# Patient Record
Sex: FEMALE | Race: OTHER | NOT HISPANIC OR LATINO | URBAN - METROPOLITAN AREA
[De-identification: names, ages, dates, MRNs, and addresses within clinical notes are randomized per-mention and may not be internally consistent; named-entity substitution may affect disease eponyms.]

---

## 2020-01-01 ENCOUNTER — INPATIENT (INPATIENT)
Facility: HOSPITAL | Age: 55
LOS: 31 days | DRG: 207 | End: 2020-05-09
Attending: INTERNAL MEDICINE | Admitting: INTERNAL MEDICINE
Payer: COMMERCIAL

## 2020-01-01 VITALS — HEART RATE: 143 BPM | OXYGEN SATURATION: 77 %

## 2020-01-01 VITALS
WEIGHT: 190.04 LBS | HEART RATE: 114 BPM | HEIGHT: 55 IN | SYSTOLIC BLOOD PRESSURE: 104 MMHG | OXYGEN SATURATION: 93 % | TEMPERATURE: 100 F | DIASTOLIC BLOOD PRESSURE: 76 MMHG | RESPIRATION RATE: 16 BRPM

## 2020-01-01 DIAGNOSIS — K21.9 GASTRO-ESOPHAGEAL REFLUX DISEASE WITHOUT ESOPHAGITIS: ICD-10-CM

## 2020-01-01 DIAGNOSIS — J80 ACUTE RESPIRATORY DISTRESS SYNDROME: ICD-10-CM

## 2020-01-01 DIAGNOSIS — I10 ESSENTIAL (PRIMARY) HYPERTENSION: ICD-10-CM

## 2020-01-01 DIAGNOSIS — N17.9 ACUTE KIDNEY FAILURE, UNSPECIFIED: ICD-10-CM

## 2020-01-01 DIAGNOSIS — U07.1 COVID-19: ICD-10-CM

## 2020-01-01 DIAGNOSIS — B34.9 VIRAL INFECTION, UNSPECIFIED: ICD-10-CM

## 2020-01-01 DIAGNOSIS — J18.9 PNEUMONIA, UNSPECIFIED ORGANISM: ICD-10-CM

## 2020-01-01 DIAGNOSIS — I50.9 HEART FAILURE, UNSPECIFIED: ICD-10-CM

## 2020-01-01 DIAGNOSIS — E43 UNSPECIFIED SEVERE PROTEIN-CALORIE MALNUTRITION: ICD-10-CM

## 2020-01-01 DIAGNOSIS — Z29.9 ENCOUNTER FOR PROPHYLACTIC MEASURES, UNSPECIFIED: ICD-10-CM

## 2020-01-01 DIAGNOSIS — Z51.5 ENCOUNTER FOR PALLIATIVE CARE: ICD-10-CM

## 2020-01-01 DIAGNOSIS — A41.9 SEPSIS, UNSPECIFIED ORGANISM: ICD-10-CM

## 2020-01-01 LAB
ALBUMIN SERPL ELPH-MCNC: 1.2 G/DL — LOW (ref 3.5–5)
ALBUMIN SERPL ELPH-MCNC: 1.2 G/DL — LOW (ref 3.5–5)
ALBUMIN SERPL ELPH-MCNC: 1.3 G/DL — LOW (ref 3.5–5)
ALBUMIN SERPL ELPH-MCNC: 1.5 G/DL — LOW (ref 3.5–5)
ALBUMIN SERPL ELPH-MCNC: 1.6 G/DL — LOW (ref 3.5–5)
ALBUMIN SERPL ELPH-MCNC: 1.7 G/DL — LOW (ref 3.5–5)
ALBUMIN SERPL ELPH-MCNC: 1.8 G/DL — LOW (ref 3.5–5)
ALBUMIN SERPL ELPH-MCNC: 1.9 G/DL — LOW (ref 3.5–5)
ALBUMIN SERPL ELPH-MCNC: 2 G/DL — LOW (ref 3.5–5)
ALBUMIN SERPL ELPH-MCNC: 2.1 G/DL — LOW (ref 3.5–5)
ALBUMIN SERPL ELPH-MCNC: 2.4 G/DL — LOW (ref 3.5–5)
ALBUMIN SERPL ELPH-MCNC: 2.4 G/DL — LOW (ref 3.5–5)
ALBUMIN SERPL ELPH-MCNC: 2.5 G/DL — LOW (ref 3.5–5)
ALBUMIN SERPL ELPH-MCNC: 2.6 G/DL — LOW (ref 3.5–5)
ALBUMIN SERPL ELPH-MCNC: 3.1 G/DL — LOW (ref 3.5–5)
ALP SERPL-CCNC: 108 U/L — SIGNIFICANT CHANGE UP (ref 40–120)
ALP SERPL-CCNC: 125 U/L — HIGH (ref 40–120)
ALP SERPL-CCNC: 129 U/L — HIGH (ref 40–120)
ALP SERPL-CCNC: 153 U/L — HIGH (ref 40–120)
ALP SERPL-CCNC: 166 U/L — HIGH (ref 40–120)
ALP SERPL-CCNC: 169 U/L — HIGH (ref 40–120)
ALP SERPL-CCNC: 184 U/L — HIGH (ref 40–120)
ALP SERPL-CCNC: 185 U/L — HIGH (ref 40–120)
ALP SERPL-CCNC: 188 U/L — HIGH (ref 40–120)
ALP SERPL-CCNC: 190 U/L — HIGH (ref 40–120)
ALP SERPL-CCNC: 202 U/L — HIGH (ref 40–120)
ALP SERPL-CCNC: 209 U/L — HIGH (ref 40–120)
ALP SERPL-CCNC: 236 U/L — HIGH (ref 40–120)
ALP SERPL-CCNC: 239 U/L — HIGH (ref 40–120)
ALP SERPL-CCNC: 253 U/L — HIGH (ref 40–120)
ALP SERPL-CCNC: 259 U/L — HIGH (ref 40–120)
ALP SERPL-CCNC: 272 U/L — HIGH (ref 40–120)
ALP SERPL-CCNC: 278 U/L — HIGH (ref 40–120)
ALP SERPL-CCNC: 291 U/L — HIGH (ref 40–120)
ALP SERPL-CCNC: 293 U/L — HIGH (ref 40–120)
ALP SERPL-CCNC: 74 U/L — SIGNIFICANT CHANGE UP (ref 40–120)
ALP SERPL-CCNC: 77 U/L — SIGNIFICANT CHANGE UP (ref 40–120)
ALP SERPL-CCNC: 80 U/L — SIGNIFICANT CHANGE UP (ref 40–120)
ALP SERPL-CCNC: 85 U/L — SIGNIFICANT CHANGE UP (ref 40–120)
ALP SERPL-CCNC: 86 U/L — SIGNIFICANT CHANGE UP (ref 40–120)
ALP SERPL-CCNC: 86 U/L — SIGNIFICANT CHANGE UP (ref 40–120)
ALP SERPL-CCNC: 89 U/L — SIGNIFICANT CHANGE UP (ref 40–120)
ALP SERPL-CCNC: 91 U/L — SIGNIFICANT CHANGE UP (ref 40–120)
ALP SERPL-CCNC: 95 U/L — SIGNIFICANT CHANGE UP (ref 40–120)
ALP SERPL-CCNC: 96 U/L — SIGNIFICANT CHANGE UP (ref 40–120)
ALP SERPL-CCNC: 97 U/L — SIGNIFICANT CHANGE UP (ref 40–120)
ALT FLD-CCNC: 115 U/L DA — HIGH (ref 10–60)
ALT FLD-CCNC: 129 U/L DA — HIGH (ref 10–60)
ALT FLD-CCNC: 29 U/L DA — SIGNIFICANT CHANGE UP (ref 10–60)
ALT FLD-CCNC: 31 U/L DA — SIGNIFICANT CHANGE UP (ref 10–60)
ALT FLD-CCNC: 31 U/L DA — SIGNIFICANT CHANGE UP (ref 10–60)
ALT FLD-CCNC: 33 U/L DA — SIGNIFICANT CHANGE UP (ref 10–60)
ALT FLD-CCNC: 33 U/L DA — SIGNIFICANT CHANGE UP (ref 10–60)
ALT FLD-CCNC: 38 U/L DA — SIGNIFICANT CHANGE UP (ref 10–60)
ALT FLD-CCNC: 38 U/L DA — SIGNIFICANT CHANGE UP (ref 10–60)
ALT FLD-CCNC: 39 U/L DA — SIGNIFICANT CHANGE UP (ref 10–60)
ALT FLD-CCNC: 44 U/L DA — SIGNIFICANT CHANGE UP (ref 10–60)
ALT FLD-CCNC: 46 U/L DA — SIGNIFICANT CHANGE UP (ref 10–60)
ALT FLD-CCNC: 47 U/L DA — SIGNIFICANT CHANGE UP (ref 10–60)
ALT FLD-CCNC: 47 U/L DA — SIGNIFICANT CHANGE UP (ref 10–60)
ALT FLD-CCNC: 49 U/L DA — SIGNIFICANT CHANGE UP (ref 10–60)
ALT FLD-CCNC: 49 U/L DA — SIGNIFICANT CHANGE UP (ref 10–60)
ALT FLD-CCNC: 51 U/L DA — SIGNIFICANT CHANGE UP (ref 10–60)
ALT FLD-CCNC: 51 U/L DA — SIGNIFICANT CHANGE UP (ref 10–60)
ALT FLD-CCNC: 52 U/L DA — SIGNIFICANT CHANGE UP (ref 10–60)
ALT FLD-CCNC: 54 U/L DA — SIGNIFICANT CHANGE UP (ref 10–60)
ALT FLD-CCNC: 55 U/L DA — SIGNIFICANT CHANGE UP (ref 10–60)
ALT FLD-CCNC: 55 U/L DA — SIGNIFICANT CHANGE UP (ref 10–60)
ALT FLD-CCNC: 57 U/L DA — SIGNIFICANT CHANGE UP (ref 10–60)
ALT FLD-CCNC: 63 U/L DA — HIGH (ref 10–60)
ALT FLD-CCNC: 65 U/L DA — HIGH (ref 10–60)
ALT FLD-CCNC: 66 U/L DA — HIGH (ref 10–60)
ALT FLD-CCNC: 68 U/L DA — HIGH (ref 10–60)
ALT FLD-CCNC: 71 U/L DA — HIGH (ref 10–60)
ALT FLD-CCNC: 75 U/L DA — HIGH (ref 10–60)
ALT FLD-CCNC: 87 U/L DA — HIGH (ref 10–60)
ALT FLD-CCNC: 93 U/L DA — HIGH (ref 10–60)
ANION GAP SERPL CALC-SCNC: 10 MMOL/L — SIGNIFICANT CHANGE UP (ref 5–17)
ANION GAP SERPL CALC-SCNC: 11 MMOL/L — SIGNIFICANT CHANGE UP (ref 5–17)
ANION GAP SERPL CALC-SCNC: 12 MMOL/L — SIGNIFICANT CHANGE UP (ref 5–17)
ANION GAP SERPL CALC-SCNC: 12 MMOL/L — SIGNIFICANT CHANGE UP (ref 5–17)
ANION GAP SERPL CALC-SCNC: 13 MMOL/L — SIGNIFICANT CHANGE UP (ref 5–17)
ANION GAP SERPL CALC-SCNC: 13 MMOL/L — SIGNIFICANT CHANGE UP (ref 5–17)
ANION GAP SERPL CALC-SCNC: 14 MMOL/L — SIGNIFICANT CHANGE UP (ref 5–17)
ANION GAP SERPL CALC-SCNC: 15 MMOL/L — SIGNIFICANT CHANGE UP (ref 5–17)
ANION GAP SERPL CALC-SCNC: 16 MMOL/L — SIGNIFICANT CHANGE UP (ref 5–17)
ANION GAP SERPL CALC-SCNC: 17 MMOL/L — SIGNIFICANT CHANGE UP (ref 5–17)
ANION GAP SERPL CALC-SCNC: 5 MMOL/L — SIGNIFICANT CHANGE UP (ref 5–17)
ANION GAP SERPL CALC-SCNC: 6 MMOL/L — SIGNIFICANT CHANGE UP (ref 5–17)
ANION GAP SERPL CALC-SCNC: 7 MMOL/L — SIGNIFICANT CHANGE UP (ref 5–17)
ANION GAP SERPL CALC-SCNC: 7 MMOL/L — SIGNIFICANT CHANGE UP (ref 5–17)
ANION GAP SERPL CALC-SCNC: 8 MMOL/L — SIGNIFICANT CHANGE UP (ref 5–17)
ANION GAP SERPL CALC-SCNC: 9 MMOL/L — SIGNIFICANT CHANGE UP (ref 5–17)
ANISOCYTOSIS BLD QL: SLIGHT — SIGNIFICANT CHANGE UP
APPEARANCE UR: CLEAR — SIGNIFICANT CHANGE UP
APTT BLD: 25.9 SEC — LOW (ref 27.5–36.3)
APTT BLD: 29.4 SEC — SIGNIFICANT CHANGE UP (ref 27.5–36.3)
APTT BLD: 30.7 SEC — SIGNIFICANT CHANGE UP (ref 27.5–36.3)
APTT BLD: 31.8 SEC — SIGNIFICANT CHANGE UP (ref 27.5–36.3)
APTT BLD: 32 SEC — SIGNIFICANT CHANGE UP (ref 27.5–36.3)
APTT BLD: 32.4 SEC — SIGNIFICANT CHANGE UP (ref 27.5–36.3)
APTT BLD: 34.7 SEC — SIGNIFICANT CHANGE UP (ref 27.5–36.3)
APTT BLD: 35.3 SEC — SIGNIFICANT CHANGE UP (ref 27.5–36.3)
AST SERPL-CCNC: 100 U/L — HIGH (ref 10–40)
AST SERPL-CCNC: 105 U/L — HIGH (ref 10–40)
AST SERPL-CCNC: 30 U/L — SIGNIFICANT CHANGE UP (ref 10–40)
AST SERPL-CCNC: 37 U/L — SIGNIFICANT CHANGE UP (ref 10–40)
AST SERPL-CCNC: 40 U/L — SIGNIFICANT CHANGE UP (ref 10–40)
AST SERPL-CCNC: 44 U/L — HIGH (ref 10–40)
AST SERPL-CCNC: 45 U/L — HIGH (ref 10–40)
AST SERPL-CCNC: 45 U/L — HIGH (ref 10–40)
AST SERPL-CCNC: 47 U/L — HIGH (ref 10–40)
AST SERPL-CCNC: 48 U/L — HIGH (ref 10–40)
AST SERPL-CCNC: 51 U/L — HIGH (ref 10–40)
AST SERPL-CCNC: 52 U/L — HIGH (ref 10–40)
AST SERPL-CCNC: 54 U/L — HIGH (ref 10–40)
AST SERPL-CCNC: 55 U/L — HIGH (ref 10–40)
AST SERPL-CCNC: 55 U/L — HIGH (ref 10–40)
AST SERPL-CCNC: 56 U/L — HIGH (ref 10–40)
AST SERPL-CCNC: 57 U/L — HIGH (ref 10–40)
AST SERPL-CCNC: 57 U/L — HIGH (ref 10–40)
AST SERPL-CCNC: 63 U/L — HIGH (ref 10–40)
AST SERPL-CCNC: 64 U/L — HIGH (ref 10–40)
AST SERPL-CCNC: 65 U/L — HIGH (ref 10–40)
AST SERPL-CCNC: 66 U/L — HIGH (ref 10–40)
AST SERPL-CCNC: 68 U/L — HIGH (ref 10–40)
AST SERPL-CCNC: 73 U/L — HIGH (ref 10–40)
AST SERPL-CCNC: 74 U/L — HIGH (ref 10–40)
AST SERPL-CCNC: 75 U/L — HIGH (ref 10–40)
AST SERPL-CCNC: 82 U/L — HIGH (ref 10–40)
AST SERPL-CCNC: 83 U/L — HIGH (ref 10–40)
AST SERPL-CCNC: 84 U/L — HIGH (ref 10–40)
BACTERIA # UR AUTO: ABNORMAL /HPF
BASE EXCESS BLDA CALC-SCNC: -0.7 MMOL/L — SIGNIFICANT CHANGE UP (ref -2–2)
BASE EXCESS BLDA CALC-SCNC: -1.9 MMOL/L — SIGNIFICANT CHANGE UP (ref -2–2)
BASE EXCESS BLDA CALC-SCNC: -2.2 MMOL/L — LOW (ref -2–2)
BASE EXCESS BLDA CALC-SCNC: -2.5 MMOL/L — LOW (ref -2–2)
BASE EXCESS BLDA CALC-SCNC: -2.9 MMOL/L — LOW (ref -2–2)
BASE EXCESS BLDA CALC-SCNC: -3.2 MMOL/L — LOW (ref -2–2)
BASE EXCESS BLDA CALC-SCNC: -3.2 MMOL/L — LOW (ref -2–2)
BASE EXCESS BLDA CALC-SCNC: -3.3 MMOL/L — LOW (ref -2–2)
BASE EXCESS BLDA CALC-SCNC: -3.7 MMOL/L — LOW (ref -2–2)
BASE EXCESS BLDA CALC-SCNC: -4.4 MMOL/L — LOW (ref -2–2)
BASE EXCESS BLDA CALC-SCNC: -4.4 MMOL/L — LOW (ref -2–2)
BASE EXCESS BLDA CALC-SCNC: -4.7 MMOL/L — SIGNIFICANT CHANGE UP (ref -2–2)
BASE EXCESS BLDA CALC-SCNC: -5.1 MMOL/L — LOW (ref -2–2)
BASE EXCESS BLDA CALC-SCNC: -5.2 MMOL/L — LOW (ref -2–2)
BASE EXCESS BLDA CALC-SCNC: -5.3 MMOL/L — LOW (ref -2–2)
BASE EXCESS BLDA CALC-SCNC: -5.3 MMOL/L — LOW (ref -2–2)
BASE EXCESS BLDA CALC-SCNC: -5.4 MMOL/L — LOW (ref -2–2)
BASE EXCESS BLDA CALC-SCNC: -5.9 MMOL/L — LOW (ref -2–2)
BASE EXCESS BLDA CALC-SCNC: -6 MMOL/L — SIGNIFICANT CHANGE UP (ref -2–2)
BASE EXCESS BLDA CALC-SCNC: -6.1 MMOL/L — LOW (ref -2–2)
BASE EXCESS BLDA CALC-SCNC: -7.3 MMOL/L — LOW (ref -2–2)
BASE EXCESS BLDA CALC-SCNC: -7.8 MMOL/L — LOW (ref -2–2)
BASE EXCESS BLDA CALC-SCNC: -8.1 MMOL/L — LOW (ref -2–2)
BASE EXCESS BLDA CALC-SCNC: -9.8 MMOL/L — LOW (ref -2–2)
BASE EXCESS BLDA CALC-SCNC: 0.5 MMOL/L — SIGNIFICANT CHANGE UP (ref -2–2)
BASE EXCESS BLDA CALC-SCNC: 0.6 MMOL/L — SIGNIFICANT CHANGE UP (ref -2–2)
BASE EXCESS BLDA CALC-SCNC: 1.2 MMOL/L — SIGNIFICANT CHANGE UP (ref -2–2)
BASE EXCESS BLDA CALC-SCNC: 1.3 MMOL/L — SIGNIFICANT CHANGE UP (ref -2–2)
BASE EXCESS BLDA CALC-SCNC: 1.3 MMOL/L — SIGNIFICANT CHANGE UP (ref -2–2)
BASE EXCESS BLDA CALC-SCNC: 1.8 MMOL/L — SIGNIFICANT CHANGE UP (ref -2–2)
BASE EXCESS BLDA CALC-SCNC: 1.9 MMOL/L — SIGNIFICANT CHANGE UP (ref -2–2)
BASE EXCESS BLDA CALC-SCNC: 2.2 MMOL/L — HIGH (ref -2–2)
BASE EXCESS BLDA CALC-SCNC: 2.4 MMOL/L — HIGH (ref -2–2)
BASE EXCESS BLDA CALC-SCNC: 2.5 MMOL/L — HIGH (ref -2–2)
BASE EXCESS BLDA CALC-SCNC: 2.6 MMOL/L — HIGH (ref -2–2)
BASE EXCESS BLDA CALC-SCNC: 3.2 MMOL/L — SIGNIFICANT CHANGE UP (ref -2–2)
BASE EXCESS BLDA CALC-SCNC: 3.5 MMOL/L — HIGH (ref -2–2)
BASE EXCESS BLDA CALC-SCNC: 4.3 MMOL/L — HIGH (ref -2–2)
BASE EXCESS BLDA CALC-SCNC: 6.7 MMOL/L — HIGH (ref -2–2)
BASE EXCESS BLDA CALC-SCNC: SIGNIFICANT CHANGE UP MMOL/L (ref -2–2)
BASE EXCESS BLDV CALC-SCNC: -0.6 MMOL/L — SIGNIFICANT CHANGE UP (ref -2–2)
BASE EXCESS BLDV CALC-SCNC: 1.5 MMOL/L — SIGNIFICANT CHANGE UP (ref -2–2)
BASE EXCESS BLDV CALC-SCNC: 4.4 MMOL/L — SIGNIFICANT CHANGE UP (ref -2–2)
BASO STIPL BLD QL SMEAR: PRESENT — SIGNIFICANT CHANGE UP
BASO STIPL BLD QL SMEAR: PRESENT — SIGNIFICANT CHANGE UP
BASOPHILS # BLD AUTO: 0 K/UL — SIGNIFICANT CHANGE UP (ref 0–0.2)
BASOPHILS # BLD AUTO: 0 K/UL — SIGNIFICANT CHANGE UP (ref 0–0.2)
BASOPHILS # BLD AUTO: 0.01 K/UL — SIGNIFICANT CHANGE UP (ref 0–0.2)
BASOPHILS # BLD AUTO: 0.02 K/UL — SIGNIFICANT CHANGE UP (ref 0–0.2)
BASOPHILS # BLD AUTO: 0.04 K/UL — SIGNIFICANT CHANGE UP (ref 0–0.2)
BASOPHILS # BLD AUTO: 0.1 K/UL — SIGNIFICANT CHANGE UP (ref 0–0.2)
BASOPHILS # BLD AUTO: 0.11 K/UL — SIGNIFICANT CHANGE UP (ref 0–0.2)
BASOPHILS # BLD AUTO: 0.19 K/UL — SIGNIFICANT CHANGE UP (ref 0–0.2)
BASOPHILS NFR BLD AUTO: 0 % — SIGNIFICANT CHANGE UP (ref 0–2)
BASOPHILS NFR BLD AUTO: 0 % — SIGNIFICANT CHANGE UP (ref 0–2)
BASOPHILS NFR BLD AUTO: 0.2 % — SIGNIFICANT CHANGE UP (ref 0–2)
BASOPHILS NFR BLD AUTO: 0.2 % — SIGNIFICANT CHANGE UP (ref 0–2)
BASOPHILS NFR BLD AUTO: 0.3 % — SIGNIFICANT CHANGE UP (ref 0–2)
BASOPHILS NFR BLD AUTO: 0.4 % — SIGNIFICANT CHANGE UP (ref 0–2)
BASOPHILS NFR BLD AUTO: 0.8 % — SIGNIFICANT CHANGE UP (ref 0–2)
BASOPHILS NFR BLD AUTO: 1 % — SIGNIFICANT CHANGE UP (ref 0–2)
BILIRUB SERPL-MCNC: 0.4 MG/DL — SIGNIFICANT CHANGE UP (ref 0.2–1.2)
BILIRUB SERPL-MCNC: 0.4 MG/DL — SIGNIFICANT CHANGE UP (ref 0.2–1.2)
BILIRUB SERPL-MCNC: 0.5 MG/DL — SIGNIFICANT CHANGE UP (ref 0.2–1.2)
BILIRUB SERPL-MCNC: 0.6 MG/DL — SIGNIFICANT CHANGE UP (ref 0.2–1.2)
BILIRUB SERPL-MCNC: 0.9 MG/DL — SIGNIFICANT CHANGE UP (ref 0.2–1.2)
BILIRUB SERPL-MCNC: 1 MG/DL — SIGNIFICANT CHANGE UP (ref 0.2–1.2)
BILIRUB SERPL-MCNC: 1.1 MG/DL — SIGNIFICANT CHANGE UP (ref 0.2–1.2)
BILIRUB SERPL-MCNC: 1.1 MG/DL — SIGNIFICANT CHANGE UP (ref 0.2–1.2)
BILIRUB SERPL-MCNC: 1.7 MG/DL — HIGH (ref 0.2–1.2)
BILIRUB SERPL-MCNC: 1.8 MG/DL — HIGH (ref 0.2–1.2)
BILIRUB SERPL-MCNC: 1.8 MG/DL — HIGH (ref 0.2–1.2)
BILIRUB SERPL-MCNC: 1.9 MG/DL — HIGH (ref 0.2–1.2)
BILIRUB SERPL-MCNC: 3 MG/DL — HIGH (ref 0.2–1.2)
BILIRUB SERPL-MCNC: 3.2 MG/DL — HIGH (ref 0.2–1.2)
BILIRUB SERPL-MCNC: 3.3 MG/DL — HIGH (ref 0.2–1.2)
BILIRUB SERPL-MCNC: 3.5 MG/DL — HIGH (ref 0.2–1.2)
BILIRUB SERPL-MCNC: 3.6 MG/DL — HIGH (ref 0.2–1.2)
BILIRUB SERPL-MCNC: 3.7 MG/DL — HIGH (ref 0.2–1.2)
BILIRUB SERPL-MCNC: 3.8 MG/DL — HIGH (ref 0.2–1.2)
BILIRUB SERPL-MCNC: 3.9 MG/DL — HIGH (ref 0.2–1.2)
BILIRUB SERPL-MCNC: 4 MG/DL — HIGH (ref 0.2–1.2)
BILIRUB SERPL-MCNC: 4.2 MG/DL — HIGH (ref 0.2–1.2)
BILIRUB SERPL-MCNC: 4.3 MG/DL — HIGH (ref 0.2–1.2)
BILIRUB UR-MCNC: ABNORMAL
BLOOD GAS COMMENTS ARTERIAL: SIGNIFICANT CHANGE UP
BLOOD GAS COMMENTS, VENOUS: SIGNIFICANT CHANGE UP
BLOOD GAS COMMENTS, VENOUS: SIGNIFICANT CHANGE UP
BUN SERPL-MCNC: 19 MG/DL — HIGH (ref 7–18)
BUN SERPL-MCNC: 20 MG/DL — HIGH (ref 7–18)
BUN SERPL-MCNC: 20 MG/DL — HIGH (ref 7–18)
BUN SERPL-MCNC: 21 MG/DL — HIGH (ref 7–18)
BUN SERPL-MCNC: 24 MG/DL — HIGH (ref 7–18)
BUN SERPL-MCNC: 25 MG/DL — HIGH (ref 7–18)
BUN SERPL-MCNC: 25 MG/DL — HIGH (ref 7–18)
BUN SERPL-MCNC: 26 MG/DL — HIGH (ref 7–18)
BUN SERPL-MCNC: 27 MG/DL — HIGH (ref 7–18)
BUN SERPL-MCNC: 28 MG/DL — HIGH (ref 7–18)
BUN SERPL-MCNC: 29 MG/DL — HIGH (ref 7–18)
BUN SERPL-MCNC: 32 MG/DL — HIGH (ref 7–18)
BUN SERPL-MCNC: 32 MG/DL — HIGH (ref 7–18)
BUN SERPL-MCNC: 33 MG/DL — HIGH (ref 7–18)
BUN SERPL-MCNC: 36 MG/DL — HIGH (ref 7–18)
BUN SERPL-MCNC: 37 MG/DL — HIGH (ref 7–18)
BUN SERPL-MCNC: 39 MG/DL — HIGH (ref 7–18)
BUN SERPL-MCNC: 41 MG/DL — HIGH (ref 7–18)
BUN SERPL-MCNC: 41 MG/DL — HIGH (ref 7–18)
BUN SERPL-MCNC: 42 MG/DL — HIGH (ref 7–18)
BUN SERPL-MCNC: 45 MG/DL — HIGH (ref 7–18)
BUN SERPL-MCNC: 46 MG/DL — HIGH (ref 7–18)
BUN SERPL-MCNC: 46 MG/DL — HIGH (ref 7–18)
BUN SERPL-MCNC: 48 MG/DL — HIGH (ref 7–18)
BUN SERPL-MCNC: 48 MG/DL — HIGH (ref 7–18)
BUN SERPL-MCNC: 54 MG/DL — HIGH (ref 7–18)
BUN SERPL-MCNC: 58 MG/DL — HIGH (ref 7–18)
BUN SERPL-MCNC: 58 MG/DL — HIGH (ref 7–18)
BUN SERPL-MCNC: 59 MG/DL — HIGH (ref 7–18)
BUN SERPL-MCNC: 69 MG/DL — HIGH (ref 7–18)
BUN SERPL-MCNC: 82 MG/DL — HIGH (ref 7–18)
CALCIUM SERPL-MCNC: 7.8 MG/DL — LOW (ref 8.4–10.5)
CALCIUM SERPL-MCNC: 8.3 MG/DL — LOW (ref 8.4–10.5)
CALCIUM SERPL-MCNC: 8.4 MG/DL — SIGNIFICANT CHANGE UP (ref 8.4–10.5)
CALCIUM SERPL-MCNC: 8.5 MG/DL — SIGNIFICANT CHANGE UP (ref 8.4–10.5)
CALCIUM SERPL-MCNC: 8.6 MG/DL — SIGNIFICANT CHANGE UP (ref 8.4–10.5)
CALCIUM SERPL-MCNC: 8.7 MG/DL — SIGNIFICANT CHANGE UP (ref 8.4–10.5)
CALCIUM SERPL-MCNC: 8.8 MG/DL — SIGNIFICANT CHANGE UP (ref 8.4–10.5)
CALCIUM SERPL-MCNC: 8.9 MG/DL — SIGNIFICANT CHANGE UP (ref 8.4–10.5)
CALCIUM SERPL-MCNC: 9 MG/DL — SIGNIFICANT CHANGE UP (ref 8.4–10.5)
CALCIUM SERPL-MCNC: 9.1 MG/DL — SIGNIFICANT CHANGE UP (ref 8.4–10.5)
CALCIUM SERPL-MCNC: 9.2 MG/DL — SIGNIFICANT CHANGE UP (ref 8.4–10.5)
CALCIUM SERPL-MCNC: 9.4 MG/DL — SIGNIFICANT CHANGE UP (ref 8.4–10.5)
CALCIUM SERPL-MCNC: 9.5 MG/DL — SIGNIFICANT CHANGE UP (ref 8.4–10.5)
CALCIUM SERPL-MCNC: 9.6 MG/DL — SIGNIFICANT CHANGE UP (ref 8.4–10.5)
CHLORIDE SERPL-SCNC: 100 MMOL/L — SIGNIFICANT CHANGE UP (ref 96–108)
CHLORIDE SERPL-SCNC: 104 MMOL/L — SIGNIFICANT CHANGE UP (ref 96–108)
CHLORIDE SERPL-SCNC: 104 MMOL/L — SIGNIFICANT CHANGE UP (ref 96–108)
CHLORIDE SERPL-SCNC: 105 MMOL/L — SIGNIFICANT CHANGE UP (ref 96–108)
CHLORIDE SERPL-SCNC: 105 MMOL/L — SIGNIFICANT CHANGE UP (ref 96–108)
CHLORIDE SERPL-SCNC: 107 MMOL/L — SIGNIFICANT CHANGE UP (ref 96–108)
CHLORIDE SERPL-SCNC: 107 MMOL/L — SIGNIFICANT CHANGE UP (ref 96–108)
CHLORIDE SERPL-SCNC: 108 MMOL/L — SIGNIFICANT CHANGE UP (ref 96–108)
CHLORIDE SERPL-SCNC: 109 MMOL/L — HIGH (ref 96–108)
CHLORIDE SERPL-SCNC: 109 MMOL/L — HIGH (ref 96–108)
CHLORIDE SERPL-SCNC: 110 MMOL/L — HIGH (ref 96–108)
CHLORIDE SERPL-SCNC: 111 MMOL/L — HIGH (ref 96–108)
CHLORIDE SERPL-SCNC: 113 MMOL/L — HIGH (ref 96–108)
CHLORIDE SERPL-SCNC: 94 MMOL/L — LOW (ref 96–108)
CHLORIDE SERPL-SCNC: 95 MMOL/L — LOW (ref 96–108)
CHLORIDE SERPL-SCNC: 95 MMOL/L — LOW (ref 96–108)
CHLORIDE SERPL-SCNC: 96 MMOL/L — SIGNIFICANT CHANGE UP (ref 96–108)
CHLORIDE SERPL-SCNC: 97 MMOL/L — SIGNIFICANT CHANGE UP (ref 96–108)
CHLORIDE SERPL-SCNC: 98 MMOL/L — SIGNIFICANT CHANGE UP (ref 96–108)
CHLORIDE SERPL-SCNC: 99 MMOL/L — SIGNIFICANT CHANGE UP (ref 96–108)
CHLORIDE SERPL-SCNC: 99 MMOL/L — SIGNIFICANT CHANGE UP (ref 96–108)
CK SERPL-CCNC: 181 U/L — SIGNIFICANT CHANGE UP (ref 21–215)
CK SERPL-CCNC: 193 U/L — SIGNIFICANT CHANGE UP (ref 21–215)
CK SERPL-CCNC: 204 U/L — SIGNIFICANT CHANGE UP (ref 21–215)
CK SERPL-CCNC: 29 U/L — SIGNIFICANT CHANGE UP (ref 21–215)
CO2 SERPL-SCNC: 19 MMOL/L — LOW (ref 22–31)
CO2 SERPL-SCNC: 19 MMOL/L — LOW (ref 22–31)
CO2 SERPL-SCNC: 20 MMOL/L — LOW (ref 22–31)
CO2 SERPL-SCNC: 22 MMOL/L — SIGNIFICANT CHANGE UP (ref 22–31)
CO2 SERPL-SCNC: 23 MMOL/L — SIGNIFICANT CHANGE UP (ref 22–31)
CO2 SERPL-SCNC: 24 MMOL/L — SIGNIFICANT CHANGE UP (ref 22–31)
CO2 SERPL-SCNC: 25 MMOL/L — SIGNIFICANT CHANGE UP (ref 22–31)
CO2 SERPL-SCNC: 26 MMOL/L — SIGNIFICANT CHANGE UP (ref 22–31)
CO2 SERPL-SCNC: 26 MMOL/L — SIGNIFICANT CHANGE UP (ref 22–31)
CO2 SERPL-SCNC: 27 MMOL/L — SIGNIFICANT CHANGE UP (ref 22–31)
CO2 SERPL-SCNC: 28 MMOL/L — SIGNIFICANT CHANGE UP (ref 22–31)
CO2 SERPL-SCNC: 29 MMOL/L — SIGNIFICANT CHANGE UP (ref 22–31)
CO2 SERPL-SCNC: 30 MMOL/L — SIGNIFICANT CHANGE UP (ref 22–31)
COLOR SPEC: ABNORMAL
CORTIS AM PEAK SERPL-MCNC: 35.4 UG/DL — HIGH (ref 6–18.4)
CREAT SERPL-MCNC: 0.63 MG/DL — SIGNIFICANT CHANGE UP (ref 0.5–1.3)
CREAT SERPL-MCNC: 0.65 MG/DL — SIGNIFICANT CHANGE UP (ref 0.5–1.3)
CREAT SERPL-MCNC: 0.66 MG/DL — SIGNIFICANT CHANGE UP (ref 0.5–1.3)
CREAT SERPL-MCNC: 0.66 MG/DL — SIGNIFICANT CHANGE UP (ref 0.5–1.3)
CREAT SERPL-MCNC: 0.72 MG/DL — SIGNIFICANT CHANGE UP (ref 0.5–1.3)
CREAT SERPL-MCNC: 0.76 MG/DL — SIGNIFICANT CHANGE UP (ref 0.5–1.3)
CREAT SERPL-MCNC: 0.78 MG/DL — SIGNIFICANT CHANGE UP (ref 0.5–1.3)
CREAT SERPL-MCNC: 0.78 MG/DL — SIGNIFICANT CHANGE UP (ref 0.5–1.3)
CREAT SERPL-MCNC: 0.79 MG/DL — SIGNIFICANT CHANGE UP (ref 0.5–1.3)
CREAT SERPL-MCNC: 0.84 MG/DL — SIGNIFICANT CHANGE UP (ref 0.5–1.3)
CREAT SERPL-MCNC: 0.94 MG/DL — SIGNIFICANT CHANGE UP (ref 0.5–1.3)
CREAT SERPL-MCNC: 0.96 MG/DL — SIGNIFICANT CHANGE UP (ref 0.5–1.3)
CREAT SERPL-MCNC: 1.53 MG/DL — HIGH (ref 0.5–1.3)
CREAT SERPL-MCNC: 2.32 MG/DL — HIGH (ref 0.5–1.3)
CREAT SERPL-MCNC: 2.95 MG/DL — HIGH (ref 0.5–1.3)
CREAT SERPL-MCNC: 3.27 MG/DL — HIGH (ref 0.5–1.3)
CREAT SERPL-MCNC: 3.44 MG/DL — HIGH (ref 0.5–1.3)
CREAT SERPL-MCNC: 3.46 MG/DL — HIGH (ref 0.5–1.3)
CREAT SERPL-MCNC: 3.84 MG/DL — HIGH (ref 0.5–1.3)
CREAT SERPL-MCNC: 3.91 MG/DL — HIGH (ref 0.5–1.3)
CREAT SERPL-MCNC: 3.95 MG/DL — HIGH (ref 0.5–1.3)
CREAT SERPL-MCNC: 3.98 MG/DL — HIGH (ref 0.5–1.3)
CREAT SERPL-MCNC: 4.02 MG/DL — HIGH (ref 0.5–1.3)
CREAT SERPL-MCNC: 4.17 MG/DL — HIGH (ref 0.5–1.3)
CREAT SERPL-MCNC: 4.37 MG/DL — HIGH (ref 0.5–1.3)
CREAT SERPL-MCNC: 4.48 MG/DL — HIGH (ref 0.5–1.3)
CREAT SERPL-MCNC: 4.52 MG/DL — HIGH (ref 0.5–1.3)
CREAT SERPL-MCNC: 4.59 MG/DL — HIGH (ref 0.5–1.3)
CREAT SERPL-MCNC: 4.73 MG/DL — HIGH (ref 0.5–1.3)
CREAT SERPL-MCNC: 4.92 MG/DL — HIGH (ref 0.5–1.3)
CREAT SERPL-MCNC: 5.03 MG/DL — HIGH (ref 0.5–1.3)
CREAT SERPL-MCNC: 5.28 MG/DL — HIGH (ref 0.5–1.3)
CREAT SERPL-MCNC: 5.44 MG/DL — HIGH (ref 0.5–1.3)
CREAT SERPL-MCNC: 5.92 MG/DL — HIGH (ref 0.5–1.3)
CRP SERPL-MCNC: 10.61 MG/DL — HIGH (ref 0–0.4)
CRP SERPL-MCNC: 12.16 MG/DL — HIGH (ref 0–0.4)
CRP SERPL-MCNC: 12.2 MG/DL — HIGH (ref 0–0.4)
CRP SERPL-MCNC: 12.67 MG/DL — HIGH (ref 0–0.4)
CRP SERPL-MCNC: 13.36 MG/DL — HIGH (ref 0–0.4)
CRP SERPL-MCNC: 15.07 MG/DL — HIGH (ref 0–0.4)
CRP SERPL-MCNC: 15.69 MG/DL — HIGH (ref 0–0.4)
CRP SERPL-MCNC: 2.76 MG/DL — HIGH (ref 0–0.4)
CRP SERPL-MCNC: 20.66 MG/DL — HIGH (ref 0–0.4)
CRP SERPL-MCNC: 21.77 MG/DL — HIGH (ref 0–0.4)
CRP SERPL-MCNC: 22.01 MG/DL — HIGH (ref 0–0.4)
CRP SERPL-MCNC: 29.76 MG/DL — HIGH (ref 0–0.4)
CRP SERPL-MCNC: 5.96 MG/DL — HIGH (ref 0–0.4)
CRP SERPL-MCNC: 6.3 MG/DL — HIGH (ref 0–0.4)
CRP SERPL-MCNC: 6.61 MG/DL — HIGH (ref 0–0.4)
CRP SERPL-MCNC: 7.69 MG/DL — HIGH (ref 0–0.4)
CRP SERPL-MCNC: 8.14 MG/DL — HIGH (ref 0–0.4)
CRP SERPL-MCNC: 9.46 MG/DL — HIGH (ref 0–0.4)
CULTURE RESULTS: SIGNIFICANT CHANGE UP
CULTURE RESULTS: SIGNIFICANT CHANGE UP
D DIMER BLD IA.RAPID-MCNC: 1142 NG/ML DDU — HIGH
D DIMER BLD IA.RAPID-MCNC: 1166 NG/ML DDU — HIGH
D DIMER BLD IA.RAPID-MCNC: 1351 NG/ML DDU — HIGH
D DIMER BLD IA.RAPID-MCNC: 1401 NG/ML DDU — HIGH
D DIMER BLD IA.RAPID-MCNC: 1901 NG/ML DDU — HIGH
D DIMER BLD IA.RAPID-MCNC: 1936 NG/ML DDU — HIGH
D DIMER BLD IA.RAPID-MCNC: 2194 NG/ML DDU — HIGH
D DIMER BLD IA.RAPID-MCNC: 2219 NG/ML DDU — HIGH
D DIMER BLD IA.RAPID-MCNC: 369 NG/ML DDU — HIGH
D DIMER BLD IA.RAPID-MCNC: 675 NG/ML DDU — HIGH
D DIMER BLD IA.RAPID-MCNC: 692 NG/ML DDU — HIGH
D DIMER BLD IA.RAPID-MCNC: 820 NG/ML DDU — HIGH
DACRYOCYTES BLD QL SMEAR: SLIGHT — SIGNIFICANT CHANGE UP
DIFF PNL FLD: ABNORMAL
EOSINOPHIL # BLD AUTO: 0 K/UL — SIGNIFICANT CHANGE UP (ref 0–0.5)
EOSINOPHIL # BLD AUTO: 0 K/UL — SIGNIFICANT CHANGE UP (ref 0–0.5)
EOSINOPHIL # BLD AUTO: 0.01 K/UL — SIGNIFICANT CHANGE UP (ref 0–0.5)
EOSINOPHIL # BLD AUTO: 0.19 K/UL — SIGNIFICANT CHANGE UP (ref 0–0.5)
EOSINOPHIL # BLD AUTO: 0.2 K/UL — SIGNIFICANT CHANGE UP (ref 0–0.5)
EOSINOPHIL # BLD AUTO: 0.34 K/UL — SIGNIFICANT CHANGE UP (ref 0–0.5)
EOSINOPHIL # BLD AUTO: 0.45 K/UL — SIGNIFICANT CHANGE UP (ref 0–0.5)
EOSINOPHIL # BLD AUTO: 0.7 K/UL — HIGH (ref 0–0.5)
EOSINOPHIL NFR BLD AUTO: 0 % — SIGNIFICANT CHANGE UP (ref 0–6)
EOSINOPHIL NFR BLD AUTO: 0 % — SIGNIFICANT CHANGE UP (ref 0–6)
EOSINOPHIL NFR BLD AUTO: 0.1 % — SIGNIFICANT CHANGE UP (ref 0–6)
EOSINOPHIL NFR BLD AUTO: 1 % — SIGNIFICANT CHANGE UP (ref 0–6)
EOSINOPHIL NFR BLD AUTO: 1.4 % — SIGNIFICANT CHANGE UP (ref 0–6)
EOSINOPHIL NFR BLD AUTO: 1.6 % — SIGNIFICANT CHANGE UP (ref 0–6)
EOSINOPHIL NFR BLD AUTO: 3 % — SIGNIFICANT CHANGE UP (ref 0–6)
EOSINOPHIL NFR BLD AUTO: 5.2 % — SIGNIFICANT CHANGE UP (ref 0–6)
EPI CELLS # UR: SIGNIFICANT CHANGE UP /HPF
ERYTHROCYTE [SEDIMENTATION RATE] IN BLOOD: 108 MM/HR — HIGH (ref 0–20)
ERYTHROCYTE [SEDIMENTATION RATE] IN BLOOD: 115 MM/HR — HIGH (ref 0–20)
ERYTHROCYTE [SEDIMENTATION RATE] IN BLOOD: 50 MM/HR — HIGH (ref 0–20)
ERYTHROCYTE [SEDIMENTATION RATE] IN BLOOD: 57 MM/HR — HIGH (ref 0–20)
ERYTHROCYTE [SEDIMENTATION RATE] IN BLOOD: 80 MM/HR — HIGH (ref 0–20)
ERYTHROCYTE [SEDIMENTATION RATE] IN BLOOD: 90 MM/HR — HIGH (ref 0–20)
ERYTHROCYTE [SEDIMENTATION RATE] IN BLOOD: 92 MM/HR — HIGH (ref 0–20)
ERYTHROCYTE [SEDIMENTATION RATE] IN BLOOD: 96 MM/HR — HIGH (ref 0–20)
ERYTHROCYTE [SEDIMENTATION RATE] IN BLOOD: 96 MM/HR — HIGH (ref 0–20)
FERRITIN SERPL-MCNC: 1055 NG/ML — HIGH (ref 15–150)
FERRITIN SERPL-MCNC: 1091 NG/ML — HIGH (ref 15–150)
FERRITIN SERPL-MCNC: 1116 NG/ML — HIGH (ref 15–150)
FERRITIN SERPL-MCNC: 1181 NG/ML — HIGH (ref 15–150)
FERRITIN SERPL-MCNC: 1207 NG/ML — HIGH (ref 15–150)
FERRITIN SERPL-MCNC: 1470 NG/ML — HIGH (ref 15–150)
FERRITIN SERPL-MCNC: 776 NG/ML — HIGH (ref 15–150)
FERRITIN SERPL-MCNC: 829 NG/ML — HIGH (ref 15–150)
FERRITIN SERPL-MCNC: 935 NG/ML — HIGH (ref 15–150)
FERRITIN SERPL-MCNC: 944 NG/ML — HIGH (ref 15–150)
FERRITIN SERPL-MCNC: 951 NG/ML — HIGH (ref 15–150)
FERRITIN SERPL-MCNC: 989 NG/ML — HIGH (ref 15–150)
FERRITIN SERPL-MCNC: 991 NG/ML — HIGH (ref 15–150)
G6PD RBC-CCNC: 12.2 U/G HGB — SIGNIFICANT CHANGE UP (ref 7–20.5)
GIANT PLATELETS BLD QL SMEAR: PRESENT — SIGNIFICANT CHANGE UP
GIANT PLATELETS BLD QL SMEAR: PRESENT — SIGNIFICANT CHANGE UP
GLUCOSE BLDC GLUCOMTR-MCNC: 101 MG/DL — HIGH (ref 70–99)
GLUCOSE BLDC GLUCOMTR-MCNC: 102 MG/DL — HIGH (ref 70–99)
GLUCOSE BLDC GLUCOMTR-MCNC: 103 MG/DL — HIGH (ref 70–99)
GLUCOSE BLDC GLUCOMTR-MCNC: 104 MG/DL — HIGH (ref 70–99)
GLUCOSE BLDC GLUCOMTR-MCNC: 105 MG/DL — HIGH (ref 70–99)
GLUCOSE BLDC GLUCOMTR-MCNC: 106 MG/DL — HIGH (ref 70–99)
GLUCOSE BLDC GLUCOMTR-MCNC: 107 MG/DL — HIGH (ref 70–99)
GLUCOSE BLDC GLUCOMTR-MCNC: 108 MG/DL — HIGH (ref 70–99)
GLUCOSE BLDC GLUCOMTR-MCNC: 109 MG/DL — HIGH (ref 70–99)
GLUCOSE BLDC GLUCOMTR-MCNC: 110 MG/DL — HIGH (ref 70–99)
GLUCOSE BLDC GLUCOMTR-MCNC: 111 MG/DL — HIGH (ref 70–99)
GLUCOSE BLDC GLUCOMTR-MCNC: 111 MG/DL — HIGH (ref 70–99)
GLUCOSE BLDC GLUCOMTR-MCNC: 113 MG/DL — HIGH (ref 70–99)
GLUCOSE BLDC GLUCOMTR-MCNC: 115 MG/DL — HIGH (ref 70–99)
GLUCOSE BLDC GLUCOMTR-MCNC: 115 MG/DL — HIGH (ref 70–99)
GLUCOSE BLDC GLUCOMTR-MCNC: 117 MG/DL — HIGH (ref 70–99)
GLUCOSE BLDC GLUCOMTR-MCNC: 120 MG/DL — HIGH (ref 70–99)
GLUCOSE BLDC GLUCOMTR-MCNC: 121 MG/DL — HIGH (ref 70–99)
GLUCOSE BLDC GLUCOMTR-MCNC: 122 MG/DL — HIGH (ref 70–99)
GLUCOSE BLDC GLUCOMTR-MCNC: 122 MG/DL — HIGH (ref 70–99)
GLUCOSE BLDC GLUCOMTR-MCNC: 123 MG/DL — HIGH (ref 70–99)
GLUCOSE BLDC GLUCOMTR-MCNC: 125 MG/DL — HIGH (ref 70–99)
GLUCOSE BLDC GLUCOMTR-MCNC: 126 MG/DL — HIGH (ref 70–99)
GLUCOSE BLDC GLUCOMTR-MCNC: 128 MG/DL — HIGH (ref 70–99)
GLUCOSE BLDC GLUCOMTR-MCNC: 129 MG/DL — HIGH (ref 70–99)
GLUCOSE BLDC GLUCOMTR-MCNC: 129 MG/DL — HIGH (ref 70–99)
GLUCOSE BLDC GLUCOMTR-MCNC: 133 MG/DL — HIGH (ref 70–99)
GLUCOSE BLDC GLUCOMTR-MCNC: 134 MG/DL — HIGH (ref 70–99)
GLUCOSE BLDC GLUCOMTR-MCNC: 138 MG/DL — HIGH (ref 70–99)
GLUCOSE BLDC GLUCOMTR-MCNC: 148 MG/DL — HIGH (ref 70–99)
GLUCOSE BLDC GLUCOMTR-MCNC: 167 MG/DL — HIGH (ref 70–99)
GLUCOSE BLDC GLUCOMTR-MCNC: 198 MG/DL — HIGH (ref 70–99)
GLUCOSE BLDC GLUCOMTR-MCNC: 220 MG/DL — HIGH (ref 70–99)
GLUCOSE BLDC GLUCOMTR-MCNC: 23 MG/DL — CRITICAL LOW (ref 70–99)
GLUCOSE BLDC GLUCOMTR-MCNC: 23 MG/DL — CRITICAL LOW (ref 70–99)
GLUCOSE BLDC GLUCOMTR-MCNC: 37 MG/DL — CRITICAL LOW (ref 70–99)
GLUCOSE BLDC GLUCOMTR-MCNC: 51 MG/DL — LOW (ref 70–99)
GLUCOSE BLDC GLUCOMTR-MCNC: 57 MG/DL — LOW (ref 70–99)
GLUCOSE BLDC GLUCOMTR-MCNC: 66 MG/DL — LOW (ref 70–99)
GLUCOSE BLDC GLUCOMTR-MCNC: 70 MG/DL — SIGNIFICANT CHANGE UP (ref 70–99)
GLUCOSE BLDC GLUCOMTR-MCNC: 70 MG/DL — SIGNIFICANT CHANGE UP (ref 70–99)
GLUCOSE BLDC GLUCOMTR-MCNC: 71 MG/DL — SIGNIFICANT CHANGE UP (ref 70–99)
GLUCOSE BLDC GLUCOMTR-MCNC: 79 MG/DL — SIGNIFICANT CHANGE UP (ref 70–99)
GLUCOSE BLDC GLUCOMTR-MCNC: 81 MG/DL — SIGNIFICANT CHANGE UP (ref 70–99)
GLUCOSE BLDC GLUCOMTR-MCNC: 82 MG/DL — SIGNIFICANT CHANGE UP (ref 70–99)
GLUCOSE BLDC GLUCOMTR-MCNC: 82 MG/DL — SIGNIFICANT CHANGE UP (ref 70–99)
GLUCOSE BLDC GLUCOMTR-MCNC: 83 MG/DL — SIGNIFICANT CHANGE UP (ref 70–99)
GLUCOSE BLDC GLUCOMTR-MCNC: 84 MG/DL — SIGNIFICANT CHANGE UP (ref 70–99)
GLUCOSE BLDC GLUCOMTR-MCNC: 84 MG/DL — SIGNIFICANT CHANGE UP (ref 70–99)
GLUCOSE BLDC GLUCOMTR-MCNC: 85 MG/DL — SIGNIFICANT CHANGE UP (ref 70–99)
GLUCOSE BLDC GLUCOMTR-MCNC: 87 MG/DL — SIGNIFICANT CHANGE UP (ref 70–99)
GLUCOSE BLDC GLUCOMTR-MCNC: 88 MG/DL — SIGNIFICANT CHANGE UP (ref 70–99)
GLUCOSE BLDC GLUCOMTR-MCNC: 90 MG/DL — SIGNIFICANT CHANGE UP (ref 70–99)
GLUCOSE BLDC GLUCOMTR-MCNC: 93 MG/DL — SIGNIFICANT CHANGE UP (ref 70–99)
GLUCOSE BLDC GLUCOMTR-MCNC: 94 MG/DL — SIGNIFICANT CHANGE UP (ref 70–99)
GLUCOSE BLDC GLUCOMTR-MCNC: 94 MG/DL — SIGNIFICANT CHANGE UP (ref 70–99)
GLUCOSE BLDC GLUCOMTR-MCNC: 95 MG/DL — SIGNIFICANT CHANGE UP (ref 70–99)
GLUCOSE BLDC GLUCOMTR-MCNC: 96 MG/DL — SIGNIFICANT CHANGE UP (ref 70–99)
GLUCOSE BLDC GLUCOMTR-MCNC: 96 MG/DL — SIGNIFICANT CHANGE UP (ref 70–99)
GLUCOSE BLDC GLUCOMTR-MCNC: 97 MG/DL — SIGNIFICANT CHANGE UP (ref 70–99)
GLUCOSE BLDC GLUCOMTR-MCNC: 97 MG/DL — SIGNIFICANT CHANGE UP (ref 70–99)
GLUCOSE BLDC GLUCOMTR-MCNC: 98 MG/DL — SIGNIFICANT CHANGE UP (ref 70–99)
GLUCOSE BLDC GLUCOMTR-MCNC: 98 MG/DL — SIGNIFICANT CHANGE UP (ref 70–99)
GLUCOSE SERPL-MCNC: 100 MG/DL — HIGH (ref 70–99)
GLUCOSE SERPL-MCNC: 103 MG/DL — HIGH (ref 70–99)
GLUCOSE SERPL-MCNC: 103 MG/DL — HIGH (ref 70–99)
GLUCOSE SERPL-MCNC: 110 MG/DL — HIGH (ref 70–99)
GLUCOSE SERPL-MCNC: 111 MG/DL — HIGH (ref 70–99)
GLUCOSE SERPL-MCNC: 114 MG/DL — HIGH (ref 70–99)
GLUCOSE SERPL-MCNC: 117 MG/DL — HIGH (ref 70–99)
GLUCOSE SERPL-MCNC: 118 MG/DL — HIGH (ref 70–99)
GLUCOSE SERPL-MCNC: 120 MG/DL — HIGH (ref 70–99)
GLUCOSE SERPL-MCNC: 124 MG/DL — HIGH (ref 70–99)
GLUCOSE SERPL-MCNC: 132 MG/DL — HIGH (ref 70–99)
GLUCOSE SERPL-MCNC: 133 MG/DL — HIGH (ref 70–99)
GLUCOSE SERPL-MCNC: 136 MG/DL — HIGH (ref 70–99)
GLUCOSE SERPL-MCNC: 68 MG/DL — LOW (ref 70–99)
GLUCOSE SERPL-MCNC: 69 MG/DL — LOW (ref 70–99)
GLUCOSE SERPL-MCNC: 73 MG/DL — SIGNIFICANT CHANGE UP (ref 70–99)
GLUCOSE SERPL-MCNC: 76 MG/DL — SIGNIFICANT CHANGE UP (ref 70–99)
GLUCOSE SERPL-MCNC: 77 MG/DL — SIGNIFICANT CHANGE UP (ref 70–99)
GLUCOSE SERPL-MCNC: 81 MG/DL — SIGNIFICANT CHANGE UP (ref 70–99)
GLUCOSE SERPL-MCNC: 82 MG/DL — SIGNIFICANT CHANGE UP (ref 70–99)
GLUCOSE SERPL-MCNC: 82 MG/DL — SIGNIFICANT CHANGE UP (ref 70–99)
GLUCOSE SERPL-MCNC: 85 MG/DL — SIGNIFICANT CHANGE UP (ref 70–99)
GLUCOSE SERPL-MCNC: 86 MG/DL — SIGNIFICANT CHANGE UP (ref 70–99)
GLUCOSE SERPL-MCNC: 86 MG/DL — SIGNIFICANT CHANGE UP (ref 70–99)
GLUCOSE SERPL-MCNC: 87 MG/DL — SIGNIFICANT CHANGE UP (ref 70–99)
GLUCOSE SERPL-MCNC: 87 MG/DL — SIGNIFICANT CHANGE UP (ref 70–99)
GLUCOSE SERPL-MCNC: 94 MG/DL — SIGNIFICANT CHANGE UP (ref 70–99)
GLUCOSE SERPL-MCNC: 95 MG/DL — SIGNIFICANT CHANGE UP (ref 70–99)
GLUCOSE SERPL-MCNC: 97 MG/DL — SIGNIFICANT CHANGE UP (ref 70–99)
GLUCOSE SERPL-MCNC: 98 MG/DL — SIGNIFICANT CHANGE UP (ref 70–99)
GLUCOSE UR QL: NEGATIVE — SIGNIFICANT CHANGE UP
HAV IGM SER-ACNC: SIGNIFICANT CHANGE UP
HBA1C BLD-MCNC: 6.4 % — HIGH (ref 4–5.6)
HBV CORE IGM SER-ACNC: SIGNIFICANT CHANGE UP
HBV SURFACE AG SER-ACNC: SIGNIFICANT CHANGE UP
HCO3 BLDA-SCNC: 18 MMOL/L — LOW (ref 23–27)
HCO3 BLDA-SCNC: 18 MMOL/L — LOW (ref 23–27)
HCO3 BLDA-SCNC: 21 MMOL/L — LOW (ref 23–27)
HCO3 BLDA-SCNC: 21 MMOL/L — LOW (ref 23–27)
HCO3 BLDA-SCNC: 22 MMOL/L — LOW (ref 23–27)
HCO3 BLDA-SCNC: 23 MMOL/L — SIGNIFICANT CHANGE UP (ref 23–27)
HCO3 BLDA-SCNC: 24 MMOL/L — SIGNIFICANT CHANGE UP (ref 23–27)
HCO3 BLDA-SCNC: 24 MMOL/L — SIGNIFICANT CHANGE UP (ref 23–27)
HCO3 BLDA-SCNC: 25 MMOL/L — SIGNIFICANT CHANGE UP (ref 23–27)
HCO3 BLDA-SCNC: 26 MMOL/L — SIGNIFICANT CHANGE UP (ref 23–27)
HCO3 BLDA-SCNC: 27 MMOL/L — SIGNIFICANT CHANGE UP (ref 23–27)
HCO3 BLDA-SCNC: 28 MMOL/L — HIGH (ref 23–27)
HCO3 BLDA-SCNC: 29 MMOL/L — HIGH (ref 23–27)
HCO3 BLDA-SCNC: 29 MMOL/L — HIGH (ref 23–27)
HCO3 BLDA-SCNC: 30 MMOL/L — HIGH (ref 23–27)
HCO3 BLDA-SCNC: 30 MMOL/L — HIGH (ref 23–27)
HCO3 BLDV-SCNC: 24 MMOL/L — SIGNIFICANT CHANGE UP (ref 21–29)
HCO3 BLDV-SCNC: 28 MMOL/L — SIGNIFICANT CHANGE UP (ref 21–29)
HCO3 BLDV-SCNC: 31 MMOL/L — HIGH (ref 21–29)
HCT VFR BLD CALC: 27.1 % — LOW (ref 34.5–45)
HCT VFR BLD CALC: 27.7 % — LOW (ref 34.5–45)
HCT VFR BLD CALC: 27.9 % — LOW (ref 34.5–45)
HCT VFR BLD CALC: 28.2 % — LOW (ref 34.5–45)
HCT VFR BLD CALC: 28.9 % — LOW (ref 34.5–45)
HCT VFR BLD CALC: 29.2 % — LOW (ref 34.5–45)
HCT VFR BLD CALC: 29.4 % — LOW (ref 34.5–45)
HCT VFR BLD CALC: 29.6 % — LOW (ref 34.5–45)
HCT VFR BLD CALC: 29.6 % — LOW (ref 34.5–45)
HCT VFR BLD CALC: 29.8 % — LOW (ref 34.5–45)
HCT VFR BLD CALC: 29.9 % — LOW (ref 34.5–45)
HCT VFR BLD CALC: 30.2 % — LOW (ref 34.5–45)
HCT VFR BLD CALC: 30.4 % — LOW (ref 34.5–45)
HCT VFR BLD CALC: 30.7 % — LOW (ref 34.5–45)
HCT VFR BLD CALC: 32.5 % — LOW (ref 34.5–45)
HCT VFR BLD CALC: 33.5 % — LOW (ref 34.5–45)
HCT VFR BLD CALC: 34.1 % — LOW (ref 34.5–45)
HCT VFR BLD CALC: 34.4 % — LOW (ref 34.5–45)
HCT VFR BLD CALC: 36.9 % — SIGNIFICANT CHANGE UP (ref 34.5–45)
HCT VFR BLD CALC: 37.5 % — SIGNIFICANT CHANGE UP (ref 34.5–45)
HCT VFR BLD CALC: 37.9 % — SIGNIFICANT CHANGE UP (ref 34.5–45)
HCT VFR BLD CALC: 39.7 % — SIGNIFICANT CHANGE UP (ref 34.5–45)
HCT VFR BLD CALC: 40.9 % — SIGNIFICANT CHANGE UP (ref 34.5–45)
HCT VFR BLD CALC: 42.6 % — SIGNIFICANT CHANGE UP (ref 34.5–45)
HCT VFR BLD CALC: 47.5 % — HIGH (ref 34.5–45)
HCT VFR BLD CALC: 47.5 % — HIGH (ref 34.5–45)
HCT VFR BLD CALC: 47.6 % — HIGH (ref 34.5–45)
HCT VFR BLD CALC: 50.8 % — HIGH (ref 34.5–45)
HCT VFR BLD CALC: 52.8 % — HIGH (ref 34.5–45)
HCT VFR BLD CALC: 52.8 % — HIGH (ref 34.5–45)
HCT VFR BLD CALC: 53.5 % — HIGH (ref 34.5–45)
HCT VFR BLD CALC: 54.1 % — HIGH (ref 34.5–45)
HCT VFR BLD CALC: 56.1 % — HIGH (ref 34.5–45)
HCV AB S/CO SERPL IA: 0.18 S/CO — SIGNIFICANT CHANGE UP (ref 0–0.99)
HCV AB SERPL-IMP: SIGNIFICANT CHANGE UP
HGB BLD-MCNC: 10.1 G/DL — LOW (ref 11.5–15.5)
HGB BLD-MCNC: 10.2 G/DL — LOW (ref 11.5–15.5)
HGB BLD-MCNC: 10.4 G/DL — LOW (ref 11.5–15.5)
HGB BLD-MCNC: 10.9 G/DL — LOW (ref 11.5–15.5)
HGB BLD-MCNC: 11.2 G/DL — LOW (ref 11.5–15.5)
HGB BLD-MCNC: 11.4 G/DL — LOW (ref 11.5–15.5)
HGB BLD-MCNC: 11.5 G/DL — SIGNIFICANT CHANGE UP (ref 11.5–15.5)
HGB BLD-MCNC: 12.3 G/DL — SIGNIFICANT CHANGE UP (ref 11.5–15.5)
HGB BLD-MCNC: 12.7 G/DL — SIGNIFICANT CHANGE UP (ref 11.5–15.5)
HGB BLD-MCNC: 14.4 G/DL — SIGNIFICANT CHANGE UP (ref 11.5–15.5)
HGB BLD-MCNC: 15.2 G/DL — SIGNIFICANT CHANGE UP (ref 11.5–15.5)
HGB BLD-MCNC: 15.6 G/DL — HIGH (ref 11.5–15.5)
HGB BLD-MCNC: 16.6 G/DL — HIGH (ref 11.5–15.5)
HGB BLD-MCNC: 16.7 G/DL — HIGH (ref 11.5–15.5)
HGB BLD-MCNC: 16.9 G/DL — HIGH (ref 11.5–15.5)
HGB BLD-MCNC: 16.9 G/DL — HIGH (ref 11.5–15.5)
HGB BLD-MCNC: 17.3 G/DL — HIGH (ref 11.5–15.5)
HGB BLD-MCNC: 17.5 G/DL — HIGH (ref 11.5–15.5)
HGB BLD-MCNC: 7.9 G/DL — LOW (ref 11.5–15.5)
HGB BLD-MCNC: 8.3 G/DL — LOW (ref 11.5–15.5)
HGB BLD-MCNC: 8.7 G/DL — LOW (ref 11.5–15.5)
HGB BLD-MCNC: 8.7 G/DL — LOW (ref 11.5–15.5)
HGB BLD-MCNC: 8.8 G/DL — LOW (ref 11.5–15.5)
HGB BLD-MCNC: 8.9 G/DL — LOW (ref 11.5–15.5)
HGB BLD-MCNC: 9.1 G/DL — LOW (ref 11.5–15.5)
HGB BLD-MCNC: 9.1 G/DL — LOW (ref 11.5–15.5)
HGB BLD-MCNC: 9.2 G/DL — LOW (ref 11.5–15.5)
HGB BLD-MCNC: 9.2 G/DL — LOW (ref 11.5–15.5)
HGB BLD-MCNC: 9.6 G/DL — LOW (ref 11.5–15.5)
HGB BLD-MCNC: 9.7 G/DL — LOW (ref 11.5–15.5)
HGB BLD-MCNC: 9.7 G/DL — LOW (ref 11.5–15.5)
HIV 1+2 AB+HIV1 P24 AG SERPL QL IA: SIGNIFICANT CHANGE UP
HOROWITZ INDEX BLDA+IHG-RTO: 100 — SIGNIFICANT CHANGE UP
HOROWITZ INDEX BLDA+IHG-RTO: 21 — SIGNIFICANT CHANGE UP
HOROWITZ INDEX BLDA+IHG-RTO: 21 — SIGNIFICANT CHANGE UP
HOROWITZ INDEX BLDA+IHG-RTO: 50 — SIGNIFICANT CHANGE UP
HOROWITZ INDEX BLDA+IHG-RTO: 60 — SIGNIFICANT CHANGE UP
HOROWITZ INDEX BLDA+IHG-RTO: 70 — SIGNIFICANT CHANGE UP
HOROWITZ INDEX BLDA+IHG-RTO: 80 — SIGNIFICANT CHANGE UP
HOROWITZ INDEX BLDA+IHG-RTO: 85 — SIGNIFICANT CHANGE UP
HOROWITZ INDEX BLDV+IHG-RTO: 100 — SIGNIFICANT CHANGE UP
HOROWITZ INDEX BLDV+IHG-RTO: 21 — SIGNIFICANT CHANGE UP
HOROWITZ INDEX BLDV+IHG-RTO: 60 — SIGNIFICANT CHANGE UP
HYPOCHROMIA BLD QL: SIGNIFICANT CHANGE UP
IMM GRANULOCYTES NFR BLD AUTO: 0.2 % — SIGNIFICANT CHANGE UP (ref 0–1.5)
IMM GRANULOCYTES NFR BLD AUTO: 1.3 % — SIGNIFICANT CHANGE UP (ref 0–1.5)
IMM GRANULOCYTES NFR BLD AUTO: 1.4 % — SIGNIFICANT CHANGE UP (ref 0–1.5)
IMM GRANULOCYTES NFR BLD AUTO: 2.4 % — HIGH (ref 0–1.5)
IMM GRANULOCYTES NFR BLD AUTO: 3.9 % — HIGH (ref 0–1.5)
INR BLD: 0.97 RATIO — SIGNIFICANT CHANGE UP (ref 0.88–1.16)
INR BLD: 1.11 RATIO — SIGNIFICANT CHANGE UP (ref 0.88–1.16)
INR BLD: 1.11 RATIO — SIGNIFICANT CHANGE UP (ref 0.88–1.16)
INR BLD: 1.13 RATIO — SIGNIFICANT CHANGE UP (ref 0.88–1.16)
INR BLD: 1.22 RATIO — HIGH (ref 0.88–1.16)
INR BLD: 1.22 RATIO — HIGH (ref 0.88–1.16)
INR BLD: 1.23 RATIO — HIGH (ref 0.88–1.16)
INR BLD: 1.3 RATIO — HIGH (ref 0.88–1.16)
INR BLD: 1.32 RATIO — HIGH (ref 0.88–1.16)
INR BLD: 1.33 RATIO — HIGH (ref 0.88–1.16)
INR BLD: 1.37 RATIO — HIGH (ref 0.88–1.16)
INR BLD: 1.38 RATIO — HIGH (ref 0.88–1.16)
INR BLD: 1.43 RATIO — HIGH (ref 0.88–1.16)
INR BLD: 1.46 RATIO — HIGH (ref 0.88–1.16)
INR BLD: 1.47 RATIO — HIGH (ref 0.88–1.16)
KETONES UR-MCNC: ABNORMAL
LACTATE SERPL-SCNC: 2.3 MMOL/L — HIGH (ref 0.7–2)
LDH SERPL L TO P-CCNC: 1380 U/L — HIGH (ref 120–225)
LDH SERPL L TO P-CCNC: 301 U/L — HIGH (ref 120–225)
LDH SERPL L TO P-CCNC: 310 U/L — HIGH (ref 120–225)
LDH SERPL L TO P-CCNC: 362 U/L — HIGH (ref 120–225)
LDH SERPL L TO P-CCNC: 362 U/L — HIGH (ref 120–225)
LDH SERPL L TO P-CCNC: 400 U/L — HIGH (ref 120–225)
LDH SERPL L TO P-CCNC: 409 U/L — HIGH (ref 120–225)
LDH SERPL L TO P-CCNC: 443 U/L — HIGH (ref 120–225)
LDH SERPL L TO P-CCNC: 447 U/L — HIGH (ref 120–225)
LDH SERPL L TO P-CCNC: 519 U/L — HIGH (ref 120–225)
LEUKOCYTE ESTERASE UR-ACNC: ABNORMAL
LG PLATELETS BLD QL AUTO: SLIGHT — SIGNIFICANT CHANGE UP
LG PLATELETS BLD QL AUTO: SLIGHT — SIGNIFICANT CHANGE UP
LYMPHOCYTES # BLD AUTO: 0.7 K/UL — LOW (ref 1–3.3)
LYMPHOCYTES # BLD AUTO: 0.75 K/UL — LOW (ref 1–3.3)
LYMPHOCYTES # BLD AUTO: 0.97 K/UL — LOW (ref 1–3.3)
LYMPHOCYTES # BLD AUTO: 0.99 K/UL — LOW (ref 1–3.3)
LYMPHOCYTES # BLD AUTO: 1.03 K/UL — SIGNIFICANT CHANGE UP (ref 1–3.3)
LYMPHOCYTES # BLD AUTO: 1.06 K/UL — SIGNIFICANT CHANGE UP (ref 1–3.3)
LYMPHOCYTES # BLD AUTO: 1.28 K/UL — SIGNIFICANT CHANGE UP (ref 1–3.3)
LYMPHOCYTES # BLD AUTO: 1.55 K/UL — SIGNIFICANT CHANGE UP (ref 1–3.3)
LYMPHOCYTES # BLD AUTO: 11.3 % — LOW (ref 13–44)
LYMPHOCYTES # BLD AUTO: 3 % — LOW (ref 13–44)
LYMPHOCYTES # BLD AUTO: 4.1 % — LOW (ref 13–44)
LYMPHOCYTES # BLD AUTO: 6.4 % — LOW (ref 13–44)
LYMPHOCYTES # BLD AUTO: 7 % — LOW (ref 13–44)
LYMPHOCYTES # BLD AUTO: 8 % — LOW (ref 13–44)
LYMPHOCYTES # BLD AUTO: 8.3 % — LOW (ref 13–44)
LYMPHOCYTES # BLD AUTO: 9.6 % — LOW (ref 13–44)
MACROCYTES BLD QL: SLIGHT — SIGNIFICANT CHANGE UP
MAGNESIUM SERPL-MCNC: 2.2 MG/DL — SIGNIFICANT CHANGE UP (ref 1.6–2.6)
MAGNESIUM SERPL-MCNC: 2.2 MG/DL — SIGNIFICANT CHANGE UP (ref 1.6–2.6)
MAGNESIUM SERPL-MCNC: 2.3 MG/DL — SIGNIFICANT CHANGE UP (ref 1.6–2.6)
MAGNESIUM SERPL-MCNC: 2.4 MG/DL — SIGNIFICANT CHANGE UP (ref 1.6–2.6)
MAGNESIUM SERPL-MCNC: 2.5 MG/DL — SIGNIFICANT CHANGE UP (ref 1.6–2.6)
MAGNESIUM SERPL-MCNC: 2.6 MG/DL — SIGNIFICANT CHANGE UP (ref 1.6–2.6)
MAGNESIUM SERPL-MCNC: 2.7 MG/DL — HIGH (ref 1.6–2.6)
MAGNESIUM SERPL-MCNC: 2.8 MG/DL — HIGH (ref 1.6–2.6)
MAGNESIUM SERPL-MCNC: 2.8 MG/DL — HIGH (ref 1.6–2.6)
MAGNESIUM SERPL-MCNC: 3.2 MG/DL — HIGH (ref 1.6–2.6)
MAGNESIUM SERPL-MCNC: 3.2 MG/DL — HIGH (ref 1.6–2.6)
MANUAL SMEAR VERIFICATION: SIGNIFICANT CHANGE UP
MCHC RBC-ENTMCNC: 26.8 PG — LOW (ref 27–34)
MCHC RBC-ENTMCNC: 26.9 PG — LOW (ref 27–34)
MCHC RBC-ENTMCNC: 27 PG — SIGNIFICANT CHANGE UP (ref 27–34)
MCHC RBC-ENTMCNC: 27 PG — SIGNIFICANT CHANGE UP (ref 27–34)
MCHC RBC-ENTMCNC: 27.1 PG — SIGNIFICANT CHANGE UP (ref 27–34)
MCHC RBC-ENTMCNC: 27.2 PG — SIGNIFICANT CHANGE UP (ref 27–34)
MCHC RBC-ENTMCNC: 27.3 PG — SIGNIFICANT CHANGE UP (ref 27–34)
MCHC RBC-ENTMCNC: 27.4 PG — SIGNIFICANT CHANGE UP (ref 27–34)
MCHC RBC-ENTMCNC: 27.5 PG — SIGNIFICANT CHANGE UP (ref 27–34)
MCHC RBC-ENTMCNC: 27.6 PG — SIGNIFICANT CHANGE UP (ref 27–34)
MCHC RBC-ENTMCNC: 27.7 PG — SIGNIFICANT CHANGE UP (ref 27–34)
MCHC RBC-ENTMCNC: 27.7 PG — SIGNIFICANT CHANGE UP (ref 27–34)
MCHC RBC-ENTMCNC: 27.8 PG — SIGNIFICANT CHANGE UP (ref 27–34)
MCHC RBC-ENTMCNC: 27.8 PG — SIGNIFICANT CHANGE UP (ref 27–34)
MCHC RBC-ENTMCNC: 27.9 PG — SIGNIFICANT CHANGE UP (ref 27–34)
MCHC RBC-ENTMCNC: 28 PG — SIGNIFICANT CHANGE UP (ref 27–34)
MCHC RBC-ENTMCNC: 28 PG — SIGNIFICANT CHANGE UP (ref 27–34)
MCHC RBC-ENTMCNC: 28.1 PG — SIGNIFICANT CHANGE UP (ref 27–34)
MCHC RBC-ENTMCNC: 28.7 GM/DL — LOW (ref 32–36)
MCHC RBC-ENTMCNC: 29.2 GM/DL — LOW (ref 32–36)
MCHC RBC-ENTMCNC: 29.4 GM/DL — LOW (ref 32–36)
MCHC RBC-ENTMCNC: 29.5 GM/DL — LOW (ref 32–36)
MCHC RBC-ENTMCNC: 29.6 GM/DL — LOW (ref 32–36)
MCHC RBC-ENTMCNC: 29.7 GM/DL — LOW (ref 32–36)
MCHC RBC-ENTMCNC: 29.8 GM/DL — LOW (ref 32–36)
MCHC RBC-ENTMCNC: 29.9 GM/DL — LOW (ref 32–36)
MCHC RBC-ENTMCNC: 30 GM/DL — LOW (ref 32–36)
MCHC RBC-ENTMCNC: 30.1 GM/DL — LOW (ref 32–36)
MCHC RBC-ENTMCNC: 30.3 GM/DL — LOW (ref 32–36)
MCHC RBC-ENTMCNC: 30.5 GM/DL — LOW (ref 32–36)
MCHC RBC-ENTMCNC: 30.7 GM/DL — LOW (ref 32–36)
MCHC RBC-ENTMCNC: 30.9 GM/DL — LOW (ref 32–36)
MCHC RBC-ENTMCNC: 31 GM/DL — LOW (ref 32–36)
MCHC RBC-ENTMCNC: 31.2 GM/DL — LOW (ref 32–36)
MCHC RBC-ENTMCNC: 31.2 GM/DL — LOW (ref 32–36)
MCHC RBC-ENTMCNC: 31.3 GM/DL — LOW (ref 32–36)
MCHC RBC-ENTMCNC: 31.5 GM/DL — LOW (ref 32–36)
MCHC RBC-ENTMCNC: 31.6 GM/DL — LOW (ref 32–36)
MCHC RBC-ENTMCNC: 32 GM/DL — SIGNIFICANT CHANGE UP (ref 32–36)
MCHC RBC-ENTMCNC: 32 GM/DL — SIGNIFICANT CHANGE UP (ref 32–36)
MCHC RBC-ENTMCNC: 32.3 GM/DL — SIGNIFICANT CHANGE UP (ref 32–36)
MCHC RBC-ENTMCNC: 32.7 GM/DL — SIGNIFICANT CHANGE UP (ref 32–36)
MCHC RBC-ENTMCNC: 32.8 GM/DL — SIGNIFICANT CHANGE UP (ref 32–36)
MCHC RBC-ENTMCNC: 32.8 GM/DL — SIGNIFICANT CHANGE UP (ref 32–36)
MCV RBC AUTO: 84.2 FL — SIGNIFICANT CHANGE UP (ref 80–100)
MCV RBC AUTO: 85.3 FL — SIGNIFICANT CHANGE UP (ref 80–100)
MCV RBC AUTO: 85.4 FL — SIGNIFICANT CHANGE UP (ref 80–100)
MCV RBC AUTO: 85.8 FL — SIGNIFICANT CHANGE UP (ref 80–100)
MCV RBC AUTO: 85.9 FL — SIGNIFICANT CHANGE UP (ref 80–100)
MCV RBC AUTO: 86 FL — SIGNIFICANT CHANGE UP (ref 80–100)
MCV RBC AUTO: 86.3 FL — SIGNIFICANT CHANGE UP (ref 80–100)
MCV RBC AUTO: 86.3 FL — SIGNIFICANT CHANGE UP (ref 80–100)
MCV RBC AUTO: 86.4 FL — SIGNIFICANT CHANGE UP (ref 80–100)
MCV RBC AUTO: 87.7 FL — SIGNIFICANT CHANGE UP (ref 80–100)
MCV RBC AUTO: 87.8 FL — SIGNIFICANT CHANGE UP (ref 80–100)
MCV RBC AUTO: 87.9 FL — SIGNIFICANT CHANGE UP (ref 80–100)
MCV RBC AUTO: 88.2 FL — SIGNIFICANT CHANGE UP (ref 80–100)
MCV RBC AUTO: 88.3 FL — SIGNIFICANT CHANGE UP (ref 80–100)
MCV RBC AUTO: 89.8 FL — SIGNIFICANT CHANGE UP (ref 80–100)
MCV RBC AUTO: 90.5 FL — SIGNIFICANT CHANGE UP (ref 80–100)
MCV RBC AUTO: 91.2 FL — SIGNIFICANT CHANGE UP (ref 80–100)
MCV RBC AUTO: 91.2 FL — SIGNIFICANT CHANGE UP (ref 80–100)
MCV RBC AUTO: 91.3 FL — SIGNIFICANT CHANGE UP (ref 80–100)
MCV RBC AUTO: 91.3 FL — SIGNIFICANT CHANGE UP (ref 80–100)
MCV RBC AUTO: 91.7 FL — SIGNIFICANT CHANGE UP (ref 80–100)
MCV RBC AUTO: 92.1 FL — SIGNIFICANT CHANGE UP (ref 80–100)
MCV RBC AUTO: 92.5 FL — SIGNIFICANT CHANGE UP (ref 80–100)
MCV RBC AUTO: 92.5 FL — SIGNIFICANT CHANGE UP (ref 80–100)
MCV RBC AUTO: 92.6 FL — SIGNIFICANT CHANGE UP (ref 80–100)
MCV RBC AUTO: 92.9 FL — SIGNIFICANT CHANGE UP (ref 80–100)
MCV RBC AUTO: 93 FL — SIGNIFICANT CHANGE UP (ref 80–100)
MCV RBC AUTO: 93 FL — SIGNIFICANT CHANGE UP (ref 80–100)
MCV RBC AUTO: 93.1 FL — SIGNIFICANT CHANGE UP (ref 80–100)
MCV RBC AUTO: 93.4 FL — SIGNIFICANT CHANGE UP (ref 80–100)
MCV RBC AUTO: 93.6 FL — SIGNIFICANT CHANGE UP (ref 80–100)
MCV RBC AUTO: 94.4 FL — SIGNIFICANT CHANGE UP (ref 80–100)
MCV RBC AUTO: 94.7 FL — SIGNIFICANT CHANGE UP (ref 80–100)
METAMYELOCYTES # FLD: 1 % — HIGH (ref 0–0)
METAMYELOCYTES # FLD: 2 % — HIGH (ref 0–0)
METAMYELOCYTES # FLD: 4 % — HIGH (ref 0–0)
MONOCYTES # BLD AUTO: 0.33 K/UL — SIGNIFICANT CHANGE UP (ref 0–0.9)
MONOCYTES # BLD AUTO: 0.49 K/UL — SIGNIFICANT CHANGE UP (ref 0–0.9)
MONOCYTES # BLD AUTO: 0.67 K/UL — SIGNIFICANT CHANGE UP (ref 0–0.9)
MONOCYTES # BLD AUTO: 0.77 K/UL — SIGNIFICANT CHANGE UP (ref 0–0.9)
MONOCYTES # BLD AUTO: 1.16 K/UL — HIGH (ref 0–0.9)
MONOCYTES # BLD AUTO: 1.24 K/UL — HIGH (ref 0–0.9)
MONOCYTES # BLD AUTO: 1.81 K/UL — HIGH (ref 0–0.9)
MONOCYTES # BLD AUTO: 1.85 K/UL — HIGH (ref 0–0.9)
MONOCYTES NFR BLD AUTO: 1 % — LOW (ref 2–14)
MONOCYTES NFR BLD AUTO: 12 % — SIGNIFICANT CHANGE UP (ref 2–14)
MONOCYTES NFR BLD AUTO: 12.4 % — SIGNIFICANT CHANGE UP (ref 2–14)
MONOCYTES NFR BLD AUTO: 4.2 % — SIGNIFICANT CHANGE UP (ref 2–14)
MONOCYTES NFR BLD AUTO: 5.4 % — SIGNIFICANT CHANGE UP (ref 2–14)
MONOCYTES NFR BLD AUTO: 6 % — SIGNIFICANT CHANGE UP (ref 2–14)
MONOCYTES NFR BLD AUTO: 7.8 % — SIGNIFICANT CHANGE UP (ref 2–14)
MONOCYTES NFR BLD AUTO: 9.3 % — SIGNIFICANT CHANGE UP (ref 2–14)
MRSA PCR RESULT.: SIGNIFICANT CHANGE UP
MYELOCYTES NFR BLD: 1 % — HIGH (ref 0–0)
MYELOCYTES NFR BLD: 4 % — HIGH (ref 0–0)
NEUTROPHILS # BLD AUTO: 10.33 K/UL — HIGH (ref 1.8–7.4)
NEUTROPHILS # BLD AUTO: 10.39 K/UL — HIGH (ref 1.8–7.4)
NEUTROPHILS # BLD AUTO: 11.17 K/UL — HIGH (ref 1.8–7.4)
NEUTROPHILS # BLD AUTO: 15.29 K/UL — HIGH (ref 1.8–7.4)
NEUTROPHILS # BLD AUTO: 19.9 K/UL — HIGH (ref 1.8–7.4)
NEUTROPHILS # BLD AUTO: 30.81 K/UL — HIGH (ref 1.8–7.4)
NEUTROPHILS # BLD AUTO: 4.72 K/UL — SIGNIFICANT CHANGE UP (ref 1.8–7.4)
NEUTROPHILS # BLD AUTO: 9.53 K/UL — HIGH (ref 1.8–7.4)
NEUTROPHILS NFR BLD AUTO: 71.2 % — SIGNIFICANT CHANGE UP (ref 43–77)
NEUTROPHILS NFR BLD AUTO: 74 % — SIGNIFICANT CHANGE UP (ref 43–77)
NEUTROPHILS NFR BLD AUTO: 75 % — SIGNIFICANT CHANGE UP (ref 43–77)
NEUTROPHILS NFR BLD AUTO: 75.9 % — SIGNIFICANT CHANGE UP (ref 43–77)
NEUTROPHILS NFR BLD AUTO: 83.2 % — HIGH (ref 43–77)
NEUTROPHILS NFR BLD AUTO: 83.9 % — HIGH (ref 43–77)
NEUTROPHILS NFR BLD AUTO: 85 % — HIGH (ref 43–77)
NEUTROPHILS NFR BLD AUTO: 87.6 % — HIGH (ref 43–77)
NEUTS BAND # BLD: 4 % — SIGNIFICANT CHANGE UP (ref 0–8)
NEUTS BAND # BLD: 8 % — SIGNIFICANT CHANGE UP (ref 0–8)
NITRITE UR-MCNC: POSITIVE
NRBC # BLD: 0 /100 WBCS — SIGNIFICANT CHANGE UP (ref 0–0)
NRBC # BLD: 1 /100 WBCS — HIGH (ref 0–0)
NRBC # BLD: 1 /100 WBCS — HIGH (ref 0–0)
NRBC # BLD: 11 /100 — HIGH (ref 0–0)
NRBC # BLD: 2 /100 WBCS — HIGH (ref 0–0)
NRBC # BLD: 2 /100 — HIGH (ref 0–0)
NRBC # BLD: 5 /100 WBCS — HIGH (ref 0–0)
NRBC # BLD: 5 /100 — HIGH (ref 0–0)
OVALOCYTES BLD QL SMEAR: SLIGHT — SIGNIFICANT CHANGE UP
PCO2 BLDA: 37 MMHG — SIGNIFICANT CHANGE UP (ref 32–46)
PCO2 BLDA: 38 MMHG — SIGNIFICANT CHANGE UP (ref 32–46)
PCO2 BLDA: 40 MMHG — SIGNIFICANT CHANGE UP (ref 32–46)
PCO2 BLDA: 44 MMHG — SIGNIFICANT CHANGE UP (ref 32–46)
PCO2 BLDA: 45 MMHG — SIGNIFICANT CHANGE UP (ref 32–46)
PCO2 BLDA: 45 MMHG — SIGNIFICANT CHANGE UP (ref 32–46)
PCO2 BLDA: 47 MMHG — HIGH (ref 32–46)
PCO2 BLDA: 48 MMHG — HIGH (ref 32–46)
PCO2 BLDA: 48 MMHG — HIGH (ref 32–46)
PCO2 BLDA: 49 MMHG — HIGH (ref 32–46)
PCO2 BLDA: 50 MMHG — HIGH (ref 32–46)
PCO2 BLDA: 51 MMHG — HIGH (ref 32–46)
PCO2 BLDA: 52 MMHG — HIGH (ref 32–46)
PCO2 BLDA: 53 MMHG — HIGH (ref 32–46)
PCO2 BLDA: 55 MMHG — HIGH (ref 32–46)
PCO2 BLDA: 56 MMHG — HIGH (ref 32–46)
PCO2 BLDA: 56 MMHG — HIGH (ref 32–46)
PCO2 BLDA: 57 MMHG — HIGH (ref 32–46)
PCO2 BLDA: 58 MMHG — HIGH (ref 32–46)
PCO2 BLDA: 61 MMHG — HIGH (ref 32–46)
PCO2 BLDA: 62 MMHG — HIGH (ref 32–46)
PCO2 BLDA: 63 MMHG — HIGH (ref 32–46)
PCO2 BLDA: 64 MMHG — HIGH (ref 32–46)
PCO2 BLDA: 64 MMHG — HIGH (ref 32–46)
PCO2 BLDA: 66 MMHG — HIGH (ref 32–46)
PCO2 BLDA: 68 MMHG — HIGH (ref 32–46)
PCO2 BLDA: 70 MMHG — CRITICAL HIGH (ref 32–46)
PCO2 BLDA: 78 MMHG — CRITICAL HIGH (ref 32–46)
PCO2 BLDA: 86 MMHG — CRITICAL HIGH (ref 32–46)
PCO2 BLDA: 88 MMHG — CRITICAL HIGH (ref 32–46)
PCO2 BLDV: 42 MMHG — SIGNIFICANT CHANGE UP (ref 35–50)
PCO2 BLDV: 57 MMHG — HIGH (ref 35–50)
PCO2 BLDV: 58 MMHG — HIGH (ref 35–50)
PH BLDA: 7.06 — CRITICAL LOW (ref 7.35–7.45)
PH BLDA: 7.12 — CRITICAL LOW (ref 7.35–7.45)
PH BLDA: 7.14 — CRITICAL LOW (ref 7.35–7.45)
PH BLDA: 7.14 — CRITICAL LOW (ref 7.35–7.45)
PH BLDA: 7.15 — CRITICAL LOW (ref 7.35–7.45)
PH BLDA: 7.16 — CRITICAL LOW (ref 7.35–7.45)
PH BLDA: 7.18 — CRITICAL LOW (ref 7.35–7.45)
PH BLDA: 7.19 — CRITICAL LOW (ref 7.35–7.45)
PH BLDA: 7.19 — CRITICAL LOW (ref 7.35–7.45)
PH BLDA: 7.2 — CRITICAL LOW (ref 7.35–7.45)
PH BLDA: 7.2 — CRITICAL LOW (ref 7.35–7.45)
PH BLDA: 7.21 — LOW (ref 7.35–7.45)
PH BLDA: 7.22 — LOW (ref 7.35–7.45)
PH BLDA: 7.22 — LOW (ref 7.35–7.45)
PH BLDA: 7.23 — LOW (ref 7.35–7.45)
PH BLDA: 7.23 — LOW (ref 7.35–7.45)
PH BLDA: 7.24 — LOW (ref 7.35–7.45)
PH BLDA: 7.25 — LOW (ref 7.35–7.45)
PH BLDA: 7.26 — LOW (ref 7.35–7.45)
PH BLDA: 7.27 — LOW (ref 7.35–7.45)
PH BLDA: 7.3 — LOW (ref 7.35–7.45)
PH BLDA: 7.3 — LOW (ref 7.35–7.45)
PH BLDA: 7.33 — LOW (ref 7.35–7.45)
PH BLDA: 7.35 — SIGNIFICANT CHANGE UP (ref 7.35–7.45)
PH BLDA: 7.38 — SIGNIFICANT CHANGE UP (ref 7.35–7.45)
PH BLDA: 7.39 — SIGNIFICANT CHANGE UP (ref 7.35–7.45)
PH BLDA: 7.39 — SIGNIFICANT CHANGE UP (ref 7.35–7.45)
PH BLDA: 7.4 — SIGNIFICANT CHANGE UP (ref 7.35–7.45)
PH BLDA: 7.41 — SIGNIFICANT CHANGE UP (ref 7.35–7.45)
PH BLDA: 7.42 — SIGNIFICANT CHANGE UP (ref 7.35–7.45)
PH BLDA: 7.44 — SIGNIFICANT CHANGE UP (ref 7.35–7.45)
PH BLDA: 7.45 — SIGNIFICANT CHANGE UP (ref 7.35–7.45)
PH BLDA: 7.49 — HIGH (ref 7.35–7.45)
PH BLDV: 7.31 — LOW (ref 7.35–7.45)
PH BLDV: 7.35 — SIGNIFICANT CHANGE UP (ref 7.35–7.45)
PH BLDV: 7.38 — SIGNIFICANT CHANGE UP (ref 7.35–7.45)
PH UR: 5 — SIGNIFICANT CHANGE UP (ref 5–8)
PHOSPHATE SERPL-MCNC: 2.7 MG/DL — SIGNIFICANT CHANGE UP (ref 2.5–4.5)
PHOSPHATE SERPL-MCNC: 2.8 MG/DL — SIGNIFICANT CHANGE UP (ref 2.5–4.5)
PHOSPHATE SERPL-MCNC: 3.2 MG/DL — SIGNIFICANT CHANGE UP (ref 2.5–4.5)
PHOSPHATE SERPL-MCNC: 3.4 MG/DL — SIGNIFICANT CHANGE UP (ref 2.5–4.5)
PHOSPHATE SERPL-MCNC: 3.5 MG/DL — SIGNIFICANT CHANGE UP (ref 2.5–4.5)
PHOSPHATE SERPL-MCNC: 3.6 MG/DL — SIGNIFICANT CHANGE UP (ref 2.5–4.5)
PHOSPHATE SERPL-MCNC: 3.8 MG/DL — SIGNIFICANT CHANGE UP (ref 2.5–4.5)
PHOSPHATE SERPL-MCNC: 3.9 MG/DL — SIGNIFICANT CHANGE UP (ref 2.5–4.5)
PHOSPHATE SERPL-MCNC: 3.9 MG/DL — SIGNIFICANT CHANGE UP (ref 2.5–4.5)
PHOSPHATE SERPL-MCNC: 4 MG/DL — SIGNIFICANT CHANGE UP (ref 2.5–4.5)
PHOSPHATE SERPL-MCNC: 4 MG/DL — SIGNIFICANT CHANGE UP (ref 2.5–4.5)
PHOSPHATE SERPL-MCNC: 4.6 MG/DL — HIGH (ref 2.5–4.5)
PHOSPHATE SERPL-MCNC: 4.7 MG/DL — HIGH (ref 2.5–4.5)
PHOSPHATE SERPL-MCNC: 4.8 MG/DL — HIGH (ref 2.5–4.5)
PHOSPHATE SERPL-MCNC: 4.9 MG/DL — HIGH (ref 2.5–4.5)
PHOSPHATE SERPL-MCNC: 5.2 MG/DL — HIGH (ref 2.5–4.5)
PHOSPHATE SERPL-MCNC: 5.6 MG/DL — HIGH (ref 2.5–4.5)
PHOSPHATE SERPL-MCNC: 6 MG/DL — HIGH (ref 2.5–4.5)
PHOSPHATE SERPL-MCNC: 6 MG/DL — HIGH (ref 2.5–4.5)
PHOSPHATE SERPL-MCNC: 6.1 MG/DL — HIGH (ref 2.5–4.5)
PHOSPHATE SERPL-MCNC: 6.2 MG/DL — HIGH (ref 2.5–4.5)
PHOSPHATE SERPL-MCNC: 6.4 MG/DL — HIGH (ref 2.5–4.5)
PHOSPHATE SERPL-MCNC: 6.5 MG/DL — HIGH (ref 2.5–4.5)
PHOSPHATE SERPL-MCNC: 7 MG/DL — HIGH (ref 2.5–4.5)
PHOSPHATE SERPL-MCNC: 7 MG/DL — HIGH (ref 2.5–4.5)
PHOSPHATE SERPL-MCNC: 7.4 MG/DL — HIGH (ref 2.5–4.5)
PHOSPHATE SERPL-MCNC: 7.8 MG/DL — HIGH (ref 2.5–4.5)
PHOSPHATE SERPL-MCNC: 8.3 MG/DL — HIGH (ref 2.5–4.5)
PHOSPHATE SERPL-MCNC: 8.5 MG/DL — HIGH (ref 2.5–4.5)
PHOSPHATE SERPL-MCNC: 8.5 MG/DL — HIGH (ref 2.5–4.5)
PLAT MORPH BLD: NORMAL — SIGNIFICANT CHANGE UP
PLATELET # BLD AUTO: 173 K/UL — SIGNIFICANT CHANGE UP (ref 150–400)
PLATELET # BLD AUTO: 183 K/UL — SIGNIFICANT CHANGE UP (ref 150–400)
PLATELET # BLD AUTO: 188 K/UL — SIGNIFICANT CHANGE UP (ref 150–400)
PLATELET # BLD AUTO: 196 K/UL — SIGNIFICANT CHANGE UP (ref 150–400)
PLATELET # BLD AUTO: 207 K/UL — SIGNIFICANT CHANGE UP (ref 150–400)
PLATELET # BLD AUTO: 213 K/UL — SIGNIFICANT CHANGE UP (ref 150–400)
PLATELET # BLD AUTO: 215 K/UL — SIGNIFICANT CHANGE UP (ref 150–400)
PLATELET # BLD AUTO: 218 K/UL — SIGNIFICANT CHANGE UP (ref 150–400)
PLATELET # BLD AUTO: 221 K/UL — SIGNIFICANT CHANGE UP (ref 150–400)
PLATELET # BLD AUTO: 226 K/UL — SIGNIFICANT CHANGE UP (ref 150–400)
PLATELET # BLD AUTO: 238 K/UL — SIGNIFICANT CHANGE UP (ref 150–400)
PLATELET # BLD AUTO: 239 K/UL — SIGNIFICANT CHANGE UP (ref 150–400)
PLATELET # BLD AUTO: 241 K/UL — SIGNIFICANT CHANGE UP (ref 150–400)
PLATELET # BLD AUTO: 242 K/UL — SIGNIFICANT CHANGE UP (ref 150–400)
PLATELET # BLD AUTO: 245 K/UL — SIGNIFICANT CHANGE UP (ref 150–400)
PLATELET # BLD AUTO: 251 K/UL — SIGNIFICANT CHANGE UP (ref 150–400)
PLATELET # BLD AUTO: 253 K/UL — SIGNIFICANT CHANGE UP (ref 150–400)
PLATELET # BLD AUTO: 262 K/UL — SIGNIFICANT CHANGE UP (ref 150–400)
PLATELET # BLD AUTO: 267 K/UL — SIGNIFICANT CHANGE UP (ref 150–400)
PLATELET # BLD AUTO: 270 K/UL — SIGNIFICANT CHANGE UP (ref 150–400)
PLATELET # BLD AUTO: 274 K/UL — SIGNIFICANT CHANGE UP (ref 150–400)
PLATELET # BLD AUTO: 275 K/UL — SIGNIFICANT CHANGE UP (ref 150–400)
PLATELET # BLD AUTO: 276 K/UL — SIGNIFICANT CHANGE UP (ref 150–400)
PLATELET # BLD AUTO: 277 K/UL — SIGNIFICANT CHANGE UP (ref 150–400)
PLATELET # BLD AUTO: 286 K/UL — SIGNIFICANT CHANGE UP (ref 150–400)
PLATELET # BLD AUTO: 299 K/UL — SIGNIFICANT CHANGE UP (ref 150–400)
PLATELET # BLD AUTO: 329 K/UL — SIGNIFICANT CHANGE UP (ref 150–400)
PLATELET # BLD AUTO: 337 K/UL — SIGNIFICANT CHANGE UP (ref 150–400)
PLATELET # BLD AUTO: 351 K/UL — SIGNIFICANT CHANGE UP (ref 150–400)
PLATELET # BLD AUTO: 370 K/UL — SIGNIFICANT CHANGE UP (ref 150–400)
PLATELET # BLD AUTO: 383 K/UL — SIGNIFICANT CHANGE UP (ref 150–400)
PLATELET # BLD AUTO: 391 K/UL — SIGNIFICANT CHANGE UP (ref 150–400)
PLATELET # BLD AUTO: 391 K/UL — SIGNIFICANT CHANGE UP (ref 150–400)
PLATELET COUNT - ESTIMATE: NORMAL — SIGNIFICANT CHANGE UP
PLATELET COUNT - ESTIMATE: NORMAL — SIGNIFICANT CHANGE UP
PO2 BLDA: 100 MMHG — SIGNIFICANT CHANGE UP (ref 74–108)
PO2 BLDA: 103 MMHG — SIGNIFICANT CHANGE UP (ref 74–108)
PO2 BLDA: 103 MMHG — SIGNIFICANT CHANGE UP (ref 74–108)
PO2 BLDA: 115 MMHG — HIGH (ref 74–108)
PO2 BLDA: 133 MMHG — HIGH (ref 74–108)
PO2 BLDA: 138 MMHG — HIGH (ref 74–108)
PO2 BLDA: 139 MMHG — HIGH (ref 74–108)
PO2 BLDA: 147 MMHG — HIGH (ref 74–108)
PO2 BLDA: 151 MMHG — HIGH (ref 74–108)
PO2 BLDA: 175 MMHG — HIGH (ref 74–108)
PO2 BLDA: 185 MMHG — HIGH (ref 74–108)
PO2 BLDA: 54 MMHG — LOW (ref 74–108)
PO2 BLDA: 57 MMHG — LOW (ref 74–108)
PO2 BLDA: 63 MMHG — LOW (ref 74–108)
PO2 BLDA: 63 MMHG — LOW (ref 74–108)
PO2 BLDA: 64 MMHG — LOW (ref 74–108)
PO2 BLDA: 65 MMHG — LOW (ref 74–108)
PO2 BLDA: 68 MMHG — LOW (ref 74–108)
PO2 BLDA: 69 MMHG — LOW (ref 74–108)
PO2 BLDA: 70 MMHG — LOW (ref 74–108)
PO2 BLDA: 75 MMHG — SIGNIFICANT CHANGE UP (ref 74–108)
PO2 BLDA: 76 MMHG — SIGNIFICANT CHANGE UP (ref 74–108)
PO2 BLDA: 77 MMHG — SIGNIFICANT CHANGE UP (ref 74–108)
PO2 BLDA: 78 MMHG — SIGNIFICANT CHANGE UP (ref 74–108)
PO2 BLDA: 83 MMHG — SIGNIFICANT CHANGE UP (ref 74–108)
PO2 BLDA: 84 MMHG — SIGNIFICANT CHANGE UP (ref 74–108)
PO2 BLDA: 85 MMHG — SIGNIFICANT CHANGE UP (ref 74–108)
PO2 BLDA: 85 MMHG — SIGNIFICANT CHANGE UP (ref 74–108)
PO2 BLDA: 87 MMHG — SIGNIFICANT CHANGE UP (ref 74–108)
PO2 BLDA: 87 MMHG — SIGNIFICANT CHANGE UP (ref 74–108)
PO2 BLDA: 89 MMHG — SIGNIFICANT CHANGE UP (ref 74–108)
PO2 BLDA: 89 MMHG — SIGNIFICANT CHANGE UP (ref 74–108)
PO2 BLDA: 94 MMHG — SIGNIFICANT CHANGE UP (ref 74–108)
PO2 BLDA: 95 MMHG — SIGNIFICANT CHANGE UP (ref 74–108)
PO2 BLDA: 97 MMHG — SIGNIFICANT CHANGE UP (ref 74–108)
PO2 BLDA: 99 MMHG — SIGNIFICANT CHANGE UP (ref 74–108)
PO2 BLDA: 99 MMHG — SIGNIFICANT CHANGE UP (ref 74–108)
PO2 BLDV: 41 MMHG — SIGNIFICANT CHANGE UP (ref 25–45)
PO2 BLDV: 42 MMHG — SIGNIFICANT CHANGE UP (ref 25–45)
PO2 BLDV: 47 MMHG — HIGH (ref 25–45)
POIKILOCYTOSIS BLD QL AUTO: SLIGHT — SIGNIFICANT CHANGE UP
POIKILOCYTOSIS BLD QL AUTO: SLIGHT — SIGNIFICANT CHANGE UP
POLYCHROMASIA BLD QL SMEAR: SIGNIFICANT CHANGE UP
POLYCHROMASIA BLD QL SMEAR: SLIGHT — SIGNIFICANT CHANGE UP
POLYCHROMASIA BLD QL SMEAR: SLIGHT — SIGNIFICANT CHANGE UP
POTASSIUM SERPL-MCNC: 3.1 MMOL/L — LOW (ref 3.5–5.3)
POTASSIUM SERPL-MCNC: 3.3 MMOL/L — LOW (ref 3.5–5.3)
POTASSIUM SERPL-MCNC: 3.5 MMOL/L — SIGNIFICANT CHANGE UP (ref 3.5–5.3)
POTASSIUM SERPL-MCNC: 3.5 MMOL/L — SIGNIFICANT CHANGE UP (ref 3.5–5.3)
POTASSIUM SERPL-MCNC: 3.6 MMOL/L — SIGNIFICANT CHANGE UP (ref 3.5–5.3)
POTASSIUM SERPL-MCNC: 3.7 MMOL/L — SIGNIFICANT CHANGE UP (ref 3.5–5.3)
POTASSIUM SERPL-MCNC: 3.7 MMOL/L — SIGNIFICANT CHANGE UP (ref 3.5–5.3)
POTASSIUM SERPL-MCNC: 3.8 MMOL/L — SIGNIFICANT CHANGE UP (ref 3.5–5.3)
POTASSIUM SERPL-MCNC: 3.9 MMOL/L — SIGNIFICANT CHANGE UP (ref 3.5–5.3)
POTASSIUM SERPL-MCNC: 3.9 MMOL/L — SIGNIFICANT CHANGE UP (ref 3.5–5.3)
POTASSIUM SERPL-MCNC: 4 MMOL/L — SIGNIFICANT CHANGE UP (ref 3.5–5.3)
POTASSIUM SERPL-MCNC: 4.1 MMOL/L — SIGNIFICANT CHANGE UP (ref 3.5–5.3)
POTASSIUM SERPL-MCNC: 4.1 MMOL/L — SIGNIFICANT CHANGE UP (ref 3.5–5.3)
POTASSIUM SERPL-MCNC: 4.2 MMOL/L — SIGNIFICANT CHANGE UP (ref 3.5–5.3)
POTASSIUM SERPL-MCNC: 4.3 MMOL/L — SIGNIFICANT CHANGE UP (ref 3.5–5.3)
POTASSIUM SERPL-MCNC: 4.4 MMOL/L — SIGNIFICANT CHANGE UP (ref 3.5–5.3)
POTASSIUM SERPL-MCNC: 4.6 MMOL/L — SIGNIFICANT CHANGE UP (ref 3.5–5.3)
POTASSIUM SERPL-MCNC: 4.6 MMOL/L — SIGNIFICANT CHANGE UP (ref 3.5–5.3)
POTASSIUM SERPL-MCNC: 4.7 MMOL/L — SIGNIFICANT CHANGE UP (ref 3.5–5.3)
POTASSIUM SERPL-MCNC: 4.7 MMOL/L — SIGNIFICANT CHANGE UP (ref 3.5–5.3)
POTASSIUM SERPL-MCNC: 4.8 MMOL/L — SIGNIFICANT CHANGE UP (ref 3.5–5.3)
POTASSIUM SERPL-MCNC: 5 MMOL/L — SIGNIFICANT CHANGE UP (ref 3.5–5.3)
POTASSIUM SERPL-MCNC: 6.1 MMOL/L — HIGH (ref 3.5–5.3)
POTASSIUM SERPL-SCNC: 3.1 MMOL/L — LOW (ref 3.5–5.3)
POTASSIUM SERPL-SCNC: 3.3 MMOL/L — LOW (ref 3.5–5.3)
POTASSIUM SERPL-SCNC: 3.5 MMOL/L — SIGNIFICANT CHANGE UP (ref 3.5–5.3)
POTASSIUM SERPL-SCNC: 3.5 MMOL/L — SIGNIFICANT CHANGE UP (ref 3.5–5.3)
POTASSIUM SERPL-SCNC: 3.6 MMOL/L — SIGNIFICANT CHANGE UP (ref 3.5–5.3)
POTASSIUM SERPL-SCNC: 3.7 MMOL/L — SIGNIFICANT CHANGE UP (ref 3.5–5.3)
POTASSIUM SERPL-SCNC: 3.7 MMOL/L — SIGNIFICANT CHANGE UP (ref 3.5–5.3)
POTASSIUM SERPL-SCNC: 3.8 MMOL/L — SIGNIFICANT CHANGE UP (ref 3.5–5.3)
POTASSIUM SERPL-SCNC: 3.9 MMOL/L — SIGNIFICANT CHANGE UP (ref 3.5–5.3)
POTASSIUM SERPL-SCNC: 3.9 MMOL/L — SIGNIFICANT CHANGE UP (ref 3.5–5.3)
POTASSIUM SERPL-SCNC: 4 MMOL/L — SIGNIFICANT CHANGE UP (ref 3.5–5.3)
POTASSIUM SERPL-SCNC: 4.1 MMOL/L — SIGNIFICANT CHANGE UP (ref 3.5–5.3)
POTASSIUM SERPL-SCNC: 4.1 MMOL/L — SIGNIFICANT CHANGE UP (ref 3.5–5.3)
POTASSIUM SERPL-SCNC: 4.2 MMOL/L — SIGNIFICANT CHANGE UP (ref 3.5–5.3)
POTASSIUM SERPL-SCNC: 4.3 MMOL/L — SIGNIFICANT CHANGE UP (ref 3.5–5.3)
POTASSIUM SERPL-SCNC: 4.4 MMOL/L — SIGNIFICANT CHANGE UP (ref 3.5–5.3)
POTASSIUM SERPL-SCNC: 4.6 MMOL/L — SIGNIFICANT CHANGE UP (ref 3.5–5.3)
POTASSIUM SERPL-SCNC: 4.6 MMOL/L — SIGNIFICANT CHANGE UP (ref 3.5–5.3)
POTASSIUM SERPL-SCNC: 4.7 MMOL/L — SIGNIFICANT CHANGE UP (ref 3.5–5.3)
POTASSIUM SERPL-SCNC: 4.7 MMOL/L — SIGNIFICANT CHANGE UP (ref 3.5–5.3)
POTASSIUM SERPL-SCNC: 4.8 MMOL/L — SIGNIFICANT CHANGE UP (ref 3.5–5.3)
POTASSIUM SERPL-SCNC: 5 MMOL/L — SIGNIFICANT CHANGE UP (ref 3.5–5.3)
POTASSIUM SERPL-SCNC: 6.1 MMOL/L — HIGH (ref 3.5–5.3)
PROCALCITONIN SERPL-MCNC: 0.11 NG/ML — HIGH (ref 0.02–0.1)
PROCALCITONIN SERPL-MCNC: 0.13 NG/ML — HIGH (ref 0.02–0.1)
PROCALCITONIN SERPL-MCNC: 0.18 NG/ML — HIGH (ref 0.02–0.1)
PROCALCITONIN SERPL-MCNC: 0.38 NG/ML — HIGH (ref 0.02–0.1)
PROCALCITONIN SERPL-MCNC: 1.46 NG/ML — HIGH (ref 0.02–0.1)
PROCALCITONIN SERPL-MCNC: 2.42 NG/ML — HIGH (ref 0.02–0.1)
PROCALCITONIN SERPL-MCNC: 2.74 NG/ML — HIGH (ref 0.02–0.1)
PROCALCITONIN SERPL-MCNC: 3.24 NG/ML — HIGH (ref 0.02–0.1)
PROCALCITONIN SERPL-MCNC: 3.31 NG/ML — HIGH (ref 0.02–0.1)
PROCALCITONIN SERPL-MCNC: 3.8 NG/ML — HIGH (ref 0.02–0.1)
PROCALCITONIN SERPL-MCNC: 6.42 NG/ML — HIGH (ref 0.02–0.1)
PROCALCITONIN SERPL-MCNC: 7.18 NG/ML — HIGH (ref 0.02–0.1)
PROT SERPL-MCNC: 6.4 G/DL — SIGNIFICANT CHANGE UP (ref 6–8.3)
PROT SERPL-MCNC: 6.5 G/DL — SIGNIFICANT CHANGE UP (ref 6–8.3)
PROT SERPL-MCNC: 6.6 G/DL — SIGNIFICANT CHANGE UP (ref 6–8.3)
PROT SERPL-MCNC: 6.6 G/DL — SIGNIFICANT CHANGE UP (ref 6–8.3)
PROT SERPL-MCNC: 6.7 G/DL — SIGNIFICANT CHANGE UP (ref 6–8.3)
PROT SERPL-MCNC: 6.9 G/DL — SIGNIFICANT CHANGE UP (ref 6–8.3)
PROT SERPL-MCNC: 6.9 G/DL — SIGNIFICANT CHANGE UP (ref 6–8.3)
PROT SERPL-MCNC: 7 G/DL — SIGNIFICANT CHANGE UP (ref 6–8.3)
PROT SERPL-MCNC: 7 G/DL — SIGNIFICANT CHANGE UP (ref 6–8.3)
PROT SERPL-MCNC: 7.1 G/DL — SIGNIFICANT CHANGE UP (ref 6–8.3)
PROT SERPL-MCNC: 7.2 G/DL — SIGNIFICANT CHANGE UP (ref 6–8.3)
PROT SERPL-MCNC: 7.4 G/DL — SIGNIFICANT CHANGE UP (ref 6–8.3)
PROT SERPL-MCNC: 7.5 G/DL — SIGNIFICANT CHANGE UP (ref 6–8.3)
PROT SERPL-MCNC: 7.5 G/DL — SIGNIFICANT CHANGE UP (ref 6–8.3)
PROT SERPL-MCNC: 7.7 G/DL — SIGNIFICANT CHANGE UP (ref 6–8.3)
PROT SERPL-MCNC: 7.7 G/DL — SIGNIFICANT CHANGE UP (ref 6–8.3)
PROT SERPL-MCNC: 7.9 G/DL — SIGNIFICANT CHANGE UP (ref 6–8.3)
PROT SERPL-MCNC: 8 G/DL — SIGNIFICANT CHANGE UP (ref 6–8.3)
PROT SERPL-MCNC: 8.1 G/DL — SIGNIFICANT CHANGE UP (ref 6–8.3)
PROT SERPL-MCNC: 8.2 G/DL — SIGNIFICANT CHANGE UP (ref 6–8.3)
PROT SERPL-MCNC: 8.3 G/DL — SIGNIFICANT CHANGE UP (ref 6–8.3)
PROT SERPL-MCNC: 8.6 G/DL — HIGH (ref 6–8.3)
PROT SERPL-MCNC: 8.7 G/DL — HIGH (ref 6–8.3)
PROT SERPL-MCNC: 8.9 G/DL — HIGH (ref 6–8.3)
PROT SERPL-MCNC: 9.1 G/DL — HIGH (ref 6–8.3)
PROT SERPL-MCNC: 9.8 G/DL — HIGH (ref 6–8.3)
PROT UR-MCNC: 30 MG/DL
PROTHROM AB SERPL-ACNC: 11 SEC — SIGNIFICANT CHANGE UP (ref 10–12.9)
PROTHROM AB SERPL-ACNC: 12.6 SEC — SIGNIFICANT CHANGE UP (ref 10–12.9)
PROTHROM AB SERPL-ACNC: 12.6 SEC — SIGNIFICANT CHANGE UP (ref 10–12.9)
PROTHROM AB SERPL-ACNC: 12.8 SEC — SIGNIFICANT CHANGE UP (ref 10–12.9)
PROTHROM AB SERPL-ACNC: 13.9 SEC — HIGH (ref 10–12.9)
PROTHROM AB SERPL-ACNC: 13.9 SEC — HIGH (ref 10–12.9)
PROTHROM AB SERPL-ACNC: 14 SEC — HIGH (ref 10–12.9)
PROTHROM AB SERPL-ACNC: 14.8 SEC — HIGH (ref 10–12.9)
PROTHROM AB SERPL-ACNC: 15.1 SEC — HIGH (ref 10–12.9)
PROTHROM AB SERPL-ACNC: 15.2 SEC — HIGH (ref 10–12.9)
PROTHROM AB SERPL-ACNC: 15.6 SEC — HIGH (ref 10–12.9)
PROTHROM AB SERPL-ACNC: 15.8 SEC — HIGH (ref 10–12.9)
PROTHROM AB SERPL-ACNC: 16.3 SEC — HIGH (ref 10–12.9)
PROTHROM AB SERPL-ACNC: 16.7 SEC — HIGH (ref 10–12.9)
PROTHROM AB SERPL-ACNC: 16.8 SEC — HIGH (ref 10–12.9)
RBC # BLD: 2.87 M/UL — LOW (ref 3.8–5.2)
RBC # BLD: 2.98 M/UL — LOW (ref 3.8–5.2)
RBC # BLD: 3 M/UL — LOW (ref 3.8–5.2)
RBC # BLD: 3.03 M/UL — LOW (ref 3.8–5.2)
RBC # BLD: 3.14 M/UL — LOW (ref 3.8–5.2)
RBC # BLD: 3.17 M/UL — LOW (ref 3.8–5.2)
RBC # BLD: 3.17 M/UL — LOW (ref 3.8–5.2)
RBC # BLD: 3.28 M/UL — LOW (ref 3.8–5.2)
RBC # BLD: 3.33 M/UL — LOW (ref 3.8–5.2)
RBC # BLD: 3.37 M/UL — LOW (ref 3.8–5.2)
RBC # BLD: 3.38 M/UL — LOW (ref 3.8–5.2)
RBC # BLD: 3.42 M/UL — LOW (ref 3.8–5.2)
RBC # BLD: 3.47 M/UL — LOW (ref 3.8–5.2)
RBC # BLD: 3.5 M/UL — LOW (ref 3.8–5.2)
RBC # BLD: 3.62 M/UL — LOW (ref 3.8–5.2)
RBC # BLD: 3.72 M/UL — LOW (ref 3.8–5.2)
RBC # BLD: 3.72 M/UL — LOW (ref 3.8–5.2)
RBC # BLD: 3.81 M/UL — SIGNIFICANT CHANGE UP (ref 3.8–5.2)
RBC # BLD: 3.99 M/UL — SIGNIFICANT CHANGE UP (ref 3.8–5.2)
RBC # BLD: 4.08 M/UL — SIGNIFICANT CHANGE UP (ref 3.8–5.2)
RBC # BLD: 4.11 M/UL — SIGNIFICANT CHANGE UP (ref 3.8–5.2)
RBC # BLD: 4.19 M/UL — SIGNIFICANT CHANGE UP (ref 3.8–5.2)
RBC # BLD: 4.48 M/UL — SIGNIFICANT CHANGE UP (ref 3.8–5.2)
RBC # BLD: 4.6 M/UL — SIGNIFICANT CHANGE UP (ref 3.8–5.2)
RBC # BLD: 5.26 M/UL — HIGH (ref 3.8–5.2)
RBC # BLD: 5.5 M/UL — HIGH (ref 3.8–5.2)
RBC # BLD: 5.56 M/UL — HIGH (ref 3.8–5.2)
RBC # BLD: 6.03 M/UL — HIGH (ref 3.8–5.2)
RBC # BLD: 6.12 M/UL — HIGH (ref 3.8–5.2)
RBC # BLD: 6.15 M/UL — HIGH (ref 3.8–5.2)
RBC # BLD: 6.22 M/UL — HIGH (ref 3.8–5.2)
RBC # BLD: 6.27 M/UL — HIGH (ref 3.8–5.2)
RBC # BLD: 6.39 M/UL — HIGH (ref 3.8–5.2)
RBC # FLD: 12.5 % — SIGNIFICANT CHANGE UP (ref 10.3–14.5)
RBC # FLD: 12.5 % — SIGNIFICANT CHANGE UP (ref 10.3–14.5)
RBC # FLD: 12.7 % — SIGNIFICANT CHANGE UP (ref 10.3–14.5)
RBC # FLD: 12.7 % — SIGNIFICANT CHANGE UP (ref 10.3–14.5)
RBC # FLD: 12.9 % — SIGNIFICANT CHANGE UP (ref 10.3–14.5)
RBC # FLD: 13 % — SIGNIFICANT CHANGE UP (ref 10.3–14.5)
RBC # FLD: 13.1 % — SIGNIFICANT CHANGE UP (ref 10.3–14.5)
RBC # FLD: 13.1 % — SIGNIFICANT CHANGE UP (ref 10.3–14.5)
RBC # FLD: 13.2 % — SIGNIFICANT CHANGE UP (ref 10.3–14.5)
RBC # FLD: 13.3 % — SIGNIFICANT CHANGE UP (ref 10.3–14.5)
RBC # FLD: 13.4 % — SIGNIFICANT CHANGE UP (ref 10.3–14.5)
RBC # FLD: 13.5 % — SIGNIFICANT CHANGE UP (ref 10.3–14.5)
RBC # FLD: 13.6 % — SIGNIFICANT CHANGE UP (ref 10.3–14.5)
RBC # FLD: 13.7 % — SIGNIFICANT CHANGE UP (ref 10.3–14.5)
RBC # FLD: 14 % — SIGNIFICANT CHANGE UP (ref 10.3–14.5)
RBC # FLD: 14.3 % — SIGNIFICANT CHANGE UP (ref 10.3–14.5)
RBC # FLD: 14.4 % — SIGNIFICANT CHANGE UP (ref 10.3–14.5)
RBC # FLD: 14.5 % — SIGNIFICANT CHANGE UP (ref 10.3–14.5)
RBC # FLD: 14.5 % — SIGNIFICANT CHANGE UP (ref 10.3–14.5)
RBC # FLD: 15.5 % — HIGH (ref 10.3–14.5)
RBC # FLD: 16.3 % — HIGH (ref 10.3–14.5)
RBC # FLD: 16.7 % — HIGH (ref 10.3–14.5)
RBC # FLD: 16.9 % — HIGH (ref 10.3–14.5)
RBC # FLD: 17 % — HIGH (ref 10.3–14.5)
RBC # FLD: 17.2 % — HIGH (ref 10.3–14.5)
RBC # FLD: 17.2 % — HIGH (ref 10.3–14.5)
RBC # FLD: 17.3 % — HIGH (ref 10.3–14.5)
RBC # FLD: 17.4 % — HIGH (ref 10.3–14.5)
RBC # FLD: 17.6 % — HIGH (ref 10.3–14.5)
RBC # FLD: 17.7 % — HIGH (ref 10.3–14.5)
RBC # FLD: 17.7 % — HIGH (ref 10.3–14.5)
RBC # FLD: 17.9 % — HIGH (ref 10.3–14.5)
RBC # FLD: 18 % — HIGH (ref 10.3–14.5)
RBC BLD AUTO: ABNORMAL
RBC CASTS # UR COMP ASSIST: SIGNIFICANT CHANGE UP /HPF (ref 0–2)
S AUREUS DNA NOSE QL NAA+PROBE: DETECTED
SAO2 % BLDA: 86 % — LOW (ref 92–96)
SAO2 % BLDA: 89 % — LOW (ref 92–96)
SAO2 % BLDA: 90 % — LOW (ref 92–96)
SAO2 % BLDA: 91 % — LOW (ref 92–96)
SAO2 % BLDA: 92 % — SIGNIFICANT CHANGE UP (ref 92–96)
SAO2 % BLDA: 92 % — SIGNIFICANT CHANGE UP (ref 92–96)
SAO2 % BLDA: 93 % — SIGNIFICANT CHANGE UP (ref 92–96)
SAO2 % BLDA: 93 % — SIGNIFICANT CHANGE UP (ref 92–96)
SAO2 % BLDA: 93.1 % — SIGNIFICANT CHANGE UP (ref 92–96)
SAO2 % BLDA: 94 % — SIGNIFICANT CHANGE UP (ref 92–96)
SAO2 % BLDA: 94 % — SIGNIFICANT CHANGE UP (ref 92–96)
SAO2 % BLDA: 95 % — SIGNIFICANT CHANGE UP (ref 92–96)
SAO2 % BLDA: 96 % — SIGNIFICANT CHANGE UP (ref 92–96)
SAO2 % BLDA: 97 % — HIGH (ref 92–96)
SAO2 % BLDA: 97 % — HIGH (ref 92–96)
SAO2 % BLDA: 98 % — HIGH (ref 92–96)
SAO2 % BLDA: 98.8 % — SIGNIFICANT CHANGE UP (ref 92–96)
SAO2 % BLDA: 99 % — HIGH (ref 92–96)
SAO2 % BLDA: SIGNIFICANT CHANGE UP % (ref 92–96)
SAO2 % BLDA: SIGNIFICANT CHANGE UP % (ref 92–96)
SAO2 % BLDV: 71 % — SIGNIFICANT CHANGE UP (ref 67–88)
SAO2 % BLDV: 75.9 % — SIGNIFICANT CHANGE UP (ref 67–88)
SAO2 % BLDV: 76.3 % — SIGNIFICANT CHANGE UP (ref 67–88)
SARS-COV-2 RNA SPEC QL NAA+PROBE: DETECTED
SMUDGE CELLS # BLD: PRESENT — SIGNIFICANT CHANGE UP
SODIUM SERPL-SCNC: 128 MMOL/L — LOW (ref 135–145)
SODIUM SERPL-SCNC: 130 MMOL/L — LOW (ref 135–145)
SODIUM SERPL-SCNC: 130 MMOL/L — LOW (ref 135–145)
SODIUM SERPL-SCNC: 131 MMOL/L — LOW (ref 135–145)
SODIUM SERPL-SCNC: 133 MMOL/L — LOW (ref 135–145)
SODIUM SERPL-SCNC: 134 MMOL/L — LOW (ref 135–145)
SODIUM SERPL-SCNC: 135 MMOL/L — SIGNIFICANT CHANGE UP (ref 135–145)
SODIUM SERPL-SCNC: 136 MMOL/L — SIGNIFICANT CHANGE UP (ref 135–145)
SODIUM SERPL-SCNC: 136 MMOL/L — SIGNIFICANT CHANGE UP (ref 135–145)
SODIUM SERPL-SCNC: 137 MMOL/L — SIGNIFICANT CHANGE UP (ref 135–145)
SODIUM SERPL-SCNC: 138 MMOL/L — SIGNIFICANT CHANGE UP (ref 135–145)
SODIUM SERPL-SCNC: 139 MMOL/L — SIGNIFICANT CHANGE UP (ref 135–145)
SODIUM SERPL-SCNC: 140 MMOL/L — SIGNIFICANT CHANGE UP (ref 135–145)
SODIUM SERPL-SCNC: 142 MMOL/L — SIGNIFICANT CHANGE UP (ref 135–145)
SODIUM SERPL-SCNC: 143 MMOL/L — SIGNIFICANT CHANGE UP (ref 135–145)
SODIUM SERPL-SCNC: 143 MMOL/L — SIGNIFICANT CHANGE UP (ref 135–145)
SODIUM SERPL-SCNC: 144 MMOL/L — SIGNIFICANT CHANGE UP (ref 135–145)
SODIUM SERPL-SCNC: 145 MMOL/L — SIGNIFICANT CHANGE UP (ref 135–145)
SODIUM SERPL-SCNC: 146 MMOL/L — HIGH (ref 135–145)
SODIUM SERPL-SCNC: 147 MMOL/L — HIGH (ref 135–145)
SODIUM SERPL-SCNC: 148 MMOL/L — HIGH (ref 135–145)
SODIUM SERPL-SCNC: 149 MMOL/L — HIGH (ref 135–145)
SP GR SPEC: 1.02 — SIGNIFICANT CHANGE UP (ref 1.01–1.02)
SPECIMEN SOURCE: SIGNIFICANT CHANGE UP
SPECIMEN SOURCE: SIGNIFICANT CHANGE UP
TOXIC GRANULES BLD QL SMEAR: PRESENT — SIGNIFICANT CHANGE UP
TRIGL SERPL-MCNC: 435 MG/DL — HIGH (ref 10–149)
TROPONIN I SERPL-MCNC: 0.03 NG/ML — SIGNIFICANT CHANGE UP (ref 0–0.04)
TROPONIN I SERPL-MCNC: 0.06 NG/ML — HIGH (ref 0–0.04)
TROPONIN I SERPL-MCNC: 0.07 NG/ML — HIGH (ref 0–0.04)
TROPONIN I SERPL-MCNC: <0.015 NG/ML — SIGNIFICANT CHANGE UP (ref 0–0.04)
UROBILINOGEN FLD QL: 8
VANCOMYCIN TROUGH SERPL-MCNC: 17.8 UG/ML — SIGNIFICANT CHANGE UP (ref 10–20)
VANCOMYCIN TROUGH SERPL-MCNC: 19.5 UG/ML — SIGNIFICANT CHANGE UP (ref 10–20)
WBC # BLD: 10.12 K/UL — SIGNIFICANT CHANGE UP (ref 3.8–10.5)
WBC # BLD: 11.52 K/UL — HIGH (ref 3.8–10.5)
WBC # BLD: 11.71 K/UL — HIGH (ref 3.8–10.5)
WBC # BLD: 11.78 K/UL — HIGH (ref 3.8–10.5)
WBC # BLD: 12.47 K/UL — HIGH (ref 3.8–10.5)
WBC # BLD: 12.76 K/UL — HIGH (ref 3.8–10.5)
WBC # BLD: 13.15 K/UL — HIGH (ref 3.8–10.5)
WBC # BLD: 13.23 K/UL — HIGH (ref 3.8–10.5)
WBC # BLD: 13.38 K/UL — HIGH (ref 3.8–10.5)
WBC # BLD: 13.41 K/UL — HIGH (ref 3.8–10.5)
WBC # BLD: 13.66 K/UL — HIGH (ref 3.8–10.5)
WBC # BLD: 14.72 K/UL — HIGH (ref 3.8–10.5)
WBC # BLD: 14.89 K/UL — HIGH (ref 3.8–10.5)
WBC # BLD: 14.93 K/UL — HIGH (ref 3.8–10.5)
WBC # BLD: 15 K/UL — HIGH (ref 3.8–10.5)
WBC # BLD: 15.1 K/UL — HIGH (ref 3.8–10.5)
WBC # BLD: 15.65 K/UL — HIGH (ref 3.8–10.5)
WBC # BLD: 15.88 K/UL — HIGH (ref 3.8–10.5)
WBC # BLD: 16.08 K/UL — HIGH (ref 3.8–10.5)
WBC # BLD: 16.4 K/UL — HIGH (ref 3.8–10.5)
WBC # BLD: 17.37 K/UL — HIGH (ref 3.8–10.5)
WBC # BLD: 18.77 K/UL — HIGH (ref 3.8–10.5)
WBC # BLD: 19.36 K/UL — HIGH (ref 3.8–10.5)
WBC # BLD: 19.88 K/UL — HIGH (ref 3.8–10.5)
WBC # BLD: 21.08 K/UL — HIGH (ref 3.8–10.5)
WBC # BLD: 23.74 K/UL — HIGH (ref 3.8–10.5)
WBC # BLD: 33.13 K/UL — HIGH (ref 3.8–10.5)
WBC # BLD: 4.72 K/UL — SIGNIFICANT CHANGE UP (ref 3.8–10.5)
WBC # BLD: 4.79 K/UL — SIGNIFICANT CHANGE UP (ref 3.8–10.5)
WBC # BLD: 6.21 K/UL — SIGNIFICANT CHANGE UP (ref 3.8–10.5)
WBC # BLD: 7.4 K/UL — SIGNIFICANT CHANGE UP (ref 3.8–10.5)
WBC # BLD: 9.09 K/UL — SIGNIFICANT CHANGE UP (ref 3.8–10.5)
WBC # BLD: 9.49 K/UL — SIGNIFICANT CHANGE UP (ref 3.8–10.5)
WBC # FLD AUTO: 10.12 K/UL — SIGNIFICANT CHANGE UP (ref 3.8–10.5)
WBC # FLD AUTO: 11.52 K/UL — HIGH (ref 3.8–10.5)
WBC # FLD AUTO: 11.71 K/UL — HIGH (ref 3.8–10.5)
WBC # FLD AUTO: 11.78 K/UL — HIGH (ref 3.8–10.5)
WBC # FLD AUTO: 12.47 K/UL — HIGH (ref 3.8–10.5)
WBC # FLD AUTO: 12.76 K/UL — HIGH (ref 3.8–10.5)
WBC # FLD AUTO: 13.15 K/UL — HIGH (ref 3.8–10.5)
WBC # FLD AUTO: 13.23 K/UL — HIGH (ref 3.8–10.5)
WBC # FLD AUTO: 13.38 K/UL — HIGH (ref 3.8–10.5)
WBC # FLD AUTO: 13.41 K/UL — HIGH (ref 3.8–10.5)
WBC # FLD AUTO: 13.66 K/UL — HIGH (ref 3.8–10.5)
WBC # FLD AUTO: 14.72 K/UL — HIGH (ref 3.8–10.5)
WBC # FLD AUTO: 14.89 K/UL — HIGH (ref 3.8–10.5)
WBC # FLD AUTO: 14.93 K/UL — HIGH (ref 3.8–10.5)
WBC # FLD AUTO: 15 K/UL — HIGH (ref 3.8–10.5)
WBC # FLD AUTO: 15.1 K/UL — HIGH (ref 3.8–10.5)
WBC # FLD AUTO: 15.65 K/UL — HIGH (ref 3.8–10.5)
WBC # FLD AUTO: 15.88 K/UL — HIGH (ref 3.8–10.5)
WBC # FLD AUTO: 16.08 K/UL — HIGH (ref 3.8–10.5)
WBC # FLD AUTO: 16.4 K/UL — HIGH (ref 3.8–10.5)
WBC # FLD AUTO: 17.37 K/UL — HIGH (ref 3.8–10.5)
WBC # FLD AUTO: 18.77 K/UL — HIGH (ref 3.8–10.5)
WBC # FLD AUTO: 19.36 K/UL — HIGH (ref 3.8–10.5)
WBC # FLD AUTO: 19.88 K/UL — HIGH (ref 3.8–10.5)
WBC # FLD AUTO: 21.08 K/UL — HIGH (ref 3.8–10.5)
WBC # FLD AUTO: 23.74 K/UL — HIGH (ref 3.8–10.5)
WBC # FLD AUTO: 33.13 K/UL — HIGH (ref 3.8–10.5)
WBC # FLD AUTO: 4.72 K/UL — SIGNIFICANT CHANGE UP (ref 3.8–10.5)
WBC # FLD AUTO: 4.79 K/UL — SIGNIFICANT CHANGE UP (ref 3.8–10.5)
WBC # FLD AUTO: 6.21 K/UL — SIGNIFICANT CHANGE UP (ref 3.8–10.5)
WBC # FLD AUTO: 7.4 K/UL — SIGNIFICANT CHANGE UP (ref 3.8–10.5)
WBC # FLD AUTO: 9.09 K/UL — SIGNIFICANT CHANGE UP (ref 3.8–10.5)
WBC # FLD AUTO: 9.49 K/UL — SIGNIFICANT CHANGE UP (ref 3.8–10.5)
WBC UR QL: SIGNIFICANT CHANGE UP /HPF (ref 0–5)

## 2020-01-01 PROCEDURE — 71045 X-RAY EXAM CHEST 1 VIEW: CPT | Mod: 26,77

## 2020-01-01 PROCEDURE — 71045 X-RAY EXAM CHEST 1 VIEW: CPT | Mod: 26

## 2020-01-01 PROCEDURE — 99233 SBSQ HOSP IP/OBS HIGH 50: CPT

## 2020-01-01 PROCEDURE — 93010 ELECTROCARDIOGRAM REPORT: CPT

## 2020-01-01 PROCEDURE — 36556 INSERT NON-TUNNEL CV CATH: CPT

## 2020-01-01 PROCEDURE — 99232 SBSQ HOSP IP/OBS MODERATE 35: CPT

## 2020-01-01 PROCEDURE — 99223 1ST HOSP IP/OBS HIGH 75: CPT

## 2020-01-01 PROCEDURE — 99285 EMERGENCY DEPT VISIT HI MDM: CPT

## 2020-01-01 RX ORDER — SENNA PLUS 8.6 MG/1
2 TABLET ORAL AT BEDTIME
Refills: 0 | Status: DISCONTINUED | OUTPATIENT
Start: 2020-01-01 | End: 2020-01-01

## 2020-01-01 RX ORDER — NOREPINEPHRINE BITARTRATE/D5W 8 MG/250ML
0.05 PLASTIC BAG, INJECTION (ML) INTRAVENOUS
Qty: 16 | Refills: 0 | Status: DISCONTINUED | OUTPATIENT
Start: 2020-01-01 | End: 2020-01-01

## 2020-01-01 RX ORDER — MUPIROCIN 20 MG/G
1 OINTMENT TOPICAL
Refills: 0 | Status: DISCONTINUED | OUTPATIENT
Start: 2020-01-01 | End: 2020-01-01

## 2020-01-01 RX ORDER — FENTANYL CITRATE 50 UG/ML
0.5 INJECTION INTRAVENOUS
Qty: 2500 | Refills: 0 | Status: DISCONTINUED | OUTPATIENT
Start: 2020-01-01 | End: 2020-01-01

## 2020-01-01 RX ORDER — HYDROMORPHONE HYDROCHLORIDE 2 MG/ML
2 INJECTION INTRAMUSCULAR; INTRAVENOUS; SUBCUTANEOUS ONCE
Refills: 0 | Status: DISCONTINUED | OUTPATIENT
Start: 2020-01-01 | End: 2020-01-01

## 2020-01-01 RX ORDER — VANCOMYCIN HCL 1 G
1250 VIAL (EA) INTRAVENOUS ONCE
Refills: 0 | Status: DISCONTINUED | OUTPATIENT
Start: 2020-01-01 | End: 2020-01-01

## 2020-01-01 RX ORDER — PHENYLEPHRINE HYDROCHLORIDE 10 MG/ML
0.1 INJECTION INTRAVENOUS
Qty: 40 | Refills: 0 | Status: DISCONTINUED | OUTPATIENT
Start: 2020-01-01 | End: 2020-01-01

## 2020-01-01 RX ORDER — MIDAZOLAM HYDROCHLORIDE 1 MG/ML
0.02 INJECTION, SOLUTION INTRAMUSCULAR; INTRAVENOUS
Qty: 100 | Refills: 0 | Status: DISCONTINUED | OUTPATIENT
Start: 2020-01-01 | End: 2020-01-01

## 2020-01-01 RX ORDER — FAMOTIDINE 10 MG/ML
20 INJECTION INTRAVENOUS ONCE
Refills: 0 | Status: COMPLETED | OUTPATIENT
Start: 2020-01-01 | End: 2020-01-01

## 2020-01-01 RX ORDER — DEXTROSE 50 % IN WATER 50 %
50 SYRINGE (ML) INTRAVENOUS ONCE
Refills: 0 | Status: COMPLETED | OUTPATIENT
Start: 2020-01-01 | End: 2020-01-01

## 2020-01-01 RX ORDER — MEROPENEM 1 G/30ML
INJECTION INTRAVENOUS
Refills: 0 | Status: DISCONTINUED | OUTPATIENT
Start: 2020-01-01 | End: 2020-01-01

## 2020-01-01 RX ORDER — VANCOMYCIN HCL 1 G
750 VIAL (EA) INTRAVENOUS ONCE
Refills: 0 | Status: COMPLETED | OUTPATIENT
Start: 2020-01-01 | End: 2020-01-01

## 2020-01-01 RX ORDER — APIXABAN 2.5 MG/1
2.5 TABLET, FILM COATED ORAL
Refills: 0 | Status: DISCONTINUED | OUTPATIENT
Start: 2020-01-01 | End: 2020-01-01

## 2020-01-01 RX ORDER — SODIUM CHLORIDE 9 MG/ML
1000 INJECTION, SOLUTION INTRAVENOUS
Refills: 0 | Status: DISCONTINUED | OUTPATIENT
Start: 2020-01-01 | End: 2020-01-01

## 2020-01-01 RX ORDER — ANAKINRA 100MG/0.67
90 SYRINGE (ML) SUBCUTANEOUS EVERY 12 HOURS
Refills: 0 | Status: COMPLETED | OUTPATIENT
Start: 2020-01-01 | End: 2020-01-01

## 2020-01-01 RX ORDER — ACETAMINOPHEN 500 MG
650 TABLET ORAL EVERY 6 HOURS
Refills: 0 | Status: DISCONTINUED | OUTPATIENT
Start: 2020-01-01 | End: 2020-01-01

## 2020-01-01 RX ORDER — PANTOPRAZOLE SODIUM 20 MG/1
40 TABLET, DELAYED RELEASE ORAL DAILY
Refills: 0 | Status: DISCONTINUED | OUTPATIENT
Start: 2020-01-01 | End: 2020-01-01

## 2020-01-01 RX ORDER — ROCURONIUM BROMIDE 10 MG/ML
8 VIAL (ML) INTRAVENOUS
Qty: 500 | Refills: 0 | Status: DISCONTINUED | OUTPATIENT
Start: 2020-01-01 | End: 2020-01-01

## 2020-01-01 RX ORDER — NOREPINEPHRINE BITARTRATE/D5W 8 MG/250ML
2 PLASTIC BAG, INJECTION (ML) INTRAVENOUS
Qty: 32 | Refills: 0 | Status: DISCONTINUED | OUTPATIENT
Start: 2020-01-01 | End: 2020-01-01

## 2020-01-01 RX ORDER — DEXMEDETOMIDINE HYDROCHLORIDE IN 0.9% SODIUM CHLORIDE 4 UG/ML
0.2 INJECTION INTRAVENOUS
Qty: 400 | Refills: 0 | Status: DISCONTINUED | OUTPATIENT
Start: 2020-01-01 | End: 2020-01-01

## 2020-01-01 RX ORDER — ASCORBIC ACID 60 MG
500 TABLET,CHEWABLE ORAL
Refills: 0 | Status: DISCONTINUED | OUTPATIENT
Start: 2020-01-01 | End: 2020-01-01

## 2020-01-01 RX ORDER — MEROPENEM 1 G/30ML
500 INJECTION INTRAVENOUS ONCE
Refills: 0 | Status: COMPLETED | OUTPATIENT
Start: 2020-01-01 | End: 2020-01-01

## 2020-01-01 RX ORDER — SODIUM CHLORIDE 9 MG/ML
1000 INJECTION INTRAMUSCULAR; INTRAVENOUS; SUBCUTANEOUS
Refills: 0 | Status: DISCONTINUED | OUTPATIENT
Start: 2020-01-01 | End: 2020-01-01

## 2020-01-01 RX ORDER — HYDROMORPHONE HYDROCHLORIDE 2 MG/ML
1 INJECTION INTRAMUSCULAR; INTRAVENOUS; SUBCUTANEOUS
Qty: 100 | Refills: 0 | Status: DISCONTINUED | OUTPATIENT
Start: 2020-01-01 | End: 2020-01-01

## 2020-01-01 RX ORDER — CALCIUM ACETATE 667 MG
667 TABLET ORAL
Refills: 0 | Status: DISCONTINUED | OUTPATIENT
Start: 2020-01-01 | End: 2020-01-01

## 2020-01-01 RX ORDER — MEROPENEM 1 G/30ML
500 INJECTION INTRAVENOUS EVERY 12 HOURS
Refills: 0 | Status: DISCONTINUED | OUTPATIENT
Start: 2020-01-01 | End: 2020-01-01

## 2020-01-01 RX ORDER — VANCOMYCIN HCL 1 G
500 VIAL (EA) INTRAVENOUS ONCE
Refills: 0 | Status: COMPLETED | OUTPATIENT
Start: 2020-01-01 | End: 2020-01-01

## 2020-01-01 RX ORDER — PHENYLEPHRINE HYDROCHLORIDE 10 MG/ML
1.5 INJECTION INTRAVENOUS
Qty: 160 | Refills: 0 | Status: DISCONTINUED | OUTPATIENT
Start: 2020-01-01 | End: 2020-01-01

## 2020-01-01 RX ORDER — HYDROMORPHONE HYDROCHLORIDE 2 MG/ML
2 INJECTION INTRAMUSCULAR; INTRAVENOUS; SUBCUTANEOUS
Qty: 100 | Refills: 0 | Status: DISCONTINUED | OUTPATIENT
Start: 2020-01-01 | End: 2020-01-01

## 2020-01-01 RX ORDER — ALBUTEROL 90 UG/1
2 AEROSOL, METERED ORAL EVERY 6 HOURS
Refills: 0 | Status: DISCONTINUED | OUTPATIENT
Start: 2020-01-01 | End: 2020-01-01

## 2020-01-01 RX ORDER — FUROSEMIDE 40 MG
5 TABLET ORAL
Qty: 100 | Refills: 0 | Status: DISCONTINUED | OUTPATIENT
Start: 2020-01-01 | End: 2020-01-01

## 2020-01-01 RX ORDER — ACETAMINOPHEN 500 MG
650 TABLET ORAL ONCE
Refills: 0 | Status: COMPLETED | OUTPATIENT
Start: 2020-01-01 | End: 2020-01-01

## 2020-01-01 RX ORDER — COLLAGENASE CLOSTRIDIUM HIST. 250 UNIT/G
1 OINTMENT (GRAM) TOPICAL DAILY
Refills: 0 | Status: DISCONTINUED | OUTPATIENT
Start: 2020-01-01 | End: 2020-01-01

## 2020-01-01 RX ORDER — ALBUMIN HUMAN 25 %
50 VIAL (ML) INTRAVENOUS EVERY 6 HOURS
Refills: 0 | Status: COMPLETED | OUTPATIENT
Start: 2020-01-01 | End: 2020-01-01

## 2020-01-01 RX ORDER — CHLORHEXIDINE GLUCONATE 213 G/1000ML
15 SOLUTION TOPICAL EVERY 12 HOURS
Refills: 0 | Status: DISCONTINUED | OUTPATIENT
Start: 2020-01-01 | End: 2020-01-01

## 2020-01-01 RX ORDER — SUCCINYLCHOLINE CHLORIDE 100 MG/5ML
200 SYRINGE (ML) INTRAVENOUS ONCE
Refills: 0 | Status: COMPLETED | OUTPATIENT
Start: 2020-01-01 | End: 2020-01-01

## 2020-01-01 RX ORDER — AZITHROMYCIN 500 MG/1
500 TABLET, FILM COATED ORAL ONCE
Refills: 0 | Status: COMPLETED | OUTPATIENT
Start: 2020-01-01 | End: 2020-01-01

## 2020-01-01 RX ORDER — PROPOFOL 10 MG/ML
15 INJECTION, EMULSION INTRAVENOUS
Qty: 1000 | Refills: 0 | Status: DISCONTINUED | OUTPATIENT
Start: 2020-01-01 | End: 2020-01-01

## 2020-01-01 RX ORDER — SODIUM CHLORIDE 9 MG/ML
1000 INJECTION INTRAMUSCULAR; INTRAVENOUS; SUBCUTANEOUS ONCE
Refills: 0 | Status: COMPLETED | OUTPATIENT
Start: 2020-01-01 | End: 2020-01-01

## 2020-01-01 RX ORDER — HYDROXYCHLOROQUINE SULFATE 200 MG
400 TABLET ORAL EVERY 12 HOURS
Refills: 0 | Status: COMPLETED | OUTPATIENT
Start: 2020-01-01 | End: 2020-01-01

## 2020-01-01 RX ORDER — PHENYLEPHRINE HYDROCHLORIDE 10 MG/ML
0.1 INJECTION INTRAVENOUS
Qty: 160 | Refills: 0 | Status: DISCONTINUED | OUTPATIENT
Start: 2020-01-01 | End: 2020-01-01

## 2020-01-01 RX ORDER — FUROSEMIDE 40 MG
80 TABLET ORAL ONCE
Refills: 0 | Status: COMPLETED | OUTPATIENT
Start: 2020-01-01 | End: 2020-01-01

## 2020-01-01 RX ORDER — ONDANSETRON 8 MG/1
4 TABLET, FILM COATED ORAL ONCE
Refills: 0 | Status: COMPLETED | OUTPATIENT
Start: 2020-01-01 | End: 2020-01-01

## 2020-01-01 RX ORDER — VANCOMYCIN HCL 1 G
750 VIAL (EA) INTRAVENOUS ONCE
Refills: 0 | Status: DISCONTINUED | OUTPATIENT
Start: 2020-01-01 | End: 2020-01-01

## 2020-01-01 RX ORDER — ENOXAPARIN SODIUM 100 MG/ML
90 INJECTION SUBCUTANEOUS
Refills: 0 | Status: DISCONTINUED | OUTPATIENT
Start: 2020-01-01 | End: 2020-01-01

## 2020-01-01 RX ORDER — VASOPRESSIN 20 [USP'U]/ML
0.02 INJECTION INTRAVENOUS
Qty: 50 | Refills: 0 | Status: DISCONTINUED | OUTPATIENT
Start: 2020-01-01 | End: 2020-01-01

## 2020-01-01 RX ORDER — ANAKINRA 100MG/0.67
100 SYRINGE (ML) SUBCUTANEOUS EVERY 6 HOURS
Refills: 0 | Status: DISCONTINUED | OUTPATIENT
Start: 2020-01-01 | End: 2020-01-01

## 2020-01-01 RX ORDER — HYDROXYCHLOROQUINE SULFATE 200 MG
TABLET ORAL
Refills: 0 | Status: COMPLETED | OUTPATIENT
Start: 2020-01-01 | End: 2020-01-01

## 2020-01-01 RX ORDER — SIMVASTATIN 20 MG/1
0 TABLET, FILM COATED ORAL
Qty: 30 | Refills: 0 | DISCHARGE

## 2020-01-01 RX ORDER — ROCURONIUM BROMIDE 10 MG/ML
50 VIAL (ML) INTRAVENOUS ONCE
Refills: 0 | Status: COMPLETED | OUTPATIENT
Start: 2020-01-01 | End: 2020-01-01

## 2020-01-01 RX ORDER — DEXMEDETOMIDINE HYDROCHLORIDE IN 0.9% SODIUM CHLORIDE 4 UG/ML
0.1 INJECTION INTRAVENOUS
Qty: 200 | Refills: 0 | Status: DISCONTINUED | OUTPATIENT
Start: 2020-01-01 | End: 2020-01-01

## 2020-01-01 RX ORDER — VANCOMYCIN HCL 1 G
1250 VIAL (EA) INTRAVENOUS ONCE
Refills: 0 | Status: COMPLETED | OUTPATIENT
Start: 2020-01-01 | End: 2020-01-01

## 2020-01-01 RX ORDER — SODIUM CHLORIDE 9 MG/ML
500 INJECTION, SOLUTION INTRAVENOUS
Refills: 0 | Status: COMPLETED | OUTPATIENT
Start: 2020-01-01 | End: 2020-01-01

## 2020-01-01 RX ORDER — HYDROMORPHONE HYDROCHLORIDE 2 MG/ML
0.5 INJECTION INTRAMUSCULAR; INTRAVENOUS; SUBCUTANEOUS
Qty: 100 | Refills: 0 | Status: DISCONTINUED | OUTPATIENT
Start: 2020-01-01 | End: 2020-01-01

## 2020-01-01 RX ORDER — SODIUM BICARBONATE 1 MEQ/ML
0.13 SYRINGE (ML) INTRAVENOUS
Qty: 150 | Refills: 0 | Status: DISCONTINUED | OUTPATIENT
Start: 2020-01-01 | End: 2020-01-01

## 2020-01-01 RX ORDER — OMEPRAZOLE 10 MG/1
0 CAPSULE, DELAYED RELEASE ORAL
Qty: 90 | Refills: 0 | DISCHARGE

## 2020-01-01 RX ORDER — DEXTROSE 50 % IN WATER 50 %
50 SYRINGE (ML) INTRAVENOUS
Refills: 0 | Status: COMPLETED | OUTPATIENT
Start: 2020-01-01 | End: 2020-01-01

## 2020-01-01 RX ORDER — CHLORHEXIDINE GLUCONATE 213 G/1000ML
1 SOLUTION TOPICAL
Refills: 0 | Status: DISCONTINUED | OUTPATIENT
Start: 2020-01-01 | End: 2020-01-01

## 2020-01-01 RX ORDER — HYDROXYCHLOROQUINE SULFATE 200 MG
200 TABLET ORAL EVERY 12 HOURS
Refills: 0 | Status: COMPLETED | OUTPATIENT
Start: 2020-01-01 | End: 2020-01-01

## 2020-01-01 RX ORDER — HEPARIN SODIUM 5000 [USP'U]/ML
5000 INJECTION INTRAVENOUS; SUBCUTANEOUS EVERY 12 HOURS
Refills: 0 | Status: DISCONTINUED | OUTPATIENT
Start: 2020-01-01 | End: 2020-01-01

## 2020-01-01 RX ORDER — THIAMINE MONONITRATE (VIT B1) 100 MG
100 TABLET ORAL DAILY
Refills: 0 | Status: DISCONTINUED | OUTPATIENT
Start: 2020-01-01 | End: 2020-01-01

## 2020-01-01 RX ORDER — ALBUMIN HUMAN 25 %
100 VIAL (ML) INTRAVENOUS EVERY 4 HOURS
Refills: 0 | Status: COMPLETED | OUTPATIENT
Start: 2020-01-01 | End: 2020-01-01

## 2020-01-01 RX ORDER — ENOXAPARIN SODIUM 100 MG/ML
90 INJECTION SUBCUTANEOUS AT BEDTIME
Refills: 0 | Status: DISCONTINUED | OUTPATIENT
Start: 2020-01-01 | End: 2020-01-01

## 2020-01-01 RX ORDER — PANTOPRAZOLE SODIUM 20 MG/1
40 TABLET, DELAYED RELEASE ORAL
Refills: 0 | Status: DISCONTINUED | OUTPATIENT
Start: 2020-01-01 | End: 2020-01-01

## 2020-01-01 RX ORDER — POLYETHYLENE GLYCOL 3350 17 G/17G
17 POWDER, FOR SOLUTION ORAL DAILY
Refills: 0 | Status: DISCONTINUED | OUTPATIENT
Start: 2020-01-01 | End: 2020-01-01

## 2020-01-01 RX ORDER — MIDODRINE HYDROCHLORIDE 2.5 MG/1
10 TABLET ORAL EVERY 8 HOURS
Refills: 0 | Status: DISCONTINUED | OUTPATIENT
Start: 2020-01-01 | End: 2020-01-01

## 2020-01-01 RX ORDER — CEFEPIME 1 G/1
1000 INJECTION, POWDER, FOR SOLUTION INTRAMUSCULAR; INTRAVENOUS EVERY 8 HOURS
Refills: 0 | Status: DISCONTINUED | OUTPATIENT
Start: 2020-01-01 | End: 2020-01-01

## 2020-01-01 RX ORDER — CEFEPIME 1 G/1
2000 INJECTION, POWDER, FOR SOLUTION INTRAMUSCULAR; INTRAVENOUS
Refills: 0 | Status: DISCONTINUED | OUTPATIENT
Start: 2020-01-01 | End: 2020-01-01

## 2020-01-01 RX ORDER — HEPARIN SODIUM 5000 [USP'U]/ML
5000 INJECTION INTRAVENOUS; SUBCUTANEOUS EVERY 8 HOURS
Refills: 0 | Status: DISCONTINUED | OUTPATIENT
Start: 2020-01-01 | End: 2020-01-01

## 2020-01-01 RX ORDER — ERGOCALCIFEROL 1.25 MG/1
0 CAPSULE ORAL
Qty: 12 | Refills: 0 | DISCHARGE

## 2020-01-01 RX ORDER — SODIUM BICARBONATE 1 MEQ/ML
150 SYRINGE (ML) INTRAVENOUS ONCE
Refills: 0 | Status: COMPLETED | OUTPATIENT
Start: 2020-01-01 | End: 2020-01-01

## 2020-01-01 RX ORDER — ENOXAPARIN SODIUM 100 MG/ML
86 INJECTION SUBCUTANEOUS
Refills: 0 | Status: DISCONTINUED | OUTPATIENT
Start: 2020-01-01 | End: 2020-01-01

## 2020-01-01 RX ORDER — TOCILIZUMAB 20 MG/ML
400 INJECTION, SOLUTION, CONCENTRATE INTRAVENOUS ONCE
Refills: 0 | Status: DISCONTINUED | OUTPATIENT
Start: 2020-01-01 | End: 2020-01-01

## 2020-01-01 RX ADMIN — CHLORHEXIDINE GLUCONATE 15 MILLILITER(S): 213 SOLUTION TOPICAL at 19:03

## 2020-01-01 RX ADMIN — POLYETHYLENE GLYCOL 3350 17 GRAM(S): 17 POWDER, FOR SOLUTION ORAL at 11:02

## 2020-01-01 RX ADMIN — Medication 250 MILLIGRAM(S): at 11:39

## 2020-01-01 RX ADMIN — CHLORHEXIDINE GLUCONATE 15 MILLILITER(S): 213 SOLUTION TOPICAL at 06:45

## 2020-01-01 RX ADMIN — Medication 90 MILLIGRAM(S): at 17:18

## 2020-01-01 RX ADMIN — CEFEPIME 100 MILLIGRAM(S): 1 INJECTION, POWDER, FOR SOLUTION INTRAMUSCULAR; INTRAVENOUS at 05:06

## 2020-01-01 RX ADMIN — CHLORHEXIDINE GLUCONATE 15 MILLILITER(S): 213 SOLUTION TOPICAL at 05:52

## 2020-01-01 RX ADMIN — ENOXAPARIN SODIUM 90 MILLIGRAM(S): 100 INJECTION SUBCUTANEOUS at 05:10

## 2020-01-01 RX ADMIN — CHLORHEXIDINE GLUCONATE 15 MILLILITER(S): 213 SOLUTION TOPICAL at 17:19

## 2020-01-01 RX ADMIN — CHLORHEXIDINE GLUCONATE 1 APPLICATION(S): 213 SOLUTION TOPICAL at 05:28

## 2020-01-01 RX ADMIN — MIDAZOLAM HYDROCHLORIDE 1.72 MG/KG/HR: 1 INJECTION, SOLUTION INTRAMUSCULAR; INTRAVENOUS at 22:04

## 2020-01-01 RX ADMIN — Medication 40 MILLIGRAM(S): at 17:48

## 2020-01-01 RX ADMIN — MIDAZOLAM HYDROCHLORIDE 1.72 MG/KG/HR: 1 INJECTION, SOLUTION INTRAMUSCULAR; INTRAVENOUS at 00:33

## 2020-01-01 RX ADMIN — ALBUTEROL 2 PUFF(S): 90 AEROSOL, METERED ORAL at 20:22

## 2020-01-01 RX ADMIN — ENOXAPARIN SODIUM 90 MILLIGRAM(S): 100 INJECTION SUBCUTANEOUS at 17:10

## 2020-01-01 RX ADMIN — MUPIROCIN 1 APPLICATION(S): 20 OINTMENT TOPICAL at 17:10

## 2020-01-01 RX ADMIN — PROPOFOL 7.76 MICROGRAM(S)/KG/MIN: 10 INJECTION, EMULSION INTRAVENOUS at 21:25

## 2020-01-01 RX ADMIN — MEROPENEM 100 MILLIGRAM(S): 1 INJECTION INTRAVENOUS at 20:00

## 2020-01-01 RX ADMIN — SENNA PLUS 2 TABLET(S): 8.6 TABLET ORAL at 21:00

## 2020-01-01 RX ADMIN — CEFEPIME 100 MILLIGRAM(S): 1 INJECTION, POWDER, FOR SOLUTION INTRAMUSCULAR; INTRAVENOUS at 05:52

## 2020-01-01 RX ADMIN — PHENYLEPHRINE HYDROCHLORIDE 24.2 MICROGRAM(S)/KG/MIN: 10 INJECTION INTRAVENOUS at 14:13

## 2020-01-01 RX ADMIN — PANTOPRAZOLE SODIUM 40 MILLIGRAM(S): 20 TABLET, DELAYED RELEASE ORAL at 05:24

## 2020-01-01 RX ADMIN — CHLORHEXIDINE GLUCONATE 15 MILLILITER(S): 213 SOLUTION TOPICAL at 05:08

## 2020-01-01 RX ADMIN — MUPIROCIN 1 APPLICATION(S): 20 OINTMENT TOPICAL at 05:28

## 2020-01-01 RX ADMIN — MIDODRINE HYDROCHLORIDE 10 MILLIGRAM(S): 2.5 TABLET ORAL at 14:08

## 2020-01-01 RX ADMIN — ENOXAPARIN SODIUM 90 MILLIGRAM(S): 100 INJECTION SUBCUTANEOUS at 05:02

## 2020-01-01 RX ADMIN — FENTANYL CITRATE 4.31 MICROGRAM(S)/KG/HR: 50 INJECTION INTRAVENOUS at 20:49

## 2020-01-01 RX ADMIN — Medication 250 MILLIGRAM(S): at 12:15

## 2020-01-01 RX ADMIN — POLYETHYLENE GLYCOL 3350 17 GRAM(S): 17 POWDER, FOR SOLUTION ORAL at 11:25

## 2020-01-01 RX ADMIN — FENTANYL CITRATE 4.31 MICROGRAM(S)/KG/HR: 50 INJECTION INTRAVENOUS at 00:03

## 2020-01-01 RX ADMIN — CHLORHEXIDINE GLUCONATE 15 MILLILITER(S): 213 SOLUTION TOPICAL at 17:25

## 2020-01-01 RX ADMIN — PANTOPRAZOLE SODIUM 40 MILLIGRAM(S): 20 TABLET, DELAYED RELEASE ORAL at 11:02

## 2020-01-01 RX ADMIN — Medication 650 MILLIGRAM(S): at 21:12

## 2020-01-01 RX ADMIN — Medication 90 MILLIGRAM(S): at 05:21

## 2020-01-01 RX ADMIN — CHLORHEXIDINE GLUCONATE 15 MILLILITER(S): 213 SOLUTION TOPICAL at 17:13

## 2020-01-01 RX ADMIN — CEFEPIME 100 MILLIGRAM(S): 1 INJECTION, POWDER, FOR SOLUTION INTRAMUSCULAR; INTRAVENOUS at 00:34

## 2020-01-01 RX ADMIN — MIDODRINE HYDROCHLORIDE 10 MILLIGRAM(S): 2.5 TABLET ORAL at 12:37

## 2020-01-01 RX ADMIN — POLYETHYLENE GLYCOL 3350 17 GRAM(S): 17 POWDER, FOR SOLUTION ORAL at 11:35

## 2020-01-01 RX ADMIN — PROPOFOL 7.76 MICROGRAM(S)/KG/MIN: 10 INJECTION, EMULSION INTRAVENOUS at 10:30

## 2020-01-01 RX ADMIN — CEFEPIME 100 MILLIGRAM(S): 1 INJECTION, POWDER, FOR SOLUTION INTRAMUSCULAR; INTRAVENOUS at 13:54

## 2020-01-01 RX ADMIN — MUPIROCIN 1 APPLICATION(S): 20 OINTMENT TOPICAL at 06:01

## 2020-01-01 RX ADMIN — Medication 8.28 MICROGRAM(S)/KG/MIN: at 16:57

## 2020-01-01 RX ADMIN — CHLORHEXIDINE GLUCONATE 15 MILLILITER(S): 213 SOLUTION TOPICAL at 05:02

## 2020-01-01 RX ADMIN — FENTANYL CITRATE 4.31 MICROGRAM(S)/KG/HR: 50 INJECTION INTRAVENOUS at 21:00

## 2020-01-01 RX ADMIN — PHENYLEPHRINE HYDROCHLORIDE 3.23 MICROGRAM(S)/KG/MIN: 10 INJECTION INTRAVENOUS at 19:20

## 2020-01-01 RX ADMIN — Medication 2.5 MG/HR: at 23:33

## 2020-01-01 RX ADMIN — Medication 650 MILLIGRAM(S): at 00:15

## 2020-01-01 RX ADMIN — Medication 40 MILLIGRAM(S): at 04:27

## 2020-01-01 RX ADMIN — Medication 50 MILLILITER(S): at 22:45

## 2020-01-01 RX ADMIN — PANTOPRAZOLE SODIUM 40 MILLIGRAM(S): 20 TABLET, DELAYED RELEASE ORAL at 05:11

## 2020-01-01 RX ADMIN — Medication 50 MILLILITER(S): at 16:27

## 2020-01-01 RX ADMIN — APIXABAN 2.5 MILLIGRAM(S): 2.5 TABLET, FILM COATED ORAL at 17:30

## 2020-01-01 RX ADMIN — DEXMEDETOMIDINE HYDROCHLORIDE IN 0.9% SODIUM CHLORIDE 4.31 MICROGRAM(S)/KG/HR: 4 INJECTION INTRAVENOUS at 22:49

## 2020-01-01 RX ADMIN — APIXABAN 2.5 MILLIGRAM(S): 2.5 TABLET, FILM COATED ORAL at 05:08

## 2020-01-01 RX ADMIN — PANTOPRAZOLE SODIUM 40 MILLIGRAM(S): 20 TABLET, DELAYED RELEASE ORAL at 11:00

## 2020-01-01 RX ADMIN — PANTOPRAZOLE SODIUM 40 MILLIGRAM(S): 20 TABLET, DELAYED RELEASE ORAL at 11:34

## 2020-01-01 RX ADMIN — CHLORHEXIDINE GLUCONATE 1 APPLICATION(S): 213 SOLUTION TOPICAL at 06:01

## 2020-01-01 RX ADMIN — VASOPRESSIN 1.2 UNIT(S)/MIN: 20 INJECTION INTRAVENOUS at 21:48

## 2020-01-01 RX ADMIN — MIDODRINE HYDROCHLORIDE 10 MILLIGRAM(S): 2.5 TABLET ORAL at 13:55

## 2020-01-01 RX ADMIN — APIXABAN 2.5 MILLIGRAM(S): 2.5 TABLET, FILM COATED ORAL at 19:47

## 2020-01-01 RX ADMIN — MIDODRINE HYDROCHLORIDE 10 MILLIGRAM(S): 2.5 TABLET ORAL at 12:17

## 2020-01-01 RX ADMIN — ENOXAPARIN SODIUM 90 MILLIGRAM(S): 100 INJECTION SUBCUTANEOUS at 18:27

## 2020-01-01 RX ADMIN — MIDODRINE HYDROCHLORIDE 10 MILLIGRAM(S): 2.5 TABLET ORAL at 21:12

## 2020-01-01 RX ADMIN — SENNA PLUS 2 TABLET(S): 8.6 TABLET ORAL at 00:55

## 2020-01-01 RX ADMIN — FENTANYL CITRATE 4.31 MICROGRAM(S)/KG/HR: 50 INJECTION INTRAVENOUS at 21:42

## 2020-01-01 RX ADMIN — SENNA PLUS 2 TABLET(S): 8.6 TABLET ORAL at 22:00

## 2020-01-01 RX ADMIN — MIDODRINE HYDROCHLORIDE 10 MILLIGRAM(S): 2.5 TABLET ORAL at 14:04

## 2020-01-01 RX ADMIN — ENOXAPARIN SODIUM 90 MILLIGRAM(S): 100 INJECTION SUBCUTANEOUS at 05:12

## 2020-01-01 RX ADMIN — CEFEPIME 100 MILLIGRAM(S): 1 INJECTION, POWDER, FOR SOLUTION INTRAMUSCULAR; INTRAVENOUS at 23:02

## 2020-01-01 RX ADMIN — PROPOFOL 7.76 MICROGRAM(S)/KG/MIN: 10 INJECTION, EMULSION INTRAVENOUS at 01:57

## 2020-01-01 RX ADMIN — SENNA PLUS 2 TABLET(S): 8.6 TABLET ORAL at 21:49

## 2020-01-01 RX ADMIN — ENOXAPARIN SODIUM 90 MILLIGRAM(S): 100 INJECTION SUBCUTANEOUS at 06:00

## 2020-01-01 RX ADMIN — ENOXAPARIN SODIUM 90 MILLIGRAM(S): 100 INJECTION SUBCUTANEOUS at 16:30

## 2020-01-01 RX ADMIN — MEROPENEM 100 MILLIGRAM(S): 1 INJECTION INTRAVENOUS at 17:10

## 2020-01-01 RX ADMIN — Medication 40 MILLIGRAM(S): at 16:30

## 2020-01-01 RX ADMIN — PANTOPRAZOLE SODIUM 40 MILLIGRAM(S): 20 TABLET, DELAYED RELEASE ORAL at 11:30

## 2020-01-01 RX ADMIN — Medication 50 MILLILITER(S): at 00:19

## 2020-01-01 RX ADMIN — CHLORHEXIDINE GLUCONATE 15 MILLILITER(S): 213 SOLUTION TOPICAL at 05:00

## 2020-01-01 RX ADMIN — AZITHROMYCIN 500 MILLIGRAM(S): 500 TABLET, FILM COATED ORAL at 03:38

## 2020-01-01 RX ADMIN — CEFEPIME 100 MILLIGRAM(S): 1 INJECTION, POWDER, FOR SOLUTION INTRAMUSCULAR; INTRAVENOUS at 05:02

## 2020-01-01 RX ADMIN — MIDODRINE HYDROCHLORIDE 10 MILLIGRAM(S): 2.5 TABLET ORAL at 14:24

## 2020-01-01 RX ADMIN — FENTANYL CITRATE 4.31 MICROGRAM(S)/KG/HR: 50 INJECTION INTRAVENOUS at 06:56

## 2020-01-01 RX ADMIN — APIXABAN 2.5 MILLIGRAM(S): 2.5 TABLET, FILM COATED ORAL at 05:13

## 2020-01-01 RX ADMIN — CHLORHEXIDINE GLUCONATE 1 APPLICATION(S): 213 SOLUTION TOPICAL at 13:10

## 2020-01-01 RX ADMIN — MIDAZOLAM HYDROCHLORIDE 1.72 MG/KG/HR: 1 INJECTION, SOLUTION INTRAMUSCULAR; INTRAVENOUS at 12:52

## 2020-01-01 RX ADMIN — MIDODRINE HYDROCHLORIDE 10 MILLIGRAM(S): 2.5 TABLET ORAL at 05:02

## 2020-01-01 RX ADMIN — CEFEPIME 100 MILLIGRAM(S): 1 INJECTION, POWDER, FOR SOLUTION INTRAMUSCULAR; INTRAVENOUS at 17:30

## 2020-01-01 RX ADMIN — CHLORHEXIDINE GLUCONATE 15 MILLILITER(S): 213 SOLUTION TOPICAL at 18:28

## 2020-01-01 RX ADMIN — MIDODRINE HYDROCHLORIDE 10 MILLIGRAM(S): 2.5 TABLET ORAL at 12:13

## 2020-01-01 RX ADMIN — FENTANYL CITRATE 4.31 MICROGRAM(S)/KG/HR: 50 INJECTION INTRAVENOUS at 01:07

## 2020-01-01 RX ADMIN — CEFEPIME 100 MILLIGRAM(S): 1 INJECTION, POWDER, FOR SOLUTION INTRAMUSCULAR; INTRAVENOUS at 21:36

## 2020-01-01 RX ADMIN — PROPOFOL 7.76 MICROGRAM(S)/KG/MIN: 10 INJECTION, EMULSION INTRAVENOUS at 18:01

## 2020-01-01 RX ADMIN — PHENYLEPHRINE HYDROCHLORIDE 1.62 MICROGRAM(S)/KG/MIN: 10 INJECTION INTRAVENOUS at 06:02

## 2020-01-01 RX ADMIN — SENNA PLUS 2 TABLET(S): 8.6 TABLET ORAL at 21:42

## 2020-01-01 RX ADMIN — SODIUM CHLORIDE 100 MILLILITER(S): 9 INJECTION INTRAMUSCULAR; INTRAVENOUS; SUBCUTANEOUS at 01:56

## 2020-01-01 RX ADMIN — APIXABAN 2.5 MILLIGRAM(S): 2.5 TABLET, FILM COATED ORAL at 17:52

## 2020-01-01 RX ADMIN — POLYETHYLENE GLYCOL 3350 17 GRAM(S): 17 POWDER, FOR SOLUTION ORAL at 12:23

## 2020-01-01 RX ADMIN — CEFEPIME 100 MILLIGRAM(S): 1 INJECTION, POWDER, FOR SOLUTION INTRAMUSCULAR; INTRAVENOUS at 05:20

## 2020-01-01 RX ADMIN — SENNA PLUS 2 TABLET(S): 8.6 TABLET ORAL at 22:43

## 2020-01-01 RX ADMIN — MIDODRINE HYDROCHLORIDE 10 MILLIGRAM(S): 2.5 TABLET ORAL at 13:59

## 2020-01-01 RX ADMIN — CHLORHEXIDINE GLUCONATE 15 MILLILITER(S): 213 SOLUTION TOPICAL at 17:30

## 2020-01-01 RX ADMIN — PROPOFOL 7.76 MICROGRAM(S)/KG/MIN: 10 INJECTION, EMULSION INTRAVENOUS at 07:35

## 2020-01-01 RX ADMIN — CHLORHEXIDINE GLUCONATE 1 APPLICATION(S): 213 SOLUTION TOPICAL at 06:52

## 2020-01-01 RX ADMIN — MIDODRINE HYDROCHLORIDE 10 MILLIGRAM(S): 2.5 TABLET ORAL at 05:51

## 2020-01-01 RX ADMIN — Medication 200 MILLIGRAM(S): at 21:28

## 2020-01-01 RX ADMIN — APIXABAN 2.5 MILLIGRAM(S): 2.5 TABLET, FILM COATED ORAL at 05:00

## 2020-01-01 RX ADMIN — PANTOPRAZOLE SODIUM 40 MILLIGRAM(S): 20 TABLET, DELAYED RELEASE ORAL at 13:29

## 2020-01-01 RX ADMIN — CHLORHEXIDINE GLUCONATE 1 APPLICATION(S): 213 SOLUTION TOPICAL at 05:35

## 2020-01-01 RX ADMIN — Medication 50 MILLILITER(S): at 04:48

## 2020-01-01 RX ADMIN — CHLORHEXIDINE GLUCONATE 15 MILLILITER(S): 213 SOLUTION TOPICAL at 17:02

## 2020-01-01 RX ADMIN — Medication 650 MILLIGRAM(S): at 16:07

## 2020-01-01 RX ADMIN — Medication 10 MILLIGRAM(S): at 12:01

## 2020-01-01 RX ADMIN — ENOXAPARIN SODIUM 90 MILLIGRAM(S): 100 INJECTION SUBCUTANEOUS at 05:22

## 2020-01-01 RX ADMIN — ENOXAPARIN SODIUM 90 MILLIGRAM(S): 100 INJECTION SUBCUTANEOUS at 04:27

## 2020-01-01 RX ADMIN — Medication 200 MILLIGRAM(S): at 10:48

## 2020-01-01 RX ADMIN — SENNA PLUS 2 TABLET(S): 8.6 TABLET ORAL at 21:32

## 2020-01-01 RX ADMIN — CEFEPIME 100 MILLIGRAM(S): 1 INJECTION, POWDER, FOR SOLUTION INTRAMUSCULAR; INTRAVENOUS at 06:00

## 2020-01-01 RX ADMIN — APIXABAN 2.5 MILLIGRAM(S): 2.5 TABLET, FILM COATED ORAL at 06:45

## 2020-01-01 RX ADMIN — MIDAZOLAM HYDROCHLORIDE 1.72 MG/KG/HR: 1 INJECTION, SOLUTION INTRAMUSCULAR; INTRAVENOUS at 10:39

## 2020-01-01 RX ADMIN — POLYETHYLENE GLYCOL 3350 17 GRAM(S): 17 POWDER, FOR SOLUTION ORAL at 14:00

## 2020-01-01 RX ADMIN — PANTOPRAZOLE SODIUM 40 MILLIGRAM(S): 20 TABLET, DELAYED RELEASE ORAL at 12:26

## 2020-01-01 RX ADMIN — ENOXAPARIN SODIUM 90 MILLIGRAM(S): 100 INJECTION SUBCUTANEOUS at 04:47

## 2020-01-01 RX ADMIN — MIDODRINE HYDROCHLORIDE 10 MILLIGRAM(S): 2.5 TABLET ORAL at 05:40

## 2020-01-01 RX ADMIN — POLYETHYLENE GLYCOL 3350 17 GRAM(S): 17 POWDER, FOR SOLUTION ORAL at 11:30

## 2020-01-01 RX ADMIN — FENTANYL CITRATE 4.31 MICROGRAM(S)/KG/HR: 50 INJECTION INTRAVENOUS at 19:04

## 2020-01-01 RX ADMIN — MUPIROCIN 1 APPLICATION(S): 20 OINTMENT TOPICAL at 06:51

## 2020-01-01 RX ADMIN — MIDODRINE HYDROCHLORIDE 10 MILLIGRAM(S): 2.5 TABLET ORAL at 21:00

## 2020-01-01 RX ADMIN — Medication 50 MILLILITER(S): at 10:25

## 2020-01-01 RX ADMIN — Medication 650 MILLIGRAM(S): at 23:55

## 2020-01-01 RX ADMIN — POLYETHYLENE GLYCOL 3350 17 GRAM(S): 17 POWDER, FOR SOLUTION ORAL at 11:22

## 2020-01-01 RX ADMIN — Medication 667 MILLIGRAM(S): at 17:04

## 2020-01-01 RX ADMIN — SODIUM CHLORIDE 100 MILLILITER(S): 9 INJECTION, SOLUTION INTRAVENOUS at 00:26

## 2020-01-01 RX ADMIN — MIDODRINE HYDROCHLORIDE 10 MILLIGRAM(S): 2.5 TABLET ORAL at 14:54

## 2020-01-01 RX ADMIN — Medication 200 MILLIGRAM(S): at 21:55

## 2020-01-01 RX ADMIN — CEFEPIME 100 MILLIGRAM(S): 1 INJECTION, POWDER, FOR SOLUTION INTRAMUSCULAR; INTRAVENOUS at 05:40

## 2020-01-01 RX ADMIN — Medication 200 MILLIGRAM(S): at 21:14

## 2020-01-01 RX ADMIN — HYDROMORPHONE HYDROCHLORIDE 2 MG/HR: 2 INJECTION INTRAMUSCULAR; INTRAVENOUS; SUBCUTANEOUS at 08:15

## 2020-01-01 RX ADMIN — MIDODRINE HYDROCHLORIDE 10 MILLIGRAM(S): 2.5 TABLET ORAL at 05:09

## 2020-01-01 RX ADMIN — Medication 1 APPLICATION(S): at 08:00

## 2020-01-01 RX ADMIN — CHLORHEXIDINE GLUCONATE 15 MILLILITER(S): 213 SOLUTION TOPICAL at 17:03

## 2020-01-01 RX ADMIN — MIDAZOLAM HYDROCHLORIDE 1.72 MG/KG/HR: 1 INJECTION, SOLUTION INTRAMUSCULAR; INTRAVENOUS at 07:57

## 2020-01-01 RX ADMIN — CEFEPIME 100 MILLIGRAM(S): 1 INJECTION, POWDER, FOR SOLUTION INTRAMUSCULAR; INTRAVENOUS at 13:01

## 2020-01-01 RX ADMIN — PANTOPRAZOLE SODIUM 40 MILLIGRAM(S): 20 TABLET, DELAYED RELEASE ORAL at 11:47

## 2020-01-01 RX ADMIN — MIDODRINE HYDROCHLORIDE 10 MILLIGRAM(S): 2.5 TABLET ORAL at 14:12

## 2020-01-01 RX ADMIN — ENOXAPARIN SODIUM 90 MILLIGRAM(S): 100 INJECTION SUBCUTANEOUS at 18:21

## 2020-01-01 RX ADMIN — Medication 667 MILLIGRAM(S): at 13:26

## 2020-01-01 RX ADMIN — PANTOPRAZOLE SODIUM 40 MILLIGRAM(S): 20 TABLET, DELAYED RELEASE ORAL at 12:12

## 2020-01-01 RX ADMIN — MIDODRINE HYDROCHLORIDE 10 MILLIGRAM(S): 2.5 TABLET ORAL at 21:36

## 2020-01-01 RX ADMIN — HEPARIN SODIUM 5000 UNIT(S): 5000 INJECTION INTRAVENOUS; SUBCUTANEOUS at 05:22

## 2020-01-01 RX ADMIN — CEFEPIME 100 MILLIGRAM(S): 1 INJECTION, POWDER, FOR SOLUTION INTRAMUSCULAR; INTRAVENOUS at 13:59

## 2020-01-01 RX ADMIN — MUPIROCIN 1 APPLICATION(S): 20 OINTMENT TOPICAL at 17:14

## 2020-01-01 RX ADMIN — SENNA PLUS 2 TABLET(S): 8.6 TABLET ORAL at 21:03

## 2020-01-01 RX ADMIN — CHLORHEXIDINE GLUCONATE 15 MILLILITER(S): 213 SOLUTION TOPICAL at 19:47

## 2020-01-01 RX ADMIN — CEFEPIME 100 MILLIGRAM(S): 1 INJECTION, POWDER, FOR SOLUTION INTRAMUSCULAR; INTRAVENOUS at 12:17

## 2020-01-01 RX ADMIN — DEXMEDETOMIDINE HYDROCHLORIDE IN 0.9% SODIUM CHLORIDE 4.31 MICROGRAM(S)/KG/HR: 4 INJECTION INTRAVENOUS at 19:49

## 2020-01-01 RX ADMIN — SENNA PLUS 2 TABLET(S): 8.6 TABLET ORAL at 21:02

## 2020-01-01 RX ADMIN — POLYETHYLENE GLYCOL 3350 17 GRAM(S): 17 POWDER, FOR SOLUTION ORAL at 11:47

## 2020-01-01 RX ADMIN — CEFEPIME 100 MILLIGRAM(S): 1 INJECTION, POWDER, FOR SOLUTION INTRAMUSCULAR; INTRAVENOUS at 17:24

## 2020-01-01 RX ADMIN — CHLORHEXIDINE GLUCONATE 15 MILLILITER(S): 213 SOLUTION TOPICAL at 17:53

## 2020-01-01 RX ADMIN — Medication 250 MILLIGRAM(S): at 17:56

## 2020-01-01 RX ADMIN — Medication 2.5 MG/HR: at 09:46

## 2020-01-01 RX ADMIN — MIDODRINE HYDROCHLORIDE 10 MILLIGRAM(S): 2.5 TABLET ORAL at 05:03

## 2020-01-01 RX ADMIN — ENOXAPARIN SODIUM 90 MILLIGRAM(S): 100 INJECTION SUBCUTANEOUS at 17:49

## 2020-01-01 RX ADMIN — Medication 4.04 MICROGRAM(S)/KG/MIN: at 12:14

## 2020-01-01 RX ADMIN — ALBUTEROL 2 PUFF(S): 90 AEROSOL, METERED ORAL at 09:14

## 2020-01-01 RX ADMIN — MIDODRINE HYDROCHLORIDE 10 MILLIGRAM(S): 2.5 TABLET ORAL at 14:00

## 2020-01-01 RX ADMIN — Medication 200 MILLIGRAM(S): at 10:30

## 2020-01-01 RX ADMIN — APIXABAN 2.5 MILLIGRAM(S): 2.5 TABLET, FILM COATED ORAL at 05:51

## 2020-01-01 RX ADMIN — CHLORHEXIDINE GLUCONATE 1 APPLICATION(S): 213 SOLUTION TOPICAL at 05:27

## 2020-01-01 RX ADMIN — PANTOPRAZOLE SODIUM 40 MILLIGRAM(S): 20 TABLET, DELAYED RELEASE ORAL at 12:43

## 2020-01-01 RX ADMIN — APIXABAN 2.5 MILLIGRAM(S): 2.5 TABLET, FILM COATED ORAL at 17:24

## 2020-01-01 RX ADMIN — CEFEPIME 100 MILLIGRAM(S): 1 INJECTION, POWDER, FOR SOLUTION INTRAMUSCULAR; INTRAVENOUS at 14:20

## 2020-01-01 RX ADMIN — MIDODRINE HYDROCHLORIDE 10 MILLIGRAM(S): 2.5 TABLET ORAL at 06:29

## 2020-01-01 RX ADMIN — PROPOFOL 7.76 MICROGRAM(S)/KG/MIN: 10 INJECTION, EMULSION INTRAVENOUS at 06:28

## 2020-01-01 RX ADMIN — ENOXAPARIN SODIUM 90 MILLIGRAM(S): 100 INJECTION SUBCUTANEOUS at 05:24

## 2020-01-01 RX ADMIN — CEFEPIME 100 MILLIGRAM(S): 1 INJECTION, POWDER, FOR SOLUTION INTRAMUSCULAR; INTRAVENOUS at 14:53

## 2020-01-01 RX ADMIN — CHLORHEXIDINE GLUCONATE 15 MILLILITER(S): 213 SOLUTION TOPICAL at 18:31

## 2020-01-01 RX ADMIN — POLYETHYLENE GLYCOL 3350 17 GRAM(S): 17 POWDER, FOR SOLUTION ORAL at 11:56

## 2020-01-01 RX ADMIN — Medication 667 MILLIGRAM(S): at 11:54

## 2020-01-01 RX ADMIN — MIDODRINE HYDROCHLORIDE 10 MILLIGRAM(S): 2.5 TABLET ORAL at 21:22

## 2020-01-01 RX ADMIN — MIDODRINE HYDROCHLORIDE 10 MILLIGRAM(S): 2.5 TABLET ORAL at 14:20

## 2020-01-01 RX ADMIN — SENNA PLUS 2 TABLET(S): 8.6 TABLET ORAL at 21:36

## 2020-01-01 RX ADMIN — FAMOTIDINE 20 MILLIGRAM(S): 10 INJECTION INTRAVENOUS at 00:49

## 2020-01-01 RX ADMIN — APIXABAN 2.5 MILLIGRAM(S): 2.5 TABLET, FILM COATED ORAL at 06:07

## 2020-01-01 RX ADMIN — MIDODRINE HYDROCHLORIDE 10 MILLIGRAM(S): 2.5 TABLET ORAL at 22:00

## 2020-01-01 RX ADMIN — Medication 50 MILLILITER(S): at 06:25

## 2020-01-01 RX ADMIN — PANTOPRAZOLE SODIUM 40 MILLIGRAM(S): 20 TABLET, DELAYED RELEASE ORAL at 05:22

## 2020-01-01 RX ADMIN — MIDODRINE HYDROCHLORIDE 10 MILLIGRAM(S): 2.5 TABLET ORAL at 05:34

## 2020-01-01 RX ADMIN — CHLORHEXIDINE GLUCONATE 15 MILLILITER(S): 213 SOLUTION TOPICAL at 05:03

## 2020-01-01 RX ADMIN — Medication 80 MILLIGRAM(S): at 14:53

## 2020-01-01 RX ADMIN — PHENYLEPHRINE HYDROCHLORIDE 24.2 MICROGRAM(S)/KG/MIN: 10 INJECTION INTRAVENOUS at 00:28

## 2020-01-01 RX ADMIN — CHLORHEXIDINE GLUCONATE 15 MILLILITER(S): 213 SOLUTION TOPICAL at 05:25

## 2020-01-01 RX ADMIN — SENNA PLUS 2 TABLET(S): 8.6 TABLET ORAL at 00:34

## 2020-01-01 RX ADMIN — CEFEPIME 100 MILLIGRAM(S): 1 INJECTION, POWDER, FOR SOLUTION INTRAMUSCULAR; INTRAVENOUS at 22:00

## 2020-01-01 RX ADMIN — Medication 8.28 MICROGRAM(S)/KG/MIN: at 10:21

## 2020-01-01 RX ADMIN — FENTANYL CITRATE 4.31 MICROGRAM(S)/KG/HR: 50 INJECTION INTRAVENOUS at 04:10

## 2020-01-01 RX ADMIN — Medication 200 MILLIGRAM(S): at 10:51

## 2020-01-01 RX ADMIN — Medication 75 MEQ/KG/HR: at 16:16

## 2020-01-01 RX ADMIN — MIDODRINE HYDROCHLORIDE 10 MILLIGRAM(S): 2.5 TABLET ORAL at 05:13

## 2020-01-01 RX ADMIN — ENOXAPARIN SODIUM 90 MILLIGRAM(S): 100 INJECTION SUBCUTANEOUS at 19:31

## 2020-01-01 RX ADMIN — Medication 667 MILLIGRAM(S): at 08:12

## 2020-01-01 RX ADMIN — HYDROMORPHONE HYDROCHLORIDE 1 MG/HR: 2 INJECTION INTRAMUSCULAR; INTRAVENOUS; SUBCUTANEOUS at 00:36

## 2020-01-01 RX ADMIN — PROPOFOL 7.76 MICROGRAM(S)/KG/MIN: 10 INJECTION, EMULSION INTRAVENOUS at 18:21

## 2020-01-01 RX ADMIN — Medication 8.28 MICROGRAM(S)/KG/MIN: at 10:25

## 2020-01-01 RX ADMIN — FENTANYL CITRATE 4.31 MICROGRAM(S)/KG/HR: 50 INJECTION INTRAVENOUS at 22:08

## 2020-01-01 RX ADMIN — POLYETHYLENE GLYCOL 3350 17 GRAM(S): 17 POWDER, FOR SOLUTION ORAL at 12:25

## 2020-01-01 RX ADMIN — Medication 50 MILLILITER(S): at 06:12

## 2020-01-01 RX ADMIN — Medication 200 MILLIGRAM(S): at 09:38

## 2020-01-01 RX ADMIN — Medication 40 MILLIGRAM(S): at 05:09

## 2020-01-01 RX ADMIN — HYDROMORPHONE HYDROCHLORIDE 2 MG/HR: 2 INJECTION INTRAMUSCULAR; INTRAVENOUS; SUBCUTANEOUS at 16:58

## 2020-01-01 RX ADMIN — PANTOPRAZOLE SODIUM 40 MILLIGRAM(S): 20 TABLET, DELAYED RELEASE ORAL at 12:37

## 2020-01-01 RX ADMIN — MIDODRINE HYDROCHLORIDE 10 MILLIGRAM(S): 2.5 TABLET ORAL at 00:55

## 2020-01-01 RX ADMIN — MIDODRINE HYDROCHLORIDE 10 MILLIGRAM(S): 2.5 TABLET ORAL at 06:02

## 2020-01-01 RX ADMIN — PROPOFOL 7.76 MICROGRAM(S)/KG/MIN: 10 INJECTION, EMULSION INTRAVENOUS at 08:07

## 2020-01-01 RX ADMIN — ENOXAPARIN SODIUM 90 MILLIGRAM(S): 100 INJECTION SUBCUTANEOUS at 17:13

## 2020-01-01 RX ADMIN — CHLORHEXIDINE GLUCONATE 1 APPLICATION(S): 213 SOLUTION TOPICAL at 05:52

## 2020-01-01 RX ADMIN — CHLORHEXIDINE GLUCONATE 15 MILLILITER(S): 213 SOLUTION TOPICAL at 18:04

## 2020-01-01 RX ADMIN — PANTOPRAZOLE SODIUM 40 MILLIGRAM(S): 20 TABLET, DELAYED RELEASE ORAL at 04:28

## 2020-01-01 RX ADMIN — PHENYLEPHRINE HYDROCHLORIDE 3.23 MICROGRAM(S)/KG/MIN: 10 INJECTION INTRAVENOUS at 13:34

## 2020-01-01 RX ADMIN — HEPARIN SODIUM 5000 UNIT(S): 5000 INJECTION INTRAVENOUS; SUBCUTANEOUS at 15:55

## 2020-01-01 RX ADMIN — Medication 400 MILLIGRAM(S): at 11:34

## 2020-01-01 RX ADMIN — Medication 162 MICROGRAM(S)/KG/MIN: at 20:50

## 2020-01-01 RX ADMIN — DEXMEDETOMIDINE HYDROCHLORIDE IN 0.9% SODIUM CHLORIDE 4.31 MICROGRAM(S)/KG/HR: 4 INJECTION INTRAVENOUS at 08:12

## 2020-01-01 RX ADMIN — CHLORHEXIDINE GLUCONATE 15 MILLILITER(S): 213 SOLUTION TOPICAL at 04:48

## 2020-01-01 RX ADMIN — CHLORHEXIDINE GLUCONATE 15 MILLILITER(S): 213 SOLUTION TOPICAL at 05:34

## 2020-01-01 RX ADMIN — Medication 10 MILLIGRAM(S): at 11:57

## 2020-01-01 RX ADMIN — CHLORHEXIDINE GLUCONATE 15 MILLILITER(S): 213 SOLUTION TOPICAL at 17:05

## 2020-01-01 RX ADMIN — MIDODRINE HYDROCHLORIDE 10 MILLIGRAM(S): 2.5 TABLET ORAL at 05:36

## 2020-01-01 RX ADMIN — PHENYLEPHRINE HYDROCHLORIDE 1.62 MICROGRAM(S)/KG/MIN: 10 INJECTION INTRAVENOUS at 01:04

## 2020-01-01 RX ADMIN — Medication 650 MILLIGRAM(S): at 22:49

## 2020-01-01 RX ADMIN — MIDODRINE HYDROCHLORIDE 10 MILLIGRAM(S): 2.5 TABLET ORAL at 06:51

## 2020-01-01 RX ADMIN — HYDROMORPHONE HYDROCHLORIDE 2 MG/HR: 2 INJECTION INTRAMUSCULAR; INTRAVENOUS; SUBCUTANEOUS at 12:54

## 2020-01-01 RX ADMIN — PANTOPRAZOLE SODIUM 40 MILLIGRAM(S): 20 TABLET, DELAYED RELEASE ORAL at 09:08

## 2020-01-01 RX ADMIN — MIDODRINE HYDROCHLORIDE 10 MILLIGRAM(S): 2.5 TABLET ORAL at 06:01

## 2020-01-01 RX ADMIN — Medication 200 MILLIGRAM(S): at 12:27

## 2020-01-01 RX ADMIN — SODIUM CHLORIDE 999 MILLILITER(S): 9 INJECTION, SOLUTION INTRAVENOUS at 23:45

## 2020-01-01 RX ADMIN — FENTANYL CITRATE 4.31 MICROGRAM(S)/KG/HR: 50 INJECTION INTRAVENOUS at 05:31

## 2020-01-01 RX ADMIN — CEFEPIME 100 MILLIGRAM(S): 1 INJECTION, POWDER, FOR SOLUTION INTRAMUSCULAR; INTRAVENOUS at 15:04

## 2020-01-01 RX ADMIN — CEFEPIME 100 MILLIGRAM(S): 1 INJECTION, POWDER, FOR SOLUTION INTRAMUSCULAR; INTRAVENOUS at 21:04

## 2020-01-01 RX ADMIN — CHLORHEXIDINE GLUCONATE 15 MILLILITER(S): 213 SOLUTION TOPICAL at 05:19

## 2020-01-01 RX ADMIN — MIDODRINE HYDROCHLORIDE 10 MILLIGRAM(S): 2.5 TABLET ORAL at 21:32

## 2020-01-01 RX ADMIN — CEFEPIME 100 MILLIGRAM(S): 1 INJECTION, POWDER, FOR SOLUTION INTRAMUSCULAR; INTRAVENOUS at 21:00

## 2020-01-01 RX ADMIN — Medication 90 MILLIGRAM(S): at 05:10

## 2020-01-01 RX ADMIN — PANTOPRAZOLE SODIUM 40 MILLIGRAM(S): 20 TABLET, DELAYED RELEASE ORAL at 11:40

## 2020-01-01 RX ADMIN — MIDODRINE HYDROCHLORIDE 10 MILLIGRAM(S): 2.5 TABLET ORAL at 05:20

## 2020-01-01 RX ADMIN — Medication 4.04 MICROGRAM(S)/KG/MIN: at 21:39

## 2020-01-01 RX ADMIN — CHLORHEXIDINE GLUCONATE 15 MILLILITER(S): 213 SOLUTION TOPICAL at 05:04

## 2020-01-01 RX ADMIN — Medication 667 MILLIGRAM(S): at 17:25

## 2020-01-01 RX ADMIN — Medication 100 MILLIGRAM(S): at 20:00

## 2020-01-01 RX ADMIN — CHLORHEXIDINE GLUCONATE 1 APPLICATION(S): 213 SOLUTION TOPICAL at 05:14

## 2020-01-01 RX ADMIN — CHLORHEXIDINE GLUCONATE 15 MILLILITER(S): 213 SOLUTION TOPICAL at 17:11

## 2020-01-01 RX ADMIN — APIXABAN 2.5 MILLIGRAM(S): 2.5 TABLET, FILM COATED ORAL at 18:31

## 2020-01-01 RX ADMIN — PANTOPRAZOLE SODIUM 40 MILLIGRAM(S): 20 TABLET, DELAYED RELEASE ORAL at 11:32

## 2020-01-01 RX ADMIN — SODIUM CHLORIDE 50 MILLILITER(S): 9 INJECTION, SOLUTION INTRAVENOUS at 10:26

## 2020-01-01 RX ADMIN — Medication 650 MILLIGRAM(S): at 17:39

## 2020-01-01 RX ADMIN — PROPOFOL 7.76 MICROGRAM(S)/KG/MIN: 10 INJECTION, EMULSION INTRAVENOUS at 11:55

## 2020-01-01 RX ADMIN — CHLORHEXIDINE GLUCONATE 15 MILLILITER(S): 213 SOLUTION TOPICAL at 05:37

## 2020-01-01 RX ADMIN — ALBUTEROL 2 PUFF(S): 90 AEROSOL, METERED ORAL at 21:56

## 2020-01-01 RX ADMIN — Medication 50 MILLIGRAM(S): at 20:45

## 2020-01-01 RX ADMIN — Medication 166.67 MILLIGRAM(S): at 16:05

## 2020-01-01 RX ADMIN — SENNA PLUS 2 TABLET(S): 8.6 TABLET ORAL at 22:49

## 2020-01-01 RX ADMIN — PANTOPRAZOLE SODIUM 40 MILLIGRAM(S): 20 TABLET, DELAYED RELEASE ORAL at 12:25

## 2020-01-01 RX ADMIN — CEFEPIME 100 MILLIGRAM(S): 1 INJECTION, POWDER, FOR SOLUTION INTRAMUSCULAR; INTRAVENOUS at 04:48

## 2020-01-01 RX ADMIN — MIDODRINE HYDROCHLORIDE 10 MILLIGRAM(S): 2.5 TABLET ORAL at 21:03

## 2020-01-01 RX ADMIN — Medication 90 MILLIGRAM(S): at 17:49

## 2020-01-01 RX ADMIN — ENOXAPARIN SODIUM 90 MILLIGRAM(S): 100 INJECTION SUBCUTANEOUS at 05:40

## 2020-01-01 RX ADMIN — DEXMEDETOMIDINE HYDROCHLORIDE IN 0.9% SODIUM CHLORIDE 4.31 MICROGRAM(S)/KG/HR: 4 INJECTION INTRAVENOUS at 17:50

## 2020-01-01 RX ADMIN — Medication 40 MILLIGRAM(S): at 05:22

## 2020-01-01 RX ADMIN — CHLORHEXIDINE GLUCONATE 15 MILLILITER(S): 213 SOLUTION TOPICAL at 18:20

## 2020-01-01 RX ADMIN — PROPOFOL 7.76 MICROGRAM(S)/KG/MIN: 10 INJECTION, EMULSION INTRAVENOUS at 20:36

## 2020-01-01 RX ADMIN — CEFEPIME 100 MILLIGRAM(S): 1 INJECTION, POWDER, FOR SOLUTION INTRAMUSCULAR; INTRAVENOUS at 17:11

## 2020-01-01 RX ADMIN — ENOXAPARIN SODIUM 90 MILLIGRAM(S): 100 INJECTION SUBCUTANEOUS at 05:06

## 2020-01-01 RX ADMIN — CHLORHEXIDINE GLUCONATE 15 MILLILITER(S): 213 SOLUTION TOPICAL at 06:51

## 2020-01-01 RX ADMIN — ENOXAPARIN SODIUM 90 MILLIGRAM(S): 100 INJECTION SUBCUTANEOUS at 05:56

## 2020-01-01 RX ADMIN — MIDODRINE HYDROCHLORIDE 10 MILLIGRAM(S): 2.5 TABLET ORAL at 13:21

## 2020-01-01 RX ADMIN — Medication 667 MILLIGRAM(S): at 17:13

## 2020-01-01 RX ADMIN — CHLORHEXIDINE GLUCONATE 15 MILLILITER(S): 213 SOLUTION TOPICAL at 07:20

## 2020-01-01 RX ADMIN — SENNA PLUS 2 TABLET(S): 8.6 TABLET ORAL at 22:29

## 2020-01-01 RX ADMIN — PROPOFOL 7.76 MICROGRAM(S)/KG/MIN: 10 INJECTION, EMULSION INTRAVENOUS at 00:25

## 2020-01-01 RX ADMIN — PANTOPRAZOLE SODIUM 40 MILLIGRAM(S): 20 TABLET, DELAYED RELEASE ORAL at 11:56

## 2020-01-01 RX ADMIN — MIDODRINE HYDROCHLORIDE 10 MILLIGRAM(S): 2.5 TABLET ORAL at 23:03

## 2020-01-01 RX ADMIN — ENOXAPARIN SODIUM 90 MILLIGRAM(S): 100 INJECTION SUBCUTANEOUS at 17:01

## 2020-01-01 RX ADMIN — MIDODRINE HYDROCHLORIDE 10 MILLIGRAM(S): 2.5 TABLET ORAL at 21:37

## 2020-01-01 RX ADMIN — MEROPENEM 100 MILLIGRAM(S): 1 INJECTION INTRAVENOUS at 05:34

## 2020-01-01 RX ADMIN — MIDODRINE HYDROCHLORIDE 10 MILLIGRAM(S): 2.5 TABLET ORAL at 05:52

## 2020-01-01 RX ADMIN — Medication 650 MILLIGRAM(S): at 05:36

## 2020-01-01 RX ADMIN — Medication 75 MEQ/KG/HR: at 11:40

## 2020-01-01 RX ADMIN — ONDANSETRON 4 MILLIGRAM(S): 8 TABLET, FILM COATED ORAL at 00:49

## 2020-01-01 RX ADMIN — Medication 90 MILLIGRAM(S): at 17:04

## 2020-01-01 RX ADMIN — PANTOPRAZOLE SODIUM 40 MILLIGRAM(S): 20 TABLET, DELAYED RELEASE ORAL at 12:39

## 2020-01-01 RX ADMIN — PANTOPRAZOLE SODIUM 40 MILLIGRAM(S): 20 TABLET, DELAYED RELEASE ORAL at 11:25

## 2020-01-01 RX ADMIN — Medication 1 APPLICATION(S): at 12:00

## 2020-01-01 RX ADMIN — Medication 4.04 MICROGRAM(S)/KG/MIN: at 04:10

## 2020-01-01 RX ADMIN — Medication 40 MILLIGRAM(S): at 05:56

## 2020-01-01 RX ADMIN — MIDAZOLAM HYDROCHLORIDE 1.72 MG/KG/HR: 1 INJECTION, SOLUTION INTRAMUSCULAR; INTRAVENOUS at 12:54

## 2020-01-01 RX ADMIN — Medication 50 MILLILITER(S): at 19:17

## 2020-01-01 RX ADMIN — Medication 40 MILLIGRAM(S): at 17:07

## 2020-01-01 RX ADMIN — Medication 4.04 MICROGRAM(S)/KG/MIN: at 21:59

## 2020-01-01 RX ADMIN — Medication 40 MILLIGRAM(S): at 19:31

## 2020-01-01 RX ADMIN — HEPARIN SODIUM 5000 UNIT(S): 5000 INJECTION INTRAVENOUS; SUBCUTANEOUS at 12:42

## 2020-01-01 RX ADMIN — MIDAZOLAM HYDROCHLORIDE 1.72 MG/KG/HR: 1 INJECTION, SOLUTION INTRAMUSCULAR; INTRAVENOUS at 09:14

## 2020-01-01 RX ADMIN — DEXMEDETOMIDINE HYDROCHLORIDE IN 0.9% SODIUM CHLORIDE 4.31 MICROGRAM(S)/KG/HR: 4 INJECTION INTRAVENOUS at 06:00

## 2020-01-01 RX ADMIN — CHLORHEXIDINE GLUCONATE 15 MILLILITER(S): 213 SOLUTION TOPICAL at 06:29

## 2020-01-01 RX ADMIN — MIDODRINE HYDROCHLORIDE 10 MILLIGRAM(S): 2.5 TABLET ORAL at 22:29

## 2020-01-01 RX ADMIN — DEXMEDETOMIDINE HYDROCHLORIDE IN 0.9% SODIUM CHLORIDE 4.31 MICROGRAM(S)/KG/HR: 4 INJECTION INTRAVENOUS at 10:01

## 2020-01-01 RX ADMIN — MIDODRINE HYDROCHLORIDE 10 MILLIGRAM(S): 2.5 TABLET ORAL at 07:20

## 2020-01-01 RX ADMIN — CHLORHEXIDINE GLUCONATE 15 MILLILITER(S): 213 SOLUTION TOPICAL at 05:26

## 2020-01-01 RX ADMIN — HEPARIN SODIUM 5000 UNIT(S): 5000 INJECTION INTRAVENOUS; SUBCUTANEOUS at 05:02

## 2020-01-01 RX ADMIN — FENTANYL CITRATE 4.31 MICROGRAM(S)/KG/HR: 50 INJECTION INTRAVENOUS at 03:35

## 2020-01-01 RX ADMIN — MUPIROCIN 1 APPLICATION(S): 20 OINTMENT TOPICAL at 17:25

## 2020-01-01 RX ADMIN — POLYETHYLENE GLYCOL 3350 17 GRAM(S): 17 POWDER, FOR SOLUTION ORAL at 12:13

## 2020-01-01 RX ADMIN — Medication 50 MILLILITER(S): at 06:01

## 2020-01-01 RX ADMIN — PROPOFOL 7.76 MICROGRAM(S)/KG/MIN: 10 INJECTION, EMULSION INTRAVENOUS at 15:21

## 2020-01-01 RX ADMIN — Medication 10 MILLIGRAM(S): at 11:40

## 2020-01-01 RX ADMIN — Medication 50 MILLILITER(S): at 11:10

## 2020-01-01 RX ADMIN — PROPOFOL 7.76 MICROGRAM(S)/KG/MIN: 10 INJECTION, EMULSION INTRAVENOUS at 11:22

## 2020-01-01 RX ADMIN — CEFEPIME 100 MILLIGRAM(S): 1 INJECTION, POWDER, FOR SOLUTION INTRAMUSCULAR; INTRAVENOUS at 05:03

## 2020-01-01 RX ADMIN — POLYETHYLENE GLYCOL 3350 17 GRAM(S): 17 POWDER, FOR SOLUTION ORAL at 11:16

## 2020-01-01 RX ADMIN — APIXABAN 2.5 MILLIGRAM(S): 2.5 TABLET, FILM COATED ORAL at 17:57

## 2020-01-01 RX ADMIN — Medication 4.04 MICROGRAM(S)/KG/MIN: at 22:49

## 2020-01-01 RX ADMIN — CEFEPIME 100 MILLIGRAM(S): 1 INJECTION, POWDER, FOR SOLUTION INTRAMUSCULAR; INTRAVENOUS at 12:58

## 2020-01-01 RX ADMIN — Medication 200 MILLIGRAM(S): at 21:41

## 2020-01-01 RX ADMIN — CHLORHEXIDINE GLUCONATE 15 MILLILITER(S): 213 SOLUTION TOPICAL at 17:12

## 2020-01-01 RX ADMIN — PANTOPRAZOLE SODIUM 40 MILLIGRAM(S): 20 TABLET, DELAYED RELEASE ORAL at 12:23

## 2020-01-01 RX ADMIN — MIDODRINE HYDROCHLORIDE 10 MILLIGRAM(S): 2.5 TABLET ORAL at 05:27

## 2020-01-01 RX ADMIN — DEXMEDETOMIDINE HYDROCHLORIDE IN 0.9% SODIUM CHLORIDE 4.31 MICROGRAM(S)/KG/HR: 4 INJECTION INTRAVENOUS at 20:56

## 2020-01-01 RX ADMIN — CHLORHEXIDINE GLUCONATE 15 MILLILITER(S): 213 SOLUTION TOPICAL at 17:14

## 2020-01-01 RX ADMIN — PHENYLEPHRINE HYDROCHLORIDE 1.62 MICROGRAM(S)/KG/MIN: 10 INJECTION INTRAVENOUS at 11:00

## 2020-01-01 RX ADMIN — SENNA PLUS 2 TABLET(S): 8.6 TABLET ORAL at 21:35

## 2020-01-01 RX ADMIN — Medication 400 MILLIGRAM(S): at 00:58

## 2020-01-01 RX ADMIN — CHLORHEXIDINE GLUCONATE 15 MILLILITER(S): 213 SOLUTION TOPICAL at 05:13

## 2020-01-01 RX ADMIN — HEPARIN SODIUM 5000 UNIT(S): 5000 INJECTION INTRAVENOUS; SUBCUTANEOUS at 05:18

## 2020-01-01 RX ADMIN — Medication 667 MILLIGRAM(S): at 17:03

## 2020-01-01 RX ADMIN — MIDODRINE HYDROCHLORIDE 10 MILLIGRAM(S): 2.5 TABLET ORAL at 21:16

## 2020-01-01 RX ADMIN — MIDODRINE HYDROCHLORIDE 10 MILLIGRAM(S): 2.5 TABLET ORAL at 13:29

## 2020-01-01 RX ADMIN — POLYETHYLENE GLYCOL 3350 17 GRAM(S): 17 POWDER, FOR SOLUTION ORAL at 11:54

## 2020-01-01 RX ADMIN — ENOXAPARIN SODIUM 90 MILLIGRAM(S): 100 INJECTION SUBCUTANEOUS at 18:31

## 2020-01-01 RX ADMIN — POLYETHYLENE GLYCOL 3350 17 GRAM(S): 17 POWDER, FOR SOLUTION ORAL at 12:04

## 2020-01-01 RX ADMIN — MIDODRINE HYDROCHLORIDE 10 MILLIGRAM(S): 2.5 TABLET ORAL at 21:43

## 2020-01-01 RX ADMIN — Medication 50 MILLILITER(S): at 17:20

## 2020-01-01 RX ADMIN — MIDODRINE HYDROCHLORIDE 10 MILLIGRAM(S): 2.5 TABLET ORAL at 23:07

## 2020-01-01 RX ADMIN — ALBUTEROL 2 PUFF(S): 90 AEROSOL, METERED ORAL at 09:43

## 2020-01-01 RX ADMIN — APIXABAN 2.5 MILLIGRAM(S): 2.5 TABLET, FILM COATED ORAL at 17:41

## 2020-01-01 RX ADMIN — MIDODRINE HYDROCHLORIDE 10 MILLIGRAM(S): 2.5 TABLET ORAL at 00:34

## 2020-01-01 RX ADMIN — CEFEPIME 100 MILLIGRAM(S): 1 INJECTION, POWDER, FOR SOLUTION INTRAMUSCULAR; INTRAVENOUS at 21:22

## 2020-01-01 RX ADMIN — PANTOPRAZOLE SODIUM 40 MILLIGRAM(S): 20 TABLET, DELAYED RELEASE ORAL at 05:12

## 2020-01-01 RX ADMIN — MIDODRINE HYDROCHLORIDE 10 MILLIGRAM(S): 2.5 TABLET ORAL at 13:44

## 2020-01-01 RX ADMIN — Medication 500 MILLIGRAM(S): at 05:22

## 2020-01-01 RX ADMIN — PROPOFOL 7.76 MICROGRAM(S)/KG/MIN: 10 INJECTION, EMULSION INTRAVENOUS at 21:15

## 2020-01-01 RX ADMIN — PANTOPRAZOLE SODIUM 40 MILLIGRAM(S): 20 TABLET, DELAYED RELEASE ORAL at 05:26

## 2020-01-01 RX ADMIN — HYDROMORPHONE HYDROCHLORIDE 1 MG/HR: 2 INJECTION INTRAMUSCULAR; INTRAVENOUS; SUBCUTANEOUS at 21:59

## 2020-01-01 RX ADMIN — CHLORHEXIDINE GLUCONATE 15 MILLILITER(S): 213 SOLUTION TOPICAL at 17:01

## 2020-01-01 RX ADMIN — MUPIROCIN 1 APPLICATION(S): 20 OINTMENT TOPICAL at 18:04

## 2020-01-01 RX ADMIN — PHENYLEPHRINE HYDROCHLORIDE 24.2 MICROGRAM(S)/KG/MIN: 10 INJECTION INTRAVENOUS at 23:57

## 2020-01-01 RX ADMIN — PROPOFOL 7.76 MICROGRAM(S)/KG/MIN: 10 INJECTION, EMULSION INTRAVENOUS at 13:41

## 2020-01-01 RX ADMIN — Medication 650 MILLIGRAM(S): at 00:49

## 2020-01-01 RX ADMIN — PROPOFOL 7.76 MICROGRAM(S)/KG/MIN: 10 INJECTION, EMULSION INTRAVENOUS at 03:43

## 2020-01-01 RX ADMIN — Medication 667 MILLIGRAM(S): at 10:04

## 2020-01-01 RX ADMIN — Medication 40 MILLIGRAM(S): at 17:19

## 2020-01-01 RX ADMIN — CEFEPIME 100 MILLIGRAM(S): 1 INJECTION, POWDER, FOR SOLUTION INTRAMUSCULAR; INTRAVENOUS at 21:15

## 2020-01-01 RX ADMIN — MIDODRINE HYDROCHLORIDE 10 MILLIGRAM(S): 2.5 TABLET ORAL at 22:43

## 2020-01-01 RX ADMIN — FENTANYL CITRATE 4.31 MICROGRAM(S)/KG/HR: 50 INJECTION INTRAVENOUS at 00:46

## 2020-01-01 RX ADMIN — PHENYLEPHRINE HYDROCHLORIDE 1.62 MICROGRAM(S)/KG/MIN: 10 INJECTION INTRAVENOUS at 21:09

## 2020-01-01 RX ADMIN — DEXMEDETOMIDINE HYDROCHLORIDE IN 0.9% SODIUM CHLORIDE 4.31 MICROGRAM(S)/KG/HR: 4 INJECTION INTRAVENOUS at 01:04

## 2020-01-01 RX ADMIN — APIXABAN 2.5 MILLIGRAM(S): 2.5 TABLET, FILM COATED ORAL at 05:27

## 2020-01-01 RX ADMIN — Medication 150 MILLIEQUIVALENT(S): at 21:55

## 2020-01-01 RX ADMIN — ENOXAPARIN SODIUM 90 MILLIGRAM(S): 100 INJECTION SUBCUTANEOUS at 05:07

## 2020-01-01 RX ADMIN — SENNA PLUS 2 TABLET(S): 8.6 TABLET ORAL at 21:22

## 2020-01-01 RX ADMIN — Medication 40 MILLIGRAM(S): at 05:12

## 2020-01-01 RX ADMIN — MIDODRINE HYDROCHLORIDE 10 MILLIGRAM(S): 2.5 TABLET ORAL at 22:59

## 2020-01-01 RX ADMIN — PANTOPRAZOLE SODIUM 40 MILLIGRAM(S): 20 TABLET, DELAYED RELEASE ORAL at 14:03

## 2020-01-01 RX ADMIN — MIDODRINE HYDROCHLORIDE 10 MILLIGRAM(S): 2.5 TABLET ORAL at 16:39

## 2020-01-01 RX ADMIN — CHLORHEXIDINE GLUCONATE 15 MILLILITER(S): 213 SOLUTION TOPICAL at 17:24

## 2020-01-01 RX ADMIN — MIDODRINE HYDROCHLORIDE 10 MILLIGRAM(S): 2.5 TABLET ORAL at 04:47

## 2020-01-01 RX ADMIN — Medication 162 MICROGRAM(S)/KG/MIN: at 22:17

## 2020-01-01 RX ADMIN — DEXMEDETOMIDINE HYDROCHLORIDE IN 0.9% SODIUM CHLORIDE 4.31 MICROGRAM(S)/KG/HR: 4 INJECTION INTRAVENOUS at 21:40

## 2020-01-01 RX ADMIN — Medication 50 MILLILITER(S): at 01:56

## 2020-01-01 RX ADMIN — Medication 667 MILLIGRAM(S): at 13:20

## 2020-01-01 RX ADMIN — CHLORHEXIDINE GLUCONATE 15 MILLILITER(S): 213 SOLUTION TOPICAL at 18:53

## 2020-01-01 RX ADMIN — Medication 8.28 MICROGRAM(S)/KG/MIN: at 22:59

## 2020-01-01 RX ADMIN — PHENYLEPHRINE HYDROCHLORIDE 1.62 MICROGRAM(S)/KG/MIN: 10 INJECTION INTRAVENOUS at 20:50

## 2020-01-01 RX ADMIN — MIDODRINE HYDROCHLORIDE 10 MILLIGRAM(S): 2.5 TABLET ORAL at 21:08

## 2020-01-01 RX ADMIN — ALBUTEROL 2 PUFF(S): 90 AEROSOL, METERED ORAL at 22:48

## 2020-01-01 RX ADMIN — CHLORHEXIDINE GLUCONATE 15 MILLILITER(S): 213 SOLUTION TOPICAL at 05:28

## 2020-01-01 RX ADMIN — APIXABAN 2.5 MILLIGRAM(S): 2.5 TABLET, FILM COATED ORAL at 17:11

## 2020-01-01 RX ADMIN — Medication 1 APPLICATION(S): at 10:05

## 2020-01-01 RX ADMIN — PANTOPRAZOLE SODIUM 40 MILLIGRAM(S): 20 TABLET, DELAYED RELEASE ORAL at 05:56

## 2020-01-01 RX ADMIN — FENTANYL CITRATE 0.86 MICROGRAM(S)/KG/HR: 50 INJECTION INTRAVENOUS at 11:02

## 2020-01-01 RX ADMIN — MIDODRINE HYDROCHLORIDE 10 MILLIGRAM(S): 2.5 TABLET ORAL at 13:01

## 2020-01-01 RX ADMIN — HEPARIN SODIUM 5000 UNIT(S): 5000 INJECTION INTRAVENOUS; SUBCUTANEOUS at 17:15

## 2020-01-01 RX ADMIN — HEPARIN SODIUM 5000 UNIT(S): 5000 INJECTION INTRAVENOUS; SUBCUTANEOUS at 21:42

## 2020-01-01 RX ADMIN — PHENYLEPHRINE HYDROCHLORIDE 24.2 MICROGRAM(S)/KG/MIN: 10 INJECTION INTRAVENOUS at 21:14

## 2020-01-01 RX ADMIN — MIDAZOLAM HYDROCHLORIDE 1.72 MG/KG/HR: 1 INJECTION, SOLUTION INTRAMUSCULAR; INTRAVENOUS at 21:59

## 2020-01-01 RX ADMIN — ALBUTEROL 2 PUFF(S): 90 AEROSOL, METERED ORAL at 15:18

## 2020-01-01 RX ADMIN — SENNA PLUS 2 TABLET(S): 8.6 TABLET ORAL at 21:08

## 2020-01-01 RX ADMIN — MIDODRINE HYDROCHLORIDE 10 MILLIGRAM(S): 2.5 TABLET ORAL at 21:49

## 2020-01-01 RX ADMIN — CEFEPIME 100 MILLIGRAM(S): 1 INJECTION, POWDER, FOR SOLUTION INTRAMUSCULAR; INTRAVENOUS at 20:00

## 2020-01-01 RX ADMIN — CHLORHEXIDINE GLUCONATE 15 MILLILITER(S): 213 SOLUTION TOPICAL at 05:51

## 2020-01-01 RX ADMIN — MIDODRINE HYDROCHLORIDE 10 MILLIGRAM(S): 2.5 TABLET ORAL at 13:11

## 2020-01-01 RX ADMIN — Medication 650 MILLIGRAM(S): at 22:20

## 2020-01-01 RX ADMIN — MIDODRINE HYDROCHLORIDE 10 MILLIGRAM(S): 2.5 TABLET ORAL at 05:00

## 2020-01-01 RX ADMIN — MIDODRINE HYDROCHLORIDE 10 MILLIGRAM(S): 2.5 TABLET ORAL at 15:04

## 2020-01-01 RX ADMIN — CHLORHEXIDINE GLUCONATE 15 MILLILITER(S): 213 SOLUTION TOPICAL at 17:57

## 2020-01-01 RX ADMIN — FENTANYL CITRATE 4.31 MICROGRAM(S)/KG/HR: 50 INJECTION INTRAVENOUS at 19:33

## 2020-01-01 RX ADMIN — MIDODRINE HYDROCHLORIDE 10 MILLIGRAM(S): 2.5 TABLET ORAL at 14:03

## 2020-01-01 RX ADMIN — Medication 667 MILLIGRAM(S): at 11:04

## 2020-01-01 RX ADMIN — Medication 8.28 MICROGRAM(S)/KG/MIN: at 22:03

## 2020-01-01 RX ADMIN — MIDAZOLAM HYDROCHLORIDE 1.72 MG/KG/HR: 1 INJECTION, SOLUTION INTRAMUSCULAR; INTRAVENOUS at 20:00

## 2020-01-01 RX ADMIN — CEFEPIME 100 MILLIGRAM(S): 1 INJECTION, POWDER, FOR SOLUTION INTRAMUSCULAR; INTRAVENOUS at 21:02

## 2020-01-01 RX ADMIN — Medication 650 MILLIGRAM(S): at 11:39

## 2020-01-01 RX ADMIN — PANTOPRAZOLE SODIUM 40 MILLIGRAM(S): 20 TABLET, DELAYED RELEASE ORAL at 11:22

## 2020-01-01 RX ADMIN — ENOXAPARIN SODIUM 90 MILLIGRAM(S): 100 INJECTION SUBCUTANEOUS at 17:05

## 2020-01-01 RX ADMIN — HYDROMORPHONE HYDROCHLORIDE 2 MILLIGRAM(S): 2 INJECTION INTRAMUSCULAR; INTRAVENOUS; SUBCUTANEOUS at 10:30

## 2020-01-01 RX ADMIN — MUPIROCIN 1 APPLICATION(S): 20 OINTMENT TOPICAL at 05:35

## 2020-01-01 RX ADMIN — CHLORHEXIDINE GLUCONATE 15 MILLILITER(S): 213 SOLUTION TOPICAL at 17:41

## 2020-01-01 RX ADMIN — Medication 50 MILLILITER(S): at 05:04

## 2020-01-01 RX ADMIN — MEROPENEM 100 MILLIGRAM(S): 1 INJECTION INTRAVENOUS at 17:02

## 2020-01-01 RX ADMIN — MIDODRINE HYDROCHLORIDE 10 MILLIGRAM(S): 2.5 TABLET ORAL at 22:49

## 2020-01-01 RX ADMIN — SENNA PLUS 2 TABLET(S): 8.6 TABLET ORAL at 23:03

## 2020-01-01 RX ADMIN — POLYETHYLENE GLYCOL 3350 17 GRAM(S): 17 POWDER, FOR SOLUTION ORAL at 12:40

## 2020-01-01 RX ADMIN — APIXABAN 2.5 MILLIGRAM(S): 2.5 TABLET, FILM COATED ORAL at 18:53

## 2020-01-01 RX ADMIN — Medication 1 APPLICATION(S): at 13:25

## 2020-01-01 RX ADMIN — CEFEPIME 100 MILLIGRAM(S): 1 INJECTION, POWDER, FOR SOLUTION INTRAMUSCULAR; INTRAVENOUS at 12:53

## 2020-01-01 RX ADMIN — FENTANYL CITRATE 4.31 MICROGRAM(S)/KG/HR: 50 INJECTION INTRAVENOUS at 12:14

## 2020-01-01 RX ADMIN — APIXABAN 2.5 MILLIGRAM(S): 2.5 TABLET, FILM COATED ORAL at 06:29

## 2020-01-01 RX ADMIN — FENTANYL CITRATE 4.31 MICROGRAM(S)/KG/HR: 50 INJECTION INTRAVENOUS at 06:49

## 2020-01-01 RX ADMIN — Medication 1 APPLICATION(S): at 11:34

## 2020-01-01 RX ADMIN — MIDODRINE HYDROCHLORIDE 10 MILLIGRAM(S): 2.5 TABLET ORAL at 06:46

## 2020-01-01 RX ADMIN — CEFEPIME 100 MILLIGRAM(S): 1 INJECTION, POWDER, FOR SOLUTION INTRAMUSCULAR; INTRAVENOUS at 05:39

## 2020-01-01 RX ADMIN — SODIUM CHLORIDE 1000 MILLILITER(S): 9 INJECTION INTRAMUSCULAR; INTRAVENOUS; SUBCUTANEOUS at 23:57

## 2020-01-01 RX ADMIN — SENNA PLUS 2 TABLET(S): 8.6 TABLET ORAL at 22:59

## 2020-01-01 RX ADMIN — MEROPENEM 100 MILLIGRAM(S): 1 INJECTION INTRAVENOUS at 06:01

## 2020-01-01 RX ADMIN — ENOXAPARIN SODIUM 90 MILLIGRAM(S): 100 INJECTION SUBCUTANEOUS at 17:37

## 2020-01-01 RX ADMIN — CEFEPIME 100 MILLIGRAM(S): 1 INJECTION, POWDER, FOR SOLUTION INTRAMUSCULAR; INTRAVENOUS at 14:03

## 2020-01-01 RX ADMIN — PROPOFOL 7.76 MICROGRAM(S)/KG/MIN: 10 INJECTION, EMULSION INTRAVENOUS at 05:38

## 2020-01-01 RX ADMIN — Medication 650 MILLIGRAM(S): at 06:00

## 2020-01-01 RX ADMIN — SENNA PLUS 2 TABLET(S): 8.6 TABLET ORAL at 21:16

## 2020-01-01 RX ADMIN — MUPIROCIN 1 APPLICATION(S): 20 OINTMENT TOPICAL at 17:03

## 2020-01-01 RX ADMIN — Medication 4.04 MICROGRAM(S)/KG/MIN: at 12:22

## 2020-01-01 RX ADMIN — SENNA PLUS 2 TABLET(S): 8.6 TABLET ORAL at 05:36

## 2020-01-01 RX ADMIN — PANTOPRAZOLE SODIUM 40 MILLIGRAM(S): 20 TABLET, DELAYED RELEASE ORAL at 05:18

## 2020-01-01 RX ADMIN — ALBUTEROL 2 PUFF(S): 90 AEROSOL, METERED ORAL at 15:37

## 2020-01-01 RX ADMIN — MIDODRINE HYDROCHLORIDE 10 MILLIGRAM(S): 2.5 TABLET ORAL at 12:22

## 2020-01-01 RX ADMIN — PANTOPRAZOLE SODIUM 40 MILLIGRAM(S): 20 TABLET, DELAYED RELEASE ORAL at 12:01

## 2020-01-01 RX ADMIN — CHLORHEXIDINE GLUCONATE 15 MILLILITER(S): 213 SOLUTION TOPICAL at 05:35

## 2020-01-01 RX ADMIN — POLYETHYLENE GLYCOL 3350 17 GRAM(S): 17 POWDER, FOR SOLUTION ORAL at 11:40

## 2020-01-01 RX ADMIN — POLYETHYLENE GLYCOL 3350 17 GRAM(S): 17 POWDER, FOR SOLUTION ORAL at 11:32

## 2020-01-01 RX ADMIN — PANTOPRAZOLE SODIUM 40 MILLIGRAM(S): 20 TABLET, DELAYED RELEASE ORAL at 12:21

## 2020-01-01 RX ADMIN — Medication 90 MILLIGRAM(S): at 04:28

## 2020-01-01 RX ADMIN — APIXABAN 2.5 MILLIGRAM(S): 2.5 TABLET, FILM COATED ORAL at 05:02

## 2020-01-01 RX ADMIN — ENOXAPARIN SODIUM 90 MILLIGRAM(S): 100 INJECTION SUBCUTANEOUS at 17:21

## 2020-01-01 RX ADMIN — MIDAZOLAM HYDROCHLORIDE 1.72 MG/KG/HR: 1 INJECTION, SOLUTION INTRAMUSCULAR; INTRAVENOUS at 21:50

## 2020-01-01 RX ADMIN — PANTOPRAZOLE SODIUM 40 MILLIGRAM(S): 20 TABLET, DELAYED RELEASE ORAL at 11:16

## 2020-01-01 RX ADMIN — MIDODRINE HYDROCHLORIDE 10 MILLIGRAM(S): 2.5 TABLET ORAL at 05:04

## 2020-01-01 RX ADMIN — POLYETHYLENE GLYCOL 3350 17 GRAM(S): 17 POWDER, FOR SOLUTION ORAL at 13:20

## 2020-01-01 RX ADMIN — PANTOPRAZOLE SODIUM 40 MILLIGRAM(S): 20 TABLET, DELAYED RELEASE ORAL at 12:00

## 2020-01-01 RX ADMIN — APIXABAN 2.5 MILLIGRAM(S): 2.5 TABLET, FILM COATED ORAL at 14:00

## 2020-01-01 RX ADMIN — HYDROMORPHONE HYDROCHLORIDE 1 MG/HR: 2 INJECTION INTRAMUSCULAR; INTRAVENOUS; SUBCUTANEOUS at 12:24

## 2020-01-01 RX ADMIN — MIDODRINE HYDROCHLORIDE 10 MILLIGRAM(S): 2.5 TABLET ORAL at 21:02

## 2020-01-01 RX ADMIN — SODIUM CHLORIDE 75 MILLILITER(S): 9 INJECTION INTRAMUSCULAR; INTRAVENOUS; SUBCUTANEOUS at 03:38

## 2020-01-01 RX ADMIN — PROPOFOL 7.76 MICROGRAM(S)/KG/MIN: 10 INJECTION, EMULSION INTRAVENOUS at 14:21

## 2020-04-07 NOTE — ED ADULT NURSE NOTE - OBJECTIVE STATEMENT
pt a&o x3 c/o fever, weakness and decreased appetite x 7 days. 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 speaking in complete sentences. no respiratory distress noted. left ac 20g placed.

## 2020-04-08 NOTE — H&P ADULT - PROBLEM SELECTOR PLAN 1
Patient p/w cough, shortness of breath and low appetite.   Chest X-ray: Bilateral airspace opacities suspicious for pneumonia.  Suspect Covid, follow Covid 19 PCR  -Follow ESR, CRP, Ferritin, LDH, D Dimer,   c/w Zithromax, vitamin C, thiamine, vitamin D   Robitussin, acetaminophen   C/w supplemental oxygen, saturating well on 4L NC.

## 2020-04-08 NOTE — PROGRESS NOTE ADULT - ASSESSMENT
53 y/o F patient, w/ PMHx of HTN, works as HD nurse, c/o fever, chills, nausea, vomiting, and mild cough that began x 1 week ago. Patient also reports mild shortness of breath but denies  any other acute complaints. 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    Chest X-ray showed: Bilateral airspace opacities suspicious for pneumonia.    Patient is admitted for Pneumonia and is COVID +    Problem 1: Pneumonia.    Plan: Patient p/w cough, shortness of breath and low appetite  Chest X-ray: Bilateral airspace opacities suspicious for pneumonia.  Covid 19 PCR Positive  c/w Zithromax, vitamin C, thiamine, vitamin D  Plaquenil regimen  Robitussin, acetaminophen   C/w supplemental oxygen, saturating 93% on 5L NC    Problem 2: RASHAUN (acute kidney injury).    Plan: likely pre-renal from dehydration  C/w mild IV hydration  Follow urine lytes   Follow BMP    Problem 3: HTN (hypertension).    Plan: BP is running low, will hold anti-HTN agents    Problem 4: GERD (gastroesophageal reflux disease)  Plan: C/w Protonix    Problem 5: DVT prophylaxis.    Plan: IMPROVE VTE score:  Improve 1, will c/w Heparin

## 2020-04-08 NOTE — H&P ADULT - ASSESSMENT
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 Patient also reports mild shortness of breath but denies  any other acute complaints. Patient was saturating 96% on 4L NC.  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    Chest X-ray showed: Bilateral airspace opacities suspicious for pneumonia.      Patient is admitted for Pneumonia.

## 2020-04-08 NOTE — H&P ADULT - HISTORY OF PRESENT ILLNESS
55 y/o F patient, w/ PMHx of HTN, works as HD nurse,  presents to the ED w/ fever, chills, nausea, vomiting, and mild cough that began x1 week ago. Patient also reports mild shortness of breath but denies  any other acute complaints. Patient was saturating 96% on 4L NC.  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    Chest X-ray showed: Bilateral airspace opacities suspicious for pneumonia.

## 2020-04-08 NOTE — ED PROVIDER NOTE - OBJECTIVE STATEMENT
53 y/o F patient, w/ PMHx of HTN, presents to the ED w/ fever, chills, nausea, vomiting, and mild cough that began x1 week ago. Patient denies shortness of breath or any other acute complaints. Patient is hypoxic to 93% on Room Air. 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 NKDA.

## 2020-04-08 NOTE — H&P ADULT - NSHPPHYSICALEXAM_GEN_ALL_CORE
CONSTITUTIONAL: Well appearing, well nourished, awake, alert and in no apparent distress  ENMT: Airway patent, Nasal mucosa clear. Mouth with normal mucosa. Throat has no vesicles, no oropharyngeal exudates and uvula is midline.  EYES: Clear bilaterally, pupils equal, round and reactive to light. EOMI.  CARDIAC: Normal rate, regular rhythm.  Heart sounds S1, S2.  No murmurs, rubs or gallops   RESPIRATORY: Breath sounds clear and equal bilaterally. No wheezes, rhales or rhonchi  MUSCULOSKELETAL: Spine appears normal, range of motion is not limited, no muscle or joint tenderness  EXTREMITIES: No edema, cyanosis or deformity   NEUROLOGICAL: Alert and oriented, no focal deficits, no motor or sensory deficits.  SKIN: No rash, skin turgor   Abdomen: soft, mild epigastric tenderness, BS present

## 2020-04-08 NOTE — H&P ADULT - PROBLEM SELECTOR PLAN 4
IMPROVE VTE score:  [ ] Previous VTE                                                3  [ ] Thrombophilia                                             2  [ ] Lower limb paralysis                                  2        (unable to hold up >15 seconds)    [ ] Current Cancer (within 6 months)            2   [x] Immobilization > 24 hrs                              1  [ ] ICU/CCU stay > 24 hours                            1  [] Age > 60                                                         1  Improve 1, will c/w heparin as Covid patients are likely more pro-coagulant C/w Protonix

## 2020-04-08 NOTE — H&P ADULT - PROBLEM SELECTOR PLAN 2
Patient has hx of HTN   BP is running low, will hold anti-HTN agents Patient is noted to have RASHAUN, likely pre-renal from dehydration  Will c/w mild iV hydration  Follow urine lytes   Follow BMP

## 2020-04-08 NOTE — ED PROVIDER NOTE - CLINICAL SUMMARY MEDICAL DECISION MAKING FREE TEXT BOX
53 y/o F patient presents to the ED w/ symptoms consistent w/ COVID-19 infection. Will send labs, provide IV fluids, and O2. Will likely require admission due to severity of symptoms.

## 2020-04-08 NOTE — PROGRESS NOTE ADULT - SUBJECTIVE AND OBJECTIVE BOX
PGY 3 Note discussed with primary attending    Patient is a 54y old  Female who presented with a chief complaint of Pneumonia with PMH of HTN, works as Hemodialysis nurse,  c/o fever, chills, nausea, vomiting, and mild cough that began x1 week ago. Patient also reports mild shortness of breath but denies any other acute complaints. Patient is currently saturating 93% on 5L NC.  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    Chest X-ray showed: Bilateral airspace opacities suspicious for pneumonia.    MEDICATIONS  (STANDING):  heparin  Injectable 5000 Unit(s) SubCutaneous every 8 hours  pantoprazole    Tablet 40 milliGRAM(s) Oral before breakfast  sodium chloride 0.9%. 1000 milliLiter(s) (75 mL/Hr) IV Continuous <Continuous>    MEDICATIONS  (PRN):  ALBUTerol 90 MICROgram(s) HFA Inhaler 2 Puff(s) Inhalation every 6 hours PRN cough  __________________________________________________  REVIEW OF SYSTEMS:    CONSTITUTIONAL: No fever  NECK: No pain or stiffness  RESPIRATORY: Cough; shortness of breath  CARDIOVASCULAR: No chest pain  GASTROINTESTINAL: No diarrhea   NEUROLOGICAL: No headache  MUSCULOSKELETAL: No joint pain  GENITOURINARY: No dysuria  PSYCHIATRY: No depression  ALL OTHER  ROS negative      Vital Signs Last 24 Hrs  T(C): 36.3 (08 Apr 2020 12:57), Max: 37.8 (07 Apr 2020 23:13)  T(F): 97.4 (08 Apr 2020 12:57), Max: 100.1 (07 Apr 2020 23:13)  HR: 100 (08 Apr 2020 12:57) (82 - 114)  BP: 123/72 (08 Apr 2020 12:57) (104/76 - 129/69)  RR: 18 (08 Apr 2020 12:57) (16 - 30)  SpO2: 93% (08 Apr 2020 12:57) (93% - 100%)  ________________________________________________  PHYSICAL EXAM:  GENERAL: NAD  HEENT: Normocephalic  CHEST/LUNG: Clear to auscultation bilaterally  HEART: RRR  ABDOMEN: Bowel sounds present  EXTREMITIES: No cyanosis  SKIN: Warm and dry  NERVOUS SYSTEM: No new deficits  _________________________________________________  LABS:                        16.6   6.21  )-----------( 241      ( 07 Apr 2020 23:55 )             50.8     04-07    138  |  104  |  45<H>  ----------------------------<  120<H>  4.3   |  23  |  1.53<H>    Ca    8.8      07 Apr 2020 23:55    TPro  9.8<H>  /  Alb  3.1<L>  /  TBili  0.4  /  DBili  x   /  AST  73<H>  /  ALT  75<H>  /  AlkPhos  169<H>  04-07      RADIOLOGY & ADDITIONAL TESTS:  < from: Xray Chest 1 View- PORTABLE-Urgent (04.08.20 @ 00:22) >  EXAM:  XR CHEST PORTABLE URGENT 1V                          PROCEDURE DATE:  04/08/2020      INTERPRETATION:  Clinical Indication: Hypoxia.    Technique: Single Frontal view of the chest    Comparison: There are no prior studies available forcomparison.    Findings:    The cardiac silhouette is unremarkable. Extensive patchy airspace opacities  are noted bilaterally. There is no pleural effusion or pneumothorax.    IMPRESSION:    Bilateral airspace opacities suspicious for pneumonia.    ADDIE GUERRERO M.D., ATTENDING RADIOLOGIST  This document has been electronically signed. Apr 8 2020  1:19AM    < end of copied text >      Imaging Personally Reviewed:  YES  Consultant(s) Notes Reviewed:   YES

## 2020-04-08 NOTE — H&P ADULT - ATTENDING COMMENTS
Pt seen and examined.  Case discussed with MAR  Agree with HPI/ assessment and plan    Vital Signs Last 24 Hrs  T(C): 37.8 (07 Apr 2020 23:13), Max: 37.8 (07 Apr 2020 23:13)  T(F): 100.1 (07 Apr 2020 23:13), Max: 100.1 (07 Apr 2020 23:13)  HR: 114 (07 Apr 2020 23:13) (114 - 114)  BP: 104/76 (07 Apr 2020 23:13) (104/76 - 104/76)  RR: 16 (07 Apr 2020 23:13) (16 - 16)  SpO2: 93% (07 Apr 2020 23:13) (93% - 93%)  Ambulatory SaO2 - 85%; 96% on 4LPM NC    Middle aged woman, NAD at rest with supplemental O2, AAO X 3  CTA b/l with bibasal crackles   RRR S1S2 only  Soft NT ND BS +  No pedal edema  No focal deficits    Labs                        16.6   6.21  )-----------( 241      ( 07 Apr 2020 23:55 )             50.8     04-07    138  |  104  |  45<H>  ----------------------------<  120<H>  4.3   |  23  |  1.53<H>    Ca    8.8      07 Apr 2020 23:55    TPro  9.8<H>  /  Alb  3.1<L>  /  TBili  0.4  /  DBili  x   /  AST  73<H>  /  ALT  75<H>  /  AlkPhos  169<H>  04-07    CXR  B/L patchy opacities    Impression  Acute respiratory failure with hypoxia  Bilateral pneumonia  RASHAUN  Dehydration Pt seen and examined.  Case discussed with MAR  Agree with HPI/ assessment and plan    Vital Signs Last 24 Hrs  T(C): 37.8 (07 Apr 2020 23:13), Max: 37.8 (07 Apr 2020 23:13)  T(F): 100.1 (07 Apr 2020 23:13), Max: 100.1 (07 Apr 2020 23:13)  HR: 114 (07 Apr 2020 23:13) (114 - 114)  BP: 104/76 (07 Apr 2020 23:13) (104/76 - 104/76)  RR: 16 (07 Apr 2020 23:13) (16 - 16)  SpO2: 93% (07 Apr 2020 23:13) (93% - 93%)  Ambulatory SaO2 - 85%; 96% on 4LPM NC    Middle aged woman, NAD at rest with supplemental O2, AAO X 3  CTA b/l with bibasal crackles   RRR S1S2 only  Soft NT ND BS +  No pedal edema  No focal deficits    Labs                        16.6   6.21  )-----------( 241      ( 07 Apr 2020 23:55 )             50.8     04-07    138  |  104  |  45<H>  ----------------------------<  120<H>  4.3   |  23  |  1.53<H>    Ca    8.8      07 Apr 2020 23:55    TPro  9.8<H>  /  Alb  3.1<L>  /  TBili  0.4  /  DBili  x   /  AST  73<H>  /  ALT  75<H>  /  AlkPhos  169<H>  04-07    CXR  B/L patchy opacities    Impression  Acute respiratory failure with hypoxia  Bilateral pneumonia  RASHAUN  Dehydration    Admit to Medicine with COVID 19 isolation protocol  F/up pending covid pcr and baseline acute phase reactants and serially  Supplemental O2 with close monitoring  Empiric antibiotics  Plaquenil regimen  Supportive care .   IVF challenge for dehydration / RASHAUN   Monitor renal indices

## 2020-04-08 NOTE — H&P ADULT - PROBLEM SELECTOR PLAN 5
IMPROVE VTE score:  [ ] Previous VTE                                                3  [ ] Thrombophilia                                             2  [ ] Lower limb paralysis                                  2        (unable to hold up >15 seconds)    [ ] Current Cancer (within 6 months)            2   [x] Immobilization > 24 hrs                              1  [ ] ICU/CCU stay > 24 hours                            1  [] Age > 60                                                         1  Improve 1, will c/w heparin as Covid patients are likely more pro-coagulant

## 2020-04-09 NOTE — CHART NOTE - NSCHARTNOTEFT_GEN_A_CORE
Vital Signs Last 24 Hrs  T(C): 37.2 (09 Apr 2020 09:00), Max: 37.7 (09 Apr 2020 01:00)  T(F): 98.9 (09 Apr 2020 09:00), Max: 99.8 (09 Apr 2020 01:00)  HR: 99 (09 Apr 2020 09:00) (88 - 113)  BP: 130/68 (09 Apr 2020 09:00) (120/73 - 132/70)  BP(mean): --  RR: 22 (09 Apr 2020 10:25) (18 - 22)  SpO2: 88% (09 Apr 2020 10:25) (85% - 93%)    Resident made aware Pt's 02 Sat dropped to 85% on NRB at 15L. Pt noted to be in distress. Spoke with Dr. [place] who recommended ICU consult   ICU consult placed

## 2020-04-09 NOTE — PROGRESS NOTE ADULT - SUBJECTIVE AND OBJECTIVE BOX
MEDICAL ATTENDING NOTE     INTERVAL HPI: no new complaints. Reports continued cough and shortness of breath. Patient started on steroids as per ICU consult recommendations.     ROS: no CP, ++SOB but slowly improving    MEDICATIONS  (STANDING):  heparin  Injectable 5000 Unit(s) SubCutaneous every 8 hours  hydroxychloroquine   Oral   hydroxychloroquine 400 milliGRAM(s) Oral every 12 hours  methylPREDNISolone sodium succinate Injectable 40 milliGRAM(s) IV Push every 12 hours  pantoprazole    Tablet 40 milliGRAM(s) Oral before breakfast    MEDICATIONS  (PRN):  ALBUTerol    90 MICROgram(s) HFA Inhaler 2 Puff(s) Inhalation every 6 hours PRN cough  guaiFENesin   Syrup  (Sugar-Free) 100 milliGRAM(s) Oral every 6 hours PRN Cough      Vital Signs Last 24 Hrs  T(C): 37.3 (09 Apr 2020 12:05), Max: 37.7 (09 Apr 2020 01:00)  T(F): 99.2 (09 Apr 2020 12:05), Max: 99.8 (09 Apr 2020 01:00)  HR: 108 (09 Apr 2020 12:05) (88 - 113)  BP: 142/77 (09 Apr 2020 12:05) (120/73 - 142/77)  BP(mean): --  RR: 24 (09 Apr 2020 12:05) (18 - 24)  SpO2: 88% (09 Apr 2020 12:05) (85% - 93%)    _________________  PHYSICAL EXAM:  ---------------------------   NAD; Normocephalic;   LUNGS - no wheezing; decreased bilateral air entry  HEART: S1 S2+   ABDOMEN: Soft, Nontender, non distended  EXTREMITIES: no cyanosis; no edema  NERVOUS SYSTEM:  Awake and alert; no focal neuro  deficits    _________________________________________________  LABS:                        14.7   5.34  )-----------( 246      ( 09 Apr 2020 05:10 )             46.4     04-09    143  |  111<H>  |  18  ----------------------------<  98  4.0   |  26  |  0.97    Ca    8.5      09 Apr 2020 05:10    TPro  7.6  /  Alb  2.4<L>  /  TBili  0.4  /  DBili  x   /  AST  66<H>  /  ALT  55  /  AlkPhos  163<H>  04-09 MEDICAL ATTENDING NOTE     INTERVAL HPI: no new complaints. Reports continued cough and shortness of breath. ICU consult called for desaturation to 85% and respiratory distress on NRB. Patient started on steroids as per ICU consult recommendations.     ROS: no CP, ++SOB but slowly improving    MEDICATIONS  (STANDING):  heparin  Injectable 5000 Unit(s) SubCutaneous every 8 hours  hydroxychloroquine   Oral   hydroxychloroquine 400 milliGRAM(s) Oral every 12 hours  methylPREDNISolone sodium succinate Injectable 40 milliGRAM(s) IV Push every 12 hours  pantoprazole    Tablet 40 milliGRAM(s) Oral before breakfast    MEDICATIONS  (PRN):  ALBUTerol    90 MICROgram(s) HFA Inhaler 2 Puff(s) Inhalation every 6 hours PRN cough  guaiFENesin   Syrup  (Sugar-Free) 100 milliGRAM(s) Oral every 6 hours PRN Cough      Vital Signs Last 24 Hrs  T(C): 37.3 (09 Apr 2020 12:05), Max: 37.7 (09 Apr 2020 01:00)  T(F): 99.2 (09 Apr 2020 12:05), Max: 99.8 (09 Apr 2020 01:00)  HR: 108 (09 Apr 2020 12:05) (88 - 113)  BP: 142/77 (09 Apr 2020 12:05) (120/73 - 142/77)  BP(mean): --  RR: 24 (09 Apr 2020 12:05) (18 - 24)  SpO2: 88% (09 Apr 2020 12:05) (85% - 93%)    _________________  PHYSICAL EXAM:  ---------------------------   NAD; Normocephalic;   LUNGS - no wheezing; decreased bilateral air entry  HEART: S1 S2+   ABDOMEN: Soft, Nontender, non distended  EXTREMITIES: no cyanosis; no edema  NERVOUS SYSTEM:  Awake and alert; no focal neuro  deficits    _________________________________________________  LABS:                        14.7   5.34  )-----------( 246      ( 09 Apr 2020 05:10 )             46.4     04-09    143  |  111<H>  |  18  ----------------------------<  98  4.0   |  26  |  0.97    Ca    8.5      09 Apr 2020 05:10    TPro  7.6  /  Alb  2.4<L>  /  TBili  0.4  /  DBili  x   /  AST  66<H>  /  ALT  55  /  AlkPhos  163<H>  04-09

## 2020-04-09 NOTE — PROGRESS NOTE ADULT - SUBJECTIVE AND OBJECTIVE BOX
PGY 2 Note discussed with primary attending    Patient is a 54y old  Female who presented with a chief complaint of Pneumonia with PMH of HTN, works as Hemodialysis nurse,  c/o fever, chills, nausea, vomiting, and mild cough that began x1 week ago. Patient also reports mild shortness of breath but denies any other acute complaints. Patient is currently saturating 93% on 5L NC.  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    Chest X-ray showed: Bilateral airspace opacities suspicious for pneumonia.    MEDICATIONS  (STANDING):  heparin  Injectable 5000 Unit(s) SubCutaneous every 8 hours  pantoprazole    Tablet 40 milliGRAM(s) Oral before breakfast  sodium chloride 0.9%. 1000 milliLiter(s) (75 mL/Hr) IV Continuous <Continuous>    MEDICATIONS  (PRN):  ALBUTerol 90 MICROgram(s) HFA Inhaler 2 Puff(s) Inhalation every 6 hours PRN cough  __________________________________________________  REVIEW OF SYSTEMS:    CONSTITUTIONAL: No fever  NECK: No pain or stiffness  RESPIRATORY: Cough; shortness of breath  CARDIOVASCULAR: No chest pain  GASTROINTESTINAL: No diarrhea   NEUROLOGICAL: No headache  MUSCULOSKELETAL: No joint pain  GENITOURINARY: No dysuria  PSYCHIATRY: No depression  ALL OTHER  ROS negative      Vital Signs Last 24 Hrs  T(C): 36.4 (09 Apr 2020 04:30), Max: 37.7 (08 Apr 2020 10:13)  T(F): 97.5 (09 Apr 2020 04:30), Max: 99.9 (08 Apr 2020 10:13)  HR: 100 (09 Apr 2020 04:30) (86 - 113)  BP: 120/73 (09 Apr 2020 04:30) (120/73 - 132/70)  BP(mean): --  RR: 18 (09 Apr 2020 04:30) (18 - 18)  SpO2: 93% (09 Apr 2020 04:30) (91% - 96%)  ________________________________________________  PHYSICAL EXAM:  GENERAL: NAD  HEENT: Normocephalic  CHEST/LUNG: Clear to auscultation bilaterally  HEART: RRR  ABDOMEN: Bowel sounds present  EXTREMITIES: No cyanosis  SKIN: Warm and dry  NERVOUS SYSTEM: No new deficits  _________________________________________________                          14.7   5.34  )-----------( 246      ( 09 Apr 2020 05:10 )             46.4     04-09    143  |  111<H>  |  18  ----------------------------<  98  4.0   |  26  |  0.97    Ca    8.5      09 Apr 2020 05:10    TPro  7.6  /  Alb  2.4<L>  /  TBili  0.4  /  DBili  x   /  AST  66<H>  /  ALT  55  /  AlkPhos  163<H>  04-09    RADIOLOGY & ADDITIONAL TESTS:  < from: Xray Chest 1 View- PORTABLE-Urgent (04.08.20 @ 00:22) >  EXAM:  XR CHEST PORTABLE URGENT 1V                          PROCEDURE DATE:  04/08/2020      INTERPRETATION:  Clinical Indication: Hypoxia.    Technique: Single Frontal view of the chest    Comparison: There are no prior studies available forcomparison.    Findings:    The cardiac silhouette is unremarkable. Extensive patchy airspace opacities  are noted bilaterally. There is no pleural effusion or pneumothorax.    IMPRESSION:    Bilateral airspace opacities suspicious for pneumonia.    ADDIE GUERRERO M.D., ATTENDING RADIOLOGIST  This document has been electronically signed. Apr 8 2020  1:19AM    < end of copied text >      Imaging Personally Reviewed:  YES  Consultant(s) Notes Reviewed:   YES

## 2020-04-09 NOTE — PROGRESS NOTE ADULT - ASSESSMENT
CAPILLARY BLOOD GLUCOSE          Impression /Plan:   1. Pneumonia due to COVID-19 with hypoxia  2. Hypoalbuminemia  3. Abnormal LFTs due to acute viral illness    - Oxygen supplementation as needed  - ICU consult appreciated  - steroids started as per ICU consult recommendations. Start antibiotics if procalcitonin is elevated   - Proventil inhalers PRN  - continue current medications   - Encourage nutritional supplements   - monitor labs as needed -including cbc, bmp, LFTs, ferritin and CRP  - Encourage prone positioning 10-15 mins /hr as tolerated  - consider steroids as clinically expedient   - Isolation precautions for COVID-19 per infection control protocol  - Prognosis guarded  - DVT prophylaxis     Plan of care was discussed  and aligned with patient's wishes. CAPILLARY BLOOD GLUCOSE          Impression /Plan:   1. Pneumonia due to COVID-19 with hypoxia  2. Hypoalbuminemia  3. Abnormal LFTs due to acute viral illness    - Oxygen supplementation as needed  - ICU consult appreciated  - steroids started as per ICU consult recommendations. Start antibiotics if procalcitonin is elevated   - Proventil inhalers PRN  - continue current medications   - Encourage nutritional supplements   - monitor labs as needed -including cbc, bmp, LFTs, ferritin and CRP  - Encourage prone positioning 10-15 mins /hr as tolerated  - consider steroids as clinically expedient   - Isolation precautions for COVID-19 per infection control protocol  - Prognosis guarded  - low threshold for ICU  - DVT prophylaxis     Plan of care was discussed  and aligned with patient's wishes.

## 2020-04-09 NOTE — CONSULT NOTE ADULT - SUBJECTIVE AND OBJECTIVE BOX
Patient is a 54y old  Female who presents with a chief complaint of Pneumonia (09 Apr 2020 07:36)      Initial HPI on admission:  HPI:  53 y/o F patient, w/ PMHx of HTN, works as HD nurse,  presents to the ED w/ fever, chills, nausea, vomiting, and mild cough that began x1 week ago. Patient also reports mild shortness of breath but denies  any other acute complaints. Patient was saturating 96% on 4L NC.  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    Chest X-ray showed: Bilateral airspace opacities suspicious for pneumonia. (08 Apr 2020 01:59)      BRIEF HOSPITAL COURSE: ***    PAST MEDICAL & SURGICAL HISTORY:  HTN (hypertension)  No significant past surgical history    Allergies    No Known Allergies    Intolerances      FAMILY HISTORY:          Medications:  ALBUTerol    90 MICROgram(s) HFA Inhaler 2 Puff(s) Inhalation every 6 hours PRN  guaiFENesin   Syrup  (Sugar-Free) 100 milliGRAM(s) Oral every 6 hours PRN  heparin  Injectable 5000 Unit(s) SubCutaneous every 8 hours  hydroxychloroquine   Oral   hydroxychloroquine 400 milliGRAM(s) Oral every 12 hours  pantoprazole    Tablet 40 milliGRAM(s) Oral before breakfast  sodium chloride 0.9%. 1000 milliLiter(s) IV Continuous <Continuous>      vent settings      Vital Signs Last 24 Hrs  T(C): 37.2 (09 Apr 2020 09:00), Max: 37.7 (09 Apr 2020 01:00)  T(F): 98.9 (09 Apr 2020 09:00), Max: 99.8 (09 Apr 2020 01:00)  HR: 99 (09 Apr 2020 09:00) (88 - 113)  BP: 130/68 (09 Apr 2020 09:00) (120/73 - 132/70)  BP(mean): --  RR: 22 (09 Apr 2020 10:25) (18 - 22)  SpO2: 88% (09 Apr 2020 10:25) (85% - 93%)              LABS:                        14.7   5.34  )-----------( 246      ( 09 Apr 2020 05:10 )             46.4     04-09    143  |  111<H>  |  18  ----------------------------<  98  4.0   |  26  |  0.97    Ca    8.5      09 Apr 2020 05:10    TPro  7.6  /  Alb  2.4<L>  /  TBili  0.4  /  DBili  x   /  AST  66<H>  /  ALT  55  /  AlkPhos  163<H>  04-09          CAPILLARY BLOOD GLUCOSE            CULTURES:        Physical Examination:    >>>      RADIOLOGY REVIEWED ***            ASSESSMENT AND PLAN:      - Neuro    - Cardiovascular    - Pulm    - ID    - Nephro    - GI    - Heme    - Endocrine    - Skin/Catheters    - FEN    - Prophylaxis Patient is a 54y old  Female who presents with a chief complaint of Pneumonia (09 Apr 2020 07:36)      Initial HPI on admission:  HPI:  55 y/o F patient, w/ PMHx of HTN, works as HD nurse,  presents to the ED w/ fever, chills, nausea, vomiting, and mild cough that began x1 week ago. Patient also reports mild shortness of breath but denies  any other acute complaints. Patient was saturating 96% on 4L NC.  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    Chest X-ray showed: Bilateral airspace opacities suspicious for pneumonia. (08 Apr 2020 01:59)      BRIEF HOSPITAL COURSE: 55 y/o F patient, w/ PMHx of HTN, works as HD nurse,  presents to the ED w/ fever, chills, nausea, vomiting, and mild cough that began x1 week ago. Patient was admitted on medicine floor for COVID pna. ICU consulted for hypoxia and desaturation on NRB.     PAST MEDICAL & SURGICAL HISTORY:  HTN (hypertension)  No significant past surgical history    Allergies    No Known Allergies    Intolerances      FAMILY HISTORY:          Medications:  ALBUTerol    90 MICROgram(s) HFA Inhaler 2 Puff(s) Inhalation every 6 hours PRN  guaiFENesin   Syrup  (Sugar-Free) 100 milliGRAM(s) Oral every 6 hours PRN  heparin  Injectable 5000 Unit(s) SubCutaneous every 8 hours  hydroxychloroquine   Oral   hydroxychloroquine 400 milliGRAM(s) Oral every 12 hours  pantoprazole    Tablet 40 milliGRAM(s) Oral before breakfast  sodium chloride 0.9%. 1000 milliLiter(s) IV Continuous <Continuous>      vent settings      Vital Signs Last 24 Hrs  T(C): 37.2 (09 Apr 2020 09:00), Max: 37.7 (09 Apr 2020 01:00)  T(F): 98.9 (09 Apr 2020 09:00), Max: 99.8 (09 Apr 2020 01:00)  HR: 99 (09 Apr 2020 09:00) (88 - 113)  BP: 130/68 (09 Apr 2020 09:00) (120/73 - 132/70)  BP(mean): --  RR: 22 (09 Apr 2020 10:25) (18 - 22)  SpO2: 88% (09 Apr 2020 10:25) (85% - 93%)              LABS:                        14.7   5.34  )-----------( 246      ( 09 Apr 2020 05:10 )             46.4     04-09    143  |  111<H>  |  18  ----------------------------<  98  4.0   |  26  |  0.97    Ca    8.5      09 Apr 2020 05:10    TPro  7.6  /  Alb  2.4<L>  /  TBili  0.4  /  DBili  x   /  AST  66<H>  /  ALT  55  /  AlkPhos  163<H>  04-09          CAPILLARY BLOOD GLUCOSE            CULTURES:        Physical Examination:    >>>      RADIOLOGY REVIEWED ***            ASSESSMENT AND PLAN:      - Neuro    - Cardiovascular    - Pulm    - ID    - Nephro    - GI    - Heme    - Endocrine    - Skin/Catheters    - FEN    - Prophylaxis

## 2020-04-09 NOTE — CONSULT NOTE ADULT - ASSESSMENT
Assessment:  - Acute Hypoxic Respiratory Failure secondary to PNA R/O COVID 19  -     Plan:  Neuro:  - AAO* at baseline  - now sedated and intubated      CV:  - Hold BP medications  - Will start Vasopresor support if needed      Pulm:  - Acute Hypoxic Resp Failure secondary to PNA R/O COVID 19  - c/e MV,   - Bilateral Opacities concerning for COVID PNA  - Continue with empiric antibiotics, Rocephin, Zithromax for concern for secondary bacterial infection   - Recommend D-dimer, Ferritin, LDH, T cell sunsets, Pr-calcitonin, G6pd level, ESR, CRP, HIV, HBV serologies   - Recommend monitor EKG for QT prolongation     ID:  - empiric CAP coverage: Zithromax and Ceftriaxone  - flu negative   - Check Blood Cultures  - Check U/C  - Check COVID Results    Nephro:  - monitor urine output   - I&Os  - Monitor electrolytes     GI:  - npo for now   - gi ppx     Heme:  - no indication for transfusion   - monitor cbc daily     Endo:  - No history of DM  - target CBG < 180  - FS q6 while NPO  - Start diet when possible    Prophy:  - C/W Lovenox     Dispo:  - ICU 55 y/o F patient, w/ PMHx of HTN, works as HD nurse,  presents to the ED w/ fever, chills, nausea, vomiting, and mild cough that began x1 week ago. Patient was admitted on medicine floor for COVID pna. ICU consulted for hypoxia and desaturation on NRB.     Assessment:  - Acute Hypoxic Respiratory Failure secondary to PNA + COVID 19  - HTN    Plan:  Neuro:  - AAO* 3at baseline    CV:  - Hold BP medications  - Will start Vasopresor support if needed      Pulm:  - Acute Hypoxic Resp Failure secondary to PNA + COVID 19  - Currently saturating 85-87 on NRB, with tachypnea  - Will monitor closely in ICU  - keep NPO, prone position   - start steroids   - check covid markers, if elevated and meet criteria, start anakinra  - keep I&Os negative  - Bilateral Opacities concerning for COVID PNA  - If procal is elevated, should be started on abx, currently none per hospital policy   - Recommend D-dimer, Ferritin, LDH, T cell sunsets, Pr-calcitonin, G6pd level, ESR, CRP, HIV, HBV serologies   - Recommend monitor EKG for QT prolongation     ID:  - as above    Nephro:  - monitor urine output   - I&Os  - Monitor electrolytes     GI:  - npo for now   - gi ppx     Heme:  - no indication for transfusion   - monitor cbc daily     Endo:  - No history of DM  - target CBG < 180  - FS q6 while NPO  - Start diet when possible    Prophy:  - C/W heparin sc     Dispo:  - Monitor closely in ICU  - low threshold for intubation

## 2020-04-09 NOTE — PROGRESS NOTE ADULT - ASSESSMENT
55 y/o F patient, w/ PMHx of HTN, works as HD nurse, c/o fever, chills, nausea, vomiting, and mild cough that began x 1 week ago. Patient also reports mild shortness of breath but denies  any other acute complaints. 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    Chest X-ray showed: Bilateral airspace opacities suspicious for pneumonia.    Patient is admitted for Pneumonia and is COVID +    Problem 1: Pneumonia.    Plan: Patient p/w cough, shortness of breath and low appetite  Chest X-ray: Bilateral airspace opacities suspicious for pneumonia.  Covid 19 PCR Positive  c/w Zithromax, vitamin C, thiamine, vitamin D  Plaquenil regimen  Robitussin, acetaminophen   C/w supplemental oxygen, saturating 93% on 5L NC    Problem 2: RASHAUN (acute kidney injury).    Plan: likely pre-renal from dehydration  C/w mild IV hydration  Follow urine lytes   Follow BMP    Problem 3: HTN (hypertension).    Plan: BP is running low, will hold anti-HTN agents    Problem 4: GERD (gastroesophageal reflux disease)  Plan: C/w Protonix    Problem 5: DVT prophylaxis.    Plan: IMPROVE VTE score:  Improve 1, will c/w Heparin

## 2020-04-10 NOTE — DIETITIAN INITIAL EVALUATION ADULT. - PERTINENT LABORATORY DATA
04-10 Na144 mmol/L Glu 110 mg/dL<H> K+ 4.6 mmol/L Cr  0.84 mg/dL BUN 20 mg/dL<H> 04-10 Phos 4.0 mg/dL 04-10 Alb 2.5 g/dL<L>

## 2020-04-10 NOTE — PROGRESS NOTE ADULT - ATTENDING COMMENTS
55 y/o F patient, w/ PMHx of HTN, works as HD nurse,  presents to the ED w/ fever, chills, nausea, vomiting, and mild cough that began x1 week ago. Patient was admitted on medicine floor for COVID pna. ICU consulted for hypoxia and desaturation on NRB.  CXR is worst    Assessment:  - Acute Hypoxic Respiratory Failure  - B/L  PNA   -COVID 19 infection  -HTN    Plan:  -O2 supp as needed  -low threshold for intubation  -prone therapy  -isolation.. contact and airborne  -anakinra and solumedrol as per protocol   -plaquenil and vitamins  -f/u cultures  -dvt/gi prophy .

## 2020-04-10 NOTE — DIETITIAN INITIAL EVALUATION ADULT. - PROBLEM SELECTOR PLAN 2
Patient is noted to have RASHAUN, likely pre-renal from dehydration  Will c/w mild iV hydration  Follow urine lytes   Follow BMP

## 2020-04-10 NOTE — DIETITIAN INITIAL EVALUATION ADULT. - PERTINENT MEDS FT
MEDICATIONS  (STANDING):  anakinra Injectable 90 milliGRAM(s) SubCutaneous every 12 hours  enoxaparin Injectable 90 milliGRAM(s) SubCutaneous two times a day  hydroxychloroquine   Oral   hydroxychloroquine 200 milliGRAM(s) Oral every 12 hours  methylPREDNISolone sodium succinate Injectable 40 milliGRAM(s) IV Push every 12 hours  pantoprazole    Tablet 40 milliGRAM(s) Oral before breakfast    MEDICATIONS  (PRN):  ALBUTerol    90 MICROgram(s) HFA Inhaler 2 Puff(s) Inhalation every 6 hours PRN cough  guaiFENesin   Syrup  (Sugar-Free) 100 milliGRAM(s) Oral every 6 hours PRN Cough

## 2020-04-10 NOTE — PROGRESS NOTE ADULT - ASSESSMENT
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 Patient was admitted on medicine floor for COVID pna. ICU consulted for hypoxia and desaturation on NRB.     Assessment:  - Acute Hypoxic Respiratory Failure secondary to PNA + COVID 19  - HTN    Plan:  Neuro:  - AAO* 3at baseline    CV:  - Hold BP medications  - Will start Vasopresor support if needed      Pulm:  - Acute Hypoxic Resp Failure secondary to PNA + COVID 19  - Currently saturating 85-87 on NRB, with tachypnea  - Will monitor closely in ICU  - keep NPO, prone position   - start steroids   - check covid markers, if elevated and meet criteria, start anakinra  - keep I&Os negative  - Bilateral Opacities concerning for COVID PNA  - If procal is elevated, should be started on abx, currently none per hospital policy   - Recommend D-dimer, Ferritin, LDH, T cell sunsets, Pr-calcitonin, G6pd level, ESR, CRP, HIV, HBV serologies   - Recommend monitor EKG for QT prolongation     ID:  - as above    Nephro:  - monitor urine output   - I&Os  - Monitor electrolytes     GI:  - npo for now   - gi ppx     Heme:  - no indication for transfusion   - monitor cbc daily     Endo:  - No history of DM  - target CBG < 180  - FS q6 while NPO  - Start diet when possible    Prophy:  - C/W heparin sc     Dispo:  - Monitor closely in ICU  - low threshold for intubation 53 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 Patient was admitted on medicine floor for COVID pna.  Admitted to ICU for hypoxia and desaturation on NRB.         Plan:  Neuro:  - AAO* 3at baseline    CV:  - Hold BP medications      Pulm:  -CXR shows bilateral opacities  - Acute Hypoxic Resp Failure secondary to PNA + COVID 19  - Currently saturating  > 90% on non rebreather   - Will monitor closely in ICU  - keep NPO, prone position   -Started on ANAKINRA and Hydroxychloroquine ( April 9th)   -Started on Full dose Lovenox       ID:  -  Continue Hydroxychloroquine, anakinra   -CRP: 5.96    Nephro:  - monitor urine output   - I&Os  - Monitor electrolytes     GI:  - npo for now   - gi ppx     Heme:  - no indication for transfusion   - monitor cbc daily     Endo:  - No history of DM  - target CBG < 180  - FS q6 while NPO  - Start diet when possible    Prophy:  - Full dose Lovenox

## 2020-04-10 NOTE — PROGRESS NOTE ADULT - SUBJECTIVE AND OBJECTIVE BOX
INTERVAL HPI/OVERNIGHT EVENTS:     PRESSORS: [ ] YES [ ] NO  WHICH:    ANTIBIOTICS:                  DATE STARTED:  ANTIBIOTICS:                  DATE STARTED:  ANTIBIOTICS:                  DATE STARTED:    Antimicrobial:  hydroxychloroquine   Oral   hydroxychloroquine 200 milliGRAM(s) Oral every 12 hours    Cardiovascular:    Pulmonary:  ALBUTerol    90 MICROgram(s) HFA Inhaler 2 Puff(s) Inhalation every 6 hours PRN  guaiFENesin   Syrup  (Sugar-Free) 100 milliGRAM(s) Oral every 6 hours PRN    Hematalogic:  enoxaparin Injectable 90 milliGRAM(s) SubCutaneous two times a day    Other:  anakinra Injectable 90 milliGRAM(s) SubCutaneous every 12 hours  methylPREDNISolone sodium succinate Injectable 40 milliGRAM(s) IV Push every 12 hours  pantoprazole    Tablet 40 milliGRAM(s) Oral before breakfast      Drug Dosing Weight  Height (cm): 134.62 (07 Apr 2020 23:13)  Weight (kg): 86.2 (07 Apr 2020 23:13)  BMI (kg/m2): 47.6 (07 Apr 2020 23:13)  BSA (m2): 1.67 (07 Apr 2020 23:13)    CENTRAL LINE: [ ] YES [ ] NO  LOCATION:   DATE INSERTED:  REMOVE: [ ] YES [ ] NO  EXPLAIN:    RENNER: [ ] YES [ ] NO    DATE INSERTED:  REMOVE:  [ ] YES [ ] NO  EXPLAIN:    A-LINE:  [ ] YES [ ] NO  LOCATION:   DATE INSERTED:  REMOVE:  [ ] YES [ ] NO  EXPLAIN:    PMH/Social Hx/Fam Hx -reviewed admission note, no change since admission  PAST MEDICAL & SURGICAL HISTORY:  HTN (hypertension)  No significant past surgical history    Heart faliure: acute [ ] chronic [ ] acute or chronic [ ] diastolic [ ] systolic [ ] combied systolic and diastolic[ ]  RASHAUN: ATN[ ] renal medullary necrosis [ ] CKD I [ ]CKDII [ ]CKD III [ ]CKD IV [ ]CKD V [ ]Other pathological lesions [ ]  Abdominal Nutrition Status: malnutrition [ ] cachexia [ ] morbid obesity/BMI=40 [ ] Supplement ordered [___________]     T(C): 35.9 (04-10-20 @ 05:09), Max: 37.3 (04-09-20 @ 12:05)  HR: 94 (04-10-20 @ 07:51)  BP: 129/79 (04-10-20 @ 07:00)  BP(mean): 90 (04-10-20 @ 07:00)  ABP: --  ABP(mean): --  RR: 41 (04-10-20 @ 07:51)  SpO2: 87% (04-10-20 @ 07:51)  Wt(kg): --    ABG - ( 10 Apr 2020 03:56 )  pH, Arterial: 7.44  pH, Blood: x     /  pCO2: 37    /  pO2: 57    / HCO3: 25    / Base Excess: 1.3   /  SaO2: 91                    04-09 @ 07:01  -  04-10 @ 07:00  --------------------------------------------------------  IN: 0 mL / OUT: 300 mL / NET: -300 mL            PHYSICAL EXAM:    GENERAL: [ ]NAD, [ ]well-groomed, [ ]well-developed  HEAD:  [ ]Atraumatic, [ ]Normocephalic  EYES: [ ]EOMI, [ ]PERRLA, [ ]conjunctiva and sclera clear  ENMT: [ ]No tonsillar erythema, exudates, or enlargement; [ ]Moist mucous membranes, [ ]Good dentition, [ ]No lesions  NECK: [ ]Supple, normal appearance, [ ]No JVD; [ ]Normal thyroid; [ ]Trachea midline  NERVOUS SYSTEM:  [ ]Alert & Oriented X3, [ ]Good concentration; [ ]Motor Strength 5/5 B/L upper and lower extremities; [ ]DTRs 2+ intact and symmetric  CHEST/LUNG: [ ]No chest deformity; [ ]Normal percussion bilaterally; [ ]No rales, rhonchi, wheezing; [ ]Crackles at bases  HEART: [ ]Regular rate and rhythm; [ ]No murmurs, rubs, or gallops  ABDOMEN: [ ]Soft, Nontender, Nondistended; [ ]Bowel sounds present  EXTREMITIES:  [ ]2+ Peripheral Pulses, [ ]No clubbing, cyanosis, or edema [ ]Bilat lower extremity edema  LYMPH: [ ]No lymphadenopathy noted  SKIN: [ ]No rashes or lesions; [ ]Good capillary refill      LABS:  CBC Full  -  ( 10 Apr 2020 08:14 )  WBC Count : 4.72 K/uL  RBC Count : 5.50 M/uL  Hemoglobin : 15.2 g/dL  Hematocrit : 47.5 %  Platelet Count - Automated : 221 K/uL  Mean Cell Volume : 86.4 fl  Mean Cell Hemoglobin : 27.6 pg  Mean Cell Hemoglobin Concentration : 32.0 gm/dL  Auto Neutrophil # : x  Auto Lymphocyte # : x  Auto Monocyte # : x  Auto Eosinophil # : x  Auto Basophil # : x  Auto Neutrophil % : x  Auto Lymphocyte % : x  Auto Monocyte % : x  Auto Eosinophil % : x  Auto Basophil % : x    04-09    143  |  111<H>  |  18  ----------------------------<  98  4.0   |  26  |  0.97    Ca    8.5      09 Apr 2020 05:10    TPro  7.6  /  Alb  2.4<L>  /  TBili  0.4  /  DBili  x   /  AST  66<H>  /  ALT  55  /  AlkPhos  163<H>  04-09            RADIOLOGY & ADDITIONAL STUDIES REVIEWED:      [ ]GOALS OF CARE DISCUSSION WITH PATIENT/FAMILY/PROXY:    CRITICAL CARE TIME SPENT: 35 minutes INTERVAL HPI/OVERNIGHT EVENTS:  No acute events over night.    PRESSORS: [ ] YES [x ] NO  WHICH:        Antimicrobial:  hydroxychloroquine   Oral   hydroxychloroquine 200 milliGRAM(s) Oral every 12 hours        Pulmonary:  ALBUTerol    90 MICROgram(s) HFA Inhaler 2 Puff(s) Inhalation every 6 hours PRN  guaiFENesin   Syrup  (Sugar-Free) 100 milliGRAM(s) Oral every 6 hours PRN    Hematalogic:  enoxaparin Injectable 90 milliGRAM(s) SubCutaneous two times a day    Other:  anakinra Injectable 90 milliGRAM(s) SubCutaneous every 12 hours  methylPREDNISolone sodium succinate Injectable 40 milliGRAM(s) IV Push every 12 hours  pantoprazole    Tablet 40 milliGRAM(s) Oral before breakfast      Drug Dosing Weight  Height (cm): 134.62 (07 Apr 2020 23:13)  Weight (kg): 86.2 (07 Apr 2020 23:13)  BMI (kg/m2): 47.6 (07 Apr 2020 23:13)  BSA (m2): 1.67 (07 Apr 2020 23:13)    CENTRAL LINE: [ ] YES [x ] NO  LOCATION:   DATE INSERTED:  REMOVE: [ ] YES [ ] NO  EXPLAIN:    RENNER: [ ] YES x ] NO    DATE INSERTED:  REMOVE:  [ ] YES [ ] NO  EXPLAIN:    A-LINE:  [ ] YES [x ] NO  LOCATION:   DATE INSERTED:  REMOVE:  [ ] YES [ ] NO  EXPLAIN:    PMH/Social Hx/Fam Hx -reviewed admission note, no change since admission  PAST MEDICAL & SURGICAL HISTORY:  HTN (hypertension)  No significant past surgical history    Heart faliure: acute [ ] chronic [ ] acute or chronic [ ] diastolic [ ] systolic [ ] combied systolic and diastolic[ ]  RASHAUN: ATN[ ] renal medullary necrosis [ ] CKD I [ ]CKDII [ ]CKD III [ ]CKD IV [ ]CKD V [ ]Other pathological lesions [ ]  Abdominal Nutrition Status: malnutrition [ ] cachexia [ ] morbid obesity/BMI=40 [ ] Supplement ordered [___________]     T(C): 35.9 (04-10-20 @ 05:09), Max: 37.3 (04-09-20 @ 12:05)  HR: 94 (04-10-20 @ 07:51)  BP: 129/79 (04-10-20 @ 07:00)  BP(mean): 90 (04-10-20 @ 07:00)  ABP: --  ABP(mean): --  RR: 41 (04-10-20 @ 07:51)  SpO2: 87% (04-10-20 @ 07:51)  Wt(kg): --    ABG - ( 10 Apr 2020 03:56 )  pH, Arterial: 7.44  pH, Blood: x     /  pCO2: 37    /  pO2: 57    / HCO3: 25    / Base Excess: 1.3   /  SaO2: 91                    04-09 @ 07:01  -  04-10 @ 07:00  --------------------------------------------------------  IN: 0 mL / OUT: 300 mL / NET: -300 mL      PHYSICAL EXAM:  GENERAL: NAD  HEENT: Normocephalic  CHEST/LUNG: Clear to auscultation bilaterally  HEART: RRR  ABDOMEN: Bowel sounds present  EXTREMITIES: No cyanosis  SKIN: Warm and dry  NERVOUS SYSTEM: No new deficits      LABS:  CBC Full  -  ( 10 Apr 2020 08:14 )  WBC Count : 4.72 K/uL  RBC Count : 5.50 M/uL  Hemoglobin : 15.2 g/dL  Hematocrit : 47.5 %  Platelet Count - Automated : 221 K/uL  Mean Cell Volume : 86.4 fl  Mean Cell Hemoglobin : 27.6 pg  Mean Cell Hemoglobin Concentration : 32.0 gm/dL  Auto Neutrophil # : x  Auto Lymphocyte # : x  Auto Monocyte # : x  Auto Eosinophil # : x  Auto Basophil # : x  Auto Neutrophil % : x  Auto Lymphocyte % : x  Auto Monocyte % : x  Auto Eosinophil % : x  Auto Basophil % : x    04-09    143  |  111<H>  |  18  ----------------------------<  98  4.0   |  26  |  0.97    Ca    8.5      09 Apr 2020 05:10    TPro  7.6  /  Alb  2.4<L>  /  TBili  0.4  /  DBili  x   /  AST  66<H>  /  ALT  55  /  AlkPhos  163<H>  04-09            RADIOLOGY & ADDITIONAL STUDIES REVIEWED:      [ ]GOALS OF CARE DISCUSSION WITH PATIENT/FAMILY/PROXY:    CRITICAL CARE TIME SPENT: 35 minutes

## 2020-04-10 NOTE — DIETITIAN INITIAL EVALUATION ADULT. - OTHER INFO
Unable to see pt due to Covid+. Noted as low threshold for intubation. Proning. Noted as lack appetite/ diarrhea PTA (pt sick x 1 week PTA).

## 2020-04-11 NOTE — PROGRESS NOTE ADULT - ASSESSMENT
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 Patient was admitted on medicine floor for COVID pna.  Admitted to ICU for hypoxia and desaturation on NRB.         Plan:  Neuro:  - AAO* 3at baseline    CV:  - Hold BP medications      Pulm:  -CXR shows bilateral opacities  - Acute Hypoxic Resp Failure secondary to PNA + COVID 19  - Currently saturating  > 90% on non rebreather   - Will monitor closely in ICU  - keep NPO, prone position   -Started on ANAKINRA and Hydroxychloroquine ( April 9th)   -Started on Full dose Lovenox       ID:  -  Continue Hydroxychloroquine, anakinra   -CRP: 5.96    Nephro:  - monitor urine output   - I&Os  - Monitor electrolytes     GI:  - npo for now   - gi ppx     Heme:  - no indication for transfusion   - monitor cbc daily     Endo:  - No history of DM  - target CBG < 180  - FS q6 while NPO  - Start diet when possible    Prophy:  - Full dose Lovenox

## 2020-04-11 NOTE — PROGRESS NOTE ADULT - SUBJECTIVE AND OBJECTIVE BOX
INTERVAL HPI/OVERNIGHT EVENTS: No acute events overnight    PRESSORS: [ ] YES [x ] NO  WHICH:    ANTIBIOTICS:                  DATE STARTED:  ANTIBIOTICS:                  DATE STARTED:  ANTIBIOTICS:                  DATE STARTED:    Antimicrobial:  hydroxychloroquine   Oral   hydroxychloroquine 200 milliGRAM(s) Oral every 12 hours    Cardiovascular:    Pulmonary:  ALBUTerol    90 MICROgram(s) HFA Inhaler 2 Puff(s) Inhalation every 6 hours PRN  guaiFENesin   Syrup  (Sugar-Free) 100 milliGRAM(s) Oral every 6 hours PRN    Hematalogic:  enoxaparin Injectable 90 milliGRAM(s) SubCutaneous two times a day    Other:  anakinra Injectable 90 milliGRAM(s) SubCutaneous every 12 hours  methylPREDNISolone sodium succinate Injectable 40 milliGRAM(s) IV Push every 12 hours  pantoprazole    Tablet 40 milliGRAM(s) Oral before breakfast    ALBUTerol    90 MICROgram(s) HFA Inhaler 2 Puff(s) Inhalation every 6 hours PRN  anakinra Injectable 90 milliGRAM(s) SubCutaneous every 12 hours  enoxaparin Injectable 90 milliGRAM(s) SubCutaneous two times a day  guaiFENesin   Syrup  (Sugar-Free) 100 milliGRAM(s) Oral every 6 hours PRN  hydroxychloroquine   Oral   hydroxychloroquine 200 milliGRAM(s) Oral every 12 hours  methylPREDNISolone sodium succinate Injectable 40 milliGRAM(s) IV Push every 12 hours  pantoprazole    Tablet 40 milliGRAM(s) Oral before breakfast    Drug Dosing Weight  Height (cm): 134.62 (07 Apr 2020 23:13)  Weight (kg): 86.2 (07 Apr 2020 23:13)  BMI (kg/m2): 47.6 (07 Apr 2020 23:13)  BSA (m2): 1.67 (07 Apr 2020 23:13)    CENTRAL LINE: [ ] YES [ x] NO  LOCATION:   DATE INSERTED:  REMOVE: [ ] YES [ ] NO  EXPLAIN:    RENNER: [ ] YES [ x] NO    DATE INSERTED:  REMOVE:  [ ] YES [ ] NO  EXPLAIN:    A-LINE:  [ ] YES [ x] NO  LOCATION:   DATE INSERTED:  REMOVE:  [ ] YES [ ] NO  EXPLAIN:    PMH -reviewed admission note, no change since admission  PAST MEDICAL & SURGICAL HISTORY:  HTN (hypertension)  No significant past surgical history      ICU Vital Signs Last 24 Hrs  T(C): 36.3 (10 Apr 2020 22:00), Max: 36.7 (10 Apr 2020 15:00)  T(F): 97.3 (10 Apr 2020 22:00), Max: 98 (10 Apr 2020 15:00)  HR: 95 (10 Apr 2020 23:00) (92 - 107)  BP: 121/69 (10 Apr 2020 23:00) (104/70 - 144/83)  BP(mean): 81 (10 Apr 2020 23:00) (78 - 96)  ABP: --  ABP(mean): --  RR: 29 (10 Apr 2020 23:00) (29 - 43)  SpO2: 88% (10 Apr 2020 23:00) (84% - 92%)      ABG - ( 10 Apr 2020 03:56 )  pH, Arterial: 7.44  pH, Blood: x     /  pCO2: 37    /  pO2: 57    / HCO3: 25    / Base Excess: 1.3   /  SaO2: 91                    04-09 @ 07:01  -  04-10 @ 07:00  --------------------------------------------------------  IN: 0 mL / OUT: 300 mL / NET: -300 mL            PHYSICAL EXAM:    GENERAL: [ ]NAD, [ ]well-groomed, [ ]well-developed  HEAD:  [ ]Atraumatic, [ ]Normocephalic  EYES: [ ]EOMI, [ ]PERRLA, [ ]conjunctiva and sclera clear  ENMT: [ ]No tonsillar erythema, exudates, or enlargement; [ ]Moist mucous membranes, [ ]Good dentition, [ ]No lesions  NECK: [ ]Supple, normal appearance, [ ]No JVD; [ ]Normal thyroid; [ ]Trachea midline  NERVOUS SYSTEM:  [ ]Alert & Oriented X3, [ ]Good concentration; [ ]Motor Strength 5/5 B/L upper and lower extremities; [ ]DTRs 2+ intact and symmetric  CHEST/LUNG: [ ]No chest deformity; [ ]Normal percussion bilaterally; [ ]No rales, rhonchi, wheezing   HEART: [ ]Regular rate and rhythm; [ ]No murmurs, rubs, or gallops  ABDOMEN: [ ]Soft, Nontender, Nondistended; [ ]Bowel sounds present  EXTREMITIES:  [ ]2+ Peripheral Pulses, [ ]No clubbing, cyanosis, or edema  LYMPH: [ ]No lymphadenopathy noted  SKIN: [ ]No rashes or lesions; [ ]Good capillary refill      LABS:  CBC Full  -  ( 10 Apr 2020 10:14 )  WBC Count : 4.79 K/uL  RBC Count : 6.12 M/uL  Hemoglobin : 16.9 g/dL  Hematocrit : 52.8 %  Platelet Count - Automated : 329 K/uL  Mean Cell Volume : 86.3 fl  Mean Cell Hemoglobin : 27.6 pg  Mean Cell Hemoglobin Concentration : 32.0 gm/dL  Auto Neutrophil # : x  Auto Lymphocyte # : x  Auto Monocyte # : x  Auto Eosinophil # : x  Auto Basophil # : x  Auto Neutrophil % : x  Auto Lymphocyte % : x  Auto Monocyte % : x  Auto Eosinophil % : x  Auto Basophil % : x    04-10    144  |  108  |  20<H>  ----------------------------<  110<H>  4.6   |  28  |  0.84    Ca    9.4      10 Apr 2020 10:14  Phos  4.0     04-10  Mg     2.7     04-10    TPro  8.9<H>  /  Alb  2.5<L>  /  TBili  0.5  /  DBili  x   /  AST  84<H>  /  ALT  66<H>  /  AlkPhos  190<H>  04-10            RADIOLOGY & ADDITIONAL STUDIES REVIEWED:  Yes    [ ]GOALS OF CARE DISCUSSION WITH PATIENT/FAMILY/PROXY:    CRITICAL CARE TIME SPENT: 35 minutes

## 2020-04-11 NOTE — PROGRESS NOTE ADULT - ATTENDING COMMENTS
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 Patient was admitted on medicine floor for COVID pna. ICU consulted for hypoxia and desaturation on NRB.  CXR is worst    Assessment:  - Acute Hypoxic Respiratory Failure  - B/L  PNA   -COVID 19 infection  -HTN    Plan:  -O2 supp as needed  -low threshold for intubation  -prone therapy  -isolation.. contact and airborne  -anakinra and solumedrol as per protocol   -plaquenil and vitamins  -f/u cultures  -dvt/gi prophy .

## 2020-04-12 NOTE — PROGRESS NOTE ADULT - ASSESSMENT
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 Patient was admitted on medicine floor for COVID pna.  Admitted to ICU for hypoxia and desaturation on NRB.         Plan:  Neuro:  - AAO* 3at baseline    CV:  - Hold BP medications      Pulm:  -CXR shows bilateral opacities  - Acute Hypoxic Resp Failure secondary to PNA + COVID 19  - Currently saturating  > 90% on non rebreather   - Will monitor closely in ICU  - keep NPO, prone position   -Started on ANAKINRA and Hydroxychloroquine ( April 9th)   -Started on Full dose Lovenox       ID:  -  Continue Hydroxychloroquine, anakinra   -CRP: 5.96    Nephro:  - monitor urine output   - I&Os  - Monitor electrolytes     GI:  - npo for now   - gi ppx     Heme:  - no indication for transfusion   - monitor cbc daily     Endo:  - No history of DM  - target CBG < 180  - FS q6 while NPO  - Start diet when possible    Prophy:  - Full dose Lovenox 53 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 Patient was admitted on medicine floor for COVID pna.  Admitted to ICU for hypoxia and desaturation on NRB.     Plan:  Neuro:  - AAO* 3at baseline    CV:  - Hold BP medications      Pulm:  -CXR shows bilateral opacities  - Acute Hypoxic Resp Failure secondary to PNA + COVID 19  - Currently saturating  > 90% on non rebreather   - Will monitor closely in ICU  - keep NPO, prone position   -continue ANAKINRA and Hydroxychloroquine ( since April 9th) \  - continue solumedrol   -on Full dose Lovenox       ID:  -  Continue Hydroxychloroquine, anakinra   -CRP: 5.96    Nephro:  - monitor urine output   - I&Os  - Monitor electrolytes     GI:  - npo for now   - gi ppx     Heme:  - no indication for transfusion   - monitor cbc daily     Endo:  - No history of DM  - target CBG < 180  - FS q6 while NPO  - Start diet when possible    Prophy:  - Full dose Lovenox 55 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 Patient was admitted on medicine floor for COVID pna.  Admitted to ICU for hypoxia and desaturation on NRB.     Plan:  Neuro:  - AAO* 3at baseline    CV:  - Hold BP medications      Pulm:  -CXR shows bilateral opacities  - Acute Hypoxic Resp Failure secondary to PNA + COVID 19  - Currently saturating  > 90% on non rebreather   - Will monitor closely in ICU  - keep NPO, prone position   -continue ANAKINRA and Hydroxychloroquine ( since April 9th)   - continue solumedrol   -on Full dose Lovenox       ID:  -  Continue Hydroxychloroquine, anakinra   -CRP: 5.96    Nephro:  - monitor urine output   - I&Os  - Monitor electrolytes     GI:  - npo for now   - gi ppx     Heme:  - no indication for transfusion   - monitor cbc daily     Endo:  - No history of DM  - target CBG < 180  - FS q6 while NPO  - Start diet when possible    Prophy:  - Full dose Lovenox

## 2020-04-12 NOTE — PROGRESS NOTE ADULT - SUBJECTIVE AND OBJECTIVE BOX
INTERVAL HPI/OVERNIGHT EVENTS: *** Patient was seen and examined at bed side.     PRESSORS: [ ] YES [ ] NO  WHICH:    ANTIBIOTICS:                  DATE STARTED:  ANTIBIOTICS:                  DATE STARTED:  ANTIBIOTICS:                  DATE STARTED:    Antimicrobial:  hydroxychloroquine   Oral   hydroxychloroquine 200 milliGRAM(s) Oral every 12 hours    Cardiovascular:    Pulmonary:  ALBUTerol    90 MICROgram(s) HFA Inhaler 2 Puff(s) Inhalation every 6 hours PRN  guaiFENesin   Syrup  (Sugar-Free) 100 milliGRAM(s) Oral every 6 hours PRN    Hematalogic:  enoxaparin Injectable 90 milliGRAM(s) SubCutaneous two times a day    Other:  anakinra Injectable 90 milliGRAM(s) SubCutaneous every 12 hours  methylPREDNISolone sodium succinate Injectable 40 milliGRAM(s) IV Push every 12 hours  pantoprazole    Tablet 40 milliGRAM(s) Oral before breakfast    ALBUTerol    90 MICROgram(s) HFA Inhaler 2 Puff(s) Inhalation every 6 hours PRN  anakinra Injectable 90 milliGRAM(s) SubCutaneous every 12 hours  enoxaparin Injectable 90 milliGRAM(s) SubCutaneous two times a day  guaiFENesin   Syrup  (Sugar-Free) 100 milliGRAM(s) Oral every 6 hours PRN  hydroxychloroquine   Oral   hydroxychloroquine 200 milliGRAM(s) Oral every 12 hours  methylPREDNISolone sodium succinate Injectable 40 milliGRAM(s) IV Push every 12 hours  pantoprazole    Tablet 40 milliGRAM(s) Oral before breakfast    Drug Dosing Weight  Height (cm): 134.62 (07 Apr 2020 23:13)  Weight (kg): 86.2 (07 Apr 2020 23:13)  BMI (kg/m2): 47.6 (07 Apr 2020 23:13)  BSA (m2): 1.67 (07 Apr 2020 23:13)    CENTRAL LINE: [ ] YES [ ] NO  LOCATION:   DATE INSERTED:  REMOVE: [ ] YES [ ] NO  EXPLAIN:    RENNER: [ ] YES [ ] NO    DATE INSERTED:  REMOVE:  [ ] YES [ ] NO  EXPLAIN:    A-LINE:  [ ] YES [ ] NO  LOCATION:   DATE INSERTED:  REMOVE:  [ ] YES [ ] NO  EXPLAIN:        ICU Vital Signs Last 24 Hrs  T(C): 36.2 (11 Apr 2020 20:43), Max: 36.6 (11 Apr 2020 12:00)  T(F): 97.2 (11 Apr 2020 20:43), Max: 97.8 (11 Apr 2020 12:00)  HR: 91 (11 Apr 2020 23:00) (87 - 110)  BP: 126/82 (11 Apr 2020 23:00) (110/63 - 145/90)  BP(mean): 91 (11 Apr 2020 23:00) (74 - 104)  ABP: --  ABP(mean): --  RR: 39 (11 Apr 2020 23:00) (25 - 51)  SpO2: 98% (11 Apr 2020 23:00) (84% - 98%)      ABG - ( 10 Apr 2020 03:56 )  pH, Arterial: 7.44  pH, Blood: x     /  pCO2: 37    /  pO2: 57    / HCO3: 25    / Base Excess: 1.3   /  SaO2: 91                    04-10 @ 07:01  -  04-11 @ 07:00  --------------------------------------------------------  IN: 150 mL / OUT: 625 mL / NET: -475 mL            PHYSICAL EXAM:    GENERAL:NAD, well-groomed, well-developed  HEAD:  Atraumatic, Normocephalic  EYES: EOMI, PERRLA, conjunctiva and sclera clear  ENMT: No tonsillar erythema, exudates, or enlargement; Moist mucous membranes, Good dentition, No lesions  NECK: Supple, normal appearance, No JVD; Normal thyroid; Trachea midline  NERVOUS SYSTEM: Alert & Oriented X3, Good concentration; Motor Strength 5/5 B/L upper and lower extremities; DTRs 2+ intact and symmetric  CHEST/LUNG: No chest deformity;Normal percussion bilaterally; No rales, rhonchi, wheezing   HEART: Regular rate and rhythm; No murmurs, rubs, or gallops  ABDOMEN: Soft, Nontender, Nondistended; Bowel sounds present  EXTREMITIES: 2+ Peripheral Pulses, No clubbing, cyanosis, or edema  LYMPH: No lymphadenopathy noted  SKIN:No rashes or lesions; Good capillary refill      LABS:  CBC Full  -  ( 11 Apr 2020 06:28 )  WBC Count : 7.40 K/uL  RBC Count : 5.56 M/uL  Hemoglobin : 15.6 g/dL  Hematocrit : 47.5 %  Platelet Count - Automated : 370 K/uL  Mean Cell Volume : 85.4 fl  Mean Cell Hemoglobin : 28.1 pg  Mean Cell Hemoglobin Concentration : 32.8 gm/dL  Auto Neutrophil # : x  Auto Lymphocyte # : x  Auto Monocyte # : x  Auto Eosinophil # : x  Auto Basophil # : x  Auto Neutrophil % : x  Auto Lymphocyte % : x  Auto Monocyte % : x  Auto Eosinophil % : x  Auto Basophil % : x    04-11    144  |  109<H>  |  29<H>  ----------------------------<  118<H>  4.4   |  25  |  0.78    Ca    9.4      11 Apr 2020 06:28  Phos  3.9     04-11  Mg     2.6     04-11    TPro  7.9  /  Alb  2.4<L>  /  TBili  0.4  /  DBili  x   /  AST  57<H>  /  ALT  54  /  AlkPhos  166<H>  04-11            RADIOLOGY & ADDITIONAL STUDIES REVIEWED:  ***    GOALS OF CARE DISCUSSION WITH PATIENT/FAMILY/PROXY: full code/DNR/DNI    CRITICAL CARE TIME SPENT: 35 minutes INTERVAL HPI/OVERNIGHT EVENTS: Patient was seen and examined at bed side. Patient saturating well on non-rebreather. Improved in prone position.      PRESSORS: No    ANTIBIOTICS: Hydroxychloroquine                 DATE STARTED: 4/9      Antimicrobial:  hydroxychloroquine   Oral   hydroxychloroquine 200 milliGRAM(s) Oral every 12 hours    Pulmonary:  ALBUTerol    90 MICROgram(s) HFA Inhaler 2 Puff(s) Inhalation every 6 hours PRN  guaiFENesin   Syrup  (Sugar-Free) 100 milliGRAM(s) Oral every 6 hours PRN    Hematalogic:  enoxaparin Injectable 90 milliGRAM(s) SubCutaneous two times a day    Other:  anakinra Injectable 90 milliGRAM(s) SubCutaneous every 12 hours  methylPREDNISolone sodium succinate Injectable 40 milliGRAM(s) IV Push every 12 hours  pantoprazole    Tablet 40 milliGRAM(s) Oral before breakfast    ALBUTerol    90 MICROgram(s) HFA Inhaler 2 Puff(s) Inhalation every 6 hours PRN  anakinra Injectable 90 milliGRAM(s) SubCutaneous every 12 hours  enoxaparin Injectable 90 milliGRAM(s) SubCutaneous two times a day  guaiFENesin   Syrup  (Sugar-Free) 100 milliGRAM(s) Oral every 6 hours PRN  hydroxychloroquine   Oral   hydroxychloroquine 200 milliGRAM(s) Oral every 12 hours  methylPREDNISolone sodium succinate Injectable 40 milliGRAM(s) IV Push every 12 hours  pantoprazole    Tablet 40 milliGRAM(s) Oral before breakfast    Drug Dosing Weight  Height (cm): 134.62 (07 Apr 2020 23:13)  Weight (kg): 86.2 (07 Apr 2020 23:13)  BMI (kg/m2): 47.6 (07 Apr 2020 23:13)  BSA (m2): 1.67 (07 Apr 2020 23:13)    CENTRAL LINE: No    RENNER: No    A-LINE: No    ICU Vital Signs Last 24 Hrs  T(C): 36.2 (11 Apr 2020 20:43), Max: 36.6 (11 Apr 2020 12:00)  T(F): 97.2 (11 Apr 2020 20:43), Max: 97.8 (11 Apr 2020 12:00)  HR: 91 (11 Apr 2020 23:00) (87 - 110)  BP: 126/82 (11 Apr 2020 23:00) (110/63 - 145/90)  BP(mean): 91 (11 Apr 2020 23:00) (74 - 104)  RR: 39 (11 Apr 2020 23:00) (25 - 51)  SpO2: 98% (11 Apr 2020 23:00) (84% - 98%)      ABG - ( 10 Apr 2020 03:56 )  pH, Arterial: 7.44  pH, Blood: x     /  pCO2: 37    /  pO2: 57    / HCO3: 25    / Base Excess: 1.3   /  SaO2: 91            04-10 @ 07:01  -  04-11 @ 07:00  --------------------------------------------------------  IN: 150 mL / OUT: 625 mL / NET: -475 mL      PHYSICAL EXAM:  GENERAL: Patient seen resting in bed and in no apparent distress on non-rebreather  HEAD: Atraumatic, Normocephalic  EYES: PERRLA, conjunctiva and sclera clear  ENMT: No tonsillar erythema, exudates, or enlargement; Moist mucous membranes  NECK: Supple, normal appearance  NERVOUS SYSTEM: Alert & Oriented X3, Good concentration  CHEST/LUNG: No chest deformity; crackles present to auscultation    HEART: Regular rate and rhythm; No murmurs  ABDOMEN: Soft, Nontender, Nondistended; Bowel sounds present  EXTREMITIES: No clubbing, cyanosis, or edema  LYMPH: No lymphadenopathy noted  SKIN: No rashes or lesions; Good capillary refill      LABS:  CBC Full  -  ( 11 Apr 2020 06:28 )  WBC Count : 7.40 K/uL  RBC Count : 5.56 M/uL  Hemoglobin : 15.6 g/dL  Hematocrit : 47.5 %  Platelet Count - Automated : 370 K/uL  Mean Cell Volume : 85.4 fl  Mean Cell Hemoglobin : 28.1 pg  Mean Cell Hemoglobin Concentration : 32.8 gm/dL      04-11    144  |  109<H>  |  29<H>  ----------------------------<  118<H>  4.4   |  25  |  0.78    Ca    9.4      11 Apr 2020 06:28  Phos  3.9     04-11  Mg     2.6     04-11    TPro  7.9  /  Alb  2.4<L>  /  TBili  0.4  /  DBili  x   /  AST  57<H>  /  ALT  54  /  AlkPhos  166<H>  04-11      RADIOLOGY & ADDITIONAL STUDIES REVIEWED: < from: Xray Chest 1 View- PORTABLE-Routine (04.10.20 @ 08:24) >  IMPRESSION:There are increasing bilateral diffuse patchy airspace opacities, compatible multifocal pneumonia. Heart size cannot be accurately evaluated in this projection. Multilevel degenerative changes of the spine.    < end of copied text >    GOALS OF CARE DISCUSSION WITH PATIENT/FAMILY/PROXY: full code     CRITICAL CARE TIME SPENT: 35 minutes

## 2020-04-13 NOTE — PROGRESS NOTE ADULT - SUBJECTIVE AND OBJECTIVE BOX
INTERVAL HPI/OVERNIGHT EVENTS:  No acute events overnight. Chest x-ray worsening. Pt desaturates on ambulation.     PRESSORS: [ ] YES [x ] NO  WHICH:    ANTIBIOTICS:  Hydroxychloroquine             DATE STARTED: April 9th       Antimicrobial:  S/p ANAKINRA  hydroxychloroquine   Oral   hydroxychloroquine 200 milliGRAM(s) Oral every 12 hours    Cardiovascular:    Pulmonary:  ALBUTerol    90 MICROgram(s) HFA Inhaler 2 Puff(s) Inhalation every 6 hours PRN  guaiFENesin   Syrup  (Sugar-Free) 100 milliGRAM(s) Oral every 6 hours PRN    Hematalogic:  enoxaparin Injectable 90 milliGRAM(s) SubCutaneous two times a day    Other:  acetaminophen   Tablet .. 650 milliGRAM(s) Oral every 6 hours PRN  methylPREDNISolone sodium succinate Injectable 40 milliGRAM(s) IV Push every 12 hours  pantoprazole    Tablet 40 milliGRAM(s) Oral before breakfast      Drug Dosing Weight  Height (cm): 134.62 (07 Apr 2020 23:13)  Weight (kg): 86.2 (07 Apr 2020 23:13)  BMI (kg/m2): 47.6 (07 Apr 2020 23:13)  BSA (m2): 1.67 (07 Apr 2020 23:13)    CENTRAL LINE: [ ] YES [x ] NO  LOCATION:   DATE INSERTED:  REMOVE: [ ] YES [ ] NO  EXPLAIN:    RENNER: [ ] YES [x ] NO    DATE INSERTED:  REMOVE:  [ ] YES [ ] NO  EXPLAIN:    A-LINE:  [ ] YES [x ] NO  LOCATION:   DATE INSERTED:  REMOVE:  [ ] YES [ ] NO  EXPLAIN:    PMH/Social Hx/Fam Hx -reviewed admission note, no change since admission  PAST MEDICAL & SURGICAL HISTORY:  HTN (hypertension)  No significant past surgical history    Heart faliure: acute [ ] chronic [ ] acute or chronic [ ] diastolic [ ] systolic [ ] combied systolic and diastolic[ ]  RASHAUN: ATN[ ] renal medullary necrosis [ ] CKD I [ ]CKDII [ ]CKD III [ ]CKD IV [ ]CKD V [ ]Other pathological lesions [ ]  Abdominal Nutrition Status: malnutrition [ ] cachexia [ ] morbid obesity/BMI=40 [ ] Supplement ordered [___________]     T(C): 36.6 (04-13-20 @ 04:00), Max: 36.7 (04-12-20 @ 15:31)  HR: 99 (04-13-20 @ 07:00)  BP: 120/72 (04-13-20 @ 07:00)  BP(mean): 82 (04-13-20 @ 07:00)  ABP: --  ABP(mean): --  RR: 26 (04-13-20 @ 07:00)  SpO2: 98% (04-13-20 @ 07:00)  Wt(kg): --          04-12 @ 07:01  -  04-13 @ 07:00  --------------------------------------------------------  IN: 610 mL / OUT: 650 mL / NET: -40 mL            PHYSICAL EXAM:    GENERAL: Patient seen resting in bed and in no apparent distress on non-rebreather  HEAD: Atraumatic, Normocephalic  EYES: PERRLA, conjunctiva and sclera clear  ENMT: No tonsillar erythema, exudates, or enlargement; Moist mucous membranes  NECK: Supple, normal appearance  NERVOUS SYSTEM: Alert & Oriented X3, Good concentration  CHEST/LUNG: No chest deformity; crackles present to auscultation    HEART: Regular rate and rhythm; No murmurs  ABDOMEN: Soft, Nontender, Nondistended; Bowel sounds present  EXTREMITIES: No clubbing, cyanosis, or edema  LYMPH: No lymphadenopathy noted  SKIN: No rashes or lesions; Good capillary refill      LABS:  CBC Full  -  ( 13 Apr 2020 06:31 )  WBC Count : 10.12 K/uL  RBC Count : 6.27 M/uL  Hemoglobin : 16.9 g/dL  Hematocrit : 54.1 %  Platelet Count - Automated : 391 K/uL  Mean Cell Volume : 86.3 fl  Mean Cell Hemoglobin : 27.0 pg  Mean Cell Hemoglobin Concentration : 31.2 gm/dL  Auto Neutrophil # : x  Auto Lymphocyte # : x  Auto Monocyte # : x  Auto Eosinophil # : x  Auto Basophil # : x  Auto Neutrophil % : x  Auto Lymphocyte % : x  Auto Monocyte % : x  Auto Eosinophil % : x  Auto Basophil % : x    04-13    142  |  107  |  20<H>  ----------------------------<  103<H>  6.1<H>   |  30  |  0.65    Ca    9.1      13 Apr 2020 06:31  Phos  3.4     04-13  Mg     2.5     04-13    TPro  8.7<H>  /  Alb  2.5<L>  /  TBili  0.9  /  DBili  x   /  AST  105<H>  /  ALT  87<H>  /  AlkPhos  185<H>  04-13    PT/INR - ( 13 Apr 2020 06:31 )   PT: 12.6 sec;   INR: 1.11 ratio         PTT - ( 13 Apr 2020 06:31 )  PTT:32.0 sec        RADIOLOGY & ADDITIONAL STUDIES REVIEWED:      [ ]GOALS OF CARE DISCUSSION WITH PATIENT/FAMILY/PROXY:    CRITICAL CARE TIME SPENT: 35 minutes

## 2020-04-13 NOTE — PROGRESS NOTE ADULT - ATTENDING COMMENTS
55 y/o F patient, w/ PMHx of HTN, works as HD nurse,  presents to the ED w/ fever, chills, nausea, vomiting, and mild cough that began x1 week ago. Patient was admitted on medicine floor for COVID pna. ICU consulted for hypoxia and desaturation on NRB.  DDimer is trending up and is on therapeutic anticoag.    Assessment:  -Acute Hypoxic Respiratory Failure  -B/L  PNA   -COVID 19 infection  -HTN  -Hypercaog state    Plan:  -O2 supp as needed  -low threshold for intubation  -prone therapy  -isolation.. contact and airborne  -s/p anakinra and solumedrol as per protocol   -s/p plaquenil and vitamins  -dvt/gi prophy .

## 2020-04-13 NOTE — PROGRESS NOTE ADULT - ASSESSMENT
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 Patient was admitted on medicine floor for COVID pna.  Admitted to ICU for hypoxia and desaturation on NRB.     Plan:  Neuro:  - AAO* 3at baseline    CV:  - Hold BP medications      Pulm:  -CXR shows worsening  bilateral opacities  - Acute Hypoxic Resp Failure secondary to PNA + COVID 19  - Currently saturating  > 90% on non rebreather   - keep NPO, prone position   -S/P  ANAKINRA and Hydroxychloroquine ( since April 9th)   - continue solumedrol   -on Full dose Lovenox       ID:  -  Continue Hydroxychloroquine, anakinra   -CRP: 5.96  -D-Dimer: 1166  -Ferritin: 1470  -CRP: 0.13     Nephro:  - monitor urine output   - I&Os  - Monitor electrolytes     GI:  - npo for now   - gi ppx     Heme:  - no indication for transfusion   - monitor cbc daily     Endo:  - No history of DM  - target CBG < 180  - FS q6 while NPO  - Start diet when possible    Prophy:  - Full dose Lovenox

## 2020-04-14 NOTE — PROGRESS NOTE ADULT - ASSESSMENT
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 Patient was admitted on medicine floor for COVID pna.  Admitted to ICU for hypoxia and desaturation on NRB.     Plan:  Neuro:  - AAO* 3at baseline    CV:  - Hold BP medications      Pulm:  -CXR shows worsening  bilateral opacities with improvement  - Acute Hypoxic Resp Failure secondary to PNA + COVID 19  - Currently saturating  > 93% on non rebreather   - Prone position  -S/P  ANAKINRA and Hydroxychloroquine ( since April 9th)   -on Full dose Lovenox       ID:  -  Continue Hydroxychloroquine, anakinra   -CRP: 5.96  -D-Dimer: 1166  -Ferritin: 1470  -CRP: 0.13     Nephro:  - monitor urine output   - I&Os  - Monitor electrolytes     GI:  - Started on full liquid diet  - gi ppx     Heme:  - no indication for transfusion   - monitor cbc daily     Endo:  - No history of DM  - target CBG < 180  - FS q6 while NPO  - Start diet when possible    Prophy:  - Full dose Lovenox

## 2020-04-14 NOTE — CHART NOTE - NSCHARTNOTEFT_GEN_A_CORE
Assessment:   Patient is a 54y old  Female who presents with a chief complaint of Pneumonia (14 Apr 2020 08:37). Pt NPO day #6, diet ordered this AM as full liquids.      Factors impacting intake: [ ] none [ ] nausea  [ ] vomiting [ ] diarrhea [ ] constipation  [ ]chewing problems [ ] swallowing issues  [ ] other:     Diet Prescription: Diet, Full Liquid (04-14-20 @ 08:36)        Daily Height in cm: 134.62 (07 Apr 2020 23:13)            Pertinent Medications: MEDICATIONS  (STANDING):  enoxaparin Injectable 90 milliGRAM(s) SubCutaneous two times a day  pantoprazole    Tablet 40 milliGRAM(s) Oral before breakfast    MEDICATIONS  (PRN):  acetaminophen   Tablet .. 650 milliGRAM(s) Oral every 6 hours PRN Mild Pain (1 - 3)  ALBUTerol    90 MICROgram(s) HFA Inhaler 2 Puff(s) Inhalation every 6 hours PRN cough  guaiFENesin   Syrup  (Sugar-Free) 100 milliGRAM(s) Oral every 6 hours PRN Cough    Pertinent Labs: 04-14 Na144 mmol/L Glu 117 mg/dL<H> K+ 4.7 mmol/L Cr  0.66 mg/dL BUN 21 mg/dL<H> 04-14 Phos 3.5 mg/dL 04-14 Alb 2.4 g/dL<L> 04-11 [id number] 6.4 %<H>     CAPILLARY BLOOD GLUCOSE            Estimated Needs:   [x ] no change since previous assessment  [ ] recalculated:     Interventions:   Recommend  [ ] Change Diet To:  [x ] Nutrition Supplement: If intake<75% meals, consider PO supplement. Advance to solids when feasible. MD to monitor. RD available.   [ ] Nutrition Support  [x ] Other: current weight and daily (ICU), weekly (floor)    Monitoring and Evaluation:   [x ] PO intake [ x ] Tolerance to diet prescription [ x ] weights [ x ] labs[ x ] follow up per protocol  [ ] other:

## 2020-04-14 NOTE — PROGRESS NOTE ADULT - SUBJECTIVE AND OBJECTIVE BOX
INTERVAL HPI/OVERNIGHT EVENTS:  No acute events overnight. Pt  seems more comfortable today.     PRESSORS: [ ] YES [ x] NO  WHICH:        Antimicrobial: s/p ANAKINRA and Hydroxychloroquine        Pulmonary:  ALBUTerol    90 MICROgram(s) HFA Inhaler 2 Puff(s) Inhalation every 6 hours PRN  guaiFENesin   Syrup  (Sugar-Free) 100 milliGRAM(s) Oral every 6 hours PRN    Hematalogic:  enoxaparin Injectable 90 milliGRAM(s) SubCutaneous two times a day    Other:  acetaminophen   Tablet .. 650 milliGRAM(s) Oral every 6 hours PRN  pantoprazole    Tablet 40 milliGRAM(s) Oral before breakfast      Drug Dosing Weight  Height (cm): 134.62 (07 Apr 2020 23:13)  Weight (kg): 86.2 (07 Apr 2020 23:13)  BMI (kg/m2): 47.6 (07 Apr 2020 23:13)  BSA (m2): 1.67 (07 Apr 2020 23:13)        PMH/Social Hx/Fam Hx -reviewed admission note, no change since admission  PAST MEDICAL & SURGICAL HISTORY:  HTN (hypertension)  No significant past surgical history      T(C): 35.6 (04-14-20 @ 04:53), Max: 37.4 (04-13-20 @ 22:00)  HR: 117 (04-14-20 @ 06:00)  BP: 134/87 (04-14-20 @ 06:00)  BP(mean): 98 (04-14-20 @ 06:00)  ABP: --  ABP(mean): --  RR: 30 (04-14-20 @ 06:00)  SpO2: 94% (04-14-20 @ 06:00)  Wt(kg): --    ABG - ( 13 Apr 2020 22:16 )  pH, Arterial: 7.49  pH, Blood: x     /  pCO2: 40    /  pO2: 54    / HCO3: 30    / Base Excess: 6.7   /  SaO2: 86                    04-13 @ 07:01  -  04-14 @ 07:00  --------------------------------------------------------  IN: 0 mL / OUT: 650 mL / NET: -650 mL            PHYSICAL EXAM:  GENERAL: Patient seen resting in bed and in no apparent distress on non-rebreather  HEAD: Atraumatic, Normocephalic  EYES: PERRLA, conjunctiva and sclera clear  ENMT: No tonsillar erythema, exudates, or enlargement; Moist mucous membranes  NECK: Supple, normal appearance  NERVOUS SYSTEM: Alert & Oriented X3, Good concentration  CHEST/LUNG: No chest deformity; crackles present to auscultation    HEART: Regular rate and rhythm; No murmurs  ABDOMEN: Soft, Nontender, Nondistended; Bowel sounds present  EXTREMITIES: No clubbing, cyanosis, or edema  LYMPH: No lymphadenopathy noted  SKIN: No rashes or lesions; Good capillary refill    LABS:  CBC Full  -  ( 14 Apr 2020 06:32 )  WBC Count : 9.49 K/uL  RBC Count : 6.15 M/uL  Hemoglobin : 16.7 g/dL  Hematocrit : 52.8 %  Platelet Count - Automated : 383 K/uL  Mean Cell Volume : 85.9 fl  Mean Cell Hemoglobin : 27.2 pg  Mean Cell Hemoglobin Concentration : 31.6 gm/dL  Auto Neutrophil # : x  Auto Lymphocyte # : x  Auto Monocyte # : x  Auto Eosinophil # : x  Auto Basophil # : x  Auto Neutrophil % : x  Auto Lymphocyte % : x  Auto Monocyte % : x  Auto Eosinophil % : x  Auto Basophil % : x    04-14    144  |  110<H>  |  21<H>  ----------------------------<  117<H>  4.7   |  29  |  0.66    Ca    9.2      14 Apr 2020 06:32  Phos  3.5     04-14  Mg     2.4     04-14    TPro  8.3  /  Alb  2.4<L>  /  TBili  1.0  /  DBili  x   /  AST  100<H>  /  ALT  115<H>  /  AlkPhos  184<H>  04-14    PT/INR - ( 13 Apr 2020 06:31 )   PT: 12.6 sec;   INR: 1.11 ratio         PTT - ( 13 Apr 2020 06:31 )  PTT:32.0 sec        RADIOLOGY & ADDITIONAL STUDIES REVIEWED:      [ ]GOALS OF CARE DISCUSSION WITH PATIENT/FAMILY/PROXY:    CRITICAL CARE TIME SPENT: 35 minutes

## 2020-04-15 NOTE — PROGRESS NOTE ADULT - SUBJECTIVE AND OBJECTIVE BOX
INTERVAL HPI/OVERNIGHT EVENTS:  Pt was consistently desaturating to 80s on non  rebreather. Pt was intubated at AM. Family informed.     PRESSORS: [ ] YES [ x] NO  WHICH:          Antimicrobial: S/P Hydroxychloroquine ANAKINRA      Hematalogic:  enoxaparin Injectable 90 milliGRAM(s) SubCutaneous two times a day    Other:  chlorhexidine 0.12% Liquid 15 milliLiter(s) Oral Mucosa every 12 hours  HYDROmorphone  Injectable 2 milliGRAM(s) IV Push once  pantoprazole    Tablet 40 milliGRAM(s) Oral before breakfast  propofol Infusion 15 MICROgram(s)/kG/Min IV Continuous <Continuous>  succinylcholine Injectable 200 milliGRAM(s) IV Push once      Drug Dosing Weight  Height (cm): 134.62 (07 Apr 2020 23:13)  Weight (kg): 86.2 (07 Apr 2020 23:13)  BMI (kg/m2): 47.6 (07 Apr 2020 23:13)  BSA (m2): 1.67 (07 Apr 2020 23:13)    CENTRAL LINE: [ ] YES [x ] NO  LOCATION:   DATE INSERTED:  REMOVE: [ ] YES [ ] NO  EXPLAIN:    RENNER: [ ] YES [x ] NO    DATE INSERTED:  REMOVE:  [ ] YES [ ] NO  EXPLAIN:    A-LINE:  [ ] YES [ x] NO  LOCATION:   DATE INSERTED:  REMOVE:  [ ] YES [ ] NO  EXPLAIN:    PMH/Social Hx/Fam Hx -reviewed admission note, no change since admission  PAST MEDICAL & SURGICAL HISTORY:  HTN (hypertension)  No significant past surgical history    Heart faliure: acute [ ] chronic [ ] acute or chronic [ ] diastolic [ ] systolic [ ] combied systolic and diastolic[ ]  RASHAUN: ATN[ ] renal medullary necrosis [ ] CKD I [ ]CKDII [ ]CKD III [ ]CKD IV [ ]CKD V [ ]Other pathological lesions [ ]  Abdominal Nutrition Status: malnutrition [ ] cachexia [ ] morbid obesity/BMI=40 [ ] Supplement ordered [___________]     T(C): 36.9 (04-15-20 @ 08:00), Max: 37.8 (04-15-20 @ 05:00)  HR: 123 (04-15-20 @ 10:00)  BP: 138/87 (04-15-20 @ 10:00)  BP(mean): 98 (04-15-20 @ 10:00)  ABP: --  ABP(mean): --  RR: 40 (04-15-20 @ 10:00)  SpO2: 82% (04-15-20 @ 10:00)  Wt(kg): --    ABG - ( 13 Apr 2020 22:16 )  pH, Arterial: 7.49  pH, Blood: x     /  pCO2: 40    /  pO2: 54    / HCO3: 30    / Base Excess: 6.7   /  SaO2: 86                    04-14 @ 07:01  -  04-15 @ 07:00  --------------------------------------------------------  IN: 0 mL / OUT: 400 mL / NET: -400 mL            PHYSICAL EXAM:  GENERAL: Pt sedated and intubated  HEAD: Atraumatic, Normocephalic  EYES: PERRLA, conjunctiva and sclera clear  ENMT: No tonsillar erythema, exudates, or enlargement; Moist mucous membranes  NECK: Supple, normal appearance  NERVOUS SYSTEM: Sedated  CHEST/LUNG: No chest deformity; crackles present to auscultation    HEART: Tachycardiac, no murmur  ABDOMEN: Soft, Nontender, Nondistended; Bowel sounds present  EXTREMITIES: No clubbing, cyanosis, or edema  LYMPH: No lymphadenopathy noted  SKIN: No rashes or lesions; Good capillary refill      LABS:  CBC Full  -  ( 15 Apr 2020 06:40 )  WBC Count : 11.78 K/uL  RBC Count : 6.39 M/uL  Hemoglobin : 17.5 g/dL  Hematocrit : 56.1 %  Platelet Count - Automated : 351 K/uL  Mean Cell Volume : 87.8 fl  Mean Cell Hemoglobin : 27.4 pg  Mean Cell Hemoglobin Concentration : 31.2 gm/dL  Auto Neutrophil # : 10.33 K/uL  Auto Lymphocyte # : 0.75 K/uL  Auto Monocyte # : 0.49 K/uL  Auto Eosinophil # : 0.01 K/uL  Auto Basophil # : 0.04 K/uL  Auto Neutrophil % : 87.6 %  Auto Lymphocyte % : 6.4 %  Auto Monocyte % : 4.2 %  Auto Eosinophil % : 0.1 %  Auto Basophil % : 0.3 %    04-15    145  |  108  |  19<H>  ----------------------------<  132<H>  4.2   |  28  |  0.76    Ca    9.2      15 Apr 2020 06:40  Phos  3.2     04-15  Mg     2.6     04-15    TPro  8.6<H>  /  Alb  2.6<L>  /  TBili  1.1  /  DBili  x   /  AST  75<H>  /  ALT  129<H>  /  AlkPhos  202<H>  04-15    PT/INR - ( 15 Apr 2020 06:40 )   PT: 13.9 sec;   INR: 1.22 ratio                 RADIOLOGY & ADDITIONAL STUDIES REVIEWED:      [ ]GOALS OF CARE DISCUSSION WITH PATIENT/FAMILY/PROXY:    CRITICAL CARE TIME SPENT: 35 minutes

## 2020-04-15 NOTE — PROGRESS NOTE ADULT - ASSESSMENT
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 Patient was admitted on medicine floor for COVID pna.  Admitted to ICU for hypoxia and desaturation on NRB.     Plan:  Neuro:  -Pt sedated with propofol  -Dilaudid drip    CV:  - Hold BP medications      Pulm:  -Intubated for respiratory distress and hypoxia 4/15  -CXR shows worsening  bilateral opacities with improvement  - Acute Hypoxic Resp Failure secondary to PNA + COVID 19  -S/P  ANAKINRA and Hydroxychloroquine ( since April 9th)   -on Full dose Lovenox       ID:  -  Continue Hydroxychloroquine, anakinra   -CRP: 7.69  -D-Dimer: 1166  -Ferritin: 1055      Nephro:  - monitor urine output   - I&Os  - Monitor electrolytes     GI:  - NPO  - gi ppx     Heme:  - no indication for transfusion   - monitor cbc daily     Endo:  - No history of DM  - target CBG < 180  - FS q6 while NPO  - Start diet when possible    Prophy:  - Full dose Lovenox

## 2020-04-15 NOTE — AIRWAY PLACEMENT NOTE ADULT - POST AIRWAY PLACEMENT ASSESSMENT:
breath sounds bilateral/breath sounds equal/CXR pending/positive end tidal CO2 noted chest excursion noted/breath sounds bilateral/breath sounds equal/positive end tidal CO2 noted/skin color improved/CXR pending

## 2020-04-15 NOTE — PROGRESS NOTE ADULT - ATTENDING COMMENTS
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 Patient was admitted on medicine floor for COVID pna. ICU consulted for hypoxia and desaturation on NRB.  DDimer is trending up and is on . therapeutic anticoag.  Pat became more dyspnenic and hypoxic, intubated and placed on vent support sedated     Assessment:  -Acute Hypoxic Respiratory Failure  -B/L  PNA   -ARDS  -COVID 19 infection  -HTN  -Hypercaog state    Plan:  - intubation 4/15  -prone therapy as needed  -vent support with lung protective strategy   -isolation.. contact and airborne  -s/p anakinra and solumedrol as per protocol   -s/p plaquenil and vitamins  -dvt/gi prophy .    -sedated and pain control 55 y/o F patient, w/ PMHx of HTN, works as HD nurse,  presents to the ED w/ fever, chills, nausea, vomiting, and mild cough that began x1 week ago. Patient was admitted on medicine floor for COVID pna. ICU consulted for hypoxia and desaturation on NRB.  DDimer is trending up and is on . therapeutic anticoag.  Pat became more dyspnenic and hypoxic, intubated  4/15 and placed on vent support sedated     Assessment:  -Acute Hypoxic Respiratory Failure  -B/L  PNA   -ARDS  -COVID 19 infection  -HTN  -Hypercaog state    Plan:  - intubation 4/15  -prone therapy as needed  -vent support with lung protective strategy   -isolation.. contact and airborne  -s/p anakinra and solumedrol as per protocol   -s/p plaquenil and vitamins  -dvt/gi prophy .    -sedated and pain control

## 2020-04-16 NOTE — PROGRESS NOTE ADULT - SUBJECTIVE AND OBJECTIVE BOX
INTERVAL HPI/OVERNIGHT EVENTS:  No acute events overnight. Pt had fever of 101 F last night.    PRESSORS: [x ] YES [ ] NO  WHICH: Phenylephrine        Antimicrobial: S/P ANAKINRA and Hydroxychloroquine    Cardiovascular:  phenylephrine    Infusion 1.5 MICROgram(s)/kG/Min IV Continuous <Continuous>        Hematalogic:  enoxaparin Injectable 90 milliGRAM(s) SubCutaneous two times a day    Other:  acetaminophen    Suspension .. 650 milliGRAM(s) Oral every 6 hours PRN  chlorhexidine 0.12% Liquid 15 milliLiter(s) Oral Mucosa every 12 hours  HYDROmorphone Infusion 1 mG/Hr IV Continuous <Continuous>  lactated ringers. 1000 milliLiter(s) IV Continuous <Continuous>  pantoprazole    Tablet 40 milliGRAM(s) Oral before breakfast  propofol Infusion 15 MICROgram(s)/kG/Min IV Continuous <Continuous>      Drug Dosing Weight  Height (cm): 134.62 (07 Apr 2020 23:13)  Weight (kg): 86.2 (07 Apr 2020 23:13)  BMI (kg/m2): 47.6 (07 Apr 2020 23:13)  BSA (m2): 1.67 (07 Apr 2020 23:13)    CENTRAL LINE: [ x] YES [ ] NO  LOCATION:   DATE INSERTED: 4/15  REMOVE: [ ] YES [ ] NO  EXPLAIN:    RENNER: [ x] YES [ ] NO    DATE INSERTED:  REMOVE:  [ ] YES [ ] NO  EXPLAIN:    A-LINE:  [ ] YES [x ] NO  LOCATION:   DATE INSERTED:  REMOVE:  [ ] YES [ ] NO  EXPLAIN:    PMH/Social Hx/Fam Hx -reviewed admission note, no change since admission  PAST MEDICAL & SURGICAL HISTORY:  HTN (hypertension)  No significant past surgical history        T(C): 37.4 (04-16-20 @ 08:00), Max: 38.6 (04-16-20 @ 00:36)  HR: 76 (04-16-20 @ 10:00)  BP: 111/59 (04-16-20 @ 10:00)  BP(mean): 70 (04-16-20 @ 10:00)  ABP: --  ABP(mean): --  RR: 26 (04-16-20 @ 10:00)  SpO2: 94% (04-16-20 @ 10:00)  Wt(kg): --    ABG - ( 16 Apr 2020 09:14 )  pH, Arterial: 7.42  pH, Blood: x     /  pCO2: 44    /  pO2: 115   / HCO3: 28    / Base Excess: 3.5   /  SaO2: 98                    04-15 @ 07:01  -  04-16 @ 07:00  --------------------------------------------------------  IN: 1579.2 mL / OUT: 895 mL / NET: 684.2 mL        Mode: AC/ CMV (Assist Control/ Continuous Mandatory Ventilation)  RR (machine): 26  TV (machine): 0.45  FiO2: 100  PEEP: 16  ITime: 1  MAP: 0.2  PIP: 35      PHYSICAL EXAM:  GENERAL: Pt sedated and intubated  HEAD: Atraumatic, Normocephalic  EYES: PERRLA, conjunctiva and sclera clear  ENMT: No tonsillar erythema, exudates, or enlargement; Moist mucous membranes  NECK: Supple, normal appearance  NERVOUS SYSTEM: Sedated  CHEST/LUNG: No chest deformity; crackles present to auscultation    HEART: Tachycardiac, no murmur  ABDOMEN: Soft, Nontender, Nondistended; Bowel sounds present  EXTREMITIES: No clubbing, cyanosis, or edema  LYMPH: No lymphadenopathy noted  SKIN: No rashes or lesions; Good capillary refill        LABS:  CBC Full  -  ( 16 Apr 2020 06:30 )  WBC Count : 11.52 K/uL  RBC Count : 5.26 M/uL  Hemoglobin : 14.4 g/dL  Hematocrit : 47.6 %  Platelet Count - Automated : 337 K/uL  Mean Cell Volume : 90.5 fl  Mean Cell Hemoglobin : 27.4 pg  Mean Cell Hemoglobin Concentration : 30.3 gm/dL  Auto Neutrophil # : x  Auto Lymphocyte # : x  Auto Monocyte # : x  Auto Eosinophil # : x  Auto Basophil # : x  Auto Neutrophil % : x  Auto Lymphocyte % : x  Auto Monocyte % : x  Auto Eosinophil % : x  Auto Basophil % : x    04-16    148<H>  |  113<H>  |  32<H>  ----------------------------<  73  5.0   |  27  |  0.94    Ca    8.9      16 Apr 2020 06:30  Phos  3.8     04-16  Mg     2.5     04-16    TPro  7.4  /  Alb  1.9<L>  /  TBili  1.8<H>  /  DBili  x   /  AST  52<H>  /  ALT  93<H>  /  AlkPhos  129<H>  04-16    PT/INR - ( 15 Apr 2020 06:40 )   PT: 13.9 sec;   INR: 1.22 ratio                 RADIOLOGY & ADDITIONAL STUDIES REVIEWED:      [ ]GOALS OF CARE DISCUSSION WITH PATIENT/FAMILY/PROXY:    CRITICAL CARE TIME SPENT: 35 minutes

## 2020-04-16 NOTE — CHART NOTE - NSCHARTNOTEFT_GEN_A_CORE
Upon Nutritional Assessment by the Registered Dietitian your patient was determined to meet criteria / has evidence of the following diagnosis/diagnoses:          [ ]  Mild Protein Calorie Malnutrition        [x ]  Moderate Protein Calorie Malnutrition        [ ] Severe Protein Calorie Malnutrition        [ ] Unspecified Protein Calorie Malnutrition        [ ] Underweight / BMI <19        [ ] Morbid Obesity / BMI > 40      Findings as based on:  •  Comprehensive nutrition assessment and consultation  •  Calorie counts (nutrient intake analysis)  •  Food acceptance and intake status from observations by staff  •  Follow up  •  Patient education  •  Intervention secondary to interdisciplinary rounds  •   concerns      Treatment:    The following diet has been recommended:  With Propofol @ 5 ml/hr :Jevity 1.5 32x24 (720 ml, 1152 kcals, 49 gm protein). Add 1 Pkt Prosource BID (+30 gm protein, 120 kcals). If continuing w/Nepro provide 25x24 + 1 Pkt Prosource BID. MD to monitor. RD available.       PROVIDER Section:     By signing this assessment you are acknowledging and agree with the diagnosis/diagnoses assigned by the Registered Dietitian    Comments:

## 2020-04-16 NOTE — PROGRESS NOTE ADULT - ATTENDING COMMENTS
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 Patient was admitted on medicine floor for COVID pna. ICU consulted for hypoxia and desaturation on NRB.  DDimer is trending up and is on . therapeutic anticoag.  Pat became more dyspnenic and hypoxic, intubated and placed on vent support sedated     Assessment:  -Acute Hypoxic Respiratory Failure  -B/L  PNA   -ARDS  -COVID 19 infection  -HTN  -Hypercaog state    Plan:  - intubation 4/15  -prone therapy as needed  -vent support with lung protective strategy   -isolation.. contact and airborne  -s/p anakinra and solumedrol as per protocol   -s/p plaquenil and vitamins  -dvt/gi prophy .    -sedated and pain control .

## 2020-04-16 NOTE — CHART NOTE - NSCHARTNOTEFT_GEN_A_CORE
Assessment:   Patient is a 54y old  Female who presents with a chief complaint of Pneumonia (2020 11:32). Pt intubated. TF order. Covid+. Noted as receiving Nepro 10 ml/hr. Propofol @ 5 ml/hr (if x24 hrs=132 kcals fat).      Factors impacting intake: [ ] none [ ] nausea  [ ] vomiting [ ] diarrhea [ ] constipation  [ ]chewing problems [ ] swallowing issues  [x ] other: critically ill, intubated    Diet Prescription: Diet, NPO:   Tube Feeding Modality: Nasogastric  Nepro with Carb Steady  Total Volume for 24 Hours (mL): 240  Continuous  Starting Tube Feed Rate {mL per Hour}: 10  Until Goal Tube Feed Rate (mL per Hour): 10  Tube Feed Duration (in Hours): 24  Tube Feed Start Time: 22:00 (04-15-20 @ 20:48)        Daily Height in cm: 134.62 (2020 23:13)      Daily Weight in k.4 (15 Apr 2020 07:00)  Weight in k.1 (10 Apr 2020 12:18)    % Weight Change: 3% loss/~1 week, 1+ generalized edema    Pertinent Medications: MEDICATIONS  (STANDING):  chlorhexidine 0.12% Liquid 15 milliLiter(s) Oral Mucosa every 12 hours  enoxaparin Injectable 90 milliGRAM(s) SubCutaneous two times a day  HYDROmorphone Infusion 1 mG/Hr (1 mL/Hr) IV Continuous <Continuous>  lactated ringers. 1000 milliLiter(s) (100 mL/Hr) IV Continuous <Continuous>  pantoprazole    Tablet 40 milliGRAM(s) Oral before breakfast  phenylephrine    Infusion 1.5 MICROgram(s)/kG/Min (24.2 mL/Hr) IV Continuous <Continuous>  propofol Infusion 15 MICROgram(s)/kG/Min (7.76 mL/Hr) IV Continuous <Continuous>    MEDICATIONS  (PRN):  acetaminophen    Suspension .. 650 milliGRAM(s) Oral every 6 hours PRN Temp greater or equal to 38C (100.4F)    Pertinent Labs:  Na148 mmol/L<H> Glu 73 mg/dL K+ 5.0 mmol/L Cr  0.94 mg/dL BUN 32 mg/dL<H>  Phos 3.8 mg/dL  Alb 1.9 g/dL<L> 04-11 [id number] 6.4 %<H>     CAPILLARY BLOOD GLUCOSE      POCT Blood Glucose.: 83 mg/dL (2020 10:31)    Nutrition Dx: Malnutrition (moderate PCM) related to acute illness ongoing as evidenced by 3% wt loss/ 1 week, 1+ generalized edema, (+ presumed inadequate intake with <75% needs met > 7 days)      [x ] Nutrition Support: With Propofol @ 5 ml/hr :Jevity 1.5 32x24 (720 ml, 1152 kcals, 49 gm protein). Add 1 Pkt Prosource BID (+30 gm protein, 120 kcals). If continuing w/Nepro provide 25x24 + 1 Pkt Prosource BID. MD to monitor. RD available.   [ ] Other:     Monitoring and Evaluation:   [ ] PO intake [ x ] Tolerance to diet prescription [ x ] weights [ x ] labs[ x ] follow up per protocol  [ ] other:

## 2020-04-16 NOTE — PROGRESS NOTE ADULT - ASSESSMENT
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 Patient was admitted on medicine floor for COVID pna.  Admitted to ICU for hypoxia and desaturation on NRB.     Plan:  Neuro:  -Pt sedated with propofol  -Dilaudid drip    CV:  - Hold BP medications      Pulm:  -Intubated for respiratory distress and hypoxia 4/15  -CXR shows worsening  bilateral opacities with improvement  - Acute Hypoxic Resp Failure secondary to PNA + COVID 19  -S/P  ANAKINRA and Hydroxychloroquine ( since April 9th)   -on Full dose Lovenox       ID:  -  Continue Hydroxychloroquine, anakinra   -CRP: 7.69  -D-Dimer: 1166  -Ferritin: 1055      Nephro:  - monitor urine output   - I&Os  - Monitor electrolytes   Hypernatremia: Na: 147  Monitor BMP    GI:  -  Tube feed  - gi ppx     Heme:  - no indication for transfusion   - monitor cbc daily     Endo:  - No history of DM  - target CBG < 180  - FS q6 while Tube feed      Prophy:  - Full dose Lovenox

## 2020-04-17 NOTE — PROGRESS NOTE ADULT - ATTENDING COMMENTS
Assessment:  1. ARDS  2. Septic shock - possibly hypotension contributed by sedatives  3. Viral pneumonia secondary to COVID 19  4. Hypernatremia    Plan  - Mechanical ventilation with lung protective strategy   - Titrate Fio2 and PEEP as tolerated  - Sedation for ventilator synchrony   - Vasopressor support to maintain MAP > 60  - Monitor ABG  - S/p Anakinra and Hydroxychloroquine  - Airborne and Contact isolation  - Plan for prone positioning today   - Febrile this morning  - Obtain cultures and start IV antibiotics   - Monitor urine output  - Free water infusion via NGT   - Add bowel regimen  - Keep close to euvolemic volume status   - DVT and Stress Ulcer prophylaxis

## 2020-04-17 NOTE — PROGRESS NOTE ADULT - ASSESSMENT
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 Patient was admitted on medicine floor for COVID pna.  Admitted to ICU for hypoxia and desaturation on NRB.     Plan:  Neuro:  -Pt sedated with propofol  -Dilaudid drip  -Paralyzed with Rucoronium    CV:  - Hold BP medications  -Pheny for pressor support      Pulm:  -Intubated for respiratory distress and hypoxia 4/15  -CXR shows worsening  bilateral opacities with improvement  - Acute Hypoxic Resp Failure secondary to PNA + COVID 19  -S/P  ANAKINRA and Hydroxychloroquine ( since April 9th)   -on Full dose Lovenox   -Prone position today      ID:  -  Continue Hydroxychloroquine, anakinra   Pt was febrile overnight  F/u blood culture  Started on Cefepime  WBC: 12.47   -CRP: 7.69--> 22.01   -D-Dimer: 1166--> 1401  -Ferritin: 1055--> 776      Nephro:  - monitor urine output   - I&Os  - Monitor electrolytes   Hypernatremia: Na: 147  Continue Free water   Monitor BMP    GI:  -  Tube feed  - gi ppx     Heme:  - no indication for transfusion   - monitor cbc daily     Endo:  - No history of DM  - target CBG < 180  - FS q6 while Tube feed      Prophy:  - Full dose Lovenox

## 2020-04-17 NOTE — PROGRESS NOTE ADULT - SUBJECTIVE AND OBJECTIVE BOX
INTERVAL HPI/OVERNIGHT EVENTS: No acute events overnight.     PRESSORS: [ x] YES [ ] NO  WHICH: Phenylephrine    ANTIBIOTICS:  Cefepime                DATE STARTED:       Antimicrobial:  cefepime   IVPB 1000 milliGRAM(s) IV Intermittent every 8 hours    Cardiovascular:  midodrine 10 milliGRAM(s) Oral every 8 hours  phenylephrine    Infusion 1.5 MICROgram(s)/kG/Min IV Continuous <Continuous>    Pulmonary:    Hematalogic:  enoxaparin Injectable 90 milliGRAM(s) SubCutaneous two times a day    Other:  acetaminophen    Suspension .. 650 milliGRAM(s) Oral every 6 hours PRN  chlorhexidine 0.12% Liquid 15 milliLiter(s) Oral Mucosa every 12 hours  HYDROmorphone Infusion 1 mG/Hr IV Continuous <Continuous>  lactated ringers. 1000 milliLiter(s) IV Continuous <Continuous>  pantoprazole   Suspension 40 milliGRAM(s) Oral daily  propofol Infusion 15 MICROgram(s)/kG/Min IV Continuous <Continuous>  rocuronium Infusion 8 MICROgram(s)/kG/Min IV Continuous <Continuous>      Drug Dosing Weight  Height (cm): 134.62 (2020 23:13)  Weight (kg): 86.2 (2020 23:13)  BMI (kg/m2): 47.6 (2020 23:13)  BSA (m2): 1.67 (2020 23:13)    CENTRAL LINE: [ x] YES [ ] NO  LOCATION:   [facility]  DATE INSERTED: 4/15  REMOVE: [ ] YES [ ] NO  EXPLAIN:    RENNER: [ x] YES [ ] NO    DATE INSERTED:  REMOVE:  [ ] YES [ ] NO  EXPLAIN:    A-LINE:  [x ] YES [ ] NO  LOCATION:   DATE INSERTED:   REMOVE:  [ ] YES [ ] NO  EXPLAIN:    PMH/Social Hx/Fam Hx -reviewed admission note, no change since admission  PAST MEDICAL & SURGICAL HISTORY:  HTN (hypertension)  No significant past surgical history        T(C): 38 (20 @ 04:30), Max: 39.5 (20 @ 12:00)  HR: 90 (20 @ 11:30)  BP: 119/63 (20 @ 11:30)  BP(mean): 76 (20 @ 11:30)  ABP: --  ABP(mean): --  RR: 26 (20 @ 11:30)  SpO2: 91% (20 @ 11:30)  Wt(kg): --    ABG - ( 2020 15:43 )  pH, Arterial: 7.38  pH, Blood: x     /  pCO2: 48    /  pO2: 83    / HCO3: 28    / Base Excess: 2.2   /  SaO2: 95                     @ 07:01  -   @ 07:00  --------------------------------------------------------  IN: 491.8 mL / OUT: 350 mL / NET: 141.8 mL        Mode: AC/ CMV (Assist Control/ Continuous Mandatory Ventilation)  RR (machine): 26  TV (machine): 450  FiO2: 70  PEEP: 14  ITime: 1  MAP: 23  PIP: 35        PHYSICAL EXAM:  GENERAL: Pt sedated and intubated  HEAD: Atraumatic, Normocephalic  EYES: PERRLA, conjunctiva and sclera clear  ENMT: No tonsillar erythema, exudates, or enlargement; Moist mucous membranes  NECK: Supple, normal appearance  NERVOUS SYSTEM: Sedated  CHEST/LUNG: No chest deformity; crackles present to auscultation    HEART: Tachycardiac, no murmur  ABDOMEN: Soft, Nontender, Nondistended; Bowel sounds present  EXTREMITIES: No clubbing, cyanosis, or edema  LYMPH: No lymphadenopathy noted  SKIN: No rashes or lesions; Good capillary refill        LABS:  CBC Full  -  ( 2020 06:07 )  WBC Count : 12.47 K/uL  RBC Count : 4.60 M/uL  Hemoglobin : 12.7 g/dL  Hematocrit : 42.6 %  Platelet Count - Automated : 275 K/uL  Mean Cell Volume : 92.6 fl  Mean Cell Hemoglobin : 27.6 pg  Mean Cell Hemoglobin Concentration : 29.8 gm/dL  Auto Neutrophil # : 10.39 K/uL  Auto Lymphocyte # : 1.03 K/uL  Auto Monocyte # : 0.67 K/uL  Auto Eosinophil # : 0.20 K/uL  Auto Basophil # : 0.02 K/uL  Auto Neutrophil % : 83.2 %  Auto Lymphocyte % : 8.3 %  Auto Monocyte % : 5.4 %  Auto Eosinophil % : 1.6 %  Auto Basophil % : 0.2 %        149<H>  |  111<H>  |  19<H>  ----------------------------<  86  3.8   |  30  |  0.66    Ca    8.7      2020 06:07  Phos  2.7       Mg     2.5         TPro  6.9  /  Alb  1.6<L>  /  TBili  1.1  /  DBili  x   /  AST  30  /  ALT  55  /  AlkPhos  97      PT/INR - ( 2020 06:07 )   PT: 15.1 sec;   INR: 1.32 ratio         PTT - ( 2020 06:07 )  PTT:29.4 sec  Urinalysis Basic - ( 2020 10:52 )    Color: Alvina / Appearance: Clear / S.020 / pH: x  Gluc: x / Ketone: Small  / Bili: Moderate / Urobili: 8   Blood: x / Protein: 30 mg/dL / Nitrite: Positive   Leuk Esterase: Trace / RBC: 0-2 /HPF / WBC 0-2 /HPF   Sq Epi: x / Non Sq Epi: Few /HPF / Bacteria: Trace /HPF          RADIOLOGY & ADDITIONAL STUDIES REVIEWED:      [ ]GOALS OF CARE DISCUSSION WITH PATIENT/FAMILY/PROXY:    CRITICAL CARE TIME SPENT: 35 minutes

## 2020-04-18 NOTE — PROGRESS NOTE ADULT - ATTENDING COMMENTS
Assessment:  1. ARDS  2. Septic shock - possibly hypotension contributed by sedatives  3. Viral pneumonia secondary to COVID 19  4. Hypernatremia    Plan  - Mechanical ventilation with lung protective strategy   - Titrate Fio2 and PEEP as tolerated  - Sedation for ventilator synchrony   - Vasopressor support to maintain MAP > 60  - Monitor ABG  - S/p Anakinra and Hydroxychloroquine  - Airborne and Contact isolation  - Plan for prone positioning daily  - Febrile this morning  - Obtain cultures and cont IV antibiotics   - Monitor urine output  - Add bowel regimen  - Keep close to euvolemic volume status   - DVT and Stress Ulcer prophylaxis Assessment:  1. ARDS  2. Septic shock - possibly hypotension contributed by sedatives  3. Viral pneumonia secondary to COVID 19  4. Hypernatremia    Plan  - Mechanical ventilation with lung protective strategy   - Titrate Fio2 and PEEP as tolerated  - Sedation for ventilator synchrony   - Vasopressor support to maintain MAP > 60  - Monitor ABG  - S/p Anakinra and Hydroxychloroquine  - Airborne and Contact isolation  - Plan for prone positioning daily  - F/u cultures and cont IV antibiotics   - Monitor urine output  - Add bowel regimen  - Keep close to euvolemic volume status   - DVT and Stress Ulcer prophylaxis

## 2020-04-18 NOTE — PROGRESS NOTE ADULT - SUBJECTIVE AND OBJECTIVE BOX
INTERVAL HPI/OVERNIGHT EVENTS: patient proned overnight, remains sedated and intubated.     PRESSORS: [  x  ] YES [ ] NO  WHICH: pheny    ANTIBIOTICS: cefepime                 DATE STARTED:     Antimicrobial:  cefepime   IVPB 1000 milliGRAM(s) IV Intermittent every 8 hours    Cardiovascular:  midodrine 10 milliGRAM(s) Oral every 8 hours  phenylephrine    Infusion 1.5 MICROgram(s)/kG/Min IV Continuous <Continuous>    Pulmonary:    Hematalogic:  enoxaparin Injectable 90 milliGRAM(s) SubCutaneous two times a day    Other:  acetaminophen    Suspension .. 650 milliGRAM(s) Oral every 6 hours PRN  chlorhexidine 0.12% Liquid 15 milliLiter(s) Oral Mucosa every 12 hours  HYDROmorphone Infusion 1 mG/Hr IV Continuous <Continuous>  lactated ringers. 1000 milliLiter(s) IV Continuous <Continuous>  pantoprazole   Suspension 40 milliGRAM(s) Oral daily  propofol Infusion 15 MICROgram(s)/kG/Min IV Continuous <Continuous>  rocuronium Infusion 8 MICROgram(s)/kG/Min IV Continuous <Continuous>    acetaminophen    Suspension .. 650 milliGRAM(s) Oral every 6 hours PRN  cefepime   IVPB 1000 milliGRAM(s) IV Intermittent every 8 hours  chlorhexidine 0.12% Liquid 15 milliLiter(s) Oral Mucosa every 12 hours  enoxaparin Injectable 90 milliGRAM(s) SubCutaneous two times a day  HYDROmorphone Infusion 1 mG/Hr IV Continuous <Continuous>  lactated ringers. 1000 milliLiter(s) IV Continuous <Continuous>  midodrine 10 milliGRAM(s) Oral every 8 hours  pantoprazole   Suspension 40 milliGRAM(s) Oral daily  phenylephrine    Infusion 1.5 MICROgram(s)/kG/Min IV Continuous <Continuous>  propofol Infusion 15 MICROgram(s)/kG/Min IV Continuous <Continuous>  rocuronium Infusion 8 MICROgram(s)/kG/Min IV Continuous <Continuous>    Drug Dosing Weight  Height (cm): 134.62 (2020 23:13)  Weight (kg): 86.2 (2020 23:13)  BMI (kg/m2): 47.6 (2020 23:13)  BSA (m2): 1.67 (2020 23:13)    CENTRAL LINE: [ x] YES [ ] NO  LOCATION:   [facility]  DATE INSERTED: 4/15  REMOVE: [ ] YES [ ] NO  EXPLAIN:    RENNER: [ x] YES [ ] NO    DATE INSERTED:  REMOVE:  [ ] YES [ ] NO  EXPLAIN:    A-LINE:  [x ] YES [ ] NO  LOCATION:   DATE INSERTED:   REMOVE:  [ ] YES [ ] NO  EXPLAIN:        ICU Vital Signs Last 24 Hrs  T(C): 38 (2020 00:00), Max: 38.3 (2020 20:00)  T(F): 100.4 (2020 00:00), Max: 101 (2020 20:00)  HR: 102 (2020 00:00) (70 - 103)  BP: 115/71 (2020 20:00) (99/53 - 126/72)  BP(mean): 86 (2020 20:00) (65 - 86)  ABP: 114/74 (2020 00:00) (114/74 - 114/74)  ABP(mean): 89 (2020 00:00) (89 - 89)  RR: 28 (2020 00:00) (16 - 28)  SpO2: 98% (2020 00:00) (90% - 98%)      ABG - ( 2020 15:56 )  pH, Arterial: 7.40  pH, Blood: x     /  pCO2: 48    /  pO2: 64    / HCO3: 29    / Base Excess: 4.3   /  SaO2: 92                    04-16 @ 07:01  -  -17 @ 07:00  --------------------------------------------------------  IN: 491.8 mL / OUT: 350 mL / NET: 141.8 mL        Mode: AC/ CMV (Assist Control/ Continuous Mandatory Ventilation)  RR (machine): 28  TV (machine): 450  FiO2: 70  PEEP: 14  ITime: 1  MAP: 26  PIP: 42        PHYSICAL EXAM:  GENERAL: Pt sedated and intubated  HEAD: Atraumatic, Normocephalic  EYES: PERRLA, conjunctiva and sclera clear  ENMT: No tonsillar erythema, exudates, or enlargement; Moist mucous membranes  NECK: Supple, normal appearance  NERVOUS SYSTEM: Sedated  CHEST/LUNG: No chest deformity; crackles present to auscultation    HEART: Tachycardiac, no murmur  ABDOMEN: Soft, Nontender, Nondistended; Bowel sounds present  EXTREMITIES: No clubbing, cyanosis, or edema  LYMPH: No lymphadenopathy noted  SKIN: No rashes or lesions; Good capillary refill      LABS:  CBC Full  -  ( 2020 06:07 )  WBC Count : 12.47 K/uL  RBC Count : 4.60 M/uL  Hemoglobin : 12.7 g/dL  Hematocrit : 42.6 %  Platelet Count - Automated : 275 K/uL  Mean Cell Volume : 92.6 fl  Mean Cell Hemoglobin : 27.6 pg  Mean Cell Hemoglobin Concentration : 29.8 gm/dL  Auto Neutrophil # : 10.39 K/uL  Auto Lymphocyte # : 1.03 K/uL  Auto Monocyte # : 0.67 K/uL  Auto Eosinophil # : 0.20 K/uL  Auto Basophil # : 0.02 K/uL  Auto Neutrophil % : 83.2 %  Auto Lymphocyte % : 8.3 %  Auto Monocyte % : 5.4 %  Auto Eosinophil % : 1.6 %  Auto Basophil % : 0.2 %    04    149<H>  |  111<H>  |  19<H>  ----------------------------<  86  3.8   |  30  |  0.66    Ca    8.7      2020 06:07  Phos  2.7     04-17  Mg     2.5     -17    TPro  6.9  /  Alb  1.6<L>  /  TBili  1.1  /  DBili  x   /  AST  30  /  ALT  55  /  AlkPhos  97  04-17    PT/INR - ( 2020 06:07 )   PT: 15.1 sec;   INR: 1.32 ratio         PTT - ( 2020 06:07 )  PTT:29.4 sec  Urinalysis Basic - ( 2020 10:52 )    Color: Alvina / Appearance: Clear / S.020 / pH: x  Gluc: x / Ketone: Small  / Bili: Moderate / Urobili: 8   Blood: x / Protein: 30 mg/dL / Nitrite: Positive   Leuk Esterase: Trace / RBC: 0-2 /HPF / WBC 0-2 /HPF   Sq Epi: x / Non Sq Epi: Few /HPF / Bacteria: Trace /HPF          RADIOLOGY & ADDITIONAL STUDIES REVIEWED:  ***    [ ]GOALS OF CARE DISCUSSION WITH PATIENT/FAMILY/PROXY:    CRITICAL CARE TIME SPENT: 35 minutes INTERVAL HPI/OVERNIGHT EVENTS: Patient proned overnight, remains sedated and intubated. Will place supine today and monitor O2 saturation. If stable, may be able to decrease FiO2 and PEEP. Noted to have temperature of 101 at 8pm and 100.4 at midnight. WBC count worsening to 16K. Euvolemic.     PRESSORS: [  x  ] YES   WHICH: phenylephrine     ANTIBIOTICS: cefepime                 DATE STARTED:     Antimicrobial:  cefepime   IVPB 1000 milliGRAM(s) IV Intermittent every 8 hours    Cardiovascular:  midodrine 10 milliGRAM(s) Oral every 8 hours  phenylephrine    Infusion 1.5 MICROgram(s)/kG/Min IV Continuous <Continuous>    Hematalogic:  enoxaparin Injectable 90 milliGRAM(s) SubCutaneous two times a day    Other:  acetaminophen    Suspension .. 650 milliGRAM(s) Oral every 6 hours PRN  chlorhexidine 0.12% Liquid 15 milliLiter(s) Oral Mucosa every 12 hours  HYDROmorphone Infusion 1 mG/Hr IV Continuous <Continuous>  lactated ringers. 1000 milliLiter(s) IV Continuous <Continuous>  pantoprazole   Suspension 40 milliGRAM(s) Oral daily  propofol Infusion 15 MICROgram(s)/kG/Min IV Continuous <Continuous>  rocuronium Infusion 8 MICROgram(s)/kG/Min IV Continuous <Continuous>    acetaminophen    Suspension .. 650 milliGRAM(s) Oral every 6 hours PRN  cefepime   IVPB 1000 milliGRAM(s) IV Intermittent every 8 hours  chlorhexidine 0.12% Liquid 15 milliLiter(s) Oral Mucosa every 12 hours  enoxaparin Injectable 90 milliGRAM(s) SubCutaneous two times a day  HYDROmorphone Infusion 1 mG/Hr IV Continuous <Continuous>  lactated ringers. 1000 milliLiter(s) IV Continuous <Continuous>  midodrine 10 milliGRAM(s) Oral every 8 hours  pantoprazole   Suspension 40 milliGRAM(s) Oral daily  phenylephrine    Infusion 1.5 MICROgram(s)/kG/Min IV Continuous <Continuous>  propofol Infusion 15 MICROgram(s)/kG/Min IV Continuous <Continuous>  rocuronium Infusion 8 MICROgram(s)/kG/Min IV Continuous <Continuous>    Drug Dosing Weight  Height (cm): 134.62 (2020 23:13)  Weight (kg): 86.2 (2020 23:13)  BMI (kg/m2): 47.6 (2020 23:13)  BSA (m2): 1.67 (2020 23:13)    CENTRAL LINE: [ x] YES  LOCATION:   [facility]  DATE INSERTED: 4/15    RENNER: [ x] YES   DATE INSERTED: 4/15    A-LINE:  [x ] YES  LOCATION:  Right  DATE INSERTED:       ICU Vital Signs Last 24 Hrs  T(C): 38 (2020 00:00), Max: 38.3 (2020 20:00)  T(F): 100.4 (2020 00:00), Max: 101 (2020 20:00)  HR: 102 (2020 00:00) (70 - 103)  BP: 115/71 (2020 20:00) (99/53 - 126/72)  BP(mean): 86 (2020 20:00) (65 - 86)  ABP: 114/74 (2020 00:00) (114/74 - 114/74)  ABP(mean): 89 (2020 00:00) (89 - 89)  RR: 28 (2020 00:00) (16 - 28)  SpO2: 98% (2020 00:00) (90% - 98%)      ABG - ( 2020 15:56 )  pH, Arterial: 7.40  pH, Blood: x     /  pCO2: 48    /  pO2: 64    / HCO3: 29    / Base Excess: 4.3   /  SaO2: 92            04-16 @ 07:01  -  04-17 @ 07:00  --------------------------------------------------------  IN: 491.8 mL / OUT: 350 mL / NET: 141.8 mL      Mode: AC/ CMV (Assist Control/ Continuous Mandatory Ventilation)  RR (machine): 28  TV (machine): 450  FiO2: 70  PEEP: 14  ITime: 1  MAP: 26  PIP: 42        PHYSICAL EXAM:  GENERAL: Pt sedated and intubated  HEAD: Atraumatic, Normocephalic  EYES: PERRLA, conjunctiva and sclera clear  ENMT: No tonsillar erythema, exudates, or enlargement; Moist mucous membranes  NECK: Supple, normal appearance  NERVOUS SYSTEM: Sedated  CHEST/LUNG: No chest deformity; crackles present to auscultation    HEART: Tachycardiac, no murmur  ABDOMEN: Soft, Nontender, Nondistended; Bowel sounds present  EXTREMITIES: No clubbing, cyanosis, or edema  LYMPH: No lymphadenopathy noted  SKIN: No rashes or lesions; Good capillary refill      LABS:  CBC Full  -  ( 2020 06:07 )  WBC Count : 12.47 K/uL  RBC Count : 4.60 M/uL  Hemoglobin : 12.7 g/dL  Hematocrit : 42.6 %  Platelet Count - Automated : 275 K/uL  Mean Cell Volume : 92.6 fl  Mean Cell Hemoglobin : 27.6 pg  Mean Cell Hemoglobin Concentration : 29.8 gm/dL  Auto Neutrophil # : 10.39 K/uL  Auto Lymphocyte # : 1.03 K/uL  Auto Monocyte # : 0.67 K/uL  Auto Eosinophil # : 0.20 K/uL  Auto Basophil # : 0.02 K/uL  Auto Neutrophil % : 83.2 %  Auto Lymphocyte % : 8.3 %  Auto Monocyte % : 5.4 %  Auto Eosinophil % : 1.6 %  Auto Basophil % : 0.2 %    04-17    149<H>  |  111<H>  |  19<H>  ----------------------------<  86  3.8   |  30  |  0.66    Ca    8.7      2020 06:07  Phos  2.7     04-17  Mg     2.5     -17    TPro  6.9  /  Alb  1.6<L>  /  TBili  1.1  /  DBili  x   /  AST  30  /  ALT  55  /  AlkPhos  97  04-17    PT/INR - ( 2020 06:07 )   PT: 15.1 sec;   INR: 1.32 ratio         PTT - ( 2020 06:07 )  PTT:29.4 sec  Urinalysis Basic - ( 2020 10:52 )    Color: Alvina / Appearance: Clear / S.020 / pH: x  Gluc: x / Ketone: Small  / Bili: Moderate / Urobili: 8   Blood: x / Protein: 30 mg/dL / Nitrite: Positive   Leuk Esterase: Trace / RBC: 0-2 /HPF / WBC 0-2 /HPF   Sq Epi: x / Non Sq Epi: Few /HPF / Bacteria: Trace /HPF      RADIOLOGY & ADDITIONAL STUDIES REVIEWED: < from: Xray Chest 1 View- PORTABLE-Urgent (20 @ 15:43) >  IMPRESSION:    Findings of stable bilateral lung opacities are suspicious for pneumonia.    < end of copied text >    GOALS OF CARE DISCUSSION WITH PATIENT/FAMILY/PROXY: FULL CODE     CRITICAL CARE TIME SPENT: 35 minutes

## 2020-04-18 NOTE — PROGRESS NOTE ADULT - ASSESSMENT
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 Patient was admitted on medicine floor for COVID pna.  Admitted to ICU for hypoxia and desaturation on NRB.     Plan:  Neuro:  -Pt sedated with propofol  -Dilaudid drip  -Paralyzed with Rucoronium    CV:  - Hold BP medications  -Pheny for pressor support      Pulm:  -Intubated for respiratory distress and hypoxia 4/15  -CXR shows worsening  bilateral opacities with improvement  - Acute Hypoxic Resp Failure secondary to PNA + COVID 19  -S/P  ANAKINRA and Hydroxychloroquine ( since April 9th)   -on Full dose Lovenox   -Proned overnight      ID:  -  Continue Hydroxychloroquine, anakinra   Pt was febrile overnight  F/u blood culture  Started on Cefepime  WBC: 12.47   -CRP: 7.69--> 22.01   -D-Dimer: 1166--> 1401  -Ferritin: 1055--> 776      Nephro:  - monitor urine output   - I&Os  - Monitor electrolytes   Hypernatremia: Na: 147  Continue Free water   Monitor BMP    GI:  -  Tube feed  - gi ppx     Heme:  - no indication for transfusion   - monitor cbc daily     Endo:  - No history of DM  - target CBG < 180  - FS q6 while Tube feed      Prophy:  - Full dose Lovenox 55 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 Patient was admitted on medicine floor for COVID pna.  Admitted to ICU for hypoxia and desaturation on NRB.     Plan:  Neuro:  -Pt sedated with propofol  -Dilaudid drip  -Paralyzed with Rucoronium    CV:  - Hold BP medications  -Pheny for pressor support  - on midodrine 10mg q 8       Pulm:  -Intubated for respiratory distress and hypoxia 4/15  -CXR shows worsening  bilateral opacities with improvement  - Acute Hypoxic Resp Failure secondary to PNA + COVID 19  -S/P  ANAKINRA and Hydroxychloroquine ( since April 9th)   -on Full dose Lovenox   -Proned overnight; supine this morning       ID:  -  completed Hydroxychloroquine, anakinra   Pt was febrile overnight  F/u blood culture  on Cefepime  WBC: 16K  -CRP: 7.69--> 22.01   -D-Dimer: 1166--> 1401  -Ferritin: 1055--> 776      Nephro:  - monitor urine output   - I&Os  - Monitor electrolytes   Hypernatremia: Na: 146  Continue Free water 300 q6  Monitor BMP    GI:  -  Tube feed  - gi ppx with protonix  - miralax and senna for prophylaxis     Heme:  - no indication for transfusion   - monitor cbc daily     Endo:  - No history of DM  - target CBG < 180  - FS q6 while Tube feed      Prophy:  - Full dose Lovenox

## 2020-04-19 NOTE — PROGRESS NOTE ADULT - ATTENDING COMMENTS
Assessment:  1. ARDS  2. Septic shock - possibly hypotension contributed by sedatives  3. Viral pneumonia secondary to COVID 19  4. Hypernatremia    Plan  - Mechanical ventilation with lung protective strategy   - Titrate Fio2 and PEEP as tolerated  - Sedation for ventilator synchrony   - Vasopressor support to maintain MAP > 60  - Monitor ABG  - S/p Anakinra and Hydroxychloroquine  - Airborne and Contact isolation  - Plan for prone positioning daily  - Cultures negative thus far and cont IV antibiotics will complete 7 day course, leukocytosis is resolving  - Monitor urine output  - Bowel regimen  - Keep close to euvolemic volume status   - DVT and Stress Ulcer prophylaxis

## 2020-04-19 NOTE — PROGRESS NOTE ADULT - ASSESSMENT
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 Patient was admitted on medicine floor for COVID pna.  Admitted to ICU for hypoxia and desaturation on NRB.     Plan:  Neuro:  -Pt sedated with propofol  -Dilaudid drip  -Paralyzed with Rucoronium    CV:  - Hold BP medications  -Pheny for pressor support  - on midodrine 10mg q 8       Pulm:  -Intubated for respiratory distress and hypoxia 4/15  -CXR shows worsening  bilateral opacities with improvement  - Acute Hypoxic Resp Failure secondary to PNA + COVID 19  -S/P  ANAKINRA and Hydroxychloroquine ( since April 9th)   -on Full dose Lovenox   -Proned overnight; supine this morning       ID:  -  completed Hydroxychloroquine, anakinra   Pt was febrile overnight  F/u blood culture  on Cefepime  WBC: 16K  -CRP: 7.69--> 22.01   -D-Dimer: 1166--> 1401  -Ferritin: 1055--> 776      Nephro:  - monitor urine output   - I&Os  - Monitor electrolytes   Hypernatremia: Na: 146  Continue Free water 300 q6  Monitor BMP    GI:  -  Tube feed  - gi ppx with protonix  - miralax and senna for prophylaxis     Heme:  - no indication for transfusion   - monitor cbc daily     Endo:  - No history of DM  - target CBG < 180  - FS q6 while Tube feed      Prophy:  - Full dose Lovenox 53 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 Patient was admitted on medicine floor for COVID pna.  Admitted to ICU for hypoxia and desaturation on NRB.     Plan:  Neuro:  -Pt sedated with propofol  -Dilaudid drip  -Paralyzed with Rucoronium    CV:  - Hold BP medications  -Pheny for pressor support  - on midodrine 10mg q 8       Pulm:  - Intubated for respiratory distress and hypoxia 4/15  - CXR shows worsening  bilateral opacities with improvement  - Acute Hypoxic Resp Failure secondary to PNA + COVID 19  - S/P  ANAKINRA and Hydroxychloroquine ( since April 9th)   - on Full dose Lovenox   - Current vent settings 28/450/50/12 , desaturates when PEEP is decreased  - Proned overnight; supine this morning, will prone again      ID:  -  completed Hydroxychloroquine, anakinra   Pt was febrile overnight  blood culture negative  on Cefepime  WBC: 16K  -CRP: 7.69--> 22.01   -D-Dimer: 1166--> 1401  -Ferritin: 1055--> 776      Nephro:  - monitor urine output   - I&Os  - Monitor electrolytes   Hypernatremia: Na: 146  Continue Free water 300 q6  Monitor BMP    GI:  -  Tube feed  - gi ppx with Protonix  - miralax and senna for prophylaxis     Heme:  - no indication for transfusion   - monitor cbc daily     Endo:  - No history of DM  - target CBG < 180  - FS q6 while Tube feed      Prophy:  - Full dose Lovenox

## 2020-04-19 NOTE — PROGRESS NOTE ADULT - SUBJECTIVE AND OBJECTIVE BOX
INTERVAL HPI/OVERNIGHT EVENTS:  No acute events overnight.     PRESSORS: [x ] YES [ ] NO  WHICH: Phenylephrine    ANTIBIOTICS: Cefepime                  DATE STARTED:       Antimicrobial:  cefepime   IVPB 1000 milliGRAM(s) IV Intermittent every 8 hours    Cardiovascular:  midodrine 10 milliGRAM(s) Oral every 8 hours  phenylephrine    Infusion 1.5 MICROgram(s)/kG/Min IV Continuous <Continuous>    Pulmonary:    Hematalogic:  enoxaparin Injectable 90 milliGRAM(s) SubCutaneous two times a day    Other:  acetaminophen    Suspension .. 650 milliGRAM(s) Oral every 6 hours PRN  chlorhexidine 0.12% Liquid 15 milliLiter(s) Oral Mucosa every 12 hours  HYDROmorphone Infusion 1 mG/Hr IV Continuous <Continuous>  pantoprazole   Suspension 40 milliGRAM(s) Oral daily  polyethylene glycol 3350 17 Gram(s) Oral daily  propofol Infusion 15 MICROgram(s)/kG/Min IV Continuous <Continuous>  rocuronium Infusion 8 MICROgram(s)/kG/Min IV Continuous <Continuous>  senna 2 Tablet(s) Oral at bedtime      Drug Dosing Weight  Height (cm): 134.62 (2020 23:13)  Weight (kg): 86.2 (2020 23:13)  BMI (kg/m2): 47.6 (2020 23:13)  BSA (m2): 1.67 (2020 23:13)    CENTRAL LINE: [ x] YES [ ] NO  LOCATION:   DATE INSERTED: 4/15  REMOVE: [ ] YES [ ] NO  EXPLAIN:    RENNER: x ] YES [ ] NO    DATE INSERTED:4/15  REMOVE:  [ ] YES [ ] NO  EXPLAIN:    A-LINE:  [x ] YES [ ] NO  LOCATION:   DATE INSERTED:   REMOVE:  [ ] YES [ ] NO  EXPLAIN:    PMH/Social Hx/Fam Hx -reviewed admission note, no change since admission  PAST MEDICAL & SURGICAL HISTORY:  HTN (hypertension)  No significant past surgical history        T(C): 37.3 (20 @ 00:00), Max: 37.9 (20 @ 04:00)  HR: 84 (20 @ 00:00)  BP: --  BP(mean): --  ABP: 133/70 (20 @ 00:00)  ABP(mean): 92 (20 @ 00:00)  RR: 28 (20 @ 00:00)  SpO2: 92% (20 @ 00:00)  Wt(kg): --    ABG - ( 2020 16:48 )  pH, Arterial: 7.39  pH, Blood: x     /  pCO2: 47    /  pO2: 63    / HCO3: 28    / Base Excess: 2.6   /  SaO2: 91                     @ 07:01  -   @ 07:00  --------------------------------------------------------  IN: 1025.6 mL / OUT: 1000 mL / NET: 25.6 mL        Mode: AC/ CMV (Assist Control/ Continuous Mandatory Ventilation)  RR (machine): 28  TV (machine): 450  FiO2: 60  PEEP: 12  ITime: 1  MAP: 22  PIP: 36      PHYSICAL EXAM:    GENERAL: Pt sedated and intubated  HEAD: Atraumatic, Normocephalic  EYES: PERRLA, conjunctiva and sclera clear  ENMT: No tonsillar erythema, exudates, or enlargement; Moist mucous membranes  NECK: Supple, normal appearance  NERVOUS SYSTEM: Sedated  CHEST/LUNG: No chest deformity; crackles present to auscultation    HEART: Tachycardiac, no murmur  ABDOMEN: Soft, Nontender, Nondistended; Bowel sounds present  EXTREMITIES: No clubbing, cyanosis, or edema  LYMPH: No lymphadenopathy noted  SKIN: No rashes or lesions; Good capillary refill        LABS:  CBC Full  -  ( 2020 04:55 )  WBC Count : 16.40 K/uL  RBC Count : 4.48 M/uL  Hemoglobin : 12.3 g/dL  Hematocrit : 40.9 %  Platelet Count - Automated : 267 K/uL  Mean Cell Volume : 91.3 fl  Mean Cell Hemoglobin : 27.5 pg  Mean Cell Hemoglobin Concentration : 30.1 gm/dL  Auto Neutrophil # : x  Auto Lymphocyte # : x  Auto Monocyte # : x  Auto Eosinophil # : x  Auto Basophil # : x  Auto Neutrophil % : x  Auto Lymphocyte % : x  Auto Monocyte % : x  Auto Eosinophil % : x  Auto Basophil % : x        146<H>  |  111<H>  |  24<H>  ----------------------------<  94  4.2   |  30  |  0.96    Ca    9.0      2020 04:55  Phos  3.9       Mg     2.4         TPro  7.1  /  Alb  1.5<L>  /  TBili  1.9<H>  /  DBili  x   /  AST  55<H>  /  ALT  63<H>  /  AlkPhos  89  18    PT/INR - ( 2020 06:07 )   PT: 15.1 sec;   INR: 1.32 ratio         PTT - ( 2020 06:07 )  PTT:29.4 sec  Urinalysis Basic - ( 2020 10:52 )    Color: Alvina / Appearance: Clear / S.020 / pH: x  Gluc: x / Ketone: Small  / Bili: Moderate / Urobili: 8   Blood: x / Protein: 30 mg/dL / Nitrite: Positive   Leuk Esterase: Trace / RBC: 0-2 /HPF / WBC 0-2 /HPF   Sq Epi: x / Non Sq Epi: Few /HPF / Bacteria: Trace /HPF      Culture Results:   No growth to date. ( @ 18:26)  Culture Results:   No growth to date. ( @ 18:26)      RADIOLOGY & ADDITIONAL STUDIES REVIEWED:      [ ]GOALS OF CARE DISCUSSION WITH PATIENT/FAMILY/PROXY:    CRITICAL CARE TIME SPENT: 35 minutes

## 2020-04-20 NOTE — PROGRESS NOTE ADULT - ASSESSMENT
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 Patient was admitted on medicine floor for COVID pna.  Admitted to ICU for hypoxia and desaturation on NRB.     Plan:  Neuro:  -Pt sedated with propofol  -Dilaudid drip  -Paralyzed with Rucoronium    CV:  - Hold BP medications  -Pheny for pressor support  - on midodrine 10mg q 8       Pulm:  - Intubated for respiratory distress and hypoxia 4/15  - CXR shows worsening right lung infiltrate  - Acute Hypoxic Resp Failure secondary to PNA + COVID 19  - S/P  ANAKINRA and Hydroxychloroquine ( since April 9th)   - on Full dose Lovenox   - Current vent settings 32 /450/50/12 ,   - prone position       ID:  -  completed Hydroxychloroquine, anakinra   Pt was febrile overnight  blood culture negative  on Cefepime  WBC: 16K  -CRP: 7.69--> 22.01 -->15.07  -D-Dimer: 1166--> 1401-->1142  -Ferritin: 1055--> 776--> 944      Nephro:  - monitor urine output   - I&Os  - Monitor electrolytes   Hypernatremia:  Resolved  Continue Free water 300 q8   Monitor BMP    GI:  -  Tube feed  - gi ppx with Protonix  - miralax and senna for prophylaxis     Heme:  - no indication for transfusion   - monitor cbc daily     Endo:  - No history of DM  - target CBG < 180  - FS q6 while Tube feed      Prophy:  - Full dose Lovenox

## 2020-04-20 NOTE — PROGRESS NOTE ADULT - SUBJECTIVE AND OBJECTIVE BOX
INTERVAL HPI/OVERNIGHT EVENTS:  No acute events overnight.     PRESSORS: [ x] YES [ ] NO  WHICH: Phenyl ephrine    ANTIBIOTICS:       Cefepime            DATE STARTED: 4/17      Antimicrobial:  cefepime   IVPB 1000 milliGRAM(s) IV Intermittent every 8 hours    Cardiovascular:  midodrine 10 milliGRAM(s) Oral every 8 hours  phenylephrine    Infusion 1.5 MICROgram(s)/kG/Min IV Continuous <Continuous>    Pulmonary:    Hematalogic:  enoxaparin Injectable 90 milliGRAM(s) SubCutaneous two times a day    Other:  acetaminophen    Suspension .. 650 milliGRAM(s) Oral every 6 hours PRN  chlorhexidine 0.12% Liquid 15 milliLiter(s) Oral Mucosa every 12 hours  HYDROmorphone Infusion 2 mG/Hr IV Continuous <Continuous>  pantoprazole   Suspension 40 milliGRAM(s) Oral daily  polyethylene glycol 3350 17 Gram(s) Oral daily  propofol Infusion 15 MICROgram(s)/kG/Min IV Continuous <Continuous>  rocuronium Infusion 8 MICROgram(s)/kG/Min IV Continuous <Continuous>  senna 2 Tablet(s) Oral at bedtime      Drug Dosing Weight  Height (cm): 134.62 (07 Apr 2020 23:13)  Weight (kg): 86.2 (07 Apr 2020 23:13)  BMI (kg/m2): 47.6 (07 Apr 2020 23:13)  BSA (m2): 1.67 (07 Apr 2020 23:13)    CENTRAL LINE: [ x] YES [ ] NO  LOCATION:   DATE INSERTED: 15  REMOVE: [ ] YES [ ] NO  EXPLAIN:    RENNER: [ x] YES [ ] NO    DATE INSERTED:  REMOVE:  [ ] YES [ ] NO  EXPLAIN:    A-LINE:  [x ] YES [ ] NO  LOCATION:   DATE INSERTED: 4/17  REMOVE:  [ ] YES [ ] NO  EXPLAIN:    PMH/Social Hx/Fam Hx -reviewed admission note, no change since admission  PAST MEDICAL & SURGICAL HISTORY:  HTN (hypertension)  No significant past surgical history        T(C): 36.6 (04-20-20 @ 08:00), Max: 38.2 (04-20-20 @ 00:00)  HR: 98 (04-20-20 @ 08:47)  BP: 79/56 (04-20-20 @ 08:00)  BP(mean): 61 (04-20-20 @ 08:00)  ABP: 88/57 (04-20-20 @ 08:00)  ABP(mean): 68 (04-20-20 @ 08:00)  RR: 32 (04-20-20 @ 08:00)  SpO2: 93% (04-20-20 @ 08:47)  Wt(kg): --    ABG - ( 20 Apr 2020 04:00 )  pH, Arterial: 7.30  pH, Blood: x     /  pCO2: 62    /  pO2: 76    / HCO3: 30    / Base Excess: 2.5   /  SaO2: 93                    04-19 @ 07:01  -  04-20 @ 07:00  --------------------------------------------------------  IN: 2509.8 mL / OUT: 650 mL / NET: 1859.8 mL        Mode: AC/ CMV (Assist Control/ Continuous Mandatory Ventilation)  RR (machine): 32  TV (machine): 450  FiO2: 80  PEEP: 12  ITime: 1  MAP: 25  PIP: 43      PHYSICAL EXAM:    GENERAL: Pt sedated and intubated  HEAD: Atraumatic, Normocephalic  EYES: PERRLA, conjunctiva and sclera clear  ENMT: No tonsillar erythema, exudates, or enlargement; Moist mucous membranes  NECK: Supple, normal appearance  NERVOUS SYSTEM: Sedated  CHEST/LUNG: No chest deformity; crackles present to auscultation    HEART: Tachycardiac, no murmur  ABDOMEN: Soft, Nontender, Nondistended; Bowel sounds present  EXTREMITIES: No clubbing, cyanosis, or edema  LYMPH: No lymphadenopathy noted  SKIN: No rashes or lesions; Good capillary refill      LABS:  CBC Full  -  ( 20 Apr 2020 06:14 )  WBC Count : 14.89 K/uL  RBC Count : 4.08 M/uL  Hemoglobin : 11.2 g/dL  Hematocrit : 37.9 %  Platelet Count - Automated : 239 K/uL  Mean Cell Volume : 92.9 fl  Mean Cell Hemoglobin : 27.5 pg  Mean Cell Hemoglobin Concentration : 29.6 gm/dL  Auto Neutrophil # : x  Auto Lymphocyte # : x  Auto Monocyte # : x  Auto Eosinophil # : x  Auto Basophil # : x  Auto Neutrophil % : x  Auto Lymphocyte % : x  Auto Monocyte % : x  Auto Eosinophil % : x  Auto Basophil % : x    04-20    143  |  107  |  19<H>  ----------------------------<  136<H>  4.0   |  29  |  0.79    Ca    9.1      20 Apr 2020 06:14  Phos  4.6     04-20  Mg     2.7     04-20    TPro  6.6  /  Alb  1.2<L>  /  TBili  1.7<H>  /  DBili  x   /  AST  83<H>  /  ALT  68<H>  /  AlkPhos  86  04-20    PT/INR - ( 20 Apr 2020 06:14 )   PT: 12.8 sec;   INR: 1.13 ratio         PTT - ( 20 Apr 2020 06:14 )  PTT:25.9 sec    Culture Results:   No growth to date. (04-17 @ 18:26)  Culture Results:   No growth to date. (04-17 @ 18:26)      RADIOLOGY & ADDITIONAL STUDIES REVIEWED:      [x ]GOALS OF CARE DISCUSSION WITH PATIENT/FAMILY/PROXY:    CRITICAL CARE TIME SPENT: 35 minutes

## 2020-04-21 NOTE — CONSULT NOTE ADULT - ASSESSMENT
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 Presented to ED with SOB, fever and hypoxia. Subsequently transfered to ICU intubated and developed ARDS s/p CQ and Anikinra. Subsequently developed RASHAUN with ensuing oliguria prompting a renal consult. Hypernatremia has improved. Severe hypoalbuminemia 1.3     # RASHAUN sec to ATN from COVID - 19 related disease. With ARDS. remains on pressors. hypoalbuminemia. ensuing oliguria.  s/p a bolus of NS.   cont current management. May consider albumin in AM if remains oligo-anuric  will follow closely.

## 2020-04-21 NOTE — CONSULT NOTE ADULT - SUBJECTIVE AND OBJECTIVE BOX
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 Presented to ED with SOB, fever and hypoxia. Subsequently transfered to ICU intubated and developed ARDS s/p CQ and Anikinra. Subsequently developed RASHAUN with ensuing oliguria prompting a renal consult. Hypernatremia has improved. Severe hypoalbuminemia 1.3     PAST MEDICAL & SURGICAL HISTORY:  HTN (hypertension)  No significant past surgical history    No Known Allergies    Home Medications Reviewed  Hospital Medications:   MEDICATIONS  (STANDING):  cefepime   IVPB 1000 milliGRAM(s) IV Intermittent every 8 hours  chlorhexidine 0.12% Liquid 15 milliLiter(s) Oral Mucosa every 12 hours  enoxaparin Injectable 90 milliGRAM(s) SubCutaneous two times a day  HYDROmorphone Infusion 2 mG/Hr (2 mL/Hr) IV Continuous <Continuous>  midodrine 10 milliGRAM(s) Oral every 8 hours  pantoprazole   Suspension 40 milliGRAM(s) Oral daily  phenylephrine    Infusion 1.5 MICROgram(s)/kG/Min (24.2 mL/Hr) IV Continuous <Continuous>  polyethylene glycol 3350 17 Gram(s) Oral daily  propofol Infusion 15 MICROgram(s)/kG/Min (7.76 mL/Hr) IV Continuous <Continuous>  rocuronium Infusion 8 MICROgram(s)/kG/Min (8.28 mL/Hr) IV Continuous <Continuous>  senna 2 Tablet(s) Oral at bedtime  sodium chloride 0.9%. 1000 milliLiter(s) (100 mL/Hr) IV Continuous <Continuous>    SOCIAL HISTORY:  Denies ETOh,Smoking,   FAMILY HISTORY:        VITALS:  T(F): 98.6 (20 @ 15:30), Max: 101.2 (20 @ 22:00)  HR: 107 (20 @ 13:30)  BP: 107/65 (20 @ 12:00)  RR: 32 (20 @ 13:30)  SpO2: 95% (20 @ 13:30)  Wt(kg): --     @ 07:  -   @ 07:00  --------------------------------------------------------  IN: 1799.7 mL / OUT: 240 mL / NET: 1559.7 mL     @ 07:01  -   @ 16:35  --------------------------------------------------------  IN: 135.6 mL / OUT: 0 mL / NET: 135.6 mL        PHYSICAL EXAM:  Constitutional: NAD  HEENT: anicteric sclera, oropharynx clear.  Neck: No JVD  Respiratory: CTAB, no wheezes, rales or rhonchi  Cardiovascular: S1, S2, RRR  Gastrointestinal: BS+, soft, NT/ND  Extremities:  No peripheral edema  Neurological: sedated no focal deficits  Psychiatric: Normal mood, normal affect  : No CVA tenderness.  parr+      LABS:      139  |  105  |  48<H>  ----------------------------<  86  4.0   |  25  |  2.95<H>    Ca    8.5      2020 14:19  Phos  6.4       Mg     2.6         TPro  7.2  /  Alb  1.3<L>  /  TBili  3.0<H>  /  DBili      /  AST  82<H>  /  ALT  65<H>  /  AlkPhos  95  04-21    Creatinine Trend: 2.95 <--, 2.32 <--, 0.79 <--, 0.72 <--, 0.96 <--, 0.66 <--, 0.94 <--, 0.76 <--                        11.4   21.08 )-----------( 299      ( 2020 06:30 )             39.7     Urine Studies:  Urinalysis Basic - ( 2020 10:52 )    Color: Alvina / Appearance: Clear / S.020 / pH:   Gluc:  / Ketone: Small  / Bili: Moderate / Urobili: 8   Blood:  / Protein: 30 mg/dL / Nitrite: Positive   Leuk Esterase: Trace / RBC: 0-2 /HPF / WBC 0-2 /HPF   Sq Epi:  / Non Sq Epi: Few /HPF / Bacteria: Trace /HPF        RADIOLOGY & ADDITIONAL STUDIES:

## 2020-04-21 NOTE — CONSULT NOTE ADULT - SUBJECTIVE AND OBJECTIVE BOX
Time of visit:    CHIEF COMPLAINT: Patient is a 54y old  Female who presents with a chief complaint of Pneumonia (21 Apr 2020 16:35)      HPI:  53 y/o F patient, w/ PMHx of HTN, works as HD nurse,  presents to the ED w/ fever, chills, nausea, vomiting, and mild cough that began x1 week ago. Patient also reports mild shortness of breath but denies  any other acute complaints. Patient was saturating 96% on 4L NC.  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    Chest X-ray showed: Bilateral airspace opacities suspicious for pneumonia. (08 Apr 2020 01:59)   Patient seen and examined.     PAST MEDICAL & SURGICAL HISTORY:  HTN (hypertension)  No significant past surgical history      Allergies    No Known Allergies    Intolerances        MEDICATIONS  (STANDING):  cefepime   IVPB 1000 milliGRAM(s) IV Intermittent every 8 hours  chlorhexidine 0.12% Liquid 15 milliLiter(s) Oral Mucosa every 12 hours  enoxaparin Injectable 90 milliGRAM(s) SubCutaneous two times a day  HYDROmorphone Infusion 2 mG/Hr (2 mL/Hr) IV Continuous <Continuous>  midodrine 10 milliGRAM(s) Oral every 8 hours  pantoprazole   Suspension 40 milliGRAM(s) Oral daily  phenylephrine    Infusion 1.5 MICROgram(s)/kG/Min (24.2 mL/Hr) IV Continuous <Continuous>  polyethylene glycol 3350 17 Gram(s) Oral daily  propofol Infusion 15 MICROgram(s)/kG/Min (7.76 mL/Hr) IV Continuous <Continuous>  rocuronium Infusion 8 MICROgram(s)/kG/Min (8.28 mL/Hr) IV Continuous <Continuous>  senna 2 Tablet(s) Oral at bedtime  sodium chloride 0.9%. 1000 milliLiter(s) (100 mL/Hr) IV Continuous <Continuous>      MEDICATIONS  (PRN):  acetaminophen    Suspension .. 650 milliGRAM(s) Oral every 6 hours PRN Temp greater or equal to 38C (100.4F)   Medications up to date at time of exam.    Medications up to date at time of exam.    FAMILY HISTORY:      SOCIAL HISTORY  Smoking History: [   ] smoking/smoke exposure, [   ] former smoker  Living Condition: [   ] apartment, [   ] private house  Work History:   Travel History: denies recent travel  Illicit Substance Use: denies  Alcohol Use: denies    REVIEW OF SYSTEMS:    CONSTITUTIONAL:  denies fevers, chills, sweats, weight loss    HEENT:  denies diplopia or blurred vision, sore throat or runny nose.    CARDIOVASCULAR:  denies pressure, squeezing, tightness, or heaviness about the chest; no palpitations.    RESPIRATORY:  denies SOB, cough, VIEIRA, wheezing.    GASTROINTESTINAL:  denies abdominal pain, nausea, vomiting or diarrhea.    GENITOURINARY: denies dysuria, frequency or urgency.    NEUROLOGIC:  denies numbness, tingling, seizures or weakness.    PSYCHIATRIC:  denies disorder of thought or mood.    MSK: denies swelling, redness      PHYSICAL EXAMINATION:    GENERAL: The patient is a well-developed, well-nourished, in no apparent distress.     Vital Signs Last 24 Hrs  T(C): 37 (21 Apr 2020 15:30), Max: 38.4 (20 Apr 2020 22:00)  T(F): 98.6 (21 Apr 2020 15:30), Max: 101.2 (20 Apr 2020 22:00)  HR: 105 (21 Apr 2020 19:00) (88 - 124)  BP: 111/67 (21 Apr 2020 18:00) (88/50 - 116/59)  BP(mean): 76 (21 Apr 2020 18:00) (59 - 77)  RR: 32 (21 Apr 2020 19:00) (32 - 36)  SpO2: 95% (21 Apr 2020 19:00) (90% - 100%)  Mode: AC/ CMV (Assist Control/ Continuous Mandatory Ventilation)  RR (machine): 32  TV (machine): 470  FiO2: 80  PEEP: 14  ITime: 1  MAP: 26  PIP: 47   (if applicable)    Chest Tube (if applicable)    HEENT: Head is normocephalic and atraumatic. Extraocular muscles are intact. Mucous membranes are moist.     NECK: Supple, no palpable adenopathy.    LUNGS: Clear to auscultation, no wheezing, rales, or rhonchi.    HEART: Regular rate and rhythm without murmur.    ABDOMEN: Soft, nontender, and nondistended.  No hepatosplenomegaly is noted.    RENAL: No difficulty voiding, no pelvic pain    EXTREMITIES: Without any cyanosis, clubbing, rash, lesions or edema.    NEUROLOGIC: Awake, alert, oriented, grossly intact    SKIN: Warm, dry, good turgor.      LABS:                        11.4   21.08 )-----------( 299      ( 21 Apr 2020 06:30 )             39.7     04-21    139  |  105  |  48<H>  ----------------------------<  86  4.0   |  25  |  2.95<H>    Ca    8.5      21 Apr 2020 14:19  Phos  6.4     04-21  Mg     2.6     04-21    TPro  7.2  /  Alb  1.3<L>  /  TBili  3.0<H>  /  DBili  x   /  AST  82<H>  /  ALT  65<H>  /  AlkPhos  95  04-21    PT/INR - ( 20 Apr 2020 06:14 )   PT: 12.8 sec;   INR: 1.13 ratio         PTT - ( 20 Apr 2020 06:14 )  PTT:25.9 sec    ABG - ( 21 Apr 2020 14:47 )  pH, Arterial: 7.21  pH, Blood: x     /  pCO2: 61    /  pO2: 99    / HCO3: 24    / Base Excess: -4.4  /  SaO2: 96                CARDIAC MARKERS ( 20 Apr 2020 06:14 )  0.063 ng/mL / x     / x     / x     / x                Procalcitonin, Serum: 1.46 ng/mL (04-20-20 @ 09:54)      MICROBIOLOGY: (if applicable)    RADIOLOGY & ADDITIONAL STUDIES:  EKG:   CXR:< from: Xray Chest 1 View- PORTABLE-Urgent (04.21.20 @ 11:33) >    EXAM:  XR CHEST PORTABLE URGENT 1V                            PROCEDURE DATE:  04/21/2020          INTERPRETATION:  Portable chest radiograph        CLINICAL INFORMATION:   Assess ET tube.    TECHNIQUE:  Portable  AP view of the chest was obtained.    COMPARISON: 4/21/2020 available for review.    FINDINGS: ET tube tip above tracheal bifurcation.  NG tube tip beyond GE junction.    The lungs  show unchanged diffuse and ill-defined upper lower lobe opacities/infiltrates..    Heart size within normal limits allowing for magnification effect of AP projection. Visualized osseous structures are intact.        IMPRESSION:   Catheters in place. Stable bilateral diffuse airspace consolidations..                    YAMILEX YUAN   This documenthas been electronically signed. Apr 21 2020 11:39AM                < end of copied text >    ECHO:    IMPRESSION: 54y Female PAST MEDICAL & SURGICAL HISTORY:  HTN (hypertension)  No significant past surgical history   p/w             53 y/o F patient, w/ PMHx of HTN, works as HD nurse,  presents to the ED w/ fever, chills, nausea, vomiting, and mild cough that began x1 week ago. Patient was admitted on medicine floor for COVID pna. ICU consulted for hypoxia and desaturation on NRB.  DDimer is trending up and is on . therapeutic anticoag.  Pat became more dyspnenic and hypoxic, intubated and placed on vent support sedated     Assessment:  -Acute Hypoxic Respiratory Failure  -B/L  PNA   -ARDS  -COVID 19 infection  -HTN  -Hypercaog state    Plan:  - intubation 4/15  -prone therapy as needed  -vent support with lung protective strategy   -isolation.. contact and airborne  -s/p anakinra and solumedrol as per protocol   -s/p plaquenil and vitamins  -dvt/gi prophy .    -sedated and pain control .        case discussed with icu team    time spent 37 min Time of visit:    CHIEF COMPLAINT: Patient is a 54y old  Female who presents with a chief complaint of Pneumonia (21 Apr 2020 16:35)      HPI:  55 y/o F patient, w/ PMHx of HTN, works as HD nurse,  presents to the ED w/ fever, chills, nausea, vomiting, and mild cough that began x1 week ago. Patient also reports mild shortness of breath but denies  any other acute complaints. Patient was saturating 96% on 4L NC.  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    Chest X-ray showed: Bilateral airspace opacities suspicious for pneumonia. (08 Apr 2020 01:59)   Patient seen and examined.     PAST MEDICAL & SURGICAL HISTORY:  HTN (hypertension)  No significant past surgical history      Allergies    No Known Allergies    Intolerances        MEDICATIONS  (STANDING):  cefepime   IVPB 1000 milliGRAM(s) IV Intermittent every 8 hours  chlorhexidine 0.12% Liquid 15 milliLiter(s) Oral Mucosa every 12 hours  enoxaparin Injectable 90 milliGRAM(s) SubCutaneous two times a day  HYDROmorphone Infusion 2 mG/Hr (2 mL/Hr) IV Continuous <Continuous>  midodrine 10 milliGRAM(s) Oral every 8 hours  pantoprazole   Suspension 40 milliGRAM(s) Oral daily  phenylephrine    Infusion 1.5 MICROgram(s)/kG/Min (24.2 mL/Hr) IV Continuous <Continuous>  polyethylene glycol 3350 17 Gram(s) Oral daily  propofol Infusion 15 MICROgram(s)/kG/Min (7.76 mL/Hr) IV Continuous <Continuous>  rocuronium Infusion 8 MICROgram(s)/kG/Min (8.28 mL/Hr) IV Continuous <Continuous>  senna 2 Tablet(s) Oral at bedtime  sodium chloride 0.9%. 1000 milliLiter(s) (100 mL/Hr) IV Continuous <Continuous>      MEDICATIONS  (PRN):  acetaminophen    Suspension .. 650 milliGRAM(s) Oral every 6 hours PRN Temp greater or equal to 38C (100.4F)   Medications up to date at time of exam.    Medications up to date at time of exam.    FAMILY HISTORY:      SOCIAL HISTORY  Smoking History: [   ] smoking/smoke exposure, [   ] former smoker  Living Condition: [   ] apartment, [   ] private house  Work History:   Travel History: denies recent travel  Illicit Substance Use: denies  Alcohol Use: denies    REVIEW OF SYSTEMS:    CONSTITUTIONAL:  denies fevers, chills, sweats, weight loss    HEENT:  denies diplopia or blurred vision, sore throat or runny nose.    CARDIOVASCULAR:  denies pressure, squeezing, tightness, or heaviness about the chest; no palpitations.    RESPIRATORY:  denies SOB, cough, VIEIRA, wheezing.    GASTROINTESTINAL:  denies abdominal pain, nausea, vomiting or diarrhea.    GENITOURINARY: denies dysuria, frequency or urgency.    NEUROLOGIC:  denies numbness, tingling, seizures or weakness.    PSYCHIATRIC:  denies disorder of thought or mood.    MSK: denies swelling, redness      PHYSICAL EXAMINATION:    GENERAL: The patient is a well-developed, well-nourished, in no apparent distress.     Vital Signs Last 24 Hrs  T(C): 37 (21 Apr 2020 15:30), Max: 38.4 (20 Apr 2020 22:00)  T(F): 98.6 (21 Apr 2020 15:30), Max: 101.2 (20 Apr 2020 22:00)  HR: 105 (21 Apr 2020 19:00) (88 - 124)  BP: 111/67 (21 Apr 2020 18:00) (88/50 - 116/59)  BP(mean): 76 (21 Apr 2020 18:00) (59 - 77)  RR: 32 (21 Apr 2020 19:00) (32 - 36)  SpO2: 95% (21 Apr 2020 19:00) (90% - 100%)  Mode: AC/ CMV (Assist Control/ Continuous Mandatory Ventilation)  RR (machine): 32  TV (machine): 470  FiO2: 80  PEEP: 14  ITime: 1  MAP: 26  PIP: 47   (if applicable)    Chest Tube (if applicable)    HEENT: Head is normocephalic and atraumatic. Extraocular muscles are intact. Mucous membranes are moist.     NECK: Supple, no palpable adenopathy.    LUNGS: Clear to auscultation, no wheezing, rales, or rhonchi.    HEART: Regular rate and rhythm without murmur.    ABDOMEN: Soft, nontender, and nondistended.  No hepatosplenomegaly is noted.    RENAL: No difficulty voiding, no pelvic pain    EXTREMITIES: Without any cyanosis, clubbing, rash, lesions or edema.    NEUROLOGIC: Awake, alert, oriented, grossly intact    SKIN: Warm, dry, good turgor.      LABS:                        11.4   21.08 )-----------( 299      ( 21 Apr 2020 06:30 )             39.7     04-21    139  |  105  |  48<H>  ----------------------------<  86  4.0   |  25  |  2.95<H>    Ca    8.5      21 Apr 2020 14:19  Phos  6.4     04-21  Mg     2.6     04-21    TPro  7.2  /  Alb  1.3<L>  /  TBili  3.0<H>  /  DBili  x   /  AST  82<H>  /  ALT  65<H>  /  AlkPhos  95  04-21    PT/INR - ( 20 Apr 2020 06:14 )   PT: 12.8 sec;   INR: 1.13 ratio         PTT - ( 20 Apr 2020 06:14 )  PTT:25.9 sec    ABG - ( 21 Apr 2020 14:47 )  pH, Arterial: 7.21  pH, Blood: x     /  pCO2: 61    /  pO2: 99    / HCO3: 24    / Base Excess: -4.4  /  SaO2: 96                CARDIAC MARKERS ( 20 Apr 2020 06:14 )  0.063 ng/mL / x     / x     / x     / x                Procalcitonin, Serum: 1.46 ng/mL (04-20-20 @ 09:54)      MICROBIOLOGY: (if applicable)    RADIOLOGY & ADDITIONAL STUDIES:  EKG:   CXR:< from: Xray Chest 1 View- PORTABLE-Urgent (04.21.20 @ 11:33) >    EXAM:  XR CHEST PORTABLE URGENT 1V                            PROCEDURE DATE:  04/21/2020          INTERPRETATION:  Portable chest radiograph        CLINICAL INFORMATION:   Assess ET tube.    TECHNIQUE:  Portable  AP view of the chest was obtained.    COMPARISON: 4/21/2020 available for review.    FINDINGS: ET tube tip above tracheal bifurcation.  NG tube tip beyond GE junction.    The lungs  show unchanged diffuse and ill-defined upper lower lobe opacities/infiltrates..    Heart size within normal limits allowing for magnification effect of AP projection. Visualized osseous structures are intact.        IMPRESSION:   Catheters in place. Stable bilateral diffuse airspace consolidations..                    YAMILEX YUAN   This documenthas been electronically signed. Apr 21 2020 11:39AM                < end of copied text >    ECHO:    IMPRESSION: 54y Female PAST MEDICAL & SURGICAL HISTORY:  HTN (hypertension)  No significant past surgical history   p/w             55 y/o F patient, w/ PMHx of HTN, works as HD nurse,  presents to the ED w/ fever, chills, nausea, vomiting, and mild cough that began x1 week ago. Patient was admitted on medicine floor for COVID pna. ICU consulted for hypoxia and desaturation on NRB.  DDimer is trending up and is on . therapeutic anticoag.  Pat became more dyspnenic and hypoxic, intubated and placed on vent support sedated . WBC trending up    Assessment:  -Acute Hypoxic Respiratory Failure  -B/L  PNA   -ARDS  -COVID 19 infection  -HTN  -Hypercaog state    Plan:  - intubation 4/15  -monitor procalcitonin and biomarkers  -prone therapy as needed  -vent support with lung protective strategy   -isolation.. contact and airborne  -s/p anakinra and solumedrol as per protocol   -s/p plaquenil and vitamins  -dvt/gi prophy .    -sedated and pain control .        case discussed with icu team    time spent 37 min

## 2020-04-21 NOTE — PROGRESS NOTE ADULT - SUBJECTIVE AND OBJECTIVE BOX
INTERVAL HPI/OVERNIGHT EVENTS:  Pt is developing RASHAUN and has decreased urine output.  Elevated temperature of 100.9F overnight.     PRESSORS: [x ] YES [ ] NO  WHICH: Phenylephrine    ANTIBIOTICS:  Cefepime             DATE STARTED: 4/17      Antimicrobial:  cefepime   IVPB 1000 milliGRAM(s) IV Intermittent every 8 hours    Cardiovascular:  midodrine 10 milliGRAM(s) Oral every 8 hours  phenylephrine    Infusion 1.5 MICROgram(s)/kG/Min IV Continuous <Continuous>    Pulmonary:    Hematalogic:  enoxaparin Injectable 90 milliGRAM(s) SubCutaneous two times a day    Other:  acetaminophen    Suspension .. 650 milliGRAM(s) Oral every 6 hours PRN  albumin human 25% IVPB 100 milliLiter(s) IV Intermittent every 4 hours  chlorhexidine 0.12% Liquid 15 milliLiter(s) Oral Mucosa every 12 hours  HYDROmorphone Infusion 2 mG/Hr IV Continuous <Continuous>  pantoprazole   Suspension 40 milliGRAM(s) Oral daily  polyethylene glycol 3350 17 Gram(s) Oral daily  propofol Infusion 15 MICROgram(s)/kG/Min IV Continuous <Continuous>  rocuronium Infusion 8 MICROgram(s)/kG/Min IV Continuous <Continuous>  senna 2 Tablet(s) Oral at bedtime  sodium chloride 0.9%. 1000 milliLiter(s) IV Continuous <Continuous>      Drug Dosing Weight  Height (cm): 134.62 (07 Apr 2020 23:13)  Weight (kg): 86.2 (07 Apr 2020 23:13)  BMI (kg/m2): 47.6 (07 Apr 2020 23:13)  BSA (m2): 1.67 (07 Apr 2020 23:13)    CENTRAL LINE: [x] YES [ ] NO  LOCATION:   DATE INSERTED: 4/15  REMOVE: [ ] YES [ ] NO  EXPLAIN:    RENNER: [x YES [ ] NO    DATE INSERTED:  REMOVE:  [ ] YES [ ] NO  EXPLAIN:    A-LINE:  [ x] YES [ ] NO  LOCATION:   DATE INSERTED:4/17  REMOVE:  [ ] YES [ ] NO  EXPLAIN:    PMH/Social Hx/Fam Hx -reviewed admission note, no change since admission  PAST MEDICAL & SURGICAL HISTORY:  HTN (hypertension)  No significant past surgical history    Heart faliure: acute [ ] chronic [ ] acute or chronic [ ] diastolic [ ] systolic [ ] combied systolic and diastolic[ ]  RASHAUN: ATN[ ] renal medullary necrosis [ ] CKD I [ ]CKDII [ ]CKD III [ ]CKD IV [ ]CKD V [ ]Other pathological lesions [ ]  Abdominal Nutrition Status: malnutrition [ ] cachexia [ ] morbid obesity/BMI=40 [ ] Supplement ordered [___________]     T(C): 37.1 (04-21-20 @ 07:00), Max: 38.4 (04-20-20 @ 22:00)  HR: 105 (04-21-20 @ 08:00)  BP: 109/65 (04-21-20 @ 08:00)  BP(mean): 75 (04-21-20 @ 08:00)  ABP: 114/67 (04-21-20 @ 08:00)  ABP(mean): 85 (04-21-20 @ 08:00)  RR: 36 (04-21-20 @ 08:00)  SpO2: 93% (04-21-20 @ 08:00)  Wt(kg): --    ABG - ( 21 Apr 2020 03:50 )  pH, Arterial: 7.19  pH, Blood: x     /  pCO2: 70    /  pO2: 185   / HCO3: 26    / Base Excess: -3.2  /  SaO2: 99                    04-20 @ 07:01  -  04-21 @ 07:00  --------------------------------------------------------  IN: 1799.7 mL / OUT: 240 mL / NET: 1559.7 mL        Mode: AC/ CMV (Assist Control/ Continuous Mandatory Ventilation)  RR (machine): 32  TV (machine): 450  FiO2: 80  PEEP: 12  ITime: 1  MAP: 22  PIP: 35      PHYSICAL EXAM:      GENERAL: Pt sedated and intubated  HEAD: Atraumatic, Normocephalic  EYES: PERRLA, conjunctiva and sclera clear  ENMT: No tonsillar erythema, exudates, or enlargement; Moist mucous membranes  NECK: Supple, normal appearance  NERVOUS SYSTEM: Sedated  CHEST/LUNG: No chest deformity; crackles present to auscultation    HEART: Tachycardiac, no murmur  ABDOMEN: Soft, Nontender, Nondistended; Bowel sounds present  EXTREMITIES: No clubbing, cyanosis, or edema  LYMPH: No lymphadenopathy noted  SKIN: No rashes or lesions; Good capillary refill      LABS:  CBC Full  -  ( 21 Apr 2020 06:30 )  WBC Count : 21.08 K/uL  RBC Count : 4.19 M/uL  Hemoglobin : 11.4 g/dL  Hematocrit : 39.7 %  Platelet Count - Automated : 299 K/uL  Mean Cell Volume : 94.7 fl  Mean Cell Hemoglobin : 27.2 pg  Mean Cell Hemoglobin Concentration : 28.7 gm/dL  Auto Neutrophil # : x  Auto Lymphocyte # : x  Auto Monocyte # : x  Auto Eosinophil # : x  Auto Basophil # : x  Auto Neutrophil % : x  Auto Lymphocyte % : x  Auto Monocyte % : x  Auto Eosinophil % : x  Auto Basophil % : x    04-21    140  |  105  |  42<H>  ----------------------------<  68<L>  4.2   |  24  |  2.32<H>    Ca    8.9      21 Apr 2020 06:30  Phos  6.4     04-21  Mg     2.6     04-21    TPro  7.2  /  Alb  1.3<L>  /  TBili  3.0<H>  /  DBili  x   /  AST  82<H>  /  ALT  65<H>  /  AlkPhos  95  04-21    PT/INR - ( 20 Apr 2020 06:14 )   PT: 12.8 sec;   INR: 1.13 ratio         PTT - ( 20 Apr 2020 06:14 )  PTT:25.9 sec        RADIOLOGY & ADDITIONAL STUDIES REVIEWED:      [ ]GOALS OF CARE DISCUSSION WITH PATIENT/FAMILY/PROXY:    CRITICAL CARE TIME SPENT: 35 minutes

## 2020-04-21 NOTE — PROGRESS NOTE ADULT - ASSESSMENT
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 Patient was admitted on medicine floor for COVID pna.  Admitted to ICU for hypoxia and desaturation on NRB.     Plan:  Neuro:  -Pt sedated with propofol  -Dilaudid drip  -Paralyzed with Rucoronium    CV:  - Hold BP medications  -Pheny for pressor support  - on midodrine 10mg q 8       Pulm:  - Intubated for respiratory distress and hypoxia 4/15  - CXR shows worsening right lung infiltrate  - Acute Hypoxic Resp Failure secondary to PNA + COVID 19  - S/P  ANAKINRA and Hydroxychloroquine ( since April 9th)   - on Full dose Lovenox   - Current vent settings 32 /450/50/12 ,   - prone position       ID:  -  completed Hydroxychloroquine, anakinra   Pt was febrile overnight  blood culture negative  on Cefepime  WBC: 16K  -CRP: 7.69--> 22.01 -->15.07  -D-Dimer: 1166--> 1401-->1142  -Ferritin: 1055--> 776--> 944      Nephro:  RASHAUN:  -Cr/BUN: 2.32   Started on albumin and NS @100 cc  - monitor urine output   - I&Os  - Monitor electrolytes   Hypernatremia:  Resolved  Continue Free water 200 q8   Monitor BMP    GI:  -  Tube feed  - gi ppx with Protonix  - miralax and senna for prophylaxis     Heme:  - no indication for transfusion   - monitor cbc daily     Endo:  - No history of DM  - target CBG < 180  - FS q6 while Tube feed      Prophy:  - Full dose Lovenox 55 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 Patient was admitted on medicine floor for COVID pna.  Admitted to ICU for hypoxia and desaturation on NRB.     Plan:  Neuro:  -Pt sedated with propofol  -Dilaudid drip  -Paralyzed with Rucoronium    CV:  - Hold BP medications  -Pheny for pressor support  - on midodrine 10mg q 8       Pulm:  - Intubated for respiratory distress and hypoxia 4/15  - CXR shows worsening right lung infiltrate  - Acute Hypoxic Resp Failure secondary to PNA + COVID 19  - S/P  ANAKINRA and Hydroxychloroquine ( since April 9th)   - on Full dose Lovenox   - Current vent settings 32 /450/50/12 ,   - changed prone to supine position today    ID:  -  completed Hydroxychloroquine, anakinra   Pt was febrile overnight  blood culture negative  on Cefepime  WBC: 16K  -CRP: 7.69--> 22.01 -->15.07  -D-Dimer: 1166--> 1401-->1142  -Ferritin: 1055--> 776--> 944      Nephro:  RASHAUN:  -Cr/BUN: 2.32   Started on albumin and NS @100 cc  - monitor urine output   - I&Os  - Monitor electrolytes   Hypernatremia:  Resolved  Continue Free water 200 q8   Monitor BMP  Nephrology: Dr. Rosa consulted    GI:  -  Tube feed  - gi ppx with Protonix  - miralax and senna for prophylaxis     Heme:  - no indication for transfusion   - monitor cbc daily     Endo:  - No history of DM  - target CBG < 180  - FS q6 while Tube feed      Prophy:  - Full dose Lovenox

## 2020-04-21 NOTE — PROGRESS NOTE ADULT - ATTENDING COMMENTS
Assessment:  1. ARDS  2. Septic shock - possibly hypotension contributed by sedatives  3. Viral pneumonia secondary to COVID 19  4. Hypernatremia  5. Acute kidney injury    Plan  - Mechanical ventilation with lung protective strategy   - Titrate Fio2 and PEEP as tolerated  - Sedation for ventilator synchrony   - Vasopressor support to maintain MAP > 60  - Monitor ABG  - S/p Anakinra and Hydroxychloroquine  - Airborne and Contact isolation  - Plan for prone positioning daily  - Cultures negative thus far and cont IV antibiotics will complete 7 day course, leukocytosis is resolving  - Monitor urine output  - Nephrology consult  - Bowel regimen  - Keep close to euvolemic volume status   - DVT and Stress Ulcer prophylaxis

## 2020-04-22 NOTE — CONSULT NOTE ADULT - PROBLEM SELECTOR RECOMMENDATION 4
2/2 MOF/ARDS/COVID19/shock. Patient with albumin 1.6. High risk skin breakdown. Continue TF via NG tube.

## 2020-04-22 NOTE — CONSULT NOTE ADULT - PROBLEM SELECTOR RECOMMENDATION 3
2/2 ARDS/COVID 19. Comorbid MOF involving lungs, heart, kidneys. Patient remains sedated/intubated, on IV abx, pressor support, paralytic. Guarded prognosis.

## 2020-04-22 NOTE — PROGRESS NOTE ADULT - ASSESSMENT
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 Presented to ED with SOB, fever and hypoxia. Subsequently transfered to ICU intubated and developed ARDS s/p CQ and Anikinra. Subsequently developed RASHAUN with ensuing oliguria prompting a renal consult. Hypernatremia has improved. Severe hypoalbuminemia 1.3     # RASHAUN sec to ATN from COVID - 19 related disease. With ARDS. remains on pressors. hypoalbuminemia. ensuing oliguria.  increasing oxugen requirements and resp acidosis.  cont current management.  if real fucntion worsens  and UOP remains in the coming hours, will consider initiating HD in AM

## 2020-04-22 NOTE — PROGRESS NOTE ADULT - SUBJECTIVE AND OBJECTIVE BOX
INTERVAL HPI/OVERNIGHT EVENTS: No acute events overnight. Pt's kidney function is gradually worsening.    PRESSORS: [x ] YES [ ] NO  WHICH:    ANTIBIOTICS: Cefepime             DATE STARTED: 4/17      Antimicrobial:  cefepime   IVPB 1000 milliGRAM(s) IV Intermittent every 8 hours    Cardiovascular:  midodrine 10 milliGRAM(s) Oral every 8 hours  phenylephrine    Infusion 1.5 MICROgram(s)/kG/Min IV Continuous <Continuous>    Pulmonary:    Hematalogic:  enoxaparin Injectable 90 milliGRAM(s) SubCutaneous two times a day    Other:  acetaminophen    Suspension .. 650 milliGRAM(s) Oral every 6 hours PRN  albumin human 25% IVPB 50 milliLiter(s) IV Intermittent every 6 hours  chlorhexidine 0.12% Liquid 15 milliLiter(s) Oral Mucosa every 12 hours  dextrose 5% + sodium chloride 0.45%. 1000 milliLiter(s) IV Continuous <Continuous>  HYDROmorphone Infusion 2 mG/Hr IV Continuous <Continuous>  pantoprazole   Suspension 40 milliGRAM(s) Oral daily  polyethylene glycol 3350 17 Gram(s) Oral daily  propofol Infusion 15 MICROgram(s)/kG/Min IV Continuous <Continuous>  rocuronium Infusion 8 MICROgram(s)/kG/Min IV Continuous <Continuous>  senna 2 Tablet(s) Oral at bedtime      Drug Dosing Weight  Height (cm): 134.62 (07 Apr 2020 23:13)  Weight (kg): 86.2 (07 Apr 2020 23:13)  BMI (kg/m2): 47.6 (07 Apr 2020 23:13)  BSA (m2): 1.67 (07 Apr 2020 23:13)    CENTRAL LINE: [x ] YES [ ] NO  LOCATION:  [facility]  DATE INSERTED: 4/15  REMOVE: [ ] YES [ ] NO  EXPLAIN:    RENNER: [ x] YES [ ] NO    DATE INSERTED:  REMOVE:  [ ] YES [ ] NO  EXPLAIN:    A-LINE:  [ x] YES [ ] NO  LOCATION:   DATE INSERTED: 4/17  REMOVE:  [ ] YES [ ] NO  EXPLAIN:    PMH/Social Hx/Fam Hx -reviewed admission note, no change since admission  PAST MEDICAL & SURGICAL HISTORY:  HTN (hypertension)  No significant past surgical history    Heart faliure: acute [ ] chronic [ ] acute or chronic [ ] diastolic [ ] systolic [ ] combied systolic and diastolic[ ]  RASHAUN: ATN[ ] renal medullary necrosis [ ] CKD I [ ]CKDII [ ]CKD III [ ]CKD IV [ ]CKD V [ ]Other pathological lesions [ ]  Abdominal Nutrition Status: malnutrition [ ] cachexia [ ] morbid obesity/BMI=40 [ ] Supplement ordered [___________]     T(C): 36 (04-22-20 @ 08:00), Max: 37.5 (04-21-20 @ 12:00)  HR: 88 (04-22-20 @ 10:00)  BP: 85/47 (04-22-20 @ 10:00)  BP(mean): 56 (04-22-20 @ 10:00)  ABP: 84/48 (04-22-20 @ 10:00)  ABP(mean): 61 (04-22-20 @ 10:00)  RR: 32 (04-22-20 @ 10:00)  SpO2: 92% (04-22-20 @ 10:00)  Wt(kg): --    ABG - ( 22 Apr 2020 04:05 )  pH, Arterial: 7.23  pH, Blood: x     /  pCO2: 56    /  pO2: 94    / HCO3: 22    / Base Excess: -5.1  /  SaO2: 96                    04-21 @ 07:01  -  04-22 @ 07:00  --------------------------------------------------------  IN: 1073.3 mL / OUT: 30 mL / NET: 1043.3 mL        Mode: AC/ CMV (Assist Control/ Continuous Mandatory Ventilation)  RR (machine): 32  TV (machine): 470  FiO2: 70  PEEP: 14  ITime: 1  MAP: 28  PIP: 50      PHYSICAL EXAM:    GENERAL: Pt sedated and intubated  HEAD: Atraumatic, Normocephalic  EYES: PERRLA, conjunctiva and sclera clear  ENMT: No tonsillar erythema, exudates, or enlargement; Moist mucous membranes  NECK: Supple, normal appearance  NERVOUS SYSTEM: Sedated  CHEST/LUNG: No chest deformity; crackles present to auscultation    HEART: Tachycardiac, no murmur  ABDOMEN: Soft, Nontender, Nondistended; Bowel sounds present  EXTREMITIES: No clubbing, cyanosis, or edema  LYMPH: No lymphadenopathy noted  SKIN: No rashes or lesions; Good capillary refill        LABS:  CBC Full  -  ( 22 Apr 2020 06:56 )  WBC Count : 18.77 K/uL  RBC Count : 3.99 M/uL  Hemoglobin : 10.9 g/dL  Hematocrit : 36.9 %  Platelet Count - Automated : 262 K/uL  Mean Cell Volume : 92.5 fl  Mean Cell Hemoglobin : 27.3 pg  Mean Cell Hemoglobin Concentration : 29.5 gm/dL  Auto Neutrophil # : x  Auto Lymphocyte # : x  Auto Monocyte # : x  Auto Eosinophil # : x  Auto Basophil # : x  Auto Neutrophil % : x  Auto Lymphocyte % : x  Auto Monocyte % : x  Auto Eosinophil % : x  Auto Basophil % : x    04-22    137  |  104  |  59<H>  ----------------------------<  69<L>  4.0   |  22  |  3.95<H>    Ca    8.8      22 Apr 2020 06:56  Phos  6.5     04-22  Mg     2.7     04-22    TPro  7.0  /  Alb  1.6<L>  /  TBili  3.5<H>  /  DBili  x   /  AST  45<H>  /  ALT  39  /  AlkPhos  91  04-22    PT/INR - ( 22 Apr 2020 06:56 )   PT: 12.6 sec;   INR: 1.11 ratio         PTT - ( 22 Apr 2020 06:56 )  PTT:32.4 sec        RADIOLOGY & ADDITIONAL STUDIES REVIEWED:      [ ]GOALS OF CARE DISCUSSION WITH PATIENT/FAMILY/PROXY:    CRITICAL CARE TIME SPENT: 35 minutes

## 2020-04-22 NOTE — CONSULT NOTE ADULT - PROBLEM SELECTOR RECOMMENDATION 2
2/2 COVID19+. Patient remains sedated/intubated, on paralytic, pressor support, IV abx. Guarded prognosis. Full code.

## 2020-04-22 NOTE — CONSULT NOTE ADULT - PROBLEM SELECTOR RECOMMENDATION 5
Spoke with patient's daughter/[place] ([phone]). She reports patient's /Manpreet is in the [facility] with patient's 2 other children. Reports she and patient's sister-in-law have been getting information. Requested patient's  contact me to identify surrogate - cell phone number provided. Updated current status. Reviewed medications, labs. All questions answered; supportive counseling provided. At this time, patient remains a full code.

## 2020-04-22 NOTE — CONSULT NOTE ADULT - SUBJECTIVE AND OBJECTIVE BOX
HPI:  53 y/o F patient, w/ PMHx of HTN, works as HD nurse,  presents to the ED w/ fever, chills, nausea, vomiting, and mild cough that began x1 week ago. Patient also reports mild shortness of breath but denies  any other acute complaints. Patient was saturating 96% on 4L NC.  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    Chest X-ray showed: Bilateral airspace opacities suspicious for pneumonia. (08 Apr 2020 01:59)      PAST MEDICAL & SURGICAL HISTORY:  HTN (hypertension)  No significant past surgical history      SOCIAL HISTORY:    Admitted from:  home assisted living [facility]   Substance abuse history:              Tobacco hx:                  Alcohol hx:              Home Opioid hx:  Cheondoism:                                    Preferred Language:    Surrogate/HCP/Guardian:            Phone#:    FAMILY HISTORY:    Baseline ADLs (prior to admission):    Allergies    No Known Allergies    Intolerances      Present Symptoms:   Dyspnea:   Nausea/Vomiting:   Anxiety:  Depressed   Fatigue:  Loss of appetite:   Pain:                                location:          Review of Systems: [All others negative or Unable to obtain due to poor mentation]    MEDICATIONS  (STANDING):  albumin human 25% IVPB 50 milliLiter(s) IV Intermittent every 6 hours  cefepime   IVPB 1000 milliGRAM(s) IV Intermittent every 8 hours  chlorhexidine 0.12% Liquid 15 milliLiter(s) Oral Mucosa every 12 hours  dextrose 5% + sodium chloride 0.45%. 1000 milliLiter(s) (50 mL/Hr) IV Continuous <Continuous>  enoxaparin Injectable 90 milliGRAM(s) SubCutaneous two times a day  HYDROmorphone Infusion 2 mG/Hr (2 mL/Hr) IV Continuous <Continuous>  midodrine 10 milliGRAM(s) Oral every 8 hours  pantoprazole   Suspension 40 milliGRAM(s) Oral daily  phenylephrine    Infusion 1.5 MICROgram(s)/kG/Min (24.2 mL/Hr) IV Continuous <Continuous>  polyethylene glycol 3350 17 Gram(s) Oral daily  propofol Infusion 15 MICROgram(s)/kG/Min (7.76 mL/Hr) IV Continuous <Continuous>  rocuronium Infusion 8 MICROgram(s)/kG/Min (8.28 mL/Hr) IV Continuous <Continuous>  senna 2 Tablet(s) Oral at bedtime    MEDICATIONS  (PRN):  acetaminophen    Suspension .. 650 milliGRAM(s) Oral every 6 hours PRN Temp greater or equal to 38C (100.4F)      PHYSICAL EXAM:    Vital Signs Last 24 Hrs  T(C): 36 (22 Apr 2020 08:00), Max: 37.5 (21 Apr 2020 12:00)  T(F): 96.8 (22 Apr 2020 08:00), Max: 99.5 (21 Apr 2020 12:00)  HR: 88 (22 Apr 2020 10:00) (80 - 109)  BP: 85/47 (22 Apr 2020 10:00) (80/40 - 111/67)  BP(mean): 56 (22 Apr 2020 10:00) (50 - 77)  RR: 32 (22 Apr 2020 10:00) (28 - 32)  SpO2: 92% (22 Apr 2020 10:00) (92% - 97%)    General: alert  oriented x ____    [lethargic distressed cachexia  nonverbal  unarousable verbal]  Karnofsky Performance Score/Palliative Performance Status Version2:     %    HEENT: normal  dry mouth  ET tube/trach oral lesions:  Lungs: comfortable tachypnea/labored breathing  excessive secretions  CV: normal  tachycardia  GI: normal  distended  tender  incontinent               PEG/NG/OG tube  constipation  last BM:   : normal  incontinent  oliguria/anuria  parr  Musculoskeletal: normal  weakness  edema             ambulatory  bedbound/wheelchair bound  Skin: normal  pressure ulcers: stage: edema: other:  Neuro: no deficits cognitive impairment dsyphagia/dysarthria paresis: other:  Oral intake ability: unable/only mouth care [minimal moderate full capability]  Diet: [NPO]    LABS:                        10.9   18.77 )-----------( 262      ( 22 Apr 2020 06:56 )             36.9     04-22    137  |  104  |  59<H>  ----------------------------<  69<L>  4.0   |  22  |  3.95<H>    Ca    8.8      22 Apr 2020 06:56  Phos  6.5     04-22  Mg     2.7     04-22    TPro  7.0  /  Alb  1.6<L>  /  TBili  3.5<H>  /  DBili  x   /  AST  45<H>  /  ALT  39  /  AlkPhos  91  04-22        RADIOLOGY & ADDITIONAL STUDIES:    ADVANCE DIRECTIVES:   Advanced Care Planning discussion total time spent: HPI:  53 y/o F patient, w/ PMHx of HTN, works as HD nurse,  presents to the ED w/ fever, chills, nausea, vomiting, and mild cough that began x1 week ago. Patient also reports mild shortness of breath but denies  any other acute complaints. Patient was saturating 96% on 4L NC.  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 Chest X-ray showed: Bilateral airspace opacities suspicious for pneumonia. (08 Apr 2020 01:59)    Hospital Course:  Patient found to be COVID+/ARDS, was consistently desaturating to 80s on nonrebreather and intubated 4/15. Patient with worsening renal function - nephro following. Request to establish goals of care and identify surrogate. Full code on file.       PAST MEDICAL & SURGICAL HISTORY:  HTN (hypertension)  No significant past surgical history      SOCIAL HISTORY:    Admitted from:  home   Substance abuse history / Tobacco hx /  Alcohol hx:  Unable to obtain - patient sedated/intubated.  Home Opioid hx: None  Sikh:       n/a                              Preferred Language: English    Surrogate/HCP/Guardian:  [place] Lees (dtr): [phone]    FAMILY HISTORY: Unable to obtain - patient sedated/intubated.      Baseline ADLs (prior to admission): Ambulatory/indendent    No Known Allergies      Present Symptoms:         Review of Systems:  Unable to obtain - patient sedated/intubated.     MEDICATIONS  (STANDING):  albumin human 25% IVPB 50 milliLiter(s) IV Intermittent every 6 hours  cefepime   IVPB 1000 milliGRAM(s) IV Intermittent every 8 hours  chlorhexidine 0.12% Liquid 15 milliLiter(s) Oral Mucosa every 12 hours  dextrose 5% + sodium chloride 0.45%. 1000 milliLiter(s) (50 mL/Hr) IV Continuous <Continuous>  enoxaparin Injectable 90 milliGRAM(s) SubCutaneous two times a day  HYDROmorphone Infusion 2 mG/Hr (2 mL/Hr) IV Continuous <Continuous>  midodrine 10 milliGRAM(s) Oral every 8 hours  pantoprazole   Suspension 40 milliGRAM(s) Oral daily  phenylephrine    Infusion 1.5 MICROgram(s)/kG/Min (24.2 mL/Hr) IV Continuous <Continuous>  polyethylene glycol 3350 17 Gram(s) Oral daily  propofol Infusion 15 MICROgram(s)/kG/Min (7.76 mL/Hr) IV Continuous <Continuous>  rocuronium Infusion 8 MICROgram(s)/kG/Min (8.28 mL/Hr) IV Continuous <Continuous>  senna 2 Tablet(s) Oral at bedtime    MEDICATIONS  (PRN):  acetaminophen    Suspension .. 650 milliGRAM(s) Oral every 6 hours PRN Temp greater or equal to 38C (100.4F)      PHYSICAL EXAM:    Vital Signs Last 24 Hrs  T(C): 36 (22 Apr 2020 08:00), Max: 37.5 (21 Apr 2020 12:00)  T(F): 96.8 (22 Apr 2020 08:00), Max: 99.5 (21 Apr 2020 12:00)  HR: 88 (22 Apr 2020 10:00) (80 - 109)  BP: 85/47 (22 Apr 2020 10:00) (80/40 - 111/67)  BP(mean): 56 (22 Apr 2020 10:00) (50 - 77)  RR: 32 (22 Apr 2020 10:00) (28 - 32)  SpO2: 92% (22 Apr 2020 10:00) (92% - 97%)    General: Patient not medically stable for full physical exam. Patient sedated/intubated, on pressor and paralytic.   Karnofsky Performance Score/Palliative Performance Status Version2:    20 %    HEENT: ET tube/trach    Lungs:  tachypnea, on ventilator, dilaudid, propofol    CV: on pressor  GI: obese, incontinent, NG tube  :   parr  Musculoskeletal: on zemuron, patient sedated/intubated/on paralytic - dependent on all ADLs  Skin: patient not medically stable for evaluation  Neuro: patient sedated/intubated, dependent on ADLs  Oral intake ability: unable/only mouth care   Diet: NPO; TF via NG tube    LABS:                        10.9   18.77 )-----------( 262      ( 22 Apr 2020 06:56 )             36.9     04-22    137  |  104  |  59<H>  ----------------------------<  69<L>  4.0   |  22  |  3.95<H>    Ca    8.8      22 Apr 2020 06:56  Phos  6.5     04-22  Mg     2.7     04-22    TPro  7.0  /  Alb  1.6<L>  /  TBili  3.5<H>  /  DBili  x   /  AST  45<H>  /  ALT  39  /  AlkPhos  91  04-22    Albumin: 1.6    RADIOLOGY & ADDITIONAL STUDIES:  < from: Xray Chest 1 View- PORTABLE-Routine (04.22.20 @ 07:38) >  EXAM:  XR CHEST PORTABLE ROUTINE 1V                            PROCEDURE DATE:  04/22/2020          INTERPRETATION:    DATE OF STUDY: 4/22/2020    PRIOR: 4/21/2020    CLINICAL INDICATION: Assess pleuropatenchymal changes.    TECHNIQUE: portable chest.    FINDINGS/  IMPRESSION:   ET tube tip appropriately positioned above the dinesh.  NG tube in stomach -tip is off the film.  Interval placement of left-sided IJ venous catheter with tip in left innominate vein.  The heart is top normal in size. The lungs show unchanged diffuse ill-defined upper lower lobe opacities/infiltrates.  No large pleural effusion. No pneumothorax.  No acute bony finding.    < end of copied text >      ADVANCE DIRECTIVES: Full code HPI:  53 y/o F patient, w/ PMHx of HTN, works as HD nurse,  presents to the ED w/ fever, chills, nausea, vomiting, and mild cough that began x1 week ago. Patient also reports mild shortness of breath but denies  any other acute complaints. Patient was saturating 96% on 4L NC.  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 Chest X-ray showed: Bilateral airspace opacities suspicious for pneumonia. (08 Apr 2020 01:59)    Hospital Course:  Patient found to be COVID+/ARDS, was consistently desaturating to 80s on nonrebreather and intubated 4/15. Patient in ICU - sedated/intubated, on 1 pressor, paralytic, with worsening renal function - nephro following. Request to establish goals of care and identify surrogate. Full code on file.       PAST MEDICAL & SURGICAL HISTORY:  HTN (hypertension)  No significant past surgical history    SOCIAL HISTORY:    Admitted from:  home   Substance abuse history / Tobacco hx /  Alcohol hx:  Unable to obtain - patient sedated/intubated.  Home Opioid hx: None  Sabianism:       n/a                              Preferred Language: English    Surrogate/HCP/Guardian:  [place] Lees (dtr): [phone]    FAMILY HISTORY: Unable to obtain - patient sedated/intubated.      Baseline ADLs (prior to admission): Ambulatory/indendent    No Known Allergies    Present Symptoms:         Review of Systems:  Unable to obtain - patient sedated/intubated.     MEDICATIONS  (STANDING):  albumin human 25% IVPB 50 milliLiter(s) IV Intermittent every 6 hours  cefepime   IVPB 1000 milliGRAM(s) IV Intermittent every 8 hours  chlorhexidine 0.12% Liquid 15 milliLiter(s) Oral Mucosa every 12 hours  dextrose 5% + sodium chloride 0.45%. 1000 milliLiter(s) (50 mL/Hr) IV Continuous <Continuous>  enoxaparin Injectable 90 milliGRAM(s) SubCutaneous two times a day  HYDROmorphone Infusion 2 mG/Hr (2 mL/Hr) IV Continuous <Continuous>  midodrine 10 milliGRAM(s) Oral every 8 hours  pantoprazole   Suspension 40 milliGRAM(s) Oral daily  phenylephrine    Infusion 1.5 MICROgram(s)/kG/Min (24.2 mL/Hr) IV Continuous <Continuous>  polyethylene glycol 3350 17 Gram(s) Oral daily  propofol Infusion 15 MICROgram(s)/kG/Min (7.76 mL/Hr) IV Continuous <Continuous>  rocuronium Infusion 8 MICROgram(s)/kG/Min (8.28 mL/Hr) IV Continuous <Continuous>  senna 2 Tablet(s) Oral at bedtime    MEDICATIONS  (PRN):  acetaminophen    Suspension .. 650 milliGRAM(s) Oral every 6 hours PRN Temp greater or equal to 38C (100.4F)    PHYSICAL EXAM:    Vital Signs Last 24 Hrs  T(C): 36 (22 Apr 2020 08:00), Max: 37.5 (21 Apr 2020 12:00)  T(F): 96.8 (22 Apr 2020 08:00), Max: 99.5 (21 Apr 2020 12:00)  HR: 88 (22 Apr 2020 10:00) (80 - 109)  BP: 85/47 (22 Apr 2020 10:00) (80/40 - 111/67)  BP(mean): 56 (22 Apr 2020 10:00) (50 - 77)  RR: 32 (22 Apr 2020 10:00) (28 - 32)  SpO2: 92% (22 Apr 2020 10:00) (92% - 97%)    General: Patient not medically stable for full physical exam. Patient sedated/intubated, on pressor and paralytic.   Karnofsky Performance Score/Palliative Performance Status Version2:    20 %    HEENT: ET tube/trach    Lungs:  tachypnea, on ventilator, dilaudid, propofol    CV: on pressor  GI: obese, incontinent, NG tube  :   parr  Musculoskeletal: on zemuron, patient sedated/intubated/on paralytic - dependent on all ADLs  Skin: patient not medically stable for evaluation  Neuro: patient sedated/intubated, dependent on ADLs  Oral intake ability: unable/only mouth care   Diet: NPO; TF via NG tube    LABS:                        10.9   18.77 )-----------( 262      ( 22 Apr 2020 06:56 )             36.9     04-22    137  |  104  |  59<H>  ----------------------------<  69<L>  4.0   |  22  |  3.95<H>    Ca    8.8      22 Apr 2020 06:56  Phos  6.5     04-22  Mg     2.7     04-22    TPro  7.0  /  Alb  1.6<L>  /  TBili  3.5<H>  /  DBili  x   /  AST  45<H>  /  ALT  39  /  AlkPhos  91  04-22    Albumin: 1.6    RADIOLOGY & ADDITIONAL STUDIES:  < from: Xray Chest 1 View- PORTABLE-Routine (04.22.20 @ 07:38) >  EXAM:  XR CHEST PORTABLE ROUTINE 1V                        PROCEDURE DATE:  04/22/2020    INTERPRETATION:    DATE OF STUDY: 4/22/2020    PRIOR: 4/21/2020    CLINICAL INDICATION: Assess pleuropatenchymal changes.    TECHNIQUE: portable chest.    FINDINGS/  IMPRESSION:   ET tube tip appropriately positioned above the dinesh.  NG tube in stomach -tip is off the film.  Interval placement of left-sided IJ venous catheter with tip in left innominate vein.  The heart is top normal in size. The lungs show unchanged diffuse ill-defined upper lower lobe opacities/infiltrates.  No large pleural effusion. No pneumothorax.  No acute bony finding.    < end of copied text >      ADVANCE DIRECTIVES: Full code

## 2020-04-22 NOTE — CHART NOTE - NSCHARTNOTEFT_GEN_A_CORE
Assessment:   Patient is a 54y old  Female who presents with a chief complaint of Pneumonia (2020 16:05). Pt continues intubated. Covid+. MOF. Tf changed to Nepro (trckle feeds) on Flow record, at times. Paralytic. Propofol 12.9 (if x24 hrs=341 kcals fat). Palliative following      Factors impacting intake: [ ] none [ ] nausea  [ ] vomiting [ ] diarrhea [ ] constipation  [ ]chewing problems [ ] swallowing issues  [x ] other: critically ill/ intubated, ongoing with MOF.    Diet Prescription: Diet, NPO with Tube Feed:   Tube Feeding Modality: Nasogastric  Nepro with Carb Steady  Continuous  Starting Tube Feed Rate {mL per Hour}: 10  Until Goal Tube Feed Rate (mL per Hour): 10  Tube Feed Duration (in Hours): 24  Tube Feed Start Time: 09:30 (20 @ 09:14)        Daily Height in cm: 134.62 (2020 23:13)      Daily Weight in k.8 (2020 08:00)  Weight in k.9 (2020 04:00)  Weight in k (2020 04:27)  Weight in k.7 (2020 04:30)  Weight in k.4 (15 Apr 2020 07:00)  Weight in k.1 (10 Apr 2020 12:18)    % Weight Change: 2+ generalized edema, I>O noted    Pertinent Medications: MEDICATIONS  (STANDING):  albumin human 25% IVPB 50 milliLiter(s) IV Intermittent every 6 hours  ALBUTerol    90 MICROgram(s) HFA Inhaler 2 Puff(s) Inhalation every 6 hours  cefepime   IVPB 1000 milliGRAM(s) IV Intermittent every 8 hours  chlorhexidine 0.12% Liquid 15 milliLiter(s) Oral Mucosa every 12 hours  dextrose 5% + sodium chloride 0.45%. 1000 milliLiter(s) (50 mL/Hr) IV Continuous <Continuous>  enoxaparin Injectable 90 milliGRAM(s) SubCutaneous two times a day  HYDROmorphone Infusion 2 mG/Hr (2 mL/Hr) IV Continuous <Continuous>  midodrine 10 milliGRAM(s) Oral every 8 hours  norepinephrine Infusion 0.05 MICROgram(s)/kG/Min (4.04 mL/Hr) IV Continuous <Continuous>  pantoprazole   Suspension 40 milliGRAM(s) Oral daily  polyethylene glycol 3350 17 Gram(s) Oral daily  propofol Infusion 15 MICROgram(s)/kG/Min (7.76 mL/Hr) IV Continuous <Continuous>  rocuronium Infusion 8 MICROgram(s)/kG/Min (8.28 mL/Hr) IV Continuous <Continuous>  senna 2 Tablet(s) Oral at bedtime    MEDICATIONS  (PRN):  acetaminophen    Suspension .. 650 milliGRAM(s) Oral every 6 hours PRN Temp greater or equal to 38C (100.4F)    Pertinent Labs:  Na137 mmol/L Glu 69 mg/dL<L> K+ 4.0 mmol/L Cr  3.95 mg/dL<H> BUN 59 mg/dL<H>  Phos 6.5 mg/dL<H>  Alb 1.6 g/dL<L>  [id number] 6.4 %<H>     CAPILLARY BLOOD GLUCOSE      POCT Blood Glucose.: 129 mg/dL (2020 16:24)  POCT Blood Glucose.: 94 mg/dL (2020 10:44)  POCT Blood Glucose.: 71 mg/dL (2020 06:27)  POCT Blood Glucose.: 70 mg/dL (2020 23:22)    Skin:     Estimated Needs:   [ ] no change since previous assessment  [ ] recalculated:       Previous Nutrition Diagnosis:   [ ] Altered GI function  [ ]Inadequate Oral Intake [ ] Swallowing Difficulty   [ ] Altered nutrition related labs [ ] Increased Nutrient Needs [ ] Overweight/Obesity   [ ] Unintended Weight Loss [ ] Food & Nutrition Related Knowledge Deficit [x ] Malnutrition (moderate)  [ ] Other:       Nutrition Diagnosis is [x ] PCM (severe) related to ongoing critical illness as evidenced by ongoing >50% needs met> 5 days, 2+ generalized edema (3+ face noted)      Interventions:   Recommend  [ ] Change Diet To:  [ ] Nutrition Supplement  [x ] Nutrition Support: continue trickle  feeds with Nepro. May add Prosource BID. Amf and consistent with goals of care   [x ] Other: TG level monitoring on Propofol    Monitoring and Evaluation:    [ x ] Tolerance to diet prescription [ x ] weights [ x ] labs[ x ] follow up per protocol  [ ] other: Assessment:   Patient is a 54y old  Female who presents with a chief complaint of Pneumonia (2020 16:05). Pt continues intubated. Covid+. MOF. Tf changed to Nepro (trckle feeds) on Flow record, at times. Paralytic. Propofol 12.9 (if x24 hrs=341 kcals fat). Palliative following      Factors impacting intake: [ ] none [ ] nausea  [ ] vomiting [ ] diarrhea [ ] constipation  [ ]chewing problems [ ] swallowing issues  [x ] other: critically ill/ intubated, ongoing with MOF.    Diet Prescription: Diet, NPO with Tube Feed:   Tube Feeding Modality: Nasogastric  Nepro with Carb Steady  Continuous  Starting Tube Feed Rate {mL per Hour}: 10  Until Goal Tube Feed Rate (mL per Hour): 10  Tube Feed Duration (in Hours): 24  Tube Feed Start Time: 09:30 (20 @ 09:14)        Daily Height in cm: 134.62 (2020 23:13)      Daily Weight in k.8 (2020 08:00)  Weight in k.9 (2020 04:00)  Weight in k (2020 04:27)  Weight in k.7 (2020 04:30)  Weight in k.4 (15 Apr 2020 07:00)  Weight in k.1 (10 Apr 2020 12:18)    % Weight Change: 2+ generalized edema, I>O noted    Pertinent Medications: MEDICATIONS  (STANDING):  albumin human 25% IVPB 50 milliLiter(s) IV Intermittent every 6 hours  ALBUTerol    90 MICROgram(s) HFA Inhaler 2 Puff(s) Inhalation every 6 hours  cefepime   IVPB 1000 milliGRAM(s) IV Intermittent every 8 hours  chlorhexidine 0.12% Liquid 15 milliLiter(s) Oral Mucosa every 12 hours  dextrose 5% + sodium chloride 0.45%. 1000 milliLiter(s) (50 mL/Hr) IV Continuous <Continuous>  enoxaparin Injectable 90 milliGRAM(s) SubCutaneous two times a day  HYDROmorphone Infusion 2 mG/Hr (2 mL/Hr) IV Continuous <Continuous>  midodrine 10 milliGRAM(s) Oral every 8 hours  norepinephrine Infusion 0.05 MICROgram(s)/kG/Min (4.04 mL/Hr) IV Continuous <Continuous>  pantoprazole   Suspension 40 milliGRAM(s) Oral daily  polyethylene glycol 3350 17 Gram(s) Oral daily  propofol Infusion 15 MICROgram(s)/kG/Min (7.76 mL/Hr) IV Continuous <Continuous>  rocuronium Infusion 8 MICROgram(s)/kG/Min (8.28 mL/Hr) IV Continuous <Continuous>  senna 2 Tablet(s) Oral at bedtime    MEDICATIONS  (PRN):  acetaminophen    Suspension .. 650 milliGRAM(s) Oral every 6 hours PRN Temp greater or equal to 38C (100.4F)    Pertinent Labs:  Na137 mmol/L Glu 69 mg/dL<L> K+ 4.0 mmol/L Cr  3.95 mg/dL<H> BUN 59 mg/dL<H>  Phos 6.5 mg/dL<H>  Alb 1.6 g/dL<L>  [id number] 6.4 %<H>     CAPILLARY BLOOD GLUCOSE      POCT Blood Glucose.: 129 mg/dL (2020 16:24)  POCT Blood Glucose.: 94 mg/dL (2020 10:44)  POCT Blood Glucose.: 71 mg/dL (2020 06:27)  POCT Blood Glucose.: 70 mg/dL (2020 23:22)    Skin:     Estimated Needs:   [ ] no change since previous assessment  [ ] recalculated:       Previous Nutrition Diagnosis:   [ ] Altered GI function  [ ]Inadequate Oral Intake [ ] Swallowing Difficulty   [ ] Altered nutrition related labs [ ] Increased Nutrient Needs [ ] Overweight/Obesity   [ ] Unintended Weight Loss [ ] Food & Nutrition Related Knowledge Deficit [x ] Malnutrition (moderate)  [ ] Other:       Nutrition Diagnosis is [x ] PCM (severe) related to ongoing critical illness as evidenced by ongoing >50% needs met> 5 days, 2+ generalized edema (3+ face noted)      Interventions:   Recommend  [ ] Change Diet To:  [ ] Nutrition Supplement  [x ] Nutrition Support: continue trickle  feeds with Nepro. May add Prosource BID, as medically feasible  and consistent with goals of care   [x ] Other: TG level monitoring on Propofol    Monitoring and Evaluation:    [ x ] Tolerance to diet prescription [ x ] weights [ x ] labs[ x ] follow up per protocol  [ ] other:

## 2020-04-22 NOTE — PROGRESS NOTE ADULT - ATTENDING COMMENTS
Assessment:  1. ARDS  2. Septic shock - possibly hypotension contributed by sedatives  3. Viral pneumonia secondary to COVID 19  4. Hypernatremia  5. Acute kidney injury    Plan  - Mechanical ventilation with lung protective strategy   - Titrate Fio2 and PEEP as tolerated  - Sedation for ventilator synchrony   - Vasopressor support to maintain MAP > 60  - Monitor ABG  - S/p Anakinra and Hydroxychloroquine  - Airborne and Contact isolation  - Plan for prone positioning daily  - Cultures negative thus far and cont IV antibiotics will complete 7 day course, leukocytosis is resolving  - Monitor urine output  - Nephrology consult  - Gentle IV hydration   - Bowel regimen  - Change tube feedings to Nepro  - Keep close to euvolemic volume status   - DVT and Stress Ulcer prophylaxis  - Over all prognosis is poor

## 2020-04-22 NOTE — PROGRESS NOTE ADULT - ASSESSMENT
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 Patient was admitted on medicine floor for COVID pna.  Admitted to ICU for hypoxia and desaturation on NRB.     Plan:  Neuro:  -Pt sedated with propofol  -Dilaudid drip  -Paralyzed with Rucoronium    CV:  - Hold BP medications  -Pheny for pressor support  - on midodrine 10mg q 8       Pulm:  - Intubated for respiratory distress and hypoxia 4/15  - CXR shows worsening right lung infiltrate  - Acute Hypoxic Resp Failure secondary to PNA + COVID 19  - S/P  ANAKINRA and Hydroxychloroquine ( since April 9th)   - on Full dose Lovenox   - Current vent settings 32 /450/50/12 ,   - changed prone to supine position today    ID:  -  completed Hydroxychloroquine, anakinra   Pt was febrile overnight  blood culture negative  on Cefepime  WBC: 16K  -CRP: 7.69--> 22.01 -->15.07  -D-Dimer: 1166--> 1401-->1142  -Ferritin: 1055--> 776--> 944      Nephro:  RASHAUN:    -Cr/BUN: 3.59  -Continue  albumin and NS @100 cc  - monitor urine output   - Monitor electrolytes   Hypernatremia:  Resolved  Continue Free water 175 q8   Monitor BMP  Nephrology: Dr. Rosa consulted    GI:  -  Tube feed  - gi ppx with Protonix  - miralax and senna for prophylaxis     Heme:  - no indication for transfusion   - monitor cbc daily     Endo:  - No history of DM  - target CBG < 180  - FS q6 while Tube feed      Prophy:  - Renally  dose Lovenox

## 2020-04-22 NOTE — PROGRESS NOTE ADULT - SUBJECTIVE AND OBJECTIVE BOX
Time of Visit:  Patient seen and examined.     MEDICATIONS  (STANDING):  albumin human 25% IVPB 50 milliLiter(s) IV Intermittent every 6 hours  ALBUTerol    90 MICROgram(s) HFA Inhaler 2 Puff(s) Inhalation every 6 hours  cefepime   IVPB 1000 milliGRAM(s) IV Intermittent every 8 hours  chlorhexidine 0.12% Liquid 15 milliLiter(s) Oral Mucosa every 12 hours  dextrose 5% + sodium chloride 0.45%. 1000 milliLiter(s) (50 mL/Hr) IV Continuous <Continuous>  enoxaparin Injectable 90 milliGRAM(s) SubCutaneous two times a day  HYDROmorphone Infusion 2 mG/Hr (2 mL/Hr) IV Continuous <Continuous>  midodrine 10 milliGRAM(s) Oral every 8 hours  norepinephrine Infusion 0.05 MICROgram(s)/kG/Min (4.04 mL/Hr) IV Continuous <Continuous>  pantoprazole   Suspension 40 milliGRAM(s) Oral daily  polyethylene glycol 3350 17 Gram(s) Oral daily  propofol Infusion 15 MICROgram(s)/kG/Min (7.76 mL/Hr) IV Continuous <Continuous>  rocuronium Infusion 8 MICROgram(s)/kG/Min (8.28 mL/Hr) IV Continuous <Continuous>  senna 2 Tablet(s) Oral at bedtime      MEDICATIONS  (PRN):  acetaminophen    Suspension .. 650 milliGRAM(s) Oral every 6 hours PRN Temp greater or equal to 38C (100.4F)       Medications up to date at time of exam.      PHYSICAL EXAMINATION:  Patient has no new complaints.  GENERAL: The patient is a well-developed, well-nourished, in no apparent distress.     Vital Signs Last 24 Hrs  T(C): 36.9 (22 Apr 2020 12:00), Max: 37.1 (21 Apr 2020 21:20)  T(F): 98.5 (22 Apr 2020 12:00), Max: 98.8 (22 Apr 2020 00:48)  HR: 110 (22 Apr 2020 15:42) (80 - 110)  BP: 113/54 (22 Apr 2020 14:00) (80/40 - 113/54)  BP(mean): 69 (22 Apr 2020 14:00) (50 - 77)  RR: 32 (22 Apr 2020 15:00) (26 - 32)  SpO2: 92% (22 Apr 2020 15:42) (89% - 97%)  Mode: AC/ CMV (Assist Control/ Continuous Mandatory Ventilation)  RR (machine): 32  TV (machine): 450  FiO2: 70  PEEP: 12  ITime: 1  MAP: 26  PIP: 53   (if applicable)    Chest Tube (if applicable)    HEENT: Head is normocephalic and atraumatic. Extraocular muscles are intact. Mucous membranes are moist. + ETT    NECK: Supple, no palpable adenopathy.    LUNGS: Clear to auscultation, no wheezing, rales, or rhonchi.    HEART: Regular rate and rhythm without murmur.    ABDOMEN: Soft, nontender, and nondistended.  No hepatosplenomegaly is noted.    : No painful voiding, no pelvic pain    EXTREMITIES: Without any cyanosis, clubbing, rash, lesions or edema.    NEUROLOGIC: sedated    SKIN: Warm, dry, good turgor.      LABS:                        10.9   18.77 )-----------( 262      ( 22 Apr 2020 06:56 )             36.9     04-22    137  |  104  |  59<H>  ----------------------------<  69<L>  4.0   |  22  |  3.95<H>    Ca    8.8      22 Apr 2020 06:56  Phos  6.5     04-22  Mg     2.7     04-22    TPro  7.0  /  Alb  1.6<L>  /  TBili  3.5<H>  /  DBili  x   /  AST  45<H>  /  ALT  39  /  AlkPhos  91  04-22    PT/INR - ( 22 Apr 2020 06:56 )   PT: 12.6 sec;   INR: 1.11 ratio         PTT - ( 22 Apr 2020 06:56 )  PTT:32.4 sec    ABG - ( 22 Apr 2020 12:04 )  pH, Arterial: 7.16  pH, Blood: x     /  pCO2: 63    /  pO2: 85    / HCO3: 22    / Base Excess: -7.3  /  SaO2: 94                CARDIAC MARKERS ( 22 Apr 2020 06:56 )  0.070 ng/mL / x     / 181 U/L / x     / x          D-Dimer Assay, Quantitative: 2219 ng/mL DDU (04-22-20 @ 06:56)        Procalcitonin, Serum: 7.18 ng/mL (04-22-20 @ 09:33)  Procalcitonin, Serum: 1.46 ng/mL (04-20-20 @ 09:54)      MICROBIOLOGY: (if applicable)    RADIOLOGY & ADDITIONAL STUDIES:  EKG:   CXR:< from: Xray Chest 1 View- PORTABLE-Routine (04.22.20 @ 07:38) >    EXAM:  XR CHEST PORTABLE ROUTINE 1V                            PROCEDURE DATE:  04/22/2020          INTERPRETATION:    DATE OF STUDY: 4/22/2020    PRIOR: 4/21/2020    CLINICAL INDICATION: Assess pleuropatenchymal changes.    TECHNIQUE: portable chest.    FINDINGS/  IMPRESSION:   ET tube tip appropriately positioned above the dinesh.  NG tube in stomach -tip is off the film.  Interval placement of left-sided IJ venous catheter with tip in left innominate vein.  The heart is top normal in size. The lungs show unchanged diffuse ill-defined upper lower lobe opacities/infiltrates.  No large pleural effusion. No pneumothorax.  No acute bony finding.                  KIET CHAUDHARI   This document has been electronically signed. Apr 22 2020  8:24AM                < end of copied text >    ECHO:    IMPRESSION: 54y Female PAST MEDICAL & SURGICAL HISTORY:  HTN (hypertension)  No significant past surgical history   p/w         53 y/o F patient, w/ PMHx of HTN, works as HD nurse,  presents to the ED w/ fever, chills, nausea, vomiting, and mild cough that began x1 week ago. Patient was admitted on medicine floor for COVID pna. ICU consulted for hypoxia and desaturation on NRB.  DDimer is trending up and is on . therapeutic anticoag.  Pat became more dyspnenic and hypoxic, intubated and placed on vent support sedated . WBC trending up    Assessment:  -Acute Hypoxic Respiratory Failure  -B/L  PNA   -ARDS  -COVID 19 infection  -HTN  -Hypercaog state  -RASHAUN      Plan:  - intubation 4/15  -monitor procalcitonin and biomarkers  -prone therapy as needed  -vent support with lung protective strategy   -isolation.. contact and airborne  -s/p anakinra and solumedrol as per protocol   -s/p plaquenil and vitamins  -dvt/gi prophy .    -sedated and pain control .        case discussed with icu team    time spent 39 min Time of Visit:  Patient seen and examined.     MEDICATIONS  (STANDING):  albumin human 25% IVPB 50 milliLiter(s) IV Intermittent every 6 hours  ALBUTerol    90 MICROgram(s) HFA Inhaler 2 Puff(s) Inhalation every 6 hours  cefepime   IVPB 1000 milliGRAM(s) IV Intermittent every 8 hours  chlorhexidine 0.12% Liquid 15 milliLiter(s) Oral Mucosa every 12 hours  dextrose 5% + sodium chloride 0.45%. 1000 milliLiter(s) (50 mL/Hr) IV Continuous <Continuous>  enoxaparin Injectable 90 milliGRAM(s) SubCutaneous two times a day  HYDROmorphone Infusion 2 mG/Hr (2 mL/Hr) IV Continuous <Continuous>  midodrine 10 milliGRAM(s) Oral every 8 hours  norepinephrine Infusion 0.05 MICROgram(s)/kG/Min (4.04 mL/Hr) IV Continuous <Continuous>  pantoprazole   Suspension 40 milliGRAM(s) Oral daily  polyethylene glycol 3350 17 Gram(s) Oral daily  propofol Infusion 15 MICROgram(s)/kG/Min (7.76 mL/Hr) IV Continuous <Continuous>  rocuronium Infusion 8 MICROgram(s)/kG/Min (8.28 mL/Hr) IV Continuous <Continuous>  senna 2 Tablet(s) Oral at bedtime      MEDICATIONS  (PRN):  acetaminophen    Suspension .. 650 milliGRAM(s) Oral every 6 hours PRN Temp greater or equal to 38C (100.4F)       Medications up to date at time of exam.      PHYSICAL EXAMINATION:  Patient has no new complaints.  GENERAL: The patient is a well-developed, well-nourished, in no apparent distress.     Vital Signs Last 24 Hrs  T(C): 36.9 (22 Apr 2020 12:00), Max: 37.1 (21 Apr 2020 21:20)  T(F): 98.5 (22 Apr 2020 12:00), Max: 98.8 (22 Apr 2020 00:48)  HR: 110 (22 Apr 2020 15:42) (80 - 110)  BP: 113/54 (22 Apr 2020 14:00) (80/40 - 113/54)  BP(mean): 69 (22 Apr 2020 14:00) (50 - 77)  RR: 32 (22 Apr 2020 15:00) (26 - 32)  SpO2: 92% (22 Apr 2020 15:42) (89% - 97%)  Mode: AC/ CMV (Assist Control/ Continuous Mandatory Ventilation)  RR (machine): 32  TV (machine): 450  FiO2: 70  PEEP: 12  ITime: 1  MAP: 26  PIP: 53   (if applicable)    Chest Tube (if applicable)    HEENT: Head is normocephalic and atraumatic. Extraocular muscles are intact. Mucous membranes are moist. + ETT    NECK: Supple, no palpable adenopathy.    LUNGS: Clear to auscultation, no wheezing, rales, or rhonchi.    HEART: Regular rate and rhythm without murmur.    ABDOMEN: Soft, nontender, and nondistended.  No hepatosplenomegaly is noted.    : No painful voiding, no pelvic pain    EXTREMITIES: Without any cyanosis, clubbing, rash, lesions or edema.    NEUROLOGIC: sedated    SKIN: Warm, dry, good turgor.      LABS:                        10.9   18.77 )-----------( 262      ( 22 Apr 2020 06:56 )             36.9     04-22    137  |  104  |  59<H>  ----------------------------<  69<L>  4.0   |  22  |  3.95<H>    Ca    8.8      22 Apr 2020 06:56  Phos  6.5     04-22  Mg     2.7     04-22    TPro  7.0  /  Alb  1.6<L>  /  TBili  3.5<H>  /  DBili  x   /  AST  45<H>  /  ALT  39  /  AlkPhos  91  04-22    PT/INR - ( 22 Apr 2020 06:56 )   PT: 12.6 sec;   INR: 1.11 ratio         PTT - ( 22 Apr 2020 06:56 )  PTT:32.4 sec    ABG - ( 22 Apr 2020 12:04 )  pH, Arterial: 7.16  pH, Blood: x     /  pCO2: 63    /  pO2: 85    / HCO3: 22    / Base Excess: -7.3  /  SaO2: 94                CARDIAC MARKERS ( 22 Apr 2020 06:56 )  0.070 ng/mL / x     / 181 U/L / x     / x          D-Dimer Assay, Quantitative: 2219 ng/mL DDU (04-22-20 @ 06:56)        Procalcitonin, Serum: 7.18 ng/mL (04-22-20 @ 09:33)  Procalcitonin, Serum: 1.46 ng/mL (04-20-20 @ 09:54)      MICROBIOLOGY: (if applicable)    RADIOLOGY & ADDITIONAL STUDIES:  EKG:   CXR:< from: Xray Chest 1 View- PORTABLE-Routine (04.22.20 @ 07:38) >    EXAM:  XR CHEST PORTABLE ROUTINE 1V                            PROCEDURE DATE:  04/22/2020          INTERPRETATION:    DATE OF STUDY: 4/22/2020    PRIOR: 4/21/2020    CLINICAL INDICATION: Assess pleuropatenchymal changes.    TECHNIQUE: portable chest.    FINDINGS/  IMPRESSION:   ET tube tip appropriately positioned above the dinesh.  NG tube in stomach -tip is off the film.  Interval placement of left-sided IJ venous catheter with tip in left innominate vein.  The heart is top normal in size. The lungs show unchanged diffuse ill-defined upper lower lobe opacities/infiltrates.  No large pleural effusion. No pneumothorax.  No acute bony finding.                  KIET CHAUDHARI   This document has been electronically signed. Apr 22 2020  8:24AM                < end of copied text >    ECHO:    IMPRESSION: 54y Female PAST MEDICAL & SURGICAL HISTORY:  HTN (hypertension)  No significant past surgical history   p/w         53 y/o F patient, w/ PMHx of HTN, works as HD nurse,  presents to the ED w/ fever, chills, nausea, vomiting, and mild cough that began x1 week ago. Patient was admitted on medicine floor for COVID pna. ICU consulted for hypoxia and desaturation on NRB.  DDimer is trending up and is on . therapeutic anticoag.  Pat became more dyspnenic and hypoxic, intubated and placed on vent support sedated . WBC trending up    Assessment:  -Acute Hypoxic Respiratory Failure  -B/L  PNA   -ARDS  -COVID 19 infection  -HTN  -Hypercaog state  -RASHAUN      Plan:  - intubation 4/15  -monitor procalcitonin and biomarkers  -Neph f/u  -prone therapy as needed  -vent support with lung protective strategy   -isolation.. contact and airborne  -s/p anakinra and solumedrol as per protocol   -s/p plaquenil and vitamins  -dvt/gi prophy .    -sedated and pain control .        case discussed with icu team    time spent 39 min

## 2020-04-22 NOTE — CONSULT NOTE ADULT - PROBLEM SELECTOR RECOMMENDATION 9
2/2 shock/ARDS due to COVID19+. Involving lungs (patient intubated), heart (on pressor support), kidneys (RASHAUN, worsening Cr 3.95). Patient remains sedated/intubated, on paralytic and pressor support, IV abx. Nephro following - if Cr worsens, patient may benefit from HD. Awaiting call from patient's  to further discuss status, goals of care and identify surrogate. At this time, patient remains a full code.

## 2020-04-22 NOTE — CONSULT NOTE ADULT - CONSULT REASON
55 y/o F with COVID/shock, intubated, worsening renal function. Request to establish surrogate and goals of care.

## 2020-04-22 NOTE — PROGRESS NOTE ADULT - SUBJECTIVE AND OBJECTIVE BOX
[facility] : Progress Note :: [phone], (office [phone]),   Dr Rosa / Dr Mosher / Dr Lin / Dr Narvaez / Dr Nando SOTELO / Dr Temple / Dr Silva / Dr Bjorn mendoza  _____________________________________________________________________________________________  remains with high oxygen requirements and resp acidosis.    No Known Allergies    Hospital Medications:   MEDICATIONS  (STANDING):  albumin human 25% IVPB 50 milliLiter(s) IV Intermittent every 6 hours  ALBUTerol    90 MICROgram(s) HFA Inhaler 2 Puff(s) Inhalation every 6 hours  cefepime   IVPB 1000 milliGRAM(s) IV Intermittent every 8 hours  chlorhexidine 0.12% Liquid 15 milliLiter(s) Oral Mucosa every 12 hours  dextrose 5% + sodium chloride 0.45%. 1000 milliLiter(s) (50 mL/Hr) IV Continuous <Continuous>  enoxaparin Injectable 90 milliGRAM(s) SubCutaneous two times a day  HYDROmorphone Infusion 2 mG/Hr (2 mL/Hr) IV Continuous <Continuous>  midodrine 10 milliGRAM(s) Oral every 8 hours  norepinephrine Infusion 0.05 MICROgram(s)/kG/Min (4.04 mL/Hr) IV Continuous <Continuous>  pantoprazole   Suspension 40 milliGRAM(s) Oral daily  polyethylene glycol 3350 17 Gram(s) Oral daily  propofol Infusion 15 MICROgram(s)/kG/Min (7.76 mL/Hr) IV Continuous <Continuous>  rocuronium Infusion 8 MICROgram(s)/kG/Min (8.28 mL/Hr) IV Continuous <Continuous>  senna 2 Tablet(s) Oral at bedtime        VITALS:  T(F): 99.5 (20 @ 15:30), Max: 99.5 (20 @ 15:30)  HR: 110 (20 @ 15:42)  BP: 113/54 (20 @ 14:00)  RR: 13 (20 @ 15:30)  SpO2: 92% (20 @ 15:42)  Wt(kg): --     @ 07:01  -   @ 07:00  --------------------------------------------------------  IN: 1073.3 mL / OUT: 30 mL / NET: 1043.3 mL     @ 07:01  -   @ 16:06  --------------------------------------------------------  IN: 2015.9 mL / OUT: 25 mL / NET: 1990.9 mL        PHYSICAL EXAM:   deferrred sec to COVID-19 status.    LABS:      137  |  104  |  59<H>  ----------------------------<  69<L>  4.0   |  22  |  3.95<H>    Ca    8.8      2020 06:56  Phos  6.5       Mg     2.7         TPro  7.0  /  Alb  1.6<L>  /  TBili  3.5<H>  /  DBili      /  AST  45<H>  /  ALT  39  /  AlkPhos  91      Creatinine Trend: 3.95 <--, 2.95 <--, 2.32 <--, 0.79 <--, 0.72 <--, 0.96 <--, 0.66 <--, 0.94 <--                        10.9   18.77 )-----------( 262      ( 2020 06:56 )             36.9     Urine Studies:  Urinalysis Basic - ( 2020 10:52 )    Color: Alvina / Appearance: Clear / S.020 / pH:   Gluc:  / Ketone: Small  / Bili: Moderate / Urobili: 8   Blood:  / Protein: 30 mg/dL / Nitrite: Positive   Leuk Esterase: Trace / RBC: 0-2 /HPF / WBC 0-2 /HPF   Sq Epi:  / Non Sq Epi: Few /HPF / Bacteria: Trace /HPF        RADIOLOGY & ADDITIONAL STUDIES:

## 2020-04-23 NOTE — PROGRESS NOTE ADULT - ATTENDING COMMENTS
Assessment:  1. ARDS  2. Septic shock - possibly hypotension contributed by sedatives  3. Viral pneumonia secondary to COVID 19  4. Hypernatremia  5. Acute kidney injury    Plan  - Mechanical ventilation with lung protective strategy   - Titrate Fio2 and PEEP as tolerated  - Sedation for ventilator synchrony   - Vasopressor support to maintain MAP > 60  - Monitor ABG  - S/p Anakinra and Hydroxychloroquine  - Airborne and Contact isolation  - Plan for prone positioning daily  - Cultures negative thus far and cont IV antibiotics will complete 7 day course, leukocytosis is resolving  - Monitor urine output  - Nephrology consult  - Worsening creatinine  - Lasix challenge  - May need HD  - Start sodium bicarbonate infusion for metabolic acidosis  - Bowel regimen  - Change tube feedings to Nepro  - DVT and Stress Ulcer prophylaxis  - Reported with oral mucosal bleeding this morning, will hold all anticoagulants for now  - Over all prognosis is poor given multiple organ dysfunction

## 2020-04-23 NOTE — PROGRESS NOTE ADULT - SUBJECTIVE AND OBJECTIVE BOX
[facility] : Progress Note :: [phone], (office [phone]),   Dr Rosa / Dr Mosher / Dr Lin / Dr Narvaez / Dr Nando SOTELO / Dr Temple / Dr Silva / Dr Bjorn mendoza  _____________________________________________________________________________________________    Remains with increased O2 needs and oliguric    No Known Allergies    Hospital Medications:   MEDICATIONS  (STANDING):  ALBUTerol    90 MICROgram(s) HFA Inhaler 2 Puff(s) Inhalation every 6 hours  bisacodyl Suppository 10 milliGRAM(s) Rectal daily  cefepime   IVPB 1000 milliGRAM(s) IV Intermittent every 8 hours  chlorhexidine 0.12% Liquid 15 milliLiter(s) Oral Mucosa every 12 hours  furosemide   Injectable 80 milliGRAM(s) IV Push once  HYDROmorphone Infusion 0.5 mG/Hr (0.5 mL/Hr) IV Continuous <Continuous>  midazolam Infusion 0.02 mG/kG/Hr (1.72 mL/Hr) IV Continuous <Continuous>  midodrine 10 milliGRAM(s) Oral every 8 hours  norepinephrine Infusion 0.05 MICROgram(s)/kG/Min (4.04 mL/Hr) IV Continuous <Continuous>  pantoprazole   Suspension 40 milliGRAM(s) Oral daily  polyethylene glycol 3350 17 Gram(s) Oral daily  rocuronium Infusion 8 MICROgram(s)/kG/Min (8.28 mL/Hr) IV Continuous <Continuous>  senna 2 Tablet(s) Oral at bedtime  sodium bicarbonate  Infusion 0.131 mEq/kG/Hr (75 mL/Hr) IV Continuous <Continuous>        VITALS:  T(F): 96.6 (20 @ 08:00), Max: 99.5 (20 @ 15:30)  HR: 77 (20 @ 12:00)  BP: 160/74 (20 @ 12:00)  RR: 35 (20 @ 12:00)  SpO2: 82% (20 @ 12:00)  Wt(kg): --     @ 07:  -   @ 07:00  --------------------------------------------------------  IN:  mL / OUT: 25 mL / NET:  mL     @ 07:  -   @ 12:04  --------------------------------------------------------  IN: 88 mL / OUT: 0 mL / NET: 88 mL        PHYSICAL EXAM:  deferred COVID-19    LABS:      133<L>  |  99  |  69<H>  ----------------------------<  124<H>  4.6   |  19<L>  |  5.28<H>    Ca    8.6      2020 06:35  Phos  8.5       Mg     3.2         TPro  7.5  /  Alb  2.1<L>  /  TBili  4.2<H>  /  DBili      /  AST  37  /  ALT  31  /  AlkPhos  86      Creatinine Trend: 5.28 <--, 3.95 <--, 2.95 <--, 2.32 <--, 0.79 <--, 0.72 <--, 0.96 <--, 0.66 <--                        10.2   14.93 )-----------( 253      ( 2020 06:35 )             34.4     Urine Studies:  Urinalysis Basic - ( 2020 10:52 )    Color: Alvina / Appearance: Clear / S.020 / pH:   Gluc:  / Ketone: Small  / Bili: Moderate / Urobili: 8   Blood:  / Protein: 30 mg/dL / Nitrite: Positive   Leuk Esterase: Trace / RBC: 0-2 /HPF / WBC 0-2 /HPF   Sq Epi:  / Non Sq Epi: Few /HPF / Bacteria: Trace /HPF        RADIOLOGY & ADDITIONAL STUDIES:

## 2020-04-23 NOTE — PROGRESS NOTE ADULT - ASSESSMENT
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 Presented to ED with SOB, fever and hypoxia. Subsequently transfered to ICU intubated and developed ARDS s/p CQ and Anikinra. Subsequently developed RASHAUN with ensuing oliguria prompting a renal consult. Hypernatremia has improved. Severe hypoalbuminemia 1.3     # RASHAUN sec to ATN from COVID - 19 related disease. With ARDS. remains on pressors. hypoalbuminemia. ensuing oliguria.  increasing oxygen requirements.  got lasix. monitor UOP  if real function does not improve and  UOP remains low, will consider initiating HD in AM

## 2020-04-23 NOTE — PROGRESS NOTE ADULT - SUBJECTIVE AND OBJECTIVE BOX
Time of Visit:  Patient seen and examined.     MEDICATIONS  (STANDING):  ALBUTerol    90 MICROgram(s) HFA Inhaler 2 Puff(s) Inhalation every 6 hours  bisacodyl Suppository 10 milliGRAM(s) Rectal daily  cefepime   IVPB 1000 milliGRAM(s) IV Intermittent every 8 hours  chlorhexidine 0.12% Liquid 15 milliLiter(s) Oral Mucosa every 12 hours  HYDROmorphone Infusion 0.5 mG/Hr (0.5 mL/Hr) IV Continuous <Continuous>  midazolam Infusion 0.02 mG/kG/Hr (1.72 mL/Hr) IV Continuous <Continuous>  midodrine 10 milliGRAM(s) Oral every 8 hours  norepinephrine Infusion 0.05 MICROgram(s)/kG/Min (4.04 mL/Hr) IV Continuous <Continuous>  pantoprazole   Suspension 40 milliGRAM(s) Oral daily  polyethylene glycol 3350 17 Gram(s) Oral daily  rocuronium Infusion 8 MICROgram(s)/kG/Min (8.28 mL/Hr) IV Continuous <Continuous>  senna 2 Tablet(s) Oral at bedtime  sodium bicarbonate  Infusion 0.131 mEq/kG/Hr (75 mL/Hr) IV Continuous <Continuous>      MEDICATIONS  (PRN):  acetaminophen    Suspension .. 650 milliGRAM(s) Oral every 6 hours PRN Temp greater or equal to 38C (100.4F)       Medications up to date at time of exam.      PHYSICAL EXAMINATION:  Patient has no new complaints.  GENERAL: The patient is a well-developed, well-nourished, in no apparent distress.     Vital Signs Last 24 Hrs  T(C): 35.6 (23 Apr 2020 20:00), Max: 36.2 (23 Apr 2020 00:42)  T(F): 96 (23 Apr 2020 20:00), Max: 97.2 (23 Apr 2020 05:10)  HR: 63 (23 Apr 2020 20:00) (62 - 99)  BP: 101/57 (23 Apr 2020 20:00) (75/46 - 160/74)  BP(mean): 67 (23 Apr 2020 20:00) (53 - 95)  RR: 35 (23 Apr 2020 20:00) (32 - 35)  SpO2: 96% (23 Apr 2020 20:00) (82% - 98%)  Mode: AC/ CMV (Assist Control/ Continuous Mandatory Ventilation)  RR (machine): 35  TV (machine): 430  FiO2: 50  PEEP: 12  ITime: 0.8  MAP: 26  PIP: 45   (if applicable)    Chest Tube (if applicable)    HEENT: Head is normocephalic and atraumatic. Extraocular muscles are intact. Mucous membranes are moist.  +ETT    NECK: Supple, no palpable adenopathy.    LUNGS: Clear to auscultation, no wheezing, rales, or rhonchi.    HEART: Regular rate and rhythm without murmur.    ABDOMEN: Soft, nontender, and nondistended.  No hepatosplenomegaly is noted.    : No painful voiding, no pelvic pain    EXTREMITIES: Without any cyanosis, clubbing, rash, lesions or edema.    NEUROLOGIC: sedated    SKIN: Warm, dry, good turgor.      LABS:                        10.2   14.93 )-----------( 253      ( 23 Apr 2020 06:35 )             34.4     04-23    133<L>  |  99  |  69<H>  ----------------------------<  124<H>  4.6   |  19<L>  |  5.28<H>    Ca    8.6      23 Apr 2020 06:35  Phos  8.5     04-23  Mg     3.2     04-23    TPro  7.5  /  Alb  2.1<L>  /  TBili  4.2<H>  /  DBili  x   /  AST  37  /  ALT  31  /  AlkPhos  86  04-23    PT/INR - ( 22 Apr 2020 06:56 )   PT: 12.6 sec;   INR: 1.11 ratio         PTT - ( 22 Apr 2020 06:56 )  PTT:32.4 sec    ABG - ( 23 Apr 2020 03:38 )  pH, Arterial: 7.20  pH, Blood: x     /  pCO2: 49    /  pO2: 138   / HCO3: 18    / Base Excess: -8.1  /  SaO2: 99                CARDIAC MARKERS ( 22 Apr 2020 06:56 )  0.070 ng/mL / x     / 181 U/L / x     / x                Procalcitonin, Serum: 7.18 ng/mL (04-22-20 @ 09:33)      MICROBIOLOGY: (if applicable)    RADIOLOGY & ADDITIONAL STUDIES:  EKG:   CXR:< from: Xray Chest 1 View- PORTABLE-Routine (04.23.20 @ 08:00) >    EXAM:  XR CHEST PORTABLE ROUTINE 1V                            PROCEDURE DATE:  04/23/2020          INTERPRETATION:  CLINICAL STATEMENT: Follow-up chest pain.    TECHNIQUE: AP view of the chest.    COMPARISON: 4/22/2020    FINDINGS/  IMPRESSION:  ET tube, feeding tube, left central line again noted. Left central line courses overlies the aortic arch. Venous return confirmation recommended if not performed.    No pneumothorax. Bilateral airspace opacities without significant change.    No significant pleural effusion.    Heart size cannot be accurately assessed in this projection.                  AKIN AMOS M.D., ATTENDING RADIOLOGIST  This document has been electronically signed. Apr 23 2020  7:48AM                < end of copied text >    ECHO:    IMPRESSION: 54y Female PAST MEDICAL & SURGICAL HISTORY:  HTN (hypertension)  No significant past surgical history   p/w         53 y/o F patient, w/ PMHx of HTN, works as HD nurse,  presents to the ED w/ fever, chills, nausea, vomiting, and mild cough that began x1 week ago. Patient was admitted on medicine floor for COVID pna. ICU consulted for hypoxia and desaturation on NRB.  DDimer is trending up and is on . therapeutic anticoag.  Pat became more dyspnenic and hypoxic, intubated and placed on vent support sedated . WBC trending up    Assessment:  -Acute Hypoxic Respiratory Failure  -B/L  PNA   -ARDS  -COVID 19 infection  -HTN  -Hypercaog state  -RASHAUN      Plan:  - intubation 4/15  -monitor procalcitonin and biomarkers  -Neph f/u... possible HD in am   -prone therapy as needed  -vent support with lung protective strategy   -isolation.. contact and airborne  -s/p anakinra and solumedrol as per protocol   -s/p plaquenil and vitamins  -dvt/gi prophy .    -sedated and pain control .        case discussed with icu team    time spent 39 min

## 2020-04-23 NOTE — PROGRESS NOTE ADULT - ASSESSMENT
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 Patient was admitted on medicine floor for COVID pna.  Admitted to ICU for hypoxia and desaturation on NRB.     Plan:  Neuro:  -Pt sedated with propofol  -Dilaudid drip  -Paralyzed with Rucoronium    CV:  - Hold BP medications  -Levophed for pressor support  - on midodrine 10mg q 8       Pulm:  - Intubated for respiratory distress and hypoxia 4/15  - CXR shows worsening right lung infiltrate  - Acute Hypoxic Resp Failure secondary to PNA + COVID 19  - S/P  ANAKINRA and Hydroxychloroquine ( since April 9th)   - on Full dose Lovenox   - Current vent settings 35/450/50/12 ,       ID:  -  completed Hydroxychloroquine, anakinra   Pt was febrile overnight  blood culture negative  on Cefepime since 4/17  WBC: 16K  -CRP: 7.69--> 22.01 -->15.07  -D-Dimer: 1166--> 1401-->1142  -Ferritin: 1055--> 776--> 944      Nephro:  RASHAUN:    -Cr/BUN:  5.28/69  -Continue  albumin and NS @100 cc  - monitor urine output   - Monitor electrolytes   Hypernatremia:  Resolved  Monitor BMP  Nephrology: Dr. Rosa consulted    GI:  -  Tube feed  - gi ppx with Protonix  - miralax and senna for prophylaxis     Heme:  - no indication for transfusion   - monitor cbc daily     Endo:  - No history of DM  - target CBG < 180  - FS q6 while Tube feed      Prophy:  - Renally  dose Lovenox

## 2020-04-23 NOTE — CHART NOTE - NSCHARTNOTEFT_GEN_A_CORE
PC Aldo contacted patient's spouse  in the [facility] [phone] to discuss NOKS.  Patient's spouse stated he will designate his sister Mrs. Criselda Egan [phone] as the NOKS for medical discussions and decision making with the team.  Patient and her  have a daughter Rere who is residing in [place] and "in her twenties," as per her father.  Dr. Lees denied addl. needs at this time.  Pc Sw will remain available as needed.  MATTY Carlson relayed this information to PC NP as well as  in the ICU.

## 2020-04-23 NOTE — PROGRESS NOTE ADULT - SUBJECTIVE AND OBJECTIVE BOX
INTERVAL HPI/OVERNIGHT EVENTS: Pt's renal function is worsening.  Pt is paralyzed and in prone position.     PRESSORS: [x ] YES [ ] NO  WHICH: Levophed    ANTIBIOTICS:    Cefepime               DATE STARTED: 4/17    Antimicrobial:  cefepime   IVPB 1000 milliGRAM(s) IV Intermittent every 8 hours    Cardiovascular:  midodrine 10 milliGRAM(s) Oral every 8 hours  norepinephrine Infusion 0.05 MICROgram(s)/kG/Min IV Continuous <Continuous>    Pulmonary:  ALBUTerol    90 MICROgram(s) HFA Inhaler 2 Puff(s) Inhalation every 6 hours    Hematalogic:  enoxaparin Injectable 90 milliGRAM(s) SubCutaneous two times a day    Other:  acetaminophen    Suspension .. 650 milliGRAM(s) Oral every 6 hours PRN  chlorhexidine 0.12% Liquid 15 milliLiter(s) Oral Mucosa every 12 hours  dextrose 5% + sodium chloride 0.45%. 1000 milliLiter(s) IV Continuous <Continuous>  HYDROmorphone Infusion 2 mG/Hr IV Continuous <Continuous>  pantoprazole   Suspension 40 milliGRAM(s) Oral daily  polyethylene glycol 3350 17 Gram(s) Oral daily  propofol Infusion 15 MICROgram(s)/kG/Min IV Continuous <Continuous>  rocuronium Infusion 8 MICROgram(s)/kG/Min IV Continuous <Continuous>  senna 2 Tablet(s) Oral at bedtime      Drug Dosing Weight  Height (cm): 134.62 (07 Apr 2020 23:13)  Weight (kg): 86.2 (07 Apr 2020 23:13)  BMI (kg/m2): 47.6 (07 Apr 2020 23:13)  BSA (m2): 1.67 (07 Apr 2020 23:13)    CENTRAL LINE: [x ] YES [ ] NO  LOCATION:  [facility]  DATE INSERTED: 4/15  REMOVE: [ ] YES [ ] NO  EXPLAIN:    RENNER: [x ] YES [ ] NO    DATE INSERTED:  REMOVE:  [ ] YES [ ] NO  EXPLAIN:    A-LINE:  [ x] YES [ ] NO  LOCATION:   DATE INSERTED: 4/17  REMOVE:  [ ] YES [ ] NO  EXPLAIN:    PMH/Social Hx/Fam Hx -reviewed admission note, no change since admission  PAST MEDICAL & SURGICAL HISTORY:  HTN (hypertension)  No significant past surgical history      T(C): 35.9 (04-23-20 @ 08:00), Max: 37.5 (04-22-20 @ 15:30)  HR: 66 (04-23-20 @ 08:00)  BP: 75/46 (04-23-20 @ 08:00)  BP(mean): 53 (04-23-20 @ 08:00)  ABP: 74/42 (04-23-20 @ 08:00)  ABP(mean): 52 (04-23-20 @ 08:00)  RR: 35 (04-23-20 @ 08:00)  SpO2: 95% (04-23-20 @ 08:00)  Wt(kg): --    ABG - ( 23 Apr 2020 03:38 )  pH, Arterial: 7.20  pH, Blood: x     /  pCO2: 49    /  pO2: 138   / HCO3: 18    / Base Excess: -8.1  /  SaO2: 99                    04-22 @ 07:01  -  04-23 @ 07:00  --------------------------------------------------------  IN: 2050 mL / OUT: 25 mL / NET: 2025 mL        Mode: AC/ CMV (Assist Control/ Continuous Mandatory Ventilation)  RR (machine): 35  TV (machine): 450  FiO2: 70  PEEP: 12  ITime: 1  MAP: 27  PIP: 50      PHYSICAL EXAM:  GENERAL: Pt sedated and intubated  HEAD: Atraumatic, Normocephalic  EYES: PERRLA, conjunctiva and sclera clear  ENMT: No tonsillar erythema, exudates, or enlargement; Moist mucous membranes  NECK: Supple, normal appearance  NERVOUS SYSTEM: Sedated  CHEST/LUNG: No chest deformity; crackles present to auscultation    HEART: Tachycardiac, no murmur  ABDOMEN: Soft, Nontender, Nondistended; Bowel sounds present  EXTREMITIES: No clubbing, cyanosis, or edema  LYMPH: No lymphadenopathy noted  SKIN: No rashes or lesions; Good capillary     LABS:  CBC Full  -  ( 23 Apr 2020 06:35 )  WBC Count : 14.93 K/uL  RBC Count : 3.72 M/uL  Hemoglobin : 10.2 g/dL  Hematocrit : 34.4 %  Platelet Count - Automated : 253 K/uL  Mean Cell Volume : 92.5 fl  Mean Cell Hemoglobin : 27.4 pg  Mean Cell Hemoglobin Concentration : 29.7 gm/dL  Auto Neutrophil # : x  Auto Lymphocyte # : x  Auto Monocyte # : x  Auto Eosinophil # : x  Auto Basophil # : x  Auto Neutrophil % : x  Auto Lymphocyte % : x  Auto Monocyte % : x  Auto Eosinophil % : x  Auto Basophil % : x    04-23    133<L>  |  99  |  69<H>  ----------------------------<  124<H>  4.6   |  19<L>  |  5.28<H>    Ca    8.6      23 Apr 2020 06:35  Phos  8.5     04-23  Mg     3.2     04-23    TPro  7.5  /  Alb  2.1<L>  /  TBili  4.2<H>  /  DBili  x   /  AST  37  /  ALT  31  /  AlkPhos  86  04-23    PT/INR - ( 22 Apr 2020 06:56 )   PT: 12.6 sec;   INR: 1.11 ratio         PTT - ( 22 Apr 2020 06:56 )  PTT:32.4 sec        RADIOLOGY & ADDITIONAL STUDIES REVIEWED:      [x ]GOALS OF CARE DISCUSSION WITH PATIENT/FAMILY/PROXY: FULL CODE    CRITICAL CARE TIME SPENT: 35 minutes

## 2020-04-24 NOTE — PROGRESS NOTE ADULT - ASSESSMENT
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 Patient was admitted on medicine floor for COVID pna.  Admitted to ICU for hypoxia and desaturation on NRB.     Plan:  Neuro:  -Pt sedated with propofol  -Dilaudid drip  -Paralyzed with Rucoronium    CV:  - Hold BP medications  -Levophed for pressor support  - on midodrine 10mg q 8       Pulm:  - Intubated for respiratory distress and hypoxia 4/15  - CXR shows worsening right lung infiltrate  - Acute Hypoxic Resp Failure secondary to PNA + COVID 19  - S/P  ANAKINRA and Hydroxychloroquine ( since April 9th)   - Discontinued full dose Lovenox due to epistaxis  - Current vent settings 35/450/50/12 ,       ID:  -  completed Hydroxychloroquine, anakinra   Pt was febrile overnight  blood culture negative  on Cefepime since 4/17  WBC: 16K  -CRP: 7.69--> 22.01 -->15.07-->  -D-Dimer: 1166--> 1401-->1142-->1901  -Ferritin: 1055--> 776--> 944-->935      Nephro:  RASHAUN:    -Cr/BUN:  5.44/82  -Started on Lasix drip  - monitor urine output   - Monitor electrolytes   Hypernatremia:  Resolved    Metabolic acidosis:  -Continue sodium bicarbonate drip   Monitor BMP  Nephrology: Dr. Rosa consulted    GI:  -  Tube feed  - GI ppx with Protonix  - Miralax and senna for prophylaxis     Heme:  - no indication for transfusion   - monitor cbc daily     Endo:  - No history of DM  - target CBG < 180  - FS q6 while Tube feed      Prophy:  - Heparin s/c for DVT prophylaxis

## 2020-04-24 NOTE — PROGRESS NOTE ADULT - SUBJECTIVE AND OBJECTIVE BOX
INTERVAL HPI/OVERNIGHT EVENTS:     PRESSORS: [ ] YES [ ] NO  WHICH:    ANTIBIOTICS:                  DATE STARTED:  ANTIBIOTICS:                  DATE STARTED:  ANTIBIOTICS:                  DATE STARTED:    Antimicrobial:  cefepime   IVPB 1000 milliGRAM(s) IV Intermittent every 8 hours    Cardiovascular:  furosemide Infusion 5 mG/Hr IV Continuous <Continuous>  midodrine 10 milliGRAM(s) Oral every 8 hours  norepinephrine Infusion 0.05 MICROgram(s)/kG/Min IV Continuous <Continuous>    Pulmonary:  ALBUTerol    90 MICROgram(s) HFA Inhaler 2 Puff(s) Inhalation every 6 hours    Hematalogic:    Other:  acetaminophen    Suspension .. 650 milliGRAM(s) Oral every 6 hours PRN  bisacodyl Suppository 10 milliGRAM(s) Rectal daily  chlorhexidine 0.12% Liquid 15 milliLiter(s) Oral Mucosa every 12 hours  HYDROmorphone Infusion 0.5 mG/Hr IV Continuous <Continuous>  midazolam Infusion 0.02 mG/kG/Hr IV Continuous <Continuous>  pantoprazole   Suspension 40 milliGRAM(s) Oral daily  polyethylene glycol 3350 17 Gram(s) Oral daily  rocuronium Infusion 8 MICROgram(s)/kG/Min IV Continuous <Continuous>  senna 2 Tablet(s) Oral at bedtime  sodium bicarbonate  Infusion 0.131 mEq/kG/Hr IV Continuous <Continuous>      Drug Dosing Weight  Height (cm): 134.62 (07 Apr 2020 23:13)  Weight (kg): 86.2 (07 Apr 2020 23:13)  BMI (kg/m2): 47.6 (07 Apr 2020 23:13)  BSA (m2): 1.67 (07 Apr 2020 23:13)    CENTRAL LINE: [ ] YES [ ] NO  LOCATION:   DATE INSERTED:  REMOVE: [ ] YES [ ] NO  EXPLAIN:    RENNER: [ ] YES [ ] NO    DATE INSERTED:  REMOVE:  [ ] YES [ ] NO  EXPLAIN:    A-LINE:  [ ] YES [ ] NO  LOCATION:   DATE INSERTED:  REMOVE:  [ ] YES [ ] NO  EXPLAIN:    PMH/Social Hx/Fam Hx -reviewed admission note, no change since admission  PAST MEDICAL & SURGICAL HISTORY:  HTN (hypertension)  No significant past surgical history    Heart faliure: acute [ ] chronic [ ] acute or chronic [ ] diastolic [ ] systolic [ ] combied systolic and diastolic[ ]  RASHAUN: ATN[ ] renal medullary necrosis [ ] CKD I [ ]CKDII [ ]CKD III [ ]CKD IV [ ]CKD V [ ]Other pathological lesions [ ]  Abdominal Nutrition Status: malnutrition [ ] cachexia [ ] morbid obesity/BMI=40 [ ] Supplement ordered [___________]     T(C): 35.3 (04-24-20 @ 08:00), Max: 36 (04-23-20 @ 12:00)  HR: 64 (04-24-20 @ 09:00)  BP: 92/52 (04-24-20 @ 09:00)  BP(mean): 60 (04-24-20 @ 09:00)  ABP: 111/58 (04-24-20 @ 09:00)  ABP(mean): 73 (04-24-20 @ 09:00)  RR: 35 (04-24-20 @ 09:00)  SpO2: 94% (04-24-20 @ 09:00)  Wt(kg): --    ABG - ( 24 Apr 2020 03:39 )  pH, Arterial: 7.25  pH, Blood: x     /  pCO2: 49    /  pO2: 95    / HCO3: 21    / Base Excess: -6    /  SaO2: 96                    04-23 @ 07:01  -  04-24 @ 07:00  --------------------------------------------------------  IN: 1827.1 mL / OUT: 55 mL / NET: 1772.1 mL        Mode: AC/ CMV (Assist Control/ Continuous Mandatory Ventilation)  RR (machine): 35  TV (machine): 430  FiO2: 50  PEEP: 12  ITime: 0.8  MAP: 26  PIP: 45      PHYSICAL EXAM:    GENERAL: [ ]NAD, [ ]well-groomed, [ ]well-developed  HEAD:  [ ]Atraumatic, [ ]Normocephalic  EYES: [ ]EOMI, [ ]PERRLA, [ ]conjunctiva and sclera clear  ENMT: [ ]No tonsillar erythema, exudates, or enlargement; [ ]Moist mucous membranes, [ ]Good dentition, [ ]No lesions  NECK: [ ]Supple, normal appearance, [ ]No JVD; [ ]Normal thyroid; [ ]Trachea midline  NERVOUS SYSTEM:  [ ]Alert & Oriented X3, [ ]Good concentration; [ ]Motor Strength 5/5 B/L upper and lower extremities; [ ]DTRs 2+ intact and symmetric  CHEST/LUNG: [ ]No chest deformity; [ ]Normal percussion bilaterally; [ ]No rales, rhonchi, wheezing; [ ]Crackles at bases  HEART: [ ]Regular rate and rhythm; [ ]No murmurs, rubs, or gallops  ABDOMEN: [ ]Soft, Nontender, Nondistended; [ ]Bowel sounds present  EXTREMITIES:  [ ]2+ Peripheral Pulses, [ ]No clubbing, cyanosis, or edema [ ]Bilat lower extremity edema  LYMPH: [ ]No lymphadenopathy noted  SKIN: [ ]No rashes or lesions; [ ]Good capillary refill      LABS:  CBC Full  -  ( 24 Apr 2020 06:55 )  WBC Count : 13.41 K/uL  RBC Count : 3.81 M/uL  Hemoglobin : 10.4 g/dL  Hematocrit : 33.5 %  Platelet Count - Automated : 238 K/uL  Mean Cell Volume : 87.9 fl  Mean Cell Hemoglobin : 27.3 pg  Mean Cell Hemoglobin Concentration : 31.0 gm/dL  Auto Neutrophil # : x  Auto Lymphocyte # : x  Auto Monocyte # : x  Auto Eosinophil # : x  Auto Basophil # : x  Auto Neutrophil % : x  Auto Lymphocyte % : x  Auto Monocyte % : x  Auto Eosinophil % : x  Auto Basophil % : x    04-24    133<L>  |  97  |  82<H>  ----------------------------<  98  4.3   |  19<L>  |  5.44<H>    Ca    8.9      24 Apr 2020 06:55  Phos  8.3     04-24  Mg     3.2     04-24    TPro  7.4  /  Alb  2.0<L>  /  TBili  4.3<H>  /  DBili  x   /  AST  40  /  ALT  29  /  AlkPhos  85  04-24    PT/INR - ( 24 Apr 2020 06:55 )   PT: 11.0 sec;   INR: 0.97 ratio                 RADIOLOGY & ADDITIONAL STUDIES REVIEWED:      [ ]GOALS OF CARE DISCUSSION WITH PATIENT/FAMILY/PROXY:    CRITICAL CARE TIME SPENT: 35 minutes INTERVAL HPI/OVERNIGHT EVENTS: No acute events overnight.  Pt's renal function is worsening with minimal urine output.     PRESSORS: [x ] YES [ ] NO  WHICH: Levophed    ANTIBIOTICS: Cefepime                   DATE STARTED: 4/17    Antimicrobial:  cefepime   IVPB 1000 milliGRAM(s) IV Intermittent every 8 hours    Cardiovascular:  furosemide Infusion 5 mG/Hr IV Continuous <Continuous>  midodrine 10 milliGRAM(s) Oral every 8 hours  norepinephrine Infusion 0.05 MICROgram(s)/kG/Min IV Continuous <Continuous>    Pulmonary:  ALBUTerol    90 MICROgram(s) HFA Inhaler 2 Puff(s) Inhalation every 6 hours    Hematalogic:    Other:  acetaminophen    Suspension .. 650 milliGRAM(s) Oral every 6 hours PRN  bisacodyl Suppository 10 milliGRAM(s) Rectal daily  chlorhexidine 0.12% Liquid 15 milliLiter(s) Oral Mucosa every 12 hours  HYDROmorphone Infusion 0.5 mG/Hr IV Continuous <Continuous>  midazolam Infusion 0.02 mG/kG/Hr IV Continuous <Continuous>  pantoprazole   Suspension 40 milliGRAM(s) Oral daily  polyethylene glycol 3350 17 Gram(s) Oral daily  rocuronium Infusion 8 MICROgram(s)/kG/Min IV Continuous <Continuous>  senna 2 Tablet(s) Oral at bedtime  sodium bicarbonate  Infusion 0.131 mEq/kG/Hr IV Continuous <Continuous>      Drug Dosing Weight  Height (cm): 134.62 (07 Apr 2020 23:13)  Weight (kg): 86.2 (07 Apr 2020 23:13)  BMI (kg/m2): 47.6 (07 Apr 2020 23:13)  BSA (m2): 1.67 (07 Apr 2020 23:13)    CENTRAL LINE: [x ] YES [ ] NO  LOCATION:   [facility] DATE INSERTED: 4/15  REMOVE: [ ] YES [ ] NO  EXPLAIN:    RENNER: [ x] YES [ ] NO    DATE INSERTED:  REMOVE:  [ ] YES [ ] NO  EXPLAIN:    A-LINE:  [ x] YES [ ] NO  LOCATION:   DATE INSERTED: 4/17  REMOVE:  [ ] YES [ ] NO  EXPLAIN:    PMH/Social Hx/Fam Hx -reviewed admission note, no change since admission  PAST MEDICAL & SURGICAL HISTORY:  HTN (hypertension)  No significant past surgical history        T(C): 35.3 (04-24-20 @ 08:00), Max: 36 (04-23-20 @ 12:00)  HR: 64 (04-24-20 @ 09:00)  BP: 92/52 (04-24-20 @ 09:00)  BP(mean): 60 (04-24-20 @ 09:00)  ABP: 111/58 (04-24-20 @ 09:00)  ABP(mean): 73 (04-24-20 @ 09:00)  RR: 35 (04-24-20 @ 09:00)  SpO2: 94% (04-24-20 @ 09:00)  Wt(kg): --    ABG - ( 24 Apr 2020 03:39 )  pH, Arterial: 7.25  pH, Blood: x     /  pCO2: 49    /  pO2: 95    / HCO3: 21    / Base Excess: -6    /  SaO2: 96                    04-23 @ 07:01  -  04-24 @ 07:00  --------------------------------------------------------  IN: 1827.1 mL / OUT: 55 mL / NET: 1772.1 mL        Mode: AC/ CMV (Assist Control/ Continuous Mandatory Ventilation)  RR (machine): 35  TV (machine): 430  FiO2: 50  PEEP: 12  ITime: 0.8  MAP: 26  PIP: 45      PHYSICAL EXAM:    PHYSICAL EXAM:  GENERAL: Pt sedated and intubated  HEAD: Atraumatic, Normocephalic  EYES: PERRLA, conjunctiva and sclera clear  ENMT: No tonsillar erythema, exudates, or enlargement; Moist mucous membranes  NECK: Supple, normal appearance  NERVOUS SYSTEM: Sedated  CHEST/LUNG: No chest deformity; crackles present to auscultation    HEART: Tachycardiac, no murmur  ABDOMEN: Soft, Nontender, Nondistended; Bowel sounds present  EXTREMITIES: No clubbing, cyanosis, or edema  LYMPH: No lymphadenopathy noted  SKIN: No rashes or lesions; Good capillary     LABS:  CBC Full  -  ( 24 Apr 2020 06:55 )  WBC Count : 13.41 K/uL  RBC Count : 3.81 M/uL  Hemoglobin : 10.4 g/dL  Hematocrit : 33.5 %  Platelet Count - Automated : 238 K/uL  Mean Cell Volume : 87.9 fl  Mean Cell Hemoglobin : 27.3 pg  Mean Cell Hemoglobin Concentration : 31.0 gm/dL  Auto Neutrophil # : x  Auto Lymphocyte # : x  Auto Monocyte # : x  Auto Eosinophil # : x  Auto Basophil # : x  Auto Neutrophil % : x  Auto Lymphocyte % : x  Auto Monocyte % : x  Auto Eosinophil % : x  Auto Basophil % : x    04-24    133<L>  |  97  |  82<H>  ----------------------------<  98  4.3   |  19<L>  |  5.44<H>    Ca    8.9      24 Apr 2020 06:55  Phos  8.3     04-24  Mg     3.2     04-24    TPro  7.4  /  Alb  2.0<L>  /  TBili  4.3<H>  /  DBili  x   /  AST  40  /  ALT  29  /  AlkPhos  85  04-24    PT/INR - ( 24 Apr 2020 06:55 )   PT: 11.0 sec;   INR: 0.97 ratio                 RADIOLOGY & ADDITIONAL STUDIES REVIEWED:      [x ]GOALS OF CARE DISCUSSION WITH PATIENT/FAMILY/PROXY: Full code    CRITICAL CARE TIME SPENT: 35 minutes

## 2020-04-24 NOTE — PROGRESS NOTE ADULT - ATTENDING COMMENTS
Assessment:  1. ARDS  2. Septic shock - possibly hypotension contributed by sedatives  3. Viral pneumonia secondary to COVID 19  4. Hypernatremia  5. Acute kidney injury    Plan  - Mechanical ventilation with lung protective strategy   - Titrate Fio2 and PEEP as tolerated  - Sedation for ventilator synchrony   - Vasopressor support to maintain MAP > 60  - Monitor ABG  - S/p Anakinra and Hydroxychloroquine  - Airborne and Contact isolation  - Plan for prone positioning daily  - Cultures negative thus far and cont IV antibiotics will complete 7 day course, leukocytosis is resolving  - Monitor urine output  - Nephrology consult  - Worsening creatinine  - Lasix challenge  - May need HD, f/u with nephrology  - Trial of lasix infusion  - Cont. sodium bicarbonate infusion for metabolic acidosis  - Bowel regimen  - Change tube feedings to Nepro  - DVT and Stress Ulcer prophylaxis  - Reported with oral mucosal bleeding from mouth, will hold all anticoagulants for now  - Over all prognosis is poor given multiple organ dysfunction

## 2020-04-24 NOTE — CHART NOTE - NSCHARTNOTEFT_GEN_A_CORE
Palliative Care:    Spoke with patient's surrogate/Criselda Egan ([phone]). Discussed status, reviewed medications, labs. All questions answered; supportive counseling provided.

## 2020-04-24 NOTE — PROGRESS NOTE ADULT - SUBJECTIVE AND OBJECTIVE BOX
Patient is a 54y Female   no  acute  events overnight   REMAINS  INTUBATED  ON  VENT   HAS  MINIMAL  URINE OUTPUT      No Known Allergies    Hospital Medications:   MEDICATIONS  (STANDING):  ALBUTerol    90 MICROgram(s) HFA Inhaler 2 Puff(s) Inhalation every 6 hours  bisacodyl Suppository 10 milliGRAM(s) Rectal daily  cefepime   IVPB 1000 milliGRAM(s) IV Intermittent every 8 hours  chlorhexidine 0.12% Liquid 15 milliLiter(s) Oral Mucosa every 12 hours  furosemide Infusion 5 mG/Hr (2.5 mL/Hr) IV Continuous <Continuous>  heparin   Injectable 5000 Unit(s) SubCutaneous every 12 hours  HYDROmorphone Infusion 0.5 mG/Hr (0.5 mL/Hr) IV Continuous <Continuous>  midazolam Infusion 0.02 mG/kG/Hr (1.72 mL/Hr) IV Continuous <Continuous>  midodrine 10 milliGRAM(s) Oral every 8 hours  norepinephrine Infusion 0.05 MICROgram(s)/kG/Min (4.04 mL/Hr) IV Continuous <Continuous>  pantoprazole   Suspension 40 milliGRAM(s) Oral daily  polyethylene glycol 3350 17 Gram(s) Oral daily  rocuronium Infusion 8 MICROgram(s)/kG/Min (8.28 mL/Hr) IV Continuous <Continuous>  senna 2 Tablet(s) Oral at bedtime  sodium bicarbonate  Infusion 0.131 mEq/kG/Hr (75 mL/Hr) IV Continuous <Continuous>    REVIEW OF SYSTEMS:  UNABLE  TO  PERFORM,  PT  ORALLY  INTUBATED  ON PROPOFOL    VITALS:  T(F): 96 (04-24-20 @ 12:00), Max: 96.8 (04-24-20 @ 04:00)  HR: 60 (04-24-20 @ 12:00)  BP: 86/49 (04-24-20 @ 12:00)  RR: 35 (04-24-20 @ 12:00)  SpO2: 91% (04-24-20 @ 12:00)  Wt(kg): --    04-23 @ 07:01  -  04-24 @ 07:00  --------------------------------------------------------  IN: 1827.1 mL / OUT: 55 mL / NET: 1772.1 mL    04-24 @ 07:01  -  04-24 @ 13:03  --------------------------------------------------------  IN: 450.5 mL / OUT: 0 mL / NET: 450.5 mL        PHYSICAL EXAM:  Constitutional: ORALLY  INTUBATED ON  VENT  HEENT: CONJ  PINK  Neck: No JVD  Respiratory: CTAB, no wheezes, rales or rhonchi  Cardiovascular: S1, S2, RRR  Gastrointestinal: BS+, soft, NT/ND  Extremities:  peripheral edema +  Neurological: ON  PROPOFOL  :  keshav. IN  PLACE  OUTPUT 55  CC        LABS:  04-24    133<L>  |  97  |  82<H>  ----------------------------<  98  4.3   |  19<L>  |  5.44<H>    Ca    8.9      24 Apr 2020 06:55  Phos  8.3     04-24  Mg     3.2     04-24    TPro  7.4  /  Alb  2.0<L>  /  TBili  4.3<H>  /  DBili      /  AST  40  /  ALT  29  /  AlkPhos  85  04-24    Creatinine Trend: 5.44 <--, 5.28 <--, 3.95 <--, 2.95 <--, 2.32 <--, 0.79 <--, 0.72 <--, 0.96 <--                        10.4   13.41 )-----------( 238      ( 24 Apr 2020 06:55 )             33.5     Urine Studies:      RADIOLOGY & ADDITIONAL STUDIES:

## 2020-04-24 NOTE — PROGRESS NOTE ADULT - ASSESSMENT
A/P    RISING  CR  ,  LOW  BICARB   ANURIC    SPOKE  WITH  ICU  TEM, ATTENDING    PL AN  TO  START HD  TODAY    THEY  AGREE  WITH THE  PLAN   WILL  HAVE  SURGERY  PLACE   ATILIO  TODAY    WILL  DIALYZE   AFTER THAT   CONSENT  TAKEN  FROM  PTS  SISTER IN LAW   WILL  DO  2  HRS  TODAY   AND  3  HRS  TOMORROW   INFORMED  THE  DIALYSIS  STAFF AS  WELL

## 2020-04-25 NOTE — PROGRESS NOTE ADULT - SUBJECTIVE AND OBJECTIVE BOX
Patient is a 54y Female   RASHAUN  REQUIRING  HD  SUPPORT,     HAD  FIRST  HD  YESTERDAY-  2  HRS    WILL  DO  2-1/2  HRS  AGAIN TODAY  REMAINS  INTUBATED ON  VENT  ON  PRESSOR    No Known Allergies    Hospital Medications:   MEDICATIONS  (STANDING):  ALBUTerol    90 MICROgram(s) HFA Inhaler 2 Puff(s) Inhalation every 6 hours  apixaban 2.5 milliGRAM(s) Oral two times a day  bisacodyl Suppository 10 milliGRAM(s) Rectal daily  cefepime   IVPB 1000 milliGRAM(s) IV Intermittent every 8 hours  chlorhexidine 0.12% Liquid 15 milliLiter(s) Oral Mucosa every 12 hours  furosemide Infusion 5 mG/Hr (2.5 mL/Hr) IV Continuous <Continuous>  HYDROmorphone Infusion 2 mG/Hr (2 mL/Hr) IV Continuous <Continuous>  midazolam Infusion 0.02 mG/kG/Hr (1.72 mL/Hr) IV Continuous <Continuous>  midodrine 10 milliGRAM(s) Oral every 8 hours  norepinephrine Infusion 0.05 MICROgram(s)/kG/Min (4.04 mL/Hr) IV Continuous <Continuous>  pantoprazole   Suspension 40 milliGRAM(s) Oral daily  polyethylene glycol 3350 17 Gram(s) Oral daily  senna 2 Tablet(s) Oral at bedtime    REVIEW OF SYSTEMS:  CANT  BE  PERFORMED  PT  INTUBATED  ON  VENT    VITALS:  T(F): 97 (04-25-20 @ 08:00), Max: 98 (04-24-20 @ 20:40)  HR: 62 (04-25-20 @ 10:00)  BP: 97/49 (04-25-20 @ 10:00)  RR: 35 (04-25-20 @ 10:00)  SpO2: 89% (04-25-20 @ 10:00)  Wt(kg): --    04-24 @ 07:01  -  04-25 @ 07:00  --------------------------------------------------------  IN: 1167 mL / OUT: 70 mL / NET: 1097 mL    04-25 @ 07:01  -  04-25 @ 12:12  --------------------------------------------------------  IN: 73.5 mL / OUT: 0 mL / NET: 73.5 mL        PHYSICAL EXAM:  Constitutional: ORALLY  INTUABTED ON  VENT  HEENT:  CONJ  PINK  Neck: R  IJ  DIALYSIS CATH   IN  PLACE  Respiratory: CTAB, no wheezes, rales or rhonchi  Cardiovascular: S1, S2, RRR  Gastrointestinal: BS+, soft, NT/ND  Extremities:NO P EDEMA ,  HAS  EDEMATOUS  UPPER  EXT  Neurological:  ON  SEDATIVES  :  parr. IN  PLACE,  ANURIC    Vascular Access: R IJ CATH    LABS:  04-25    133<L>  |  97  |  58<H>  ----------------------------<  100<H>  3.8   |  27  |  4.48<H>    Ca    7.8<L>      25 Apr 2020 05:44  Phos  5.6     04-25  Mg     2.4     04-25    TPro  6.5  /  Alb  1.7<L>  /  TBili  3.9<H>  /  DBili      /  AST  44<H>  /  ALT  31  /  AlkPhos  74  04-25    Creatinine Trend: 4.48 <--, 5.44 <--, 5.28 <--, 3.95 <--, 2.95 <--, 2.32 <--, 0.79 <--, 0.72 <--                        9.1    11.71 )-----------( 215      ( 25 Apr 2020 05:44 )             28.9     Urine Studies:      RADIOLOGY & ADDITIONAL STUDIES:

## 2020-04-25 NOTE — PROGRESS NOTE ADULT - ASSESSMENT
A/P  PT  WITH  RASHAUN  ALONG  WITH  COVID  19  PNEUMONIA    HAD  FIRST  HD  YESTERDAY    WILL  BE  DIALYZED  AGAIN  TODAY   UF  GOAL  1.5  LIT   WILL  DO  WITH  3  K  BATH   CORRECTED  CA  OK    PO4  IMPROVING      BICARB ALSO  BETTER     WILL FOLLOW

## 2020-04-25 NOTE — PROGRESS NOTE ADULT - ASSESSMENT
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 Patient was admitted on medicine floor for COVID pna.  Admitted to ICU for hypoxia and desaturation on NRB.     Plan:  Neuro:  -Pt sedated with propofol  -Dilaudid drip  -Paralyzed with Rucoronium    CV:  - Hold BP medications  -Levophed for pressor support  - on midodrine 10mg q 8       Pulm:  - Intubated for respiratory distress and hypoxia 4/15  - CXR shows worsening right lung infiltrate  - Acute Hypoxic Resp Failure secondary to PNA + COVID 19  - S/P  ANAKINRA and Hydroxychloroquine ( since April 9th)   - Discontinued full dose Lovenox due to epistaxis  - Current vent settings 35/450/50/12 ,       ID:  -  completed Hydroxychloroquine, anakinra   Pt was febrile overnight  blood culture negative  on Cefepime since 4/17  WBC: 16K  -CRP: 7.69--> 22.01 -->15.07-->  -D-Dimer: 1166--> 1401-->1142-->1901  -Ferritin: 1055--> 776--> 944-->935      Nephro:  RASHAUN:    -Cr/BUN:  5.44/82  -Started on Lasix drip  - monitor urine output   - Monitor electrolytes   Hypernatremia:  Resolved    Metabolic acidosis:  -Continue sodium bicarbonate drip   Monitor BMP  Nephrology: Dr. Rosa consulted    GI:  -  Tube feed  - GI ppx with Protonix  - Miralax and senna for prophylaxis     Heme:  - no indication for transfusion   - monitor cbc daily     Endo:  - No history of DM  - target CBG < 180  - FS q6 while Tube feed      Prophy:  - Heparin s/c for DVT prophylaxis 55 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 Patient was admitted on medicine floor for COVID pna.  Admitted to ICU for hypoxia and desaturation on NRB.     Plan:  Neuro:  -Pt sedated with versed   -Dilaudid drip  -no longer on Rocuronium    CV:  - Hold BP medications  -Levophed for pressor support  - on midodrine 10mg q 8       Pulm:  - Intubated for respiratory distress and hypoxia 4/15  - CXR shows worsening right lung infiltrate  - Acute Hypoxic Resp Failure secondary to PNA + COVID 19  - S/P  ANAKINRA and Hydroxychloroquine ( since April 9th)   - Discontinued full dose Lovenox due to epistaxis  - Current vent settings 35/430/50/12        ID:  -  completed Hydroxychloroquine, anakinra   Pt was febrile two night ago   blood culture negative  on Cefepime since 4/17  WBC: 16K  -CRP: 7.69--> 22.01 -->15.07-->  -D-Dimer: 1166--> 1401-->1142-->1901  -Ferritin: 1055--> 776--> 944-->935      Nephro:  RASHAUN:    -Cr/BUN:  5.44/82  - On Lasix drip  - monitor urine output   - Monitor electrolytes   Hypernatremia:  Resolved    Metabolic acidosis:  -dc sodium bicarbonate drip last night    Monitor BMP  Nephrology: Dr. 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 Patient was admitted on medicine floor for COVID pna.  Admitted to ICU for hypoxia and desaturation on NRB.     Plan:  Neuro:  -Pt sedated with versed   -Dilaudid drip  -no longer on Rocuronium    CV:  - Hold BP medications  -Levophed for pressor support  - on midodrine 10mg q 8       Pulm:  - Intubated for respiratory distress and hypoxia 4/15  - CXR shows worsening right lung infiltrate  - Acute Hypoxic Resp Failure secondary to PNA + COVID 19  - S/P  ANAKINRA and Hydroxychloroquine ( since April 9th)   - Discontinued full dose Lovenox due to epistaxis  - Current vent settings 35/430/50/12        ID:  -  completed Hydroxychloroquine, anakinra   Pt was febrile two night ago   blood culture negative  on Cefepime since 4/17  WBC: 16K  -CRP: 7.69--> 22.01 -->15.07-->  -D-Dimer: 1166--> 1401-->1142-->1901  -Ferritin: 1055--> 776--> 944-->935      Nephro:  RASHAUN:    -Cr/BUN:  5.44/82  S/P HD 1 st session of HD yesterday  Discontinued lasix drip  - monitor urine output   - Monitor electrolytes   Hypernatremia:  Resolved    Metabolic acidosis:  Improved   -dc sodium bicarbonate drip last night    Monitor BMP  Nephrology: Dr. Rosa      GI:  -  Tube feed  - GI ppx with Protonix  - Miralax and senna for prophylaxis     Heme:  - no indication for transfusion   - monitor cbc daily     Endo:  - No history of DM  - target CBG < 180  - FS q6 while Tube feed      Prophy:  - Heparin s/c for DVT prophylaxis

## 2020-04-25 NOTE — CHART NOTE - NSCHARTNOTEFT_GEN_A_CORE
Palliative Care:    Reviewed status with ICU team. Spoke with patient's surrogate/Criselda Egan ([phone]). Discussed status, reviewed medications, labs. All questions answered; supportive counseling provided.

## 2020-04-25 NOTE — PROGRESS NOTE ADULT - SUBJECTIVE AND OBJECTIVE BOX
INTERVAL HPI/OVERNIGHT EVENTS: *** Patient was seen and examined at bed side.     PRESSORS: [ ] YES [ ] NO  WHICH:    ANTIBIOTICS:                  DATE STARTED:  ANTIBIOTICS:                  DATE STARTED:  ANTIBIOTICS:                  DATE STARTED:    Antimicrobial:  cefepime   IVPB 1000 milliGRAM(s) IV Intermittent every 8 hours    Cardiovascular:  furosemide Infusion 5 mG/Hr IV Continuous <Continuous>  midodrine 10 milliGRAM(s) Oral every 8 hours  norepinephrine Infusion 0.05 MICROgram(s)/kG/Min IV Continuous <Continuous>    Pulmonary:  ALBUTerol    90 MICROgram(s) HFA Inhaler 2 Puff(s) Inhalation every 6 hours    Hematalogic:  heparin   Injectable 5000 Unit(s) SubCutaneous every 12 hours    Other:  acetaminophen    Suspension .. 650 milliGRAM(s) Oral every 6 hours PRN  bisacodyl Suppository 10 milliGRAM(s) Rectal daily  chlorhexidine 0.12% Liquid 15 milliLiter(s) Oral Mucosa every 12 hours  HYDROmorphone Infusion 0.5 mG/Hr IV Continuous <Continuous>  midazolam Infusion 0.02 mG/kG/Hr IV Continuous <Continuous>  pantoprazole   Suspension 40 milliGRAM(s) Oral daily  polyethylene glycol 3350 17 Gram(s) Oral daily  senna 2 Tablet(s) Oral at bedtime  sodium bicarbonate  Infusion 0.131 mEq/kG/Hr IV Continuous <Continuous>    acetaminophen    Suspension .. 650 milliGRAM(s) Oral every 6 hours PRN  ALBUTerol    90 MICROgram(s) HFA Inhaler 2 Puff(s) Inhalation every 6 hours  bisacodyl Suppository 10 milliGRAM(s) Rectal daily  cefepime   IVPB 1000 milliGRAM(s) IV Intermittent every 8 hours  chlorhexidine 0.12% Liquid 15 milliLiter(s) Oral Mucosa every 12 hours  furosemide Infusion 5 mG/Hr IV Continuous <Continuous>  heparin   Injectable 5000 Unit(s) SubCutaneous every 12 hours  HYDROmorphone Infusion 0.5 mG/Hr IV Continuous <Continuous>  midazolam Infusion 0.02 mG/kG/Hr IV Continuous <Continuous>  midodrine 10 milliGRAM(s) Oral every 8 hours  norepinephrine Infusion 0.05 MICROgram(s)/kG/Min IV Continuous <Continuous>  pantoprazole   Suspension 40 milliGRAM(s) Oral daily  polyethylene glycol 3350 17 Gram(s) Oral daily  senna 2 Tablet(s) Oral at bedtime  sodium bicarbonate  Infusion 0.131 mEq/kG/Hr IV Continuous <Continuous>    Drug Dosing Weight  Height (cm): 134.62 (07 Apr 2020 23:13)  Weight (kg): 86.2 (07 Apr 2020 23:13)  BMI (kg/m2): 47.6 (07 Apr 2020 23:13)  BSA (m2): 1.67 (07 Apr 2020 23:13)    CENTRAL LINE: [ ] YES [ ] NO  LOCATION:   DATE INSERTED:  REMOVE: [ ] YES [ ] NO  EXPLAIN:    RENNER: [ ] YES [ ] NO    DATE INSERTED:  REMOVE:  [ ] YES [ ] NO  EXPLAIN:    A-LINE:  [ ] YES [ ] NO  LOCATION:   DATE INSERTED:  REMOVE:  [ ] YES [ ] NO  EXPLAIN:        ICU Vital Signs Last 24 Hrs  T(C): 36.7 (24 Apr 2020 20:40), Max: 36.7 (24 Apr 2020 20:40)  T(F): 98 (24 Apr 2020 20:40), Max: 98 (24 Apr 2020 20:40)  HR: 78 (24 Apr 2020 22:00) (58 - 101)  BP: 107/55 (24 Apr 2020 21:21) (77/47 - 156/70)  BP(mean): 68 (24 Apr 2020 21:21) (53 - 93)  ABP: 113/59 (24 Apr 2020 22:00) (96/51 - 182/83)  ABP(mean): 75 (24 Apr 2020 22:00) (63 - 117)  RR: 35 (24 Apr 2020 22:00) (17 - 35)  SpO2: 88% (24 Apr 2020 22:00) (88% - 99%)      ABG - ( 24 Apr 2020 03:39 )  pH, Arterial: 7.25  pH, Blood: x     /  pCO2: 49    /  pO2: 95    / HCO3: 21    / Base Excess: -6    /  SaO2: 96                    04-23 @ 07:01  -  04-24 @ 07:00  --------------------------------------------------------  IN: 1827.1 mL / OUT: 55 mL / NET: 1772.1 mL        Mode: AC/ CMV (Assist Control/ Continuous Mandatory Ventilation)  RR (machine): 35  TV (machine): 430  FiO2: 50  PEEP: 12  ITime: 0.8  MAP: 27  PIP: 46      PHYSICAL EXAM:    GENERAL:NAD, well-groomed, well-developed  HEAD:  Atraumatic, Normocephalic  EYES: EOMI, PERRLA, conjunctiva and sclera clear  ENMT: No tonsillar erythema, exudates, or enlargement; Moist mucous membranes, Good dentition, No lesions  NECK: Supple, normal appearance, No JVD; Normal thyroid; Trachea midline  NERVOUS SYSTEM: Alert & Oriented X3, Good concentration; Motor Strength 5/5 B/L upper and lower extremities; DTRs 2+ intact and symmetric  CHEST/LUNG: No chest deformity;Normal percussion bilaterally; No rales, rhonchi, wheezing   HEART: Regular rate and rhythm; No murmurs, rubs, or gallops  ABDOMEN: Soft, Nontender, Nondistended; Bowel sounds present  EXTREMITIES: 2+ Peripheral Pulses, No clubbing, cyanosis, or edema  LYMPH: No lymphadenopathy noted  SKIN:No rashes or lesions; Good capillary refill      LABS:  CBC Full  -  ( 24 Apr 2020 06:55 )  WBC Count : 13.41 K/uL  RBC Count : 3.81 M/uL  Hemoglobin : 10.4 g/dL  Hematocrit : 33.5 %  Platelet Count - Automated : 238 K/uL  Mean Cell Volume : 87.9 fl  Mean Cell Hemoglobin : 27.3 pg  Mean Cell Hemoglobin Concentration : 31.0 gm/dL  Auto Neutrophil # : x  Auto Lymphocyte # : x  Auto Monocyte # : x  Auto Eosinophil # : x  Auto Basophil # : x  Auto Neutrophil % : x  Auto Lymphocyte % : x  Auto Monocyte % : x  Auto Eosinophil % : x  Auto Basophil % : x    04-24    133<L>  |  97  |  82<H>  ----------------------------<  98  4.3   |  19<L>  |  5.44<H>    Ca    8.9      24 Apr 2020 06:55  Phos  8.3     04-24  Mg     3.2     04-24    TPro  7.4  /  Alb  2.0<L>  /  TBili  4.3<H>  /  DBili  x   /  AST  40  /  ALT  29  /  AlkPhos  85  04-24    PT/INR - ( 24 Apr 2020 06:55 )   PT: 11.0 sec;   INR: 0.97 ratio                 RADIOLOGY & ADDITIONAL STUDIES REVIEWED:  ***    GOALS OF CARE DISCUSSION WITH PATIENT/FAMILY/PROXY: full code/DNR/DNI    CRITICAL CARE TIME SPENT: 35 minutes INTERVAL HPI/OVERNIGHT EVENTS: Patient was seen and examined at bed side. Patient had HD session yesterday.      PRESSORS: Yes   WHICH: Levophed     ANTIBIOTICS: cefepime     DATE STARTED: 4/17    Antimicrobial:  cefepime   IVPB 1000 milliGRAM(s) IV Intermittent every 8 hours    Cardiovascular:  furosemide Infusion 5 mG/Hr IV Continuous <Continuous>  midodrine 10 milliGRAM(s) Oral every 8 hours  norepinephrine Infusion 0.05 MICROgram(s)/kG/Min IV Continuous <Continuous>    Pulmonary:  ALBUTerol    90 MICROgram(s) HFA Inhaler 2 Puff(s) Inhalation every 6 hours    Hematalogic:  heparin   Injectable 5000 Unit(s) SubCutaneous every 12 hours    Other:  acetaminophen    Suspension .. 650 milliGRAM(s) Oral every 6 hours PRN  bisacodyl Suppository 10 milliGRAM(s) Rectal daily  chlorhexidine 0.12% Liquid 15 milliLiter(s) Oral Mucosa every 12 hours  HYDROmorphone Infusion 0.5 mG/Hr IV Continuous <Continuous>  midazolam Infusion 0.02 mG/kG/Hr IV Continuous <Continuous>  pantoprazole   Suspension 40 milliGRAM(s) Oral daily  polyethylene glycol 3350 17 Gram(s) Oral daily  senna 2 Tablet(s) Oral at bedtime  sodium bicarbonate  Infusion 0.131 mEq/kG/Hr IV Continuous <Continuous>    acetaminophen    Suspension .. 650 milliGRAM(s) Oral every 6 hours PRN  ALBUTerol    90 MICROgram(s) HFA Inhaler 2 Puff(s) Inhalation every 6 hours  bisacodyl Suppository 10 milliGRAM(s) Rectal daily  cefepime   IVPB 1000 milliGRAM(s) IV Intermittent every 8 hours  chlorhexidine 0.12% Liquid 15 milliLiter(s) Oral Mucosa every 12 hours  furosemide Infusion 5 mG/Hr IV Continuous <Continuous>  heparin   Injectable 5000 Unit(s) SubCutaneous every 12 hours  HYDROmorphone Infusion 0.5 mG/Hr IV Continuous <Continuous>  midazolam Infusion 0.02 mG/kG/Hr IV Continuous <Continuous>  midodrine 10 milliGRAM(s) Oral every 8 hours  norepinephrine Infusion 0.05 MICROgram(s)/kG/Min IV Continuous <Continuous>  pantoprazole   Suspension 40 milliGRAM(s) Oral daily  polyethylene glycol 3350 17 Gram(s) Oral daily  senna 2 Tablet(s) Oral at bedtime  sodium bicarbonate  Infusion 0.131 mEq/kG/Hr IV Continuous <Continuous>    Drug Dosing Weight  Height (cm): 134.62 (07 Apr 2020 23:13)  Weight (kg): 86.2 (07 Apr 2020 23:13)  BMI (kg/m2): 47.6 (07 Apr 2020 23:13)  BSA (m2): 1.67 (07 Apr 2020 23:13)    CENTRAL LINE: Yes  LOCATION: Right IJ  DATE INSERTED: 4/15  REMOVE: No    RENNER: Yes   DATE INSERTED: 4/15  REMOVE: No    A-LINE: Yes  LOCATION: Right radial   DATE INSERTED: 4/17  REMOVE: No      ICU Vital Signs Last 24 Hrs  T(C): 36.7 (24 Apr 2020 20:40), Max: 36.7 (24 Apr 2020 20:40)  T(F): 98 (24 Apr 2020 20:40), Max: 98 (24 Apr 2020 20:40)  HR: 78 (24 Apr 2020 22:00) (58 - 101)  BP: 107/55 (24 Apr 2020 21:21) (77/47 - 156/70)  BP(mean): 68 (24 Apr 2020 21:21) (53 - 93)  ABP: 113/59 (24 Apr 2020 22:00) (96/51 - 182/83)  ABP(mean): 75 (24 Apr 2020 22:00) (63 - 117)  RR: 35 (24 Apr 2020 22:00) (17 - 35)  SpO2: 88% (24 Apr 2020 22:00) (88% - 99%)      ABG - ( 24 Apr 2020 03:39 )  pH, Arterial: 7.25  pH, Blood: x     /  pCO2: 49    /  pO2: 95    / HCO3: 21    / Base Excess: -6    /  SaO2: 96          04-23 @ 07:01  - 04-24 @ 07:00  --------------------------------------------------------  IN: 1827.1 mL / OUT: 55 mL / NET: 1772.1 mL      Mode: AC/ CMV (Assist Control/ Continuous Mandatory Ventilation)  RR (machine): 35  TV (machine): 430  FiO2: 50  PEEP: 12  ITime: 0.8  MAP: 27  PIP: 46      PHYSICAL EXAM:  GENERAL: Sedated and intubated   HEAD: Atraumatic, Normocephalic  EYES: PERRLA, conjunctiva and sclera clear  ENMT: No tonsillar erythema, exudates, or enlargement  NECK: Supple, normal appearance  NERVOUS SYSTEM: Sedated and intubated  CHEST/LUNG: No chest deformity; crackles present to auscultation   HEART: Regular rate and rhythm; No murmurs  ABDOMEN: Soft, Nontender, Nondistended; Bowel sounds present  EXTREMITIES: No clubbing, cyanosis, or edema  LYMPH: No lymphadenopathy noted  SKIN: No rashes or lesions; Good capillary refill      LABS:  CBC Full  -  ( 24 Apr 2020 06:55 )  WBC Count : 13.41 K/uL  RBC Count : 3.81 M/uL  Hemoglobin : 10.4 g/dL  Hematocrit : 33.5 %  Platelet Count - Automated : 238 K/uL  Mean Cell Volume : 87.9 fl  Mean Cell Hemoglobin : 27.3 pg  Mean Cell Hemoglobin Concentration : 31.0 gm/dL    04-24    133<L>  |  97  |  82<H>  ----------------------------<  98  4.3   |  19<L>  |  5.44<H>    Ca    8.9      24 Apr 2020 06:55  Phos  8.3     04-24  Mg     3.2     04-24    TPro  7.4  /  Alb  2.0<L>  /  TBili  4.3<H>  /  DBili  x   /  AST  40  /  ALT  29  /  AlkPhos  85  04-24    PT/INR - ( 24 Apr 2020 06:55 )   PT: 11.0 sec;   INR: 0.97 ratio         RADIOLOGY & ADDITIONAL STUDIES REVIEWED: < from: Xray Chest 1 View-PORTABLE IMMEDIATE (04.24.20 @ 16:59) >  IMPRESSION:    1.  RIGHT IJ catheter noted in proper position overlying the SVC. No pneumothorax.  2.  Bilateral diffuse severe airspace opacities are not significantly changed from  4/24/2020 .  3.  Other supportive lines and catheters remain in unchanged proper position.  4.  No  sizable pleural effusions.    < end of copied text >    GOALS OF CARE DISCUSSION WITH PATIENT/FAMILY/PROXY: full code     CRITICAL CARE TIME SPENT: 35 minutes INTERVAL HPI/OVERNIGHT EVENTS: Patient was seen and examined at bed side. Patient had HD session yesterday.   No acute events overnight.    PRESSORS: Yes   WHICH: Levophed     ANTIBIOTICS: cefepime     DATE STARTED: 4/17    Antimicrobial:  cefepime   IVPB 1000 milliGRAM(s) IV Intermittent every 8 hours    Cardiovascular:  furosemide Infusion 5 mG/Hr IV Continuous <Continuous>  midodrine 10 milliGRAM(s) Oral every 8 hours  norepinephrine Infusion 0.05 MICROgram(s)/kG/Min IV Continuous <Continuous>    Pulmonary:  ALBUTerol    90 MICROgram(s) HFA Inhaler 2 Puff(s) Inhalation every 6 hours    Hematalogic:  heparin   Injectable 5000 Unit(s) SubCutaneous every 12 hours    Other:  acetaminophen    Suspension .. 650 milliGRAM(s) Oral every 6 hours PRN  bisacodyl Suppository 10 milliGRAM(s) Rectal daily  chlorhexidine 0.12% Liquid 15 milliLiter(s) Oral Mucosa every 12 hours  HYDROmorphone Infusion 0.5 mG/Hr IV Continuous <Continuous>  midazolam Infusion 0.02 mG/kG/Hr IV Continuous <Continuous>  pantoprazole   Suspension 40 milliGRAM(s) Oral daily  polyethylene glycol 3350 17 Gram(s) Oral daily  senna 2 Tablet(s) Oral at bedtime  sodium bicarbonate  Infusion 0.131 mEq/kG/Hr IV Continuous <Continuous>    acetaminophen    Suspension .. 650 milliGRAM(s) Oral every 6 hours PRN  ALBUTerol    90 MICROgram(s) HFA Inhaler 2 Puff(s) Inhalation every 6 hours  bisacodyl Suppository 10 milliGRAM(s) Rectal daily  cefepime   IVPB 1000 milliGRAM(s) IV Intermittent every 8 hours  chlorhexidine 0.12% Liquid 15 milliLiter(s) Oral Mucosa every 12 hours  furosemide Infusion 5 mG/Hr IV Continuous <Continuous>  heparin   Injectable 5000 Unit(s) SubCutaneous every 12 hours  HYDROmorphone Infusion 0.5 mG/Hr IV Continuous <Continuous>  midazolam Infusion 0.02 mG/kG/Hr IV Continuous <Continuous>  midodrine 10 milliGRAM(s) Oral every 8 hours  norepinephrine Infusion 0.05 MICROgram(s)/kG/Min IV Continuous <Continuous>  pantoprazole   Suspension 40 milliGRAM(s) Oral daily  polyethylene glycol 3350 17 Gram(s) Oral daily  senna 2 Tablet(s) Oral at bedtime  sodium bicarbonate  Infusion 0.131 mEq/kG/Hr IV Continuous <Continuous>    Drug Dosing Weight  Height (cm): 134.62 (07 Apr 2020 23:13)  Weight (kg): 86.2 (07 Apr 2020 23:13)  BMI (kg/m2): 47.6 (07 Apr 2020 23:13)  BSA (m2): 1.67 (07 Apr 2020 23:13)    CENTRAL LINE: Yes  LOCATION: Right IJ  DATE INSERTED: 4/15  REMOVE: No    RENNER: Yes   DATE INSERTED: 4/15  REMOVE: No    A-LINE: Yes  LOCATION: Right radial   DATE INSERTED: 4/17  REMOVE: No      ICU Vital Signs Last 24 Hrs  T(C): 36.7 (24 Apr 2020 20:40), Max: 36.7 (24 Apr 2020 20:40)  T(F): 98 (24 Apr 2020 20:40), Max: 98 (24 Apr 2020 20:40)  HR: 78 (24 Apr 2020 22:00) (58 - 101)  BP: 107/55 (24 Apr 2020 21:21) (77/47 - 156/70)  BP(mean): 68 (24 Apr 2020 21:21) (53 - 93)  ABP: 113/59 (24 Apr 2020 22:00) (96/51 - 182/83)  ABP(mean): 75 (24 Apr 2020 22:00) (63 - 117)  RR: 35 (24 Apr 2020 22:00) (17 - 35)  SpO2: 88% (24 Apr 2020 22:00) (88% - 99%)      ABG - ( 24 Apr 2020 03:39 )  pH, Arterial: 7.25  pH, Blood: x     /  pCO2: 49    /  pO2: 95    / HCO3: 21    / Base Excess: -6    /  SaO2: 96          04-23 @ 07:01  -  04-24 @ 07:00  --------------------------------------------------------  IN: 1827.1 mL / OUT: 55 mL / NET: 1772.1 mL      Mode: AC/ CMV (Assist Control/ Continuous Mandatory Ventilation)  RR (machine): 35  TV (machine): 430  FiO2: 50  PEEP: 12  ITime: 0.8  MAP: 27  PIP: 46      PHYSICAL EXAM:  GENERAL: Sedated and intubated   HEAD: Atraumatic, Normocephalic  EYES: PERRLA, conjunctiva and sclera clear  ENMT: No tonsillar erythema, exudates, or enlargement  NECK: Supple, normal appearance  NERVOUS SYSTEM: Sedated and intubated  CHEST/LUNG: No chest deformity; crackles present to auscultation   HEART: Regular rate and rhythm; No murmurs  ABDOMEN: Soft, Nontender, Nondistended; Bowel sounds present  EXTREMITIES: No clubbing, cyanosis, or edema  LYMPH: No lymphadenopathy noted  SKIN: No rashes or lesions; Good capillary refill      LABS:  CBC Full  -  ( 24 Apr 2020 06:55 )  WBC Count : 13.41 K/uL  RBC Count : 3.81 M/uL  Hemoglobin : 10.4 g/dL  Hematocrit : 33.5 %  Platelet Count - Automated : 238 K/uL  Mean Cell Volume : 87.9 fl  Mean Cell Hemoglobin : 27.3 pg  Mean Cell Hemoglobin Concentration : 31.0 gm/dL    04-24    133<L>  |  97  |  82<H>  ----------------------------<  98  4.3   |  19<L>  |  5.44<H>    Ca    8.9      24 Apr 2020 06:55  Phos  8.3     04-24  Mg     3.2     04-24    TPro  7.4  /  Alb  2.0<L>  /  TBili  4.3<H>  /  DBili  x   /  AST  40  /  ALT  29  /  AlkPhos  85  04-24    PT/INR - ( 24 Apr 2020 06:55 )   PT: 11.0 sec;   INR: 0.97 ratio         RADIOLOGY & ADDITIONAL STUDIES REVIEWED: < from: Xray Chest 1 View-PORTABLE IMMEDIATE (04.24.20 @ 16:59) >  IMPRESSION:    1.  RIGHT IJ catheter noted in proper position overlying the SVC. No pneumothorax.  2.  Bilateral diffuse severe airspace opacities are not significantly changed from  4/24/2020 .  3.  Other supportive lines and catheters remain in unchanged proper position.  4.  No  sizable pleural effusions.    < end of copied text >    GOALS OF CARE DISCUSSION WITH PATIENT/FAMILY/PROXY: full code     CRITICAL CARE TIME SPENT: 35 minutes

## 2020-04-25 NOTE — PROGRESS NOTE ADULT - ATTENDING COMMENTS
IMP: This is a 54 yr old woman with  HTN, works as HD nurse,  presents to the ED w/ fever, chills, nausea, vomiting, and mild cough that began x1 week ago. Patient was admitted on medicine floor for COVID pna. ICU consulted for hypoxia and desaturation on NRB.  DDimer is trending up and is on . therapeutic anticoag.  Pat became more dyspnenic and hypoxic, intubated and placed on vent support sedated     Assessment:  -Acute Hypoxic Respiratory Failure  -B/L  PNA   -ARDS  -COVID 19 infection  -HTN  -Hypercaog state  -Septic shock  -RASHAUN  -MOSF      Plan  - Mechanical ventilation with lung protective strategy   - Titrate Fio2 and PEEP as tolerated  - Sedation for ventilator synchrony   - Vasopressor support to maintain MAP > 60  - Monitor ABG  - S/p Anakinra and Hydroxychloroquine  - Airborne and Contact isolation  - Plan for prone positioning daily  - Cultures negative thus far and cont IV antibiotics will complete 7 day course, leukocytosis is resolving  - Monitor urine output  - Nephrology consult  - Worsening creatinine  - Lasix challenge  - May need HD, f/u with nephrology  - Trial of lasix infusion  - Cont. sodium bicarbonate infusion for metabolic acidosis  - Bowel regimen  - Change tube feedings to Nepro  - DVT and Stress Ulcer prophylaxis  - Resume anticoag due to up trending DDimer   - Over all prognosis is poor given multiple organ dysfunction .

## 2020-04-26 NOTE — PROGRESS NOTE ADULT - ATTENDING COMMENTS
IMP: This is a 54 yr old woman with  HTN, works as HD nurse,  presents to the ED w/ fever, chills, nausea, vomiting, and mild cough that began x1 week ago. Patient was admitted on medicine floor for COVID pna. ICU consulted for hypoxia and desaturation on NRB.  DDimer is trending up and is on . therapeutic anticoag.  Pat became more dyspnenic and hypoxic, intubated and placed on vent support sedated . WBC and procalcitonin is elevated on iv antibx. Will add vanco, change central line.  CXR 4/26 show new RUL consolidation probable due to asp pna    Assessment:  -Acute Hypoxic Respiratory Failure  -B/L  PNA   -ARDS  -COVID 19 infection  -HTN  -Hypercaog state  -Septic shock  -RASHAUN  -MOSF  -Asp pna    Plan  - Mechanical ventilation with lung protective strategy   - Decrease Fio2  - CXR is worst today with new RUL infiltrate due to asp pna  - Titrate Fio2 and PEEP as tolerated  - Sedation for ventilator synchrony   - Vasopressor support to maintain MAP > 60  - Monitor ABG  - S/p Anakinra and Hydroxychloroquine  - Airborne and Contact isolation  - Plan for prone positioning daily  - Cultures negative thus far and cont IV antibiotics will complete 7 day course, leukocytosis is resolving  - Monitor urine output  - Nephrology consult  - Worsening creatinine  - Lasix challenge  - May need HD, f/u with nephrology  - Trial of lasix infusion  - Cont. sodium bicarbonate infusion for metabolic acidosis  - Bowel regimen  - Change tube feedings to Nepro  - DVT and Stress Ulcer prophylaxis  - Resume anticoag due to up trending DDimer   - Over all prognosis is poor given multiple organ dysfunction . IMP: This is a 54 yr old woman with  HTN, works as HD nurse,  presents to the ED w/ fever, chills, nausea, vomiting, and mild cough that began x1 week ago. Patient was admitted on medicine floor for COVID pna. ICU consulted for hypoxia and desaturation on NRB.  DDimer is trending up and is on . therapeutic anticoag.  Pat became more dyspnenic and hypoxic, intubated and placed on vent support sedated . WBC and procalcitonin is elevated on iv antibx. Will add vanco, change central line.     Assessment:  -Acute Hypoxic Respiratory Failure  -B/L  PNA   -ARDS  -COVID 19 infection  -HTN  -Hypercaog state  -Septic shock  -RASHAUN  -MOSF      Plan  - Mechanical ventilation with lung protective strategy   - Decrease Fio2  - Titrate Fio2 and PEEP as tolerated  - Sedation for ventilator synchrony   - Vasopressor support to maintain MAP > 60  - Monitor ABG  - S/p Anakinra and Hydroxychloroquine  - Airborne and Contact isolation  - Plan for prone positioning daily  - Cultures negative thus far and cont IV antibiotics will complete 7 day course, leukocytosis is resolving  - Monitor urine output  - Nephrology consult  - Worsening creatinine  - Lasix challenge  - May need HD, f/u with nephrology  - Trial of lasix infusion  - Cont. sodium bicarbonate infusion for metabolic acidosis  - Bowel regimen  - Change tube feedings to Nepro  - DVT and Stress Ulcer prophylaxis  - Resume anticoag due to up trending DDimer   - Over all prognosis is poor given multiple organ dysfunction .

## 2020-04-26 NOTE — PROGRESS NOTE ADULT - SUBJECTIVE AND OBJECTIVE BOX
Patient is a 54y Female with  RASHAUN   SEC  T O  COVID  19  PNEUMONIA   REQUIRING   HD  SUPPORT  WAS  DIALYZED  YESTERDAY,    TOLERATED  IT  WELL    No Known Allergies    Hospital Medications:   MEDICATIONS  (STANDING):  ALBUTerol    90 MICROgram(s) HFA Inhaler 2 Puff(s) Inhalation every 6 hours  apixaban 2.5 milliGRAM(s) Oral two times a day  bisacodyl Suppository 10 milliGRAM(s) Rectal daily  cefepime   IVPB 1000 milliGRAM(s) IV Intermittent every 8 hours  chlorhexidine 0.12% Liquid 15 milliLiter(s) Oral Mucosa every 12 hours  furosemide Infusion 5 mG/Hr (2.5 mL/Hr) IV Continuous <Continuous>  HYDROmorphone Infusion 2 mG/Hr (2 mL/Hr) IV Continuous <Continuous>  midazolam Infusion 0.02 mG/kG/Hr (1.72 mL/Hr) IV Continuous <Continuous>  midodrine 10 milliGRAM(s) Oral every 8 hours  norepinephrine Infusion 0.05 MICROgram(s)/kG/Min (4.04 mL/Hr) IV Continuous <Continuous>  pantoprazole   Suspension 40 milliGRAM(s) Oral daily  polyethylene glycol 3350 17 Gram(s) Oral daily  senna 2 Tablet(s) Oral at bedtime    REVIEW OF SYSTEMS:  INTUBATED ON  VENT    VITALS:  T(F): 99.1 (04-26-20 @ 08:00), Max: 99.2 (04-26-20 @ 00:00)  HR: 52 (04-26-20 @ 09:00)  BP: 120/66 (04-26-20 @ 09:00)  RR: 3 (04-26-20 @ 09:00)  SpO2: 95% (04-26-20 @ 09:00)  Wt(kg): --    04-25 @ 07:01  -  04-26 @ 07:00  --------------------------------------------------------  IN: 1180.5 mL / OUT: 1020 mL / NET: 160.5 mL    04-26 @ 07:01  -  04-26 @ 12:07  --------------------------------------------------------  IN: 119 mL / OUT: 10 mL / NET: 109 mL        PHYSICAL EXAM:  GENERAL: Sedated and intubated   HEAD: Atraumatic, Normocephalic  EYES: PERRLA, conjunctiva and sclera clear  ENMT: No tonsillar erythema, exudates, or enlargement  NECK: Supple, normal appearance  NERVOUS SYSTEM: Sedated and intubated  CHEST/LUNG: No chest deformity; crackles present to auscultation   HEART: Regular rate and rhythm; No murmurs  ABDOMEN: Soft, Nontender, Nondistended; Bowel sounds present  EXTREMITIES: No clubbing, cyanosis, or edema  LYMPH: No lymphadenopathy noted  SKIN: No rashes or lesions; Good capillary refill  HAS  R  IJ  DIALYSIS  CATHETER  IN  PLACE    LABS:  04-26    136  |  99  |  48<H>  ----------------------------<  87  3.1<L>   |  27  |  3.91<H>    Ca    8.3<L>      26 Apr 2020 05:04  Phos  3.6     04-26  Mg     2.3     04-26    TPro  6.4  /  Alb  1.6<L>  /  TBili  3.8<H>  /  DBili      /  AST  47<H>  /  ALT  33  /  AlkPhos  77  04-26    Creatinine Trend: 3.91 <--, 4.48 <--, 5.44 <--, 5.28 <--, 3.95 <--, 2.95 <--, 2.32 <--, 0.79 <--                        9.7    13.66 )-----------( 213      ( 26 Apr 2020 05:04 )             29.6     Urine Studies:      RADIOLOGY & ADDITIONAL STUDIES: no

## 2020-04-26 NOTE — PROGRESS NOTE ADULT - ASSESSMENT
A/P    RASHAUN   IN  THE  SETTING  OF  COVID  19  PNEUMONIA  REQUIRING  HD    WAS  DIALYZED  YESTERDAY   FOR  REGULAR  HD ON  TUESDAY   HEPARIN  FREE  PROTOCOL   3  K  BATH  IF  K  LESS  THAN 4   UF  GOAL 2  LIT  OFF   BICARB  SUPPLEMENT   ON  FUROSEMIDE  IV,  AS  SHE  IS  NOT  MAKING  ANY  URINE   SHOULD  D/C        WILL  FOLLOW

## 2020-04-26 NOTE — CHART NOTE - NSCHARTNOTEFT_GEN_A_CORE
[facility]J CVC placed on 4/15 was removed today under sterile and aseptic precaution. Pt was positioned in Trendelenburg and sutures were cut and removed. Catheter was removed while applying firm pressure with the guaze. Pressure was applied over the catheter removal site for 6 mins and the bleeding stopped. Minimal blood loss.

## 2020-04-26 NOTE — CHART NOTE - NSCHARTNOTEFT_GEN_A_CORE
Palliative Care:    Reviewed status with ICU team. Left message for patient's surrogate/Criselda Egan ([phone]). Provided cell number if any questions/concerns.

## 2020-04-26 NOTE — CONSULT NOTE ADULT - SUBJECTIVE AND OBJECTIVE BOX
HPI:  55 y/o F patient, w/ PMHx of HTN, works as HD nurse,  presents to the ED w/ fever, chills, nausea, vomiting, and mild cough that began x1 week ago. Patient also reports mild shortness of breath but denies  any other acute complaints. Patient was saturating 96% on 4L NC.  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    Chest X-ray showed: Bilateral airspace opacities suspicious for pneumonia. (08 Apr 2020 01:59)      PAST MEDICAL & SURGICAL HISTORY:  HTN (hypertension)  No significant past surgical history      No Known Allergies      Meds:  acetaminophen    Suspension .. 650 milliGRAM(s) Oral every 6 hours PRN  ALBUTerol    90 MICROgram(s) HFA Inhaler 2 Puff(s) Inhalation every 6 hours  apixaban 2.5 milliGRAM(s) Oral two times a day  bisacodyl Suppository 10 milliGRAM(s) Rectal daily  cefepime   IVPB 1000 milliGRAM(s) IV Intermittent every 8 hours  chlorhexidine 0.12% Liquid 15 milliLiter(s) Oral Mucosa every 12 hours  furosemide Infusion 5 mG/Hr IV Continuous <Continuous>  HYDROmorphone Infusion 2 mG/Hr IV Continuous <Continuous>  midazolam Infusion 0.02 mG/kG/Hr IV Continuous <Continuous>  midodrine 10 milliGRAM(s) Oral every 8 hours  norepinephrine Infusion 0.05 MICROgram(s)/kG/Min IV Continuous <Continuous>  pantoprazole  Injectable 40 milliGRAM(s) IV Push daily  polyethylene glycol 3350 17 Gram(s) Oral daily  senna 2 Tablet(s) Oral at bedtime      SOCIAL HISTORY:  Smoker:  YES / NO        PACK YEARS:                         WHEN QUIT?  ETOH use:  YES / NO               FREQUENCY / QUANTITY:  Ilicit Drug use:  YES / NO  Occupation:  Assisted device use (Cane / Walker):  Live with:    FAMILY HISTORY:      VITALS:  Vital Signs Last 24 Hrs  T(C): 36.1 (26 Apr 2020 15:23), Max: 37.3 (26 Apr 2020 00:00)  T(F): 97 (26 Apr 2020 15:23), Max: 99.2 (26 Apr 2020 00:00)  HR: 58 (26 Apr 2020 15:36) (48 - 66)  BP: 119/68 (26 Apr 2020 13:00) (105/56 - 152/71)  BP(mean): 81 (26 Apr 2020 13:00) (68 - 96)  RR: 33 (26 Apr 2020 13:00) (0 - 35)  SpO2: 89% (26 Apr 2020 15:36) (85% - 99%)    LABS/DIAGNOSTIC TESTS:                          9.7    13.66 )-----------( 213      ( 26 Apr 2020 05:04 )             29.6     WBC Count: 13.66 K/uL (04-26 @ 05:04)  WBC Count: 11.71 K/uL (04-25 @ 05:44)  WBC Count: 13.41 K/uL (04-24 @ 06:55)      04-26    136  |  99  |  48<H>  ----------------------------<  87  3.1<L>   |  27  |  3.91<H>    Ca    8.3<L>      26 Apr 2020 05:04  Phos  3.6     04-26  Mg     2.3     04-26    TPro  6.4  /  Alb  1.6<L>  /  TBili  3.8<H>  /  DBili  x   /  AST  47<H>  /  ALT  33  /  AlkPhos  77  04-26          LIVER FUNCTIONS - ( 26 Apr 2020 05:04 )  Alb: 1.6 g/dL / Pro: 6.4 g/dL / ALK PHOS: 77 U/L / ALT: 33 U/L DA / AST: 47 U/L / GGT: x                 LACTATE:    ABG - ABG - ( 26 Apr 2020 12:57 )  pH, Arterial: 7.38  pH, Blood: x     /  pCO2: 44    /  pO2: 65    / HCO3: 25    / Base Excess: 0.6   /  SaO2: 91                  CULTURES:   .Blood Blood-Peripheral  04-17 @ 18:26   No Growth Final  --  --          RADIOLOGY:< from: Xray Chest 1 View- PORTABLE-Routine (04.26.20 @ 09:51) >  EXAM:  XR CHEST PORTABLE ROUTINE 1V                            PROCEDURE DATE:  04/26/2020          INTERPRETATION:  INDICATION: Respiratory failure; follow-up assessment.    PRIORS: 4/25/2020 at 6:39 AM    VIEWS: Portable AP radiography of the chest performed.    FINDINGS: Since prior evaluation there is no significant interval change in position of the indwelling ETT, NGT as well as bilateral IJ CVCs. Bilateral lung parenchymal airspace opacities are unchanged. No pleural effusion or pneumothorax is demonstrated. No mediastinal shift is noted. No acute osseous fractures are identified.    IMPRESSION: Bilateral lung parenchymal airspace opacities, without change.        DISCRETE X-RAY DATA:  Percent of LEFT lung opacification: 34-66%  Percent of RIGHT lung opacification: 34-66%  Change in lung opacification from most recent x-ray (<=3 days): Stable  Change from prior dated 3 or more days (same admission): Stable                MELISSA AGUERO   This document has been electronically signed. Apr 26 2020 10:14AM          < end of copied text >        ROS  [  ] UNABLE TO ELICIT HPI:  55 y/o F patient, w/ PMHx of HTN, works as HD nurse,  presents to the ED w/ fever, chills, nausea, vomiting, and mild cough that began x1 week ago. Patient also reports mild shortness of breath but denies  any other acute complaints. Patient was saturating 96% on 4L NC.  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  Chest X-ray showed: Bilateral airspace opacities suspicious for pneumonia. (08 Apr 2020 01:59)      History as above, Pt who has been in the hospital for almost 3 weeks from COVID - 19 disease and has developed complications such as resp failure and renal failure from her disease and is currently in the ICU , sedated , intubated and vent dependent , she I agree with above on a pressor also , she has some worsening in her left lung infiltrates , the right side is relatively unchanged, she has been on Maxipime for approximately 10 days and her wbc is increasing again. She has no fevers. She is on an FIO2 of 70% and PEEP of 8.    PAST MEDICAL & SURGICAL HISTORY:  HTN (hypertension)  No significant past surgical history      No Known Allergies      Meds:  acetaminophen    Suspension .. 650 milliGRAM(s) Oral every 6 hours PRN  ALBUTerol    90 MICROgram(s) HFA Inhaler 2 Puff(s) Inhalation every 6 hours  apixaban 2.5 milliGRAM(s) Oral two times a day  bisacodyl Suppository 10 milliGRAM(s) Rectal daily  cefepime   IVPB 1000 milliGRAM(s) IV Intermittent every 8 hours  chlorhexidine 0.12% Liquid 15 milliLiter(s) Oral Mucosa every 12 hours  furosemide Infusion 5 mG/Hr IV Continuous <Continuous>  HYDROmorphone Infusion 2 mG/Hr IV Continuous <Continuous>  midazolam Infusion 0.02 mG/kG/Hr IV Continuous <Continuous>  midodrine 10 milliGRAM(s) Oral every 8 hours  norepinephrine Infusion 0.05 MICROgram(s)/kG/Min IV Continuous <Continuous>  pantoprazole  Injectable 40 milliGRAM(s) IV Push daily  polyethylene glycol 3350 17 Gram(s) Oral daily  senna 2 Tablet(s) Oral at bedtime      SOCIAL HISTORY: unknown    FAMILY HISTORY: unknown      VITALS:  Vital Signs Last 24 Hrs  T(C): 36.1 (26 Apr 2020 15:23), Max: 37.3 (26 Apr 2020 00:00)  T(F): 97 (26 Apr 2020 15:23), Max: 99.2 (26 Apr 2020 00:00)  HR: 58 (26 Apr 2020 15:36) (48 - 66)  BP: 119/68 (26 Apr 2020 13:00) (105/56 - 152/71)  BP(mean): 81 (26 Apr 2020 13:00) (68 - 96)  RR: 33 (26 Apr 2020 13:00) (0 - 35)  SpO2: 89% (26 Apr 2020 15:36) (85% - 99%)    LABS/DIAGNOSTIC TESTS:                          9.7    13.66 )-----------( 213      ( 26 Apr 2020 05:04 )             29.6     WBC Count: 13.66 K/uL (04-26 @ 05:04)  WBC Count: 11.71 K/uL (04-25 @ 05:44)  WBC Count: 13.41 K/uL (04-24 @ 06:55)      04-26    136  |  99  |  48<H>  ----------------------------<  87  3.1<L>   |  27  |  3.91<H>    Ca    8.3<L>      26 Apr 2020 05:04  Phos  3.6     04-26  Mg     2.3     04-26    TPro  6.4  /  Alb  1.6<L>  /  TBili  3.8<H>  /  DBili  x   /  AST  47<H>  /  ALT  33  /  AlkPhos  77  04-26          LIVER FUNCTIONS - ( 26 Apr 2020 05:04 )  Alb: 1.6 g/dL / Pro: 6.4 g/dL / ALK PHOS: 77 U/L / ALT: 33 U/L DA / AST: 47 U/L / GGT: x                 LACTATE:    ABG - ABG - ( 26 Apr 2020 12:57 )  pH, Arterial: 7.38  pH, Blood: x     /  pCO2: 44    /  pO2: 65    / HCO3: 25    / Base Excess: 0.6   /  SaO2: 91                  CULTURES:   .Blood Blood-Peripheral  04-17 @ 18:26   No Growth Final  --  --          RADIOLOGY:< from: Xray Chest 1 View- PORTABLE-Routine (04.26.20 @ 09:51) >  EXAM:  XR CHEST PORTABLE ROUTINE 1V                            PROCEDURE DATE:  04/26/2020          INTERPRETATION:  INDICATION: Respiratory failure; follow-up assessment.    PRIORS: 4/25/2020 at 6:39 AM    VIEWS: Portable AP radiography of the chest performed.    FINDINGS: Since prior evaluation there is no significant interval change in position of the indwelling ETT, NGT as well as bilateral IJ CVCs. Bilateral lung parenchymal airspace opacities are unchanged. No pleural effusion or pneumothorax is demonstrated. No mediastinal shift is noted. No acute osseous fractures are identified.    IMPRESSION: Bilateral lung parenchymal airspace opacities, without change.        DISCRETE X-RAY DATA:  Percent of LEFT lung opacification: 34-66%  Percent of RIGHT lung opacification: 34-66%  Change in lung opacification from most recent x-ray (<=3 days): Stable  Change from prior dated 3 or more days (same admission): Stable                MELISSA AGUERO   This document has been electronically signed. Apr 26 2020 10:14AM          < end of copied text >        ROS  [ x ] UNABLE TO ELICIT

## 2020-04-26 NOTE — PROGRESS NOTE ADULT - ASSESSMENT
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 Patient was admitted on medicine floor for COVID pna.  Admitted to ICU for hypoxia and desaturation on NRB.     Plan:  Neuro:  -Pt sedated with versed   -Dilaudid drip  -no longer on Rocuronium    CV:  - Hold BP medications  -Levophed for pressor support  - on midodrine 10mg q 8       Pulm:  - Intubated for respiratory distress and hypoxia 4/15  - CXR shows worsening right lung infiltrate  - Acute Hypoxic Resp Failure secondary to PNA + COVID 19  - S/P  ANAKINRA and Hydroxychloroquine ( since April 9th)   - Discontinued full dose Lovenox due to epistaxis  - Current vent settings 35/430/50/12        ID:  -  completed Hydroxychloroquine, anakinra   Pt was febrile two night ago   blood culture negative  on Cefepime since 4/17  WBC: 16K  -CRP: 7.69--> 22.01 -->15.07-->  -D-Dimer: 1166--> 1401-->1142-->1901  -Ferritin: 1055--> 776--> 944-->935      Nephro:  RASHAUN:    -Cr/BUN:  5.44/82  S/p lasix drip  S/P HD 1 st session of HD on 4/24, second session on 4/25  - monitor urine output   - Monitor electrolytes   Hypernatremia:  Resolved    Metabolic acidosis:  Improved   -off sodium bicarbonate  Monitor [facility]  Nephrology: Dr. 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 Patient was admitted on medicine floor for COVID pna.  Admitted to ICU for hypoxia and desaturation on NRB.     Plan:  Neuro:  -Pt sedated with versed   -Dilaudid drip  -no longer on Rocuronium    CV:  - Hold BP medications  -Levophed for pressor support  - on midodrine 10mg q 8       Pulm:  - Intubated for respiratory distress and hypoxia 4/15  - CXR shows worsening right lung infiltrate  - Acute Hypoxic Resp Failure secondary to PNA + COVID 19  - S/P  ANAKINRA and Hydroxychloroquine ( since April 9th)   - Discontinued full dose Lovenox due to epistaxis  - Current vent settings 35/430/50/12        ID:  -  completed Hydroxychloroquine, anakinra   Pt was febrile two night ago   blood culture negative  on Cefepime since 4/17  WBC: elevated.   Dr. Davey consulted     Nephro:  RASHAUN:    -Cr/BUN:  5.44/82  Pt on lasix drip   S/P HD 1 st session of HD on 4/24, second session on 4/25  - monitor urine output   - Monitor electrolytes   Hypernatremia:  Resolved    Metabolic acidosis:  Improved   -off sodium bicarbonate  Monitor [facility]  Nephrology: Dr. Rosa      GI:  -  Tube feed  - GI ppx with Protonix  - Miralax and senna for prophylaxis     Heme:  - no indication for transfusion   - monitor cbc daily     Endo:  - No history of DM  - target CBG < 180  - FS q6 while Tube feed      Prophy:  - Heparin s/c for DVT prophylaxis 55 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 Patient was admitted on medicine floor for COVID pna.  Admitted to ICU for hypoxia and desaturation on NRB.     Plan:  Neuro:  -Pt sedated with versed   -Dilaudid drip  -no longer on Rocuronium    CV:  - Hold BP medications  -Levophed for pressor support  - on midodrine 10mg q 8       Pulm:  - Intubated for respiratory distress and hypoxia 4/15  - CXR shows worsening right lung infiltrate  - Acute Hypoxic Resp Failure secondary to PNA + COVID 19  - S/P  ANAKINRA and Hydroxychloroquine ( since April 9th)   - Discontinued full dose Lovenox due to epistaxis  - Current vent settings 35/430/50/12        ID:  -  completed Hydroxychloroquine, anakinra   Pt was febrile two night ago   blood culture negative  on Cefepime since 4/17  WBC: elevated.   Dr. Davey consulted , s/p 1 dose of Vanco 1250 today. *  *Pt to receive  Vanco 1250mg iv  dose Intradialysis*    Nephro:  RASHAUN:    -Cr/BUN:  5.44/82  Pt on lasix drip   S/P HD 1 st session of HD on 4/24, second session on 4/25  - monitor urine output   - Monitor electrolytes   Hypernatremia:  Resolved    Metabolic acidosis:  Improved   -off sodium bicarbonate  Monitor BMP  Nephrology: Dr. Rosa      GI:  -  Tube feed  - GI ppx with Protonix  - Miralax and senna for prophylaxis     Heme:  - no indication for transfusion   - monitor cbc daily     Endo:  - No history of DM  - target CBG < 180  - FS q6 while Tube feed      Prophy:  - Heparin s/c for DVT prophylaxis

## 2020-04-26 NOTE — CHART NOTE - NSCHARTNOTEFT_GEN_A_CORE
Palliative Care:    Received call from patient's surrogate/Criselda Egan ([phone]). Discussed status; reviewed medications, labs. All questions answered; supportive counseling provided.

## 2020-04-26 NOTE — PROGRESS NOTE ADULT - SUBJECTIVE AND OBJECTIVE BOX
INTERVAL HPI/OVERNIGHT EVENTS: patient underwent second session of HD yesterday, remains sedated and intubated    PRESSORS: [ x ] YES [ ] NO  WHICH: levophed    ANTIBIOTICS:  cefepime                DATE STARTED: 4/17    Antimicrobial:  cefepime   IVPB 1000 milliGRAM(s) IV Intermittent every 8 hours    Cardiovascular:  furosemide Infusion 5 mG/Hr IV Continuous <Continuous>  midodrine 10 milliGRAM(s) Oral every 8 hours  norepinephrine Infusion 0.05 MICROgram(s)/kG/Min IV Continuous <Continuous>    Pulmonary:  ALBUTerol    90 MICROgram(s) HFA Inhaler 2 Puff(s) Inhalation every 6 hours    Hematalogic:  apixaban 2.5 milliGRAM(s) Oral two times a day    Other:  acetaminophen    Suspension .. 650 milliGRAM(s) Oral every 6 hours PRN  bisacodyl Suppository 10 milliGRAM(s) Rectal daily  chlorhexidine 0.12% Liquid 15 milliLiter(s) Oral Mucosa every 12 hours  HYDROmorphone Infusion 2 mG/Hr IV Continuous <Continuous>  midazolam Infusion 0.02 mG/kG/Hr IV Continuous <Continuous>  pantoprazole   Suspension 40 milliGRAM(s) Oral daily  polyethylene glycol 3350 17 Gram(s) Oral daily  senna 2 Tablet(s) Oral at bedtime    acetaminophen    Suspension .. 650 milliGRAM(s) Oral every 6 hours PRN  ALBUTerol    90 MICROgram(s) HFA Inhaler 2 Puff(s) Inhalation every 6 hours  apixaban 2.5 milliGRAM(s) Oral two times a day  bisacodyl Suppository 10 milliGRAM(s) Rectal daily  cefepime   IVPB 1000 milliGRAM(s) IV Intermittent every 8 hours  chlorhexidine 0.12% Liquid 15 milliLiter(s) Oral Mucosa every 12 hours  furosemide Infusion 5 mG/Hr IV Continuous <Continuous>  HYDROmorphone Infusion 2 mG/Hr IV Continuous <Continuous>  midazolam Infusion 0.02 mG/kG/Hr IV Continuous <Continuous>  midodrine 10 milliGRAM(s) Oral every 8 hours  norepinephrine Infusion 0.05 MICROgram(s)/kG/Min IV Continuous <Continuous>  pantoprazole   Suspension 40 milliGRAM(s) Oral daily  polyethylene glycol 3350 17 Gram(s) Oral daily  senna 2 Tablet(s) Oral at bedtime    Drug Dosing Weight  Height (cm): 134.62 (07 Apr 2020 23:13)  Weight (kg): 86.2 (07 Apr 2020 23:13)  BMI (kg/m2): 47.6 (07 Apr 2020 23:13)  BSA (m2): 1.67 (07 Apr 2020 23:13)    CENTRAL LINE: [ x ] YES [ ] NO  [facility]ATION: [facility]        RENNER: [ x ] YES [ ] NO          A-LINE:  [ x ] YES [ ] NO  LOCATION:             ICU Vital Signs Last 24 Hrs  T(C): 37.2 (25 Apr 2020 20:00), Max: 37.2 (25 Apr 2020 20:00)  T(F): 98.9 (25 Apr 2020 20:00), Max: 98.9 (25 Apr 2020 20:00)  HR: 57 (25 Apr 2020 22:00) (54 - 100)  BP: 131/72 (25 Apr 2020 22:00) (84/47 - 152/71)  BP(mean): 86 (25 Apr 2020 22:00) (49 - 96)  ABP: 152/75 (25 Apr 2020 22:00) (92/50 - 183/77)  ABP(mean): 100 (25 Apr 2020 22:00) (60 - 111)  RR: 35 (25 Apr 2020 22:00) (19 - 35)  SpO2: 97% (25 Apr 2020 22:00) (85% - 99%)      ABG - ( 25 Apr 2020 03:51 )  pH, Arterial: 7.38  pH, Blood: x     /  pCO2: 45    /  pO2: 64    / HCO3: 26    / Base Excess: 1.2   /  SaO2: 91                    04-24 @ 07:01  -  04-25 @ 07:00  --------------------------------------------------------  IN: 1167 mL / OUT: 70 mL / NET: 1097 mL        Mode: AC/ CMV (Assist Control/ Continuous Mandatory Ventilation)  RR (machine): 35  TV (machine): 430  FiO2: 70  PEEP: 12  ITime: 1  MAP: 25  PIP: 46    PHYSICAL EXAM:  GENERAL: Sedated and intubated   HEAD: Atraumatic, Normocephalic  EYES: PERRLA, conjunctiva and sclera clear  ENMT: No tonsillar erythema, exudates, or enlargement  NECK: Supple, normal appearance  NERVOUS SYSTEM: Sedated and intubated  CHEST/LUNG: No chest deformity; crackles present to auscultation   HEART: Regular rate and rhythm; No murmurs  ABDOMEN: Soft, Nontender, Nondistended; Bowel sounds present  EXTREMITIES: No clubbing, cyanosis, or edema  LYMPH: No lymphadenopathy noted  SKIN: No rashes or lesions; Good capillary refill        LABS:  CBC Full  -  ( 25 Apr 2020 05:44 )  WBC Count : 11.71 K/uL  RBC Count : 3.37 M/uL  Hemoglobin : 9.1 g/dL  Hematocrit : 28.9 %  Platelet Count - Automated : 215 K/uL  Mean Cell Volume : 85.8 fl  Mean Cell Hemoglobin : 27.0 pg  Mean Cell Hemoglobin Concentration : 31.5 gm/dL  Auto Neutrophil # : x  Auto Lymphocyte # : x  Auto Monocyte # : x  Auto Eosinophil # : x  Auto Basophil # : x  Auto Neutrophil % : x  Auto Lymphocyte % : x  Auto Monocyte % : x  Auto Eosinophil % : x  Auto Basophil % : x    04-25    133<L>  |  97  |  58<H>  ----------------------------<  100<H>  3.8   |  27  |  4.48<H>    Ca    7.8<L>      25 Apr 2020 05:44  Phos  5.6     04-25  Mg     2.4     04-25    TPro  6.5  /  Alb  1.7<L>  /  TBili  3.9<H>  /  DBili  x   /  AST  44<H>  /  ALT  31  /  AlkPhos  74  04-25    PT/INR - ( 24 Apr 2020 06:55 )   PT: 11.0 sec;   INR: 0.97 ratio                 RADIOLOGY & ADDITIONAL STUDIES REVIEWED:  ***    [ ]GOALS OF CARE DISCUSSION WITH PATIENT/FAMILY/PROXY:    CRITICAL CARE TIME SPENT: 35 minutes INTERVAL HPI/OVERNIGHT EVENTS: patient underwent second session of HD yesterday, remains sedated and intubated. Pt placed on L fem CVC line, and old LIJ removed. WBC elevated, Dr. Davey consulted.     PRESSORS: [ x ] YES [ ] NO  WHICH: levophed    ANTIBIOTICS:  cefepime                DATE STARTED: 4/17    Antimicrobial:  cefepime   IVPB 1000 milliGRAM(s) IV Intermittent every 8 hours    Cardiovascular:  furosemide Infusion 5 mG/Hr IV Continuous <Continuous>  midodrine 10 milliGRAM(s) Oral every 8 hours  norepinephrine Infusion 0.05 MICROgram(s)/kG/Min IV Continuous <Continuous>    Pulmonary:  ALBUTerol    90 MICROgram(s) HFA Inhaler 2 Puff(s) Inhalation every 6 hours    Hematalogic:  apixaban 2.5 milliGRAM(s) Oral two times a day    Other:  acetaminophen    Suspension .. 650 milliGRAM(s) Oral every 6 hours PRN  bisacodyl Suppository 10 milliGRAM(s) Rectal daily  chlorhexidine 0.12% Liquid 15 milliLiter(s) Oral Mucosa every 12 hours  HYDROmorphone Infusion 2 mG/Hr IV Continuous <Continuous>  midazolam Infusion 0.02 mG/kG/Hr IV Continuous <Continuous>  pantoprazole   Suspension 40 milliGRAM(s) Oral daily  polyethylene glycol 3350 17 Gram(s) Oral daily  senna 2 Tablet(s) Oral at bedtime    acetaminophen    Suspension .. 650 milliGRAM(s) Oral every 6 hours PRN  ALBUTerol    90 MICROgram(s) HFA Inhaler 2 Puff(s) Inhalation every 6 hours  apixaban 2.5 milliGRAM(s) Oral two times a day  bisacodyl Suppository 10 milliGRAM(s) Rectal daily  cefepime   IVPB 1000 milliGRAM(s) IV Intermittent every 8 hours  chlorhexidine 0.12% Liquid 15 milliLiter(s) Oral Mucosa every 12 hours  furosemide Infusion 5 mG/Hr IV Continuous <Continuous>  HYDROmorphone Infusion 2 mG/Hr IV Continuous <Continuous>  midazolam Infusion 0.02 mG/kG/Hr IV Continuous <Continuous>  midodrine 10 milliGRAM(s) Oral every 8 hours  norepinephrine Infusion 0.05 MICROgram(s)/kG/Min IV Continuous <Continuous>  pantoprazole   Suspension 40 milliGRAM(s) Oral daily  polyethylene glycol 3350 17 Gram(s) Oral daily  senna 2 Tablet(s) Oral at bedtime    Drug Dosing Weight  Height (cm): 134.62 (07 Apr 2020 23:13)  Weight (kg): 86.2 (07 Apr 2020 23:13)  BMI (kg/m2): 47.6 (07 Apr 2020 23:13)  BSA (m2): 1.67 (07 Apr 2020 23:13)    CENTRAL LINE: [ x ] YES [ ] NO  LOCATION: [facility]        RENNER: [ x ] YES [ ] NO          A-LINE:  [ x ] YES [ ] NO  LOCATION:             ICU Vital Signs Last 24 Hrs  T(C): 37.2 (25 Apr 2020 20:00), Max: 37.2 (25 Apr 2020 20:00)  T(F): 98.9 (25 Apr 2020 20:00), Max: 98.9 (25 Apr 2020 20:00)  HR: 57 (25 Apr 2020 22:00) (54 - 100)  BP: 131/72 (25 Apr 2020 22:00) (84/47 - 152/71)  BP(mean): 86 (25 Apr 2020 22:00) (49 - 96)  ABP: 152/75 (25 Apr 2020 22:00) (92/50 - 183/77)  ABP(mean): 100 (25 Apr 2020 22:00) (60 - 111)  RR: 35 (25 Apr 2020 22:00) (19 - 35)  SpO2: 97% (25 Apr 2020 22:00) (85% - 99%)      ABG - ( 25 Apr 2020 03:51 )  pH, Arterial: 7.38  pH, Blood: x     /  pCO2: 45    /  pO2: 64    / HCO3: 26    / Base Excess: 1.2   /  SaO2: 91                    04-24 @ 07:01  -  04-25 @ 07:00  --------------------------------------------------------  IN: 1167 mL / OUT: 70 mL / NET: 1097 mL        Mode: AC/ CMV (Assist Control/ Continuous Mandatory Ventilation)  RR (machine): 35  TV (machine): 430  FiO2: 70  PEEP: 12  ITime: 1  MAP: 25  PIP: 46    PHYSICAL EXAM:  GENERAL: Sedated and intubated   HEAD: Atraumatic, Normocephalic  EYES: PERRLA, conjunctiva and sclera clear  ENMT: No tonsillar erythema, exudates, or enlargement  NECK: Supple, normal appearance  NERVOUS SYSTEM: Sedated and intubated  CHEST/LUNG: No chest deformity; crackles present to auscultation   HEART: Regular rate and rhythm; No murmurs  ABDOMEN: Soft, Nontender, Nondistended; Bowel sounds present  EXTREMITIES: No clubbing, cyanosis, or edema  LYMPH: No lymphadenopathy noted  SKIN: No rashes or lesions; Good capillary refill        LABS:  CBC Full  -  ( 25 Apr 2020 05:44 )  WBC Count : 11.71 K/uL  RBC Count : 3.37 M/uL  Hemoglobin : 9.1 g/dL  Hematocrit : 28.9 %  Platelet Count - Automated : 215 K/uL  Mean Cell Volume : 85.8 fl  Mean Cell Hemoglobin : 27.0 pg  Mean Cell Hemoglobin Concentration : 31.5 gm/dL  Auto Neutrophil # : x  Auto Lymphocyte # : x  Auto Monocyte # : x  Auto Eosinophil # : x  Auto Basophil # : x  Auto Neutrophil % : x  Auto Lymphocyte % : x  Auto Monocyte % : x  Auto Eosinophil % : x  Auto Basophil % : x    04-25    133<L>  |  97  |  58<H>  ----------------------------<  100<H>  3.8   |  27  |  4.48<H>    Ca    7.8<L>      25 Apr 2020 05:44  Phos  5.6     04-25  Mg     2.4     04-25    TPro  6.5  /  Alb  1.7<L>  /  TBili  3.9<H>  /  DBili  x   /  AST  44<H>  /  ALT  31  /  AlkPhos  74  04-25    PT/INR - ( 24 Apr 2020 06:55 )   PT: 11.0 sec;   INR: 0.97 ratio                 RADIOLOGY & ADDITIONAL STUDIES REVIEWED:    < from: Xray Chest 1 View- PORTABLE-Routine (04.26.20 @ 09:51) >    IMPRESSION: Bilateral lung parenchymal airspace opacities, without change.    < end of copied text >    [ ]GOALS OF CARE DISCUSSION WITH PATIENT/FAMILY/PROXY:    CRITICAL CARE TIME SPENT: 35 minutes INTERVAL HPI/OVERNIGHT EVENTS: patient underwent second session of HD yesterday, remains sedated and intubated. Pt placed on L fem CVC line, and old LIJ removed. WBC elevated, Dr. Davey consulted. S/p 1 dose of Vanco 1250 today.     PRESSORS: [ x ] YES [ ] NO  WHICH: levophed    ANTIBIOTICS:  cefepime                DATE STARTED: 4/17    Antimicrobial:  cefepime   IVPB 1000 milliGRAM(s) IV Intermittent every 8 hours    Cardiovascular:  furosemide Infusion 5 mG/Hr IV Continuous <Continuous>  midodrine 10 milliGRAM(s) Oral every 8 hours  norepinephrine Infusion 0.05 MICROgram(s)/kG/Min IV Continuous <Continuous>    Pulmonary:  ALBUTerol    90 MICROgram(s) HFA Inhaler 2 Puff(s) Inhalation every 6 hours    Hematalogic:  apixaban 2.5 milliGRAM(s) Oral two times a day    Other:  acetaminophen    Suspension .. 650 milliGRAM(s) Oral every 6 hours PRN  bisacodyl Suppository 10 milliGRAM(s) Rectal daily  chlorhexidine 0.12% Liquid 15 milliLiter(s) Oral Mucosa every 12 hours  HYDROmorphone Infusion 2 mG/Hr IV Continuous <Continuous>  midazolam Infusion 0.02 mG/kG/Hr IV Continuous <Continuous>  pantoprazole   Suspension 40 milliGRAM(s) Oral daily  polyethylene glycol 3350 17 Gram(s) Oral daily  senna 2 Tablet(s) Oral at bedtime    acetaminophen    Suspension .. 650 milliGRAM(s) Oral every 6 hours PRN  ALBUTerol    90 MICROgram(s) HFA Inhaler 2 Puff(s) Inhalation every 6 hours  apixaban 2.5 milliGRAM(s) Oral two times a day  bisacodyl Suppository 10 milliGRAM(s) Rectal daily  cefepime   IVPB 1000 milliGRAM(s) IV Intermittent every 8 hours  chlorhexidine 0.12% Liquid 15 milliLiter(s) Oral Mucosa every 12 hours  furosemide Infusion 5 mG/Hr IV Continuous <Continuous>  HYDROmorphone Infusion 2 mG/Hr IV Continuous <Continuous>  midazolam Infusion 0.02 mG/kG/Hr IV Continuous <Continuous>  midodrine 10 milliGRAM(s) Oral every 8 hours  norepinephrine Infusion 0.05 MICROgram(s)/kG/Min IV Continuous <Continuous>  pantoprazole   Suspension 40 milliGRAM(s) Oral daily  polyethylene glycol 3350 17 Gram(s) Oral daily  senna 2 Tablet(s) Oral at bedtime    Drug Dosing Weight  Height (cm): 134.62 (07 Apr 2020 23:13)  Weight (kg): 86.2 (07 Apr 2020 23:13)  BMI (kg/m2): 47.6 (07 Apr 2020 23:13)  BSA (m2): 1.67 (07 Apr 2020 23:13)    CENTRAL LINE: [ x ] YES [ ] NO  LOCATION: [facility]        RENNER: [ x ] YES [ ] NO          A-LINE:  [ x ] YES [ ] NO  LOCATION:             ICU Vital Signs Last 24 Hrs  T(C): 37.2 (25 Apr 2020 20:00), Max: 37.2 (25 Apr 2020 20:00)  T(F): 98.9 (25 Apr 2020 20:00), Max: 98.9 (25 Apr 2020 20:00)  HR: 57 (25 Apr 2020 22:00) (54 - 100)  BP: 131/72 (25 Apr 2020 22:00) (84/47 - 152/71)  BP(mean): 86 (25 Apr 2020 22:00) (49 - 96)  ABP: 152/75 (25 Apr 2020 22:00) (92/50 - 183/77)  ABP(mean): 100 (25 Apr 2020 22:00) (60 - 111)  RR: 35 (25 Apr 2020 22:00) (19 - 35)  SpO2: 97% (25 Apr 2020 22:00) (85% - 99%)      ABG - ( 25 Apr 2020 03:51 )  pH, Arterial: 7.38  pH, Blood: x     /  pCO2: 45    /  pO2: 64    / HCO3: 26    / Base Excess: 1.2   /  SaO2: 91                    04-24 @ 07:01  -  04-25 @ 07:00  --------------------------------------------------------  IN: 1167 mL / OUT: 70 mL / NET: 1097 mL        Mode: AC/ CMV (Assist Control/ Continuous Mandatory Ventilation)  RR (machine): 35  TV (machine): 430  FiO2: 70  PEEP: 12  ITime: 1  MAP: 25  PIP: 46    PHYSICAL EXAM:  GENERAL: Sedated and intubated   HEAD: Atraumatic, Normocephalic  EYES: PERRLA, conjunctiva and sclera clear  ENMT: No tonsillar erythema, exudates, or enlargement  NECK: Supple, normal appearance  NERVOUS SYSTEM: Sedated and intubated  CHEST/LUNG: No chest deformity; crackles present to auscultation   HEART: Regular rate and rhythm; No murmurs  ABDOMEN: Soft, Nontender, Nondistended; Bowel sounds present  EXTREMITIES: No clubbing, cyanosis, or edema  LYMPH: No lymphadenopathy noted  SKIN: No rashes or lesions; Good capillary refill        LABS:  CBC Full  -  ( 25 Apr 2020 05:44 )  WBC Count : 11.71 K/uL  RBC Count : 3.37 M/uL  Hemoglobin : 9.1 g/dL  Hematocrit : 28.9 %  Platelet Count - Automated : 215 K/uL  Mean Cell Volume : 85.8 fl  Mean Cell Hemoglobin : 27.0 pg  Mean Cell Hemoglobin Concentration : 31.5 gm/dL  Auto Neutrophil # : x  Auto Lymphocyte # : x  Auto Monocyte # : x  Auto Eosinophil # : x  Auto Basophil # : x  Auto Neutrophil % : x  Auto Lymphocyte % : x  Auto Monocyte % : x  Auto Eosinophil % : x  Auto Basophil % : x    04-25    133<L>  |  97  |  58<H>  ----------------------------<  100<H>  3.8   |  27  |  4.48<H>    Ca    7.8<L>      25 Apr 2020 05:44  Phos  5.6     04-25  Mg     2.4     04-25    TPro  6.5  /  Alb  1.7<L>  /  TBili  3.9<H>  /  DBili  x   /  AST  44<H>  /  ALT  31  /  AlkPhos  74  04-25    PT/INR - ( 24 Apr 2020 06:55 )   PT: 11.0 sec;   INR: 0.97 ratio                 RADIOLOGY & ADDITIONAL STUDIES REVIEWED:    < from: Xray Chest 1 View- PORTABLE-Routine (04.26.20 @ 09:51) >    IMPRESSION: Bilateral lung parenchymal airspace opacities, without change.    < end of copied text >    [ ]GOALS OF CARE DISCUSSION WITH PATIENT/FAMILY/PROXY:    CRITICAL CARE TIME SPENT: 35 minutes

## 2020-04-26 NOTE — CONSULT NOTE ADULT - ASSESSMENT
Septic Shock  Bilat Pneumonia - worse on left side  COVID - 19 infection  Leukocytosis - increasing    Plan - Start Vancomycin 1250mgs iv x 1 today and then 1250mgs iv intradialysis  on T-T-S  decrease Maxipime to 1 gm iv q24hrs  prognosis - guarded

## 2020-04-27 NOTE — CHART NOTE - NSCHARTNOTEFT_GEN_A_CORE
Palliative Care:    Reviewed status with ICU team and Nephro. Spoke with patient's surrogate/Criselda Egan ([phone]). Discussed status; reviewed medications, labs. All questions answered; supportive counseling provided.

## 2020-04-27 NOTE — PROGRESS NOTE ADULT - SUBJECTIVE AND OBJECTIVE BOX
INTERVAL HPI/OVERNIGHT EVENTS: *** Patient was seen and examined at bed side.     PRESSORS: [ ] YES [ ] NO  WHICH:    ANTIBIOTICS:                  DATE STARTED:  ANTIBIOTICS:                  DATE STARTED:  ANTIBIOTICS:                  DATE STARTED:    Antimicrobial:  cefepime   IVPB 2000 milliGRAM(s) IV Intermittent <User Schedule>  vancomycin  IVPB 1250 milliGRAM(s) IV Intermittent once    Cardiovascular:  midodrine 10 milliGRAM(s) Oral every 8 hours  norepinephrine Infusion 0.05 MICROgram(s)/kG/Min IV Continuous <Continuous>    Pulmonary:  ALBUTerol    90 MICROgram(s) HFA Inhaler 2 Puff(s) Inhalation every 6 hours    Hematalogic:  apixaban 2.5 milliGRAM(s) Oral two times a day    Other:  acetaminophen    Suspension .. 650 milliGRAM(s) Oral every 6 hours PRN  bisacodyl Suppository 10 milliGRAM(s) Rectal daily  chlorhexidine 0.12% Liquid 15 milliLiter(s) Oral Mucosa every 12 hours  HYDROmorphone Infusion 2 mG/Hr IV Continuous <Continuous>  midazolam Infusion 0.02 mG/kG/Hr IV Continuous <Continuous>  pantoprazole  Injectable 40 milliGRAM(s) IV Push daily  polyethylene glycol 3350 17 Gram(s) Oral daily  senna 2 Tablet(s) Oral at bedtime    acetaminophen    Suspension .. 650 milliGRAM(s) Oral every 6 hours PRN  ALBUTerol    90 MICROgram(s) HFA Inhaler 2 Puff(s) Inhalation every 6 hours  apixaban 2.5 milliGRAM(s) Oral two times a day  bisacodyl Suppository 10 milliGRAM(s) Rectal daily  cefepime   IVPB 2000 milliGRAM(s) IV Intermittent <User Schedule>  chlorhexidine 0.12% Liquid 15 milliLiter(s) Oral Mucosa every 12 hours  HYDROmorphone Infusion 2 mG/Hr IV Continuous <Continuous>  midazolam Infusion 0.02 mG/kG/Hr IV Continuous <Continuous>  midodrine 10 milliGRAM(s) Oral every 8 hours  norepinephrine Infusion 0.05 MICROgram(s)/kG/Min IV Continuous <Continuous>  pantoprazole  Injectable 40 milliGRAM(s) IV Push daily  polyethylene glycol 3350 17 Gram(s) Oral daily  senna 2 Tablet(s) Oral at bedtime  vancomycin  IVPB 1250 milliGRAM(s) IV Intermittent once    Drug Dosing Weight  Height (cm): 134.62 (07 Apr 2020 23:13)  Weight (kg): 86.2 (07 Apr 2020 23:13)  BMI (kg/m2): 47.6 (07 Apr 2020 23:13)  BSA (m2): 1.67 (07 Apr 2020 23:13)    CENTRAL LINE: [ ] YES [ ] NO  LOCATION:   DATE INSERTED:  REMOVE: [ ] YES [ ] NO  EXPLAIN:    RENNER: [ ] YES [ ] NO    DATE INSERTED:  REMOVE:  [ ] YES [ ] NO  EXPLAIN:    A-LINE:  [ ] YES [ ] NO  LOCATION:   DATE INSERTED:  REMOVE:  [ ] YES [ ] NO  EXPLAIN:        ICU Vital Signs Last 24 Hrs  T(C): 35.4 (27 Apr 2020 04:00), Max: 36.4 (27 Apr 2020 00:00)  T(F): 95.8 (27 Apr 2020 04:00), Max: 97.5 (27 Apr 2020 00:00)  HR: 76 (27 Apr 2020 07:00) (51 - 83)  BP: 101/52 (27 Apr 2020 07:00) (93/45 - 126/59)  BP(mean): 62 (27 Apr 2020 07:00) (56 - 81)  ABP: 143/94 (27 Apr 2020 07:00) (66/60 - 157/77)  ABP(mean): 111 (27 Apr 2020 07:00) (62 - 111)  RR: 26 (27 Apr 2020 07:00) (3 - 36)  SpO2: 92% (27 Apr 2020 07:00) (89% - 96%)      ABG - ( 27 Apr 2020 03:59 )  pH, Arterial: 7.27  pH, Blood: x     /  pCO2: 56    /  pO2: 78    / HCO3: 25    / Base Excess: -1.9  /  SaO2: 93                    04-26 @ 07:01  -  04-27 @ 07:00  --------------------------------------------------------  IN: 520.6 mL / OUT: 90 mL / NET: 430.6 mL        Mode: AC/ CMV (Assist Control/ Continuous Mandatory Ventilation)  RR (machine): 33  TV (machine): 400  FiO2: 80  PEEP: 9  ITime: 0.7  MAP: 22  PIP: 44      PHYSICAL EXAM:    GENERAL:NAD, well-groomed, well-developed  HEAD:  Atraumatic, Normocephalic  EYES: EOMI, PERRLA, conjunctiva and sclera clear  ENMT: No tonsillar erythema, exudates, or enlargement; Moist mucous membranes, Good dentition, No lesions  NECK: Supple, normal appearance, No JVD; Normal thyroid; Trachea midline  NERVOUS SYSTEM: Alert & Oriented X3, Good concentration; Motor Strength 5/5 B/L upper and lower extremities; DTRs 2+ intact and symmetric  CHEST/LUNG: No chest deformity;Normal percussion bilaterally; No rales, rhonchi, wheezing   HEART: Regular rate and rhythm; No murmurs, rubs, or gallops  ABDOMEN: Soft, Nontender, Nondistended; Bowel sounds present  EXTREMITIES: 2+ Peripheral Pulses, No clubbing, cyanosis, or edema  LYMPH: No lymphadenopathy noted  SKIN:No rashes or lesions; Good capillary refill      LABS:  CBC Full  -  ( 27 Apr 2020 06:22 )  WBC Count : 17.37 K/uL  RBC Count : 3.50 M/uL  Hemoglobin : 9.6 g/dL  Hematocrit : 30.7 %  Platelet Count - Automated : 207 K/uL  Mean Cell Volume : 87.7 fl  Mean Cell Hemoglobin : 27.4 pg  Mean Cell Hemoglobin Concentration : 31.3 gm/dL  Auto Neutrophil # : x  Auto Lymphocyte # : x  Auto Monocyte # : x  Auto Eosinophil # : x  Auto Basophil # : x  Auto Neutrophil % : x  Auto Lymphocyte % : x  Auto Monocyte % : x  Auto Eosinophil % : x  Auto Basophil % : x    04-27    136  |  98  |  58<H>  ----------------------------<  97  3.9   |  22  |  4.92<H>    Ca    8.4      27 Apr 2020 06:22  Phos  7.8     04-27  Mg     2.5     04-27    TPro  6.6  /  Alb  1.5<L>  /  TBili  3.6<H>  /  DBili  x   /  AST  45<H>  /  ALT  33  /  AlkPhos  96  04-27    PT/INR - ( 27 Apr 2020 06:22 )   PT: 13.9 sec;   INR: 1.22 ratio         PTT - ( 27 Apr 2020 06:22 )  PTT:30.7 sec        RADIOLOGY & ADDITIONAL STUDIES REVIEWED:  ***    GOALS OF CARE DISCUSSION WITH PATIENT/FAMILY/PROXY: full code/DNR/DNI    CRITICAL CARE TIME SPENT: 35 minutes INTERVAL HPI/OVERNIGHT EVENTS: Patient was seen and examined at bed side. Cefepime change to post HD. WBC increasing to 17K this morning. Minimal urine output. Plan for HD tomorrow. Current vent settings of 35/410/80/9.     PRESSORS: Yes   WHICH: Levophed    ANTIBIOTICS: cefepime    DATE STARTED: 4/26    Antimicrobial:  cefepime   IVPB 2000 milliGRAM(s) IV Intermittent <User Schedule>  vancomycin  IVPB 1250 milliGRAM(s) IV Intermittent once    Cardiovascular:  midodrine 10 milliGRAM(s) Oral every 8 hours  norepinephrine Infusion 0.05 MICROgram(s)/kG/Min IV Continuous <Continuous>    Pulmonary:  ALBUTerol    90 MICROgram(s) HFA Inhaler 2 Puff(s) Inhalation every 6 hours    Hematalogic:  apixaban 2.5 milliGRAM(s) Oral two times a day    Other:  acetaminophen    Suspension .. 650 milliGRAM(s) Oral every 6 hours PRN  bisacodyl Suppository 10 milliGRAM(s) Rectal daily  chlorhexidine 0.12% Liquid 15 milliLiter(s) Oral Mucosa every 12 hours  HYDROmorphone Infusion 2 mG/Hr IV Continuous <Continuous>  midazolam Infusion 0.02 mG/kG/Hr IV Continuous <Continuous>  pantoprazole  Injectable 40 milliGRAM(s) IV Push daily  polyethylene glycol 3350 17 Gram(s) Oral daily  senna 2 Tablet(s) Oral at bedtime    acetaminophen    Suspension .. 650 milliGRAM(s) Oral every 6 hours PRN  ALBUTerol    90 MICROgram(s) HFA Inhaler 2 Puff(s) Inhalation every 6 hours  apixaban 2.5 milliGRAM(s) Oral two times a day  bisacodyl Suppository 10 milliGRAM(s) Rectal daily  cefepime   IVPB 2000 milliGRAM(s) IV Intermittent <User Schedule>  chlorhexidine 0.12% Liquid 15 milliLiter(s) Oral Mucosa every 12 hours  HYDROmorphone Infusion 2 mG/Hr IV Continuous <Continuous>  midazolam Infusion 0.02 mG/kG/Hr IV Continuous <Continuous>  midodrine 10 milliGRAM(s) Oral every 8 hours  norepinephrine Infusion 0.05 MICROgram(s)/kG/Min IV Continuous <Continuous>  pantoprazole  Injectable 40 milliGRAM(s) IV Push daily  polyethylene glycol 3350 17 Gram(s) Oral daily  senna 2 Tablet(s) Oral at bedtime  vancomycin  IVPB 1250 milliGRAM(s) IV Intermittent once    Drug Dosing Weight  Height (cm): 134.62 (07 Apr 2020 23:13)  Weight (kg): 86.2 (07 Apr 2020 23:13)  BMI (kg/m2): 47.6 (07 Apr 2020 23:13)  BSA (m2): 1.67 (07 Apr 2020 23:13)    CENTRAL LINE: Yes LOCATION: Right femoral   DATE INSERTED: 4/26  REMOVE: No    RENNER: Yes   DATE INSERTED: 4/15  REMOVE: No    A-LINE: Yes Right axillary   DATE INSERTED: 4/26  REMOVE: No      ICU Vital Signs Last 24 Hrs  T(C): 35.4 (27 Apr 2020 04:00), Max: 36.4 (27 Apr 2020 00:00)  T(F): 95.8 (27 Apr 2020 04:00), Max: 97.5 (27 Apr 2020 00:00)  HR: 76 (27 Apr 2020 07:00) (51 - 83)  BP: 101/52 (27 Apr 2020 07:00) (93/45 - 126/59)  BP(mean): 62 (27 Apr 2020 07:00) (56 - 81)  ABP: 143/94 (27 Apr 2020 07:00) (66/60 - 157/77)  ABP(mean): 111 (27 Apr 2020 07:00) (62 - 111)  RR: 26 (27 Apr 2020 07:00) (3 - 36)  SpO2: 92% (27 Apr 2020 07:00) (89% - 96%)      ABG - ( 27 Apr 2020 03:59 )  pH, Arterial: 7.27  pH, Blood: x     /  pCO2: 56    /  pO2: 78    / HCO3: 25    / Base Excess: -1.9  /  SaO2: 93            04-26 @ 07:01  -  04-27 @ 07:00  --------------------------------------------------------  IN: 520.6 mL / OUT: 90 mL / NET: 430.6 mL      Mode: AC/ CMV (Assist Control/ Continuous Mandatory Ventilation)  RR (machine): 33  TV (machine): 400  FiO2: 80  PEEP: 9  ITime: 0.7  MAP: 22  PIP: 44      PHYSICAL EXAM:  GENERAL: Sedated and intubated   HEAD: Atraumatic, Normocephalic  EYES: PERRLA, conjunctiva and sclera clear  ENMT: No tonsillar erythema, exudates, or enlargement; Moist mucous membranes  NECK: Supple, normal appearance  NERVOUS SYSTEM: Sedated and intubated   CHEST/LUNG: No chest deformity; crackles present to auscultation   HEART: Regular rate and rhythm; No murmurs  ABDOMEN: Soft, Nontender, Nondistended; Bowel sounds present  EXTREMITIES: No clubbing, cyanosis, or edema  LYMPH: No lymphadenopathy noted  SKIN: No rashes or lesions; Good capillary refill      LABS:  CBC Full  -  ( 27 Apr 2020 06:22 )  WBC Count : 17.37 K/uL  RBC Count : 3.50 M/uL  Hemoglobin : 9.6 g/dL  Hematocrit : 30.7 %  Platelet Count - Automated : 207 K/uL  Mean Cell Volume : 87.7 fl  Mean Cell Hemoglobin : 27.4 pg  Mean Cell Hemoglobin Concentration : 31.3 gm/dL      04-27    136  |  98  |  58<H>  ----------------------------<  97  3.9   |  22  |  4.92<H>    Ca    8.4      27 Apr 2020 06:22  Phos  7.8     04-27  Mg     2.5     04-27    TPro  6.6  /  Alb  1.5<L>  /  TBili  3.6<H>  /  DBili  x   /  AST  45<H>  /  ALT  33  /  AlkPhos  96  04-27    PT/INR - ( 27 Apr 2020 06:22 )   PT: 13.9 sec;   INR: 1.22 ratio         PTT - ( 27 Apr 2020 06:22 )  PTT:30.7 sec      RADIOLOGY & ADDITIONAL STUDIES REVIEWED: < from: Xray Chest 1 View- PORTABLE-Routine (04.26.20 @ 09:51) >  IMPRESSION: Bilateral lung parenchymal airspace opacities, without change.    < end of copied text >    GOALS OF CARE DISCUSSION WITH PATIENT/FAMILY/PROXY: full code     CRITICAL CARE TIME SPENT: 35 minutes

## 2020-04-27 NOTE — PROGRESS NOTE ADULT - ASSESSMENT
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 Patient was admitted on medicine floor for COVID pna.  Admitted to ICU for hypoxia and desaturation on NRB.     Plan:  Neuro:  -Pt sedated with versed   -Dilaudid drip  -no longer on Rocuronium    CV:  - Hold BP medications  -Levophed for pressor support  - on midodrine 10mg q 8       Pulm:  - Intubated for respiratory distress and hypoxia 4/15  - CXR shows worsening right lung infiltrate  - Acute Hypoxic Resp Failure secondary to PNA + COVID 19  - S/P  ANAKINRA and Hydroxychloroquine ( since April 9th)   - Discontinued full dose Lovenox due to epistaxis  - Current vent settings 35/430/50/12        ID:  -  completed Hydroxychloroquine, anakinra   Pt was febrile two night ago   blood culture negative  on Cefepime since 4/17  WBC: elevated.   Dr. Davey consulted , s/p 1 dose of Vanco 1250 today. *  *Pt to receive  Vanco 1250mg iv  dose Intradialysis*    Nephro:  RASHAUN:    -Cr/BUN:  5.44/82  Pt on lasix drip   S/P HD 1 st session of HD on 4/24, second session on 4/25  - monitor urine output   - Monitor electrolytes   Hypernatremia:  Resolved    Metabolic acidosis:  Improved   -off sodium bicarbonate  Monitor BMP  Nephrology: Dr. Rosa      GI:  -  Tube feed  - GI ppx with Protonix  - Miralax and senna for prophylaxis     Heme:  - no indication for transfusion   - monitor cbc daily     Endo:  - No history of DM  - target CBG < 180  - FS q6 while Tube feed      Prophy:  - Heparin s/c for DVT prophylaxis 55 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 Patient was admitted on medicine floor for COVID pna.  Admitted to ICU for hypoxia and desaturation on NRB.     Plan:  Neuro:  -Pt sedated with versed   -Dilaudid drip  -no longer on Rocuronium    CV:  - Hold BP medications  -Levophed for pressor support  - on midodrine 10mg q 8       Pulm:  - Intubated for respiratory distress and hypoxia 4/15  - CXR shows bilateral airspace opacities   - Acute Hypoxic Resp Failure secondary to PNA + COVID 19  - S/P ANAKINRA and Hydroxychloroquine (since April 9th)   - Discontinued full dose Lovenox due to epistaxis  - Current vent settings 35/410/80/9        ID:  - completed Hydroxychloroquine, anakinra   Pt was febrile a few nights ago   blood culture negative  on Cefepime since 4/17  WBC: elevated at 17K this morning    ID Dr. Davey, s/p 1 dose of Vanco 1250 yesterday  *Pt to receive Vanco 1250mg iv  dose Intradialysis tomorrow*    Nephro:  RASHAUN:    -Cr/BUN:  4.92/58  no longer on lasix drip as per nephro due to minimal urine output   S/P HD 1 st session of HD on 4/24, second session on 4/25; plan for next session tomorrow 4/28  - monitor urine output; minimal urine output; net positive 431cc  - Monitor electrolytes   Hypernatremia:  Resolved    Metabolic acidosis:  Improved   -off sodium bicarbonate  Monitor BMP  Nephrology: Dr. Rosa      GI:  -  Tube feed  - GI ppx with Protonix  - Miralax and senna for prophylaxis; dulcolax     Heme:  - no indication for transfusion   - Hgb stable   - monitor cbc daily     Endo:  - No history of DM  - target CBG < 180  - FS q6 while Tube feed      Prophy:  - eliquis 2.5mg BID   - Protonix for GI prophylaxis 55 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 Patient was admitted on medicine floor for COVID pna.  Admitted to ICU for hypoxia and desaturation on NRB.     Assessment:  - acute hypoxic respiratory failure secondary to COVID-19 pneumonia  - septic shock requiring pressors   - RASHAUN  - Leukocytosis  - COVID pandemic     Plan:  Neuro:  -Pt sedated with versed   -Dilaudid drip  -no longer on Rocuronium    CV:  - Hold BP medications  -Levophed for pressor support  - on midodrine 10mg q 8       Pulm:  - Intubated for respiratory distress and hypoxia 4/15  - CXR shows bilateral airspace opacities   - Acute Hypoxic Resp Failure secondary to PNA + COVID 19  - S/P ANAKINRA and Hydroxychloroquine (since April 9th)   - Discontinued full dose Lovenox due to epistaxis  - Current vent settings 35/410/80/9    - CXR findings may be due to fluid overload; will speak to nephro Dr. Rosa about possible daily HD sessions for a few days to attempt to decrease    FiO2 requirements; will hold off on trial of tocilizumab at this time   - ESR: 96>92  - D-dimer: 1901>1936  - LDH: 362>447     ID:  - completed Hydroxychloroquine, anakinra   Pt was febrile a few nights ago   blood culture negative  on Cefepime since 4/17  WBC: elevated at 17K this morning    ID Dr. Davey, s/p 1 dose of Vanco 1250 yesterday  *Pt to receive Vanco 1250mg iv  dose Intradialysis tomorrow*    Nephro:  RASHAUN:    -Cr/BUN:  4.92/58  no longer on lasix drip as per nephro due to minimal urine output   S/P HD 1 st session of HD on 4/24, second session on 4/25; plan for next session tomorrow 4/28  - monitor urine output; minimal urine output; net positive 431cc  - Monitor electrolytes   Hypernatremia:  Resolved    Metabolic acidosis:  Improved   -off sodium bicarbonate  Monitor BMP  Nephrology: Dr. Rosa      GI:  -  Tube feed  - GI ppx with Protonix  - Miralax and senna for prophylaxis; dulcolax     Heme:  - no indication for transfusion   - Hgb stable   - monitor cbc daily     Endo:  - No history of DM  - target CBG < 180  - FS q6 while Tube feed      Prophy:  - eliquis 2.5mg BID   - Protonix for GI prophylaxis

## 2020-04-27 NOTE — PROGRESS NOTE ADULT - ATTENDING COMMENTS
IMP: This is a 54 yr old woman with  HTN, works as HD nurse,  presents to the ED w/ fever, chills, nausea, vomiting, and mild cough that began x1 week ago. Patient was admitted on medicine floor for COVID pna. ICU consulted for hypoxia and desaturation on NRB.  DDimer is trending up and is on . therapeutic anticoag.  Pat became more dyspnenic and hypoxic, intubated and placed on vent support sedated . WBC and procalcitonin is elevated on iv antibx. Will add vanco, change central line. . NO IL-6 at this time since CXR shows pul edema . Will request from neph for more frequent dialysis and if CXR improve or decrease FiO2 need then no IL-6. CRP, Ferritin and DDimer are trending down     Assessment:  -Acute Hypoxic Respiratory Failure  -B/L  PNA   -ARDS  -COVID 19 infection  -HTN  -Hypercaog state  -Septic shock  -RASHAUN  -MOSF      Plan  - Mechanical ventilation with lung protective strategy   - Decrease Fio2  - Titrate Fio2 and PEEP as tolerated  - Sedation for ventilator synchrony   - Vasopressor support to maintain MAP > 60  - Monitor ABG  - S/p Anakinra and Hydroxychloroquine  - Airborne and Contact isolation  - Plan for prone positioning daily  - Cultures negative thus far and cont IV antibiotics will complete 7 day course, leukocytosis is resolving  - Monitor urine output  - Nephrology  for daily dialysis  - HD, f/u with nephrology  - Bowel regimen  - DVT and Stress Ulcer prophylaxis   - Over all prognosis is poor given multiple organ dysfunction .

## 2020-04-27 NOTE — PROGRESS NOTE ADULT - ASSESSMENT
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 Presented to ED with SOB, fever and hypoxia. Subsequently transfered to ICU intubated and developed ARDS s/p CQ and Anikinra. Subsequently developed RASHAUN with ensuing oliguria prompting a renal consult. Hypernatremia has improved. Severe hypoalbuminemia 1.3     # RASHAUN sec to ATN from COVID - 19 related disease. With ARDS. remains on pressors. hypoalbuminemia.  oliguria.  Pulm edema.  s/p HD Saturday.  HD again today with UF as tolerated.  RPT BMP IN AM

## 2020-04-27 NOTE — CHART NOTE - NSCHARTNOTEFT_GEN_A_CORE
Assessment:   Patient is a 54y old  Female who presents with a chief complaint of Pneumonia (2020 08:08). Pt intubated, Covid+. MOF, first HD , 2nd , next , TF indicated as given @ 10 ml/hr (ord3er). Pt noted to have poor prognosis.      Factors impacting intake: [ ] none [ ] nausea  [ ] vomiting [ ] diarrhea [ ] constipation  [ ]chewing problems [ ] swallowing issues  [x ] other: critically ill, intubated, MOF    Diet Prescription: Diet, NPO with Tube Feed:   Tube Feeding Modality: Nasogastric  Nepro with Carb Steady  Continuous  Starting Tube Feed Rate {mL per Hour}: 10  Until Goal Tube Feed Rate (mL per Hour): 10  Tube Feed Duration (in Hours): 24  Tube Feed Start Time: 09:30 (20 @ 09:14)        Daily Height in cm: 134.62 (2020 23:13)      Daily Weight in k.1 (2020 19:10)  Weight in k.1 (2020 16:33)  Weight in k.6 (2020 18:39)  Weight in k.8 (2020 08:00)  Weight in k.5 (2020 08:00)  Weight in k.8 (2020 08:00)  Weight in k.9 (2020 04:00)  Weight in k (2020 04:27)  Weight in k.7 (2020 04:30)  Weight in k.4 (15 Apr 2020 07:00)  Weight in k.1 (10 Apr 2020 12:18)    % Weight Change: 2+ generalized edema, I>O (now on HD)    Pertinent Medications: MEDICATIONS  (STANDING):  ALBUTerol    90 MICROgram(s) HFA Inhaler 2 Puff(s) Inhalation every 6 hours  apixaban 2.5 milliGRAM(s) Oral two times a day  cefepime   IVPB 2000 milliGRAM(s) IV Intermittent <User Schedule>  chlorhexidine 0.12% Liquid 15 milliLiter(s) Oral Mucosa every 12 hours  HYDROmorphone Infusion 2 mG/Hr (2 mL/Hr) IV Continuous <Continuous>  midazolam Infusion 0.02 mG/kG/Hr (1.72 mL/Hr) IV Continuous <Continuous>  midodrine 10 milliGRAM(s) Oral every 8 hours  norepinephrine Infusion 0.05 MICROgram(s)/kG/Min (4.04 mL/Hr) IV Continuous <Continuous>  pantoprazole  Injectable 40 milliGRAM(s) IV Push daily  polyethylene glycol 3350 17 Gram(s) Oral daily  senna 2 Tablet(s) Oral at bedtime  vancomycin  IVPB 1250 milliGRAM(s) IV Intermittent once    MEDICATIONS  (PRN):  acetaminophen    Suspension .. 650 milliGRAM(s) Oral every 6 hours PRN Temp greater or equal to 38C (100.4F)    Pertinent Labs:  Na136 mmol/L Glu 97 mg/dL K+ 3.9 mmol/L Cr  4.92 mg/dL<H> BUN 58 mg/dL<H>  Phos 7.8 mg/dL<H>  Alb 1.5 g/dL<L>  [id number] 6.4 %<H>  Chol --    LDL --    HDL --    Trig 435 mg/dL<H>     CAPILLARY BLOOD GLUCOSE      POCT Blood Glucose.: 90 mg/dL (2020 11:37)  POCT Blood Glucose.: 107 mg/dL (2020 04:35)    Skin:     Estimated Needs:   [x] no change since previous assessment  [ ] recalculated:       Previous Nutrition Diagnosis:   [ ] Altered GI function  [ ]Inadequate Oral Intake [ ] Swallowing Difficulty   [ ] Altered nutrition related labs [ ] Increased Nutrient Needs [ ] Overweight/Obesity   [ ] Unintended Weight Loss [ ] Food & Nutrition Related Knowledge Deficit [x ] Malnutrition (severe)  [ ] Other:     Nutrition Diagnosis is [x ] ongoing  [ ] resolved [ ] not applicable       Interventions:   Recommend  [ ] Change Diet To:  [ ] Nutrition Supplement  [x ] Nutrition Support: TF as medically feasible and consistent with goals of care (as tolerated, consider increase to 20 ml/hr). Add 1 Pkt Prosource BID. MD to monitor. RD available.   [ ] Other:     Monitoring and Evaluation:   [ x ] Tolerance to diet prescription [ x ] weights [ x ] labs[ x ] follow up per protocol  [ ] other:

## 2020-04-27 NOTE — PROGRESS NOTE ADULT - SUBJECTIVE AND OBJECTIVE BOX
ICU VISIT  54y Female    Meds:  cefepime   IVPB 2000 milliGRAM(s) IV Intermittent <User Schedule>  vancomycin  IVPB 1250 milliGRAM(s) IV Intermittent once    Allergies    No Known Allergies    Intolerances        VITALS:  Vital Signs Last 24 Hrs  T(C): 35.4 (27 Apr 2020 04:00), Max: 36.4 (27 Apr 2020 00:00)  T(F): 95.8 (27 Apr 2020 04:00), Max: 97.5 (27 Apr 2020 00:00)  HR: 88 (27 Apr 2020 11:00) (58 - 88)  BP: 98/42 (27 Apr 2020 11:00) (92/39 - 123/60)  BP(mean): 54 (27 Apr 2020 11:00) (52 - 76)  RR: 35 (27 Apr 2020 11:00) (19 - 36)  SpO2: 86% (27 Apr 2020 11:00) (86% - 95%)    LABS/DIAGNOSTIC TESTS:                          9.6    17.37 )-----------( 207      ( 27 Apr 2020 06:22 )             30.7         04-27    136  |  98  |  58<H>  ----------------------------<  97  3.9   |  22  |  4.92<H>    Ca    8.4      27 Apr 2020 06:22  Phos  7.8     04-27  Mg     2.5     04-27    TPro  6.6  /  Alb  1.5<L>  /  TBili  3.6<H>  /  DBili  x   /  AST  45<H>  /  ALT  33  /  AlkPhos  96  04-27      LIVER FUNCTIONS - ( 27 Apr 2020 06:22 )  Alb: 1.5 g/dL / Pro: 6.6 g/dL / ALK PHOS: 96 U/L / ALT: 33 U/L DA / AST: 45 U/L / GGT: x             CULTURES: .Blood Blood-Peripheral  04-17 @ 18:26   No Growth Final  --  --            RADIOLOGY:  < from: Xray Chest 1 View- PORTABLE-Routine (04.27.20 @ 08:26) >  EXAM:  XR CHEST PORTABLE ROUTINE 1V                            PROCEDURE DATE:  04/27/2020          INTERPRETATION:  History: Cough.    Chest:  one view.      Comparison: 04/26/2020    AP radiograph of the chest demonstrates slight worsening of diffuse groundglass pneumonia, RIGHT greater than LEFT. Lines and tubes are unchanged. The cardiac silhouette is normal in size. Osseous structures are intact.    Impression:slight worsening of diffuse groundglass pneumonia, RIGHT greater than LEFT. Lines and tubes are unchanged.                JAYA FRANKLNI M.D., ATTENDING RADIOLOGIST  This document has been electronically signed. Apr 27 2020  8:37AM              < end of copied text >      ROS:  [  ] UNABLE TO ELICIT ICU VISIT  54y Female who remains in the ICU, she remains sedated , intubated and vent dependent , she is on a FIO2 of 80% and PEEP of 9 , she is on 1 pressor, she continues to have poor urine output(on dialysis T-T-S). she is not febrile but her WBC count has increased further.     Meds:  cefepime   IVPB 2000 milliGRAM(s) IV Intermittent <User Schedule>  vancomycin  IVPB 1250 milliGRAM(s) IV Intermittent once    Allergies    No Known Allergies    Intolerances        VITALS:  Vital Signs Last 24 Hrs  T(C): 35.4 (27 Apr 2020 04:00), Max: 36.4 (27 Apr 2020 00:00)  T(F): 95.8 (27 Apr 2020 04:00), Max: 97.5 (27 Apr 2020 00:00)  HR: 88 (27 Apr 2020 11:00) (58 - 88)  BP: 98/42 (27 Apr 2020 11:00) (92/39 - 123/60)  BP(mean): 54 (27 Apr 2020 11:00) (52 - 76)  RR: 35 (27 Apr 2020 11:00) (19 - 36)  SpO2: 86% (27 Apr 2020 11:00) (86% - 95%)    LABS/DIAGNOSTIC TESTS:                          9.6    17.37 )-----------( 207      ( 27 Apr 2020 06:22 )             30.7         04-27    136  |  98  |  58<H>  ----------------------------<  97  3.9   |  22  |  4.92<H>    Ca    8.4      27 Apr 2020 06:22  Phos  7.8     04-27  Mg     2.5     04-27    TPro  6.6  /  Alb  1.5<L>  /  TBili  3.6<H>  /  DBili  x   /  AST  45<H>  /  ALT  33  /  AlkPhos  96  04-27      LIVER FUNCTIONS - ( 27 Apr 2020 06:22 )  Alb: 1.5 g/dL / Pro: 6.6 g/dL / ALK PHOS: 96 U/L / ALT: 33 U/L DA / AST: 45 U/L / GGT: x             CULTURES: .Blood Blood-Peripheral  04-17 @ 18:26   No Growth Final  --  --            RADIOLOGY:  < from: Xray Chest 1 View- PORTABLE-Routine (04.27.20 @ 08:26) >  EXAM:  XR CHEST PORTABLE ROUTINE 1V                            PROCEDURE DATE:  04/27/2020          INTERPRETATION:  History: Cough.    Chest:  one view.      Comparison: 04/26/2020    AP radiograph of the chest demonstrates slight worsening of diffuse groundglass pneumonia, RIGHT greater than LEFT. Lines and tubes are unchanged. The cardiac silhouette is normal in size. Osseous structures are intact.    Impression:slight worsening of diffuse groundglass pneumonia, RIGHT greater than LEFT. Lines and tubes are unchanged.                JAYA FRANKLIN M.D., ATTENDING RADIOLOGIST  This document has been electronically signed. Apr 27 2020  8:37AM              < end of copied text >      ROS:  [ x ] UNABLE TO ELICIT

## 2020-04-27 NOTE — PROGRESS NOTE ADULT - ASSESSMENT
Septic Shock  Bilat Pneumonia - worse on left side  COVID - 19 infection  Leukocytosis - increasing    Plan - Cont Vancomycin 1250mgs iv intradialysis  on T-T-S  cont Maxipime to 1 gm iv q24hrs  prognosis - guarded  Time spent - 30 mins

## 2020-04-28 NOTE — PROGRESS NOTE ADULT - PROBLEM SELECTOR PLAN 4
Reviewed status with ICU team. Spoke with patient's surrogate/Criselda - discussed status. Reviewed medications, labs, diagnostics. Discussed risks vs benefits of resuscitation. She wants to further speak with family before making a decision. Patient remains a full code at this time. All questions answered; supportive counseling provided.

## 2020-04-28 NOTE — PROGRESS NOTE ADULT - SUBJECTIVE AND OBJECTIVE BOX
ICU VISIT  55y Female    Meds:  cefepime   IVPB 2000 milliGRAM(s) IV Intermittent <User Schedule>    Allergies    No Known Allergies    Intolerances        VITALS:  Vital Signs Last 24 Hrs  T(C): 34.9 (28 Apr 2020 09:00), Max: 37 (27 Apr 2020 20:15)  T(F): 94.8 (28 Apr 2020 09:00), Max: 98.6 (27 Apr 2020 20:15)  HR: 53 (28 Apr 2020 12:00) (52 - 91)  BP: 102/54 (28 Apr 2020 10:00) (75/41 - 148/70)  BP(mean): 65 (28 Apr 2020 10:00) (44 - 90)  RR: 35 (28 Apr 2020 12:00) (18 - 39)  SpO2: 95% (28 Apr 2020 12:00) (88% - 97%)    LABS/DIAGNOSTIC TESTS:                          9.2    19.88 )-----------( 226      ( 28 Apr 2020 04:52 )             30.2         04-28    133<L>  |  96  |  41<H>  ----------------------------<  103<H>  4.0   |  28  |  4.52<H>    Ca    8.5      28 Apr 2020 04:52  Phos  7.0     04-28  Mg     2.3     04-28    TPro  6.9  /  Alb  1.7<L>  /  TBili  3.9<H>  /  DBili  x   /  AST  51<H>  /  ALT  38  /  AlkPhos  108  04-28      LIVER FUNCTIONS - ( 28 Apr 2020 04:52 )  Alb: 1.7 g/dL / Pro: 6.9 g/dL / ALK PHOS: 108 U/L / ALT: 38 U/L DA / AST: 51 U/L / GGT: x             CULTURES: .Blood Blood-Peripheral  04-17 @ 18:26   No Growth Final  --  --            RADIOLOGY:< from: Xray Chest 1 View- PORTABLE-Routine (04.28.20 @ 07:32) >  EXAM:  XR CHEST PORTABLE ROUTINE 1V                            PROCEDURE DATE:  04/28/2020          INTERPRETATION:  PROCEDURE: AP view of the chest.    CLINICAL INFORMATION: Respiratory failure.    COMPARISON: 4/27/2020.    FINDINGS:     Endotracheal tube, gastric tube, and right internal jugular central venous catheter are noted.    Lungs: There are stable bilateral lung opacities.  Heart: The heart is top normal in size.  Mediastinum: The mediastinum is within normal limits.    IMPRESSION:    Findings of stable bilateral lung opacities are suspicious for pneumonia.          AUSTIN BORGES M.D., ATTENDING RADIOLOGIST  This document has been electronically signed. Apr 28 2020  7:50AM              < end of copied text >        ROS:  [  ] UNABLE TO ELICIT ICU VISIT  55y Female who remains in the ICU , she is sedated, intubated and vent dependent, she is still on 1 pressor, she is much less edematous as she is being dialyzed on a daily basis to get off excess fluid in order to improve her oxygenation. She is afebrile and is now on 2 gms of Maxipime a day.    Meds:  cefepime   IVPB 2000 milliGRAM(s) IV Intermittent <User Schedule>    Allergies    No Known Allergies    Intolerances        VITALS:  Vital Signs Last 24 Hrs  T(C): 34.9 (28 Apr 2020 09:00), Max: 37 (27 Apr 2020 20:15)  T(F): 94.8 (28 Apr 2020 09:00), Max: 98.6 (27 Apr 2020 20:15)  HR: 53 (28 Apr 2020 12:00) (52 - 91)  BP: 102/54 (28 Apr 2020 10:00) (75/41 - 148/70)  BP(mean): 65 (28 Apr 2020 10:00) (44 - 90)  RR: 35 (28 Apr 2020 12:00) (18 - 39)  SpO2: 95% (28 Apr 2020 12:00) (88% - 97%)    LABS/DIAGNOSTIC TESTS:                          9.2    19.88 )-----------( 226      ( 28 Apr 2020 04:52 )             30.2         04-28    133<L>  |  96  |  41<H>  ----------------------------<  103<H>  4.0   |  28  |  4.52<H>    Ca    8.5      28 Apr 2020 04:52  Phos  7.0     04-28  Mg     2.3     04-28    TPro  6.9  /  Alb  1.7<L>  /  TBili  3.9<H>  /  DBili  x   /  AST  51<H>  /  ALT  38  /  AlkPhos  108  04-28      LIVER FUNCTIONS - ( 28 Apr 2020 04:52 )  Alb: 1.7 g/dL / Pro: 6.9 g/dL / ALK PHOS: 108 U/L / ALT: 38 U/L DA / AST: 51 U/L / GGT: x             CULTURES: .Blood Blood-Peripheral  04-17 @ 18:26   No Growth Final  --  --            RADIOLOGY:< from: Xray Chest 1 View- PORTABLE-Routine (04.28.20 @ 07:32) >  EXAM:  XR CHEST PORTABLE ROUTINE 1V                            PROCEDURE DATE:  04/28/2020          INTERPRETATION:  PROCEDURE: AP view of the chest.    CLINICAL INFORMATION: Respiratory failure.    COMPARISON: 4/27/2020.    FINDINGS:     Endotracheal tube, gastric tube, and right internal jugular central venous catheter are noted.    Lungs: There are stable bilateral lung opacities.  Heart: The heart is top normal in size.  Mediastinum: The mediastinum is within normal limits.    IMPRESSION:    Findings of stable bilateral lung opacities are suspicious for pneumonia.          AUSTIN BORGES M.D., ATTENDING RADIOLOGIST  This document has been electronically signed. Apr 28 2020  7:50AM              < end of copied text >        ROS:  [ x ] UNABLE TO ELICIT

## 2020-04-28 NOTE — ADVANCED PRACTICE NURSE CONSULT - ASSESSMENT
This is a 55yr old female patient admitted for a viral infection, to which a wound care specialist consultation was placed for evaluation. At the time of evaluation, the patient is currently medically unstable. The resident physician, nurse, and nurse manager agreed that an evaluation at this time is not in the best interest of the patient. I will continue to monitor.

## 2020-04-28 NOTE — ADVANCED PRACTICE NURSE CONSULT - RECOMMEDATIONS
Elevate/float the patients heels using heel protectors and reposition the patient Q 2hrs using wedges or pillows

## 2020-04-28 NOTE — PROGRESS NOTE ADULT - SUBJECTIVE AND OBJECTIVE BOX
[facility] : Progress Note :: [phone], (office [phone]),   Dr Rosa / Dr Mosher / Dr Lin / Dr Narvaez / Dr Nando SOTELO / Dr Temple / Dr Silva / Dr Bjorn mendoza  _____________________________________________________________________________________________    s/p HD yesterday    No Known Allergies    Hospital Medications:   MEDICATIONS  (STANDING):  apixaban 2.5 milliGRAM(s) Oral two times a day  cefepime   IVPB 2000 milliGRAM(s) IV Intermittent <User Schedule>  chlorhexidine 0.12% Liquid 15 milliLiter(s) Oral Mucosa every 12 hours  HYDROmorphone Infusion 1 mG/Hr (1 mL/Hr) IV Continuous <Continuous>  midazolam Infusion 0.02 mG/kG/Hr (1.72 mL/Hr) IV Continuous <Continuous>  midodrine 10 milliGRAM(s) Oral every 8 hours  norepinephrine Infusion 0.05 MICROgram(s)/kG/Min (4.04 mL/Hr) IV Continuous <Continuous>  pantoprazole  Injectable 40 milliGRAM(s) IV Push daily  polyethylene glycol 3350 17 Gram(s) Oral daily  senna 2 Tablet(s) Oral at bedtime  vancomycin  IVPB 500 milliGRAM(s) IV Intermittent once        VITALS:  T(F): 96 (04-28-20 @ 13:00), Max: 98.6 (04-27-20 @ 20:15)  HR: 61 (04-28-20 @ 15:00)  BP: 104/48 (04-28-20 @ 14:00)  RR: 35 (04-28-20 @ 15:00)  SpO2: 95% (04-28-20 @ 15:00)  Wt(kg): --    04-27 @ 07:01  -  04-28 @ 07:00  --------------------------------------------------------  IN: 783 mL / OUT: 160 mL / NET: 623 mL    04-28 @ 07:01  -  04-28 @ 15:48  --------------------------------------------------------  IN: 148.4 mL / OUT: 0 mL / NET: 148.4 mL        PHYSICAL EXAM:  Constitutional: NAD  HEENT: anicteric sclera, oropharynx clear.  Neck: No JVD  Respiratory: CTAB, no wheezes, rales or rhonchi  Cardiovascular: S1, S2, RRR  Gastrointestinal: BS+, soft, NT/ND  Extremities: No peripheral edema  Vascular Access: femoral shiley    LABS:  04-28    133<L>  |  96  |  41<H>  ----------------------------<  103<H>  4.0   |  28  |  4.52<H>    Ca    8.5      28 Apr 2020 04:52  Phos  7.0     04-28  Mg     2.3     04-28    TPro  6.9  /  Alb  1.7<L>  /  TBili  3.9<H>  /  DBili      /  AST  51<H>  /  ALT  38  /  AlkPhos  108  04-28    Creatinine Trend: 4.52 <--, 4.92 <--, 3.91 <--, 4.48 <--, 5.44 <--, 5.28 <--, 3.95 <--                        9.2    19.88 )-----------( 226      ( 28 Apr 2020 04:52 )             30.2     Urine Studies:      RADIOLOGY & ADDITIONAL STUDIES:

## 2020-04-28 NOTE — PROGRESS NOTE ADULT - ASSESSMENT
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 Patient was admitted on medicine floor for COVID pna.  Admitted to ICU for hypoxia and desaturation on NRB.     Assessment:  - acute hypoxic respiratory failure secondary to COVID-19 pneumonia  - septic shock requiring pressors   - RASHAUN  - Leukocytosis  - COVID pandemic     Plan:  Neuro:  -Pt sedated with versed   -Dilaudid drip  -no longer on Rocuronium    CV:  - Hold BP medications  -Levophed for pressor support  - on midodrine 10mg q 8       Pulm:  - Intubated for respiratory distress and hypoxia 4/15  - CXR shows bilateral airspace opacities   - Acute Hypoxic Resp Failure secondary to PNA + COVID 19  - S/P ANAKINRA and Hydroxychloroquine (since April 9th)   - Discontinued full dose Lovenox due to epistaxis  - Current vent settings 35/410/80/9    - CXR findings may be due to fluid overload; will speak to nephro Dr. Rosa about possible daily HD sessions for a few days to attempt to decrease    FiO2 requirements; will hold off on trial of tocilizumab at this time   - ESR: 96>92  - D-dimer: 1901>1936  - LDH: 362>447     ID:  - completed Hydroxychloroquine, anakinra   Pt was febrile a few nights ago   blood culture negative  on Cefepime since 4/17  WBC: elevated at 17K this morning    ID Dr. Davey, s/p 1 dose of Vanco 1250 yesterday  *Pt to receive Vanco 1250mg iv  dose Intradialysis tomorrow*    Nephro:  RASHAUN:    -Cr/BUN:  4.92/58  no longer on lasix drip as per nephro due to minimal urine output   S/P HD 1 st session of HD on 4/24, second session on 4/25; plan for next session tomorrow 4/28  - monitor urine output; minimal urine output; net positive 431cc  - Monitor electrolytes   Hypernatremia:  Resolved    Metabolic acidosis:  Improved   -off sodium bicarbonate  Monitor BMP  Nephrology: Dr. 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 Patient was admitted on medicine floor for COVID pna.  Admitted to ICU for hypoxia and desaturation on NRB.     Assessment:  - acute hypoxic respiratory failure secondary to COVID-19 pneumonia  - septic shock requiring pressors   - RASHAUN  - Leukocytosis  - COVID pandemic     Plan:  Neuro:  -Pt sedated with versed   -Dilaudid drip  -no longer on Rocuronium    CV:  - Hold BP medications  -Levophed for pressor support  - on midodrine 10mg q 8       Pulm:  - Intubated for respiratory distress and hypoxia 4/15  - CXR shows bilateral airspace opacities   - Acute Hypoxic Resp Failure secondary to PNA + COVID 19  - S/P ANAKINRA and Hydroxychloroquine (since April 9th)   - Discontinued full dose Lovenox due to epistaxis  - Current vent settings 35/400/80/9    - CXR findings may be due to fluid overload; s/p HD session yesterday 4/28; possible HD session today as per nephro Dr. Rosa  - attempt to decrease FiO2 requirements after HD sessions; will hold off on trial of tocilizumab at this time   - ESR: 96>92  - D-dimer: 1901>1936  - LDH: 362>447     ID:  - completed Hydroxychloroquine, anakinra   Pt was febrile a few nights ago   blood culture negative  on Cefepime    WBC: elevated at 19K this morning    ID Dr. Davey, s/p 1 dose of Vanco 1250 yesterday  *Pt to receive Vanco 1250mg iv  dose Intradialysis as per Dr Davey     Nephro:  RASHAUN:    -Cr/BUN:  4.52/41  no longer on lasix drip as per nephro due to minimal urine output   S/P HD 1 st session of HD on 4/24, second session on 4/25; third session 4/28; possible HD session today   - monitor urine output; minimal urine output; net positive 623cc  - Monitor electrolytes   Hypernatremia: 133 this morning     Metabolic acidosis:  Improved   -off sodium bicarbonate  Monitor BMP  Nephrology: Dr. Rosa      GI:  -  Tube feed  - GI ppx with Protonix  - Miralax and senna for prophylaxis  - dc dulcolax     Heme:  - no indication for transfusion   - Hgb stable   - monitor cbc daily     Endo:  - No history of DM  - target CBG < 180  - FS q6 while Tube feed      Prophy:  - eliquis 2.5mg BID   - Protonix for GI prophylaxis

## 2020-04-28 NOTE — PROGRESS NOTE ADULT - ASSESSMENT
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 Presented to ED with SOB, fever and hypoxia. Subsequently transfered to ICU intubated and developed ARDS s/p CQ and Anikinra. Subsequently developed RASHAUN with ensuing oliguria prompting a renal consult. Hypernatremia has improved. Severe hypoalbuminemia 1.3     # RASHAUN sec to [facility] from COVID - 19 related disease. With ARDS. remains on pressors. hypoalbuminemia.  oliguria.  Pulm edema.  s/p HD yesterday.  HD again today with UF as tolerated.  RPT BMP IN AM

## 2020-04-28 NOTE — CONSULT NOTE ADULT - CONSULT REASON
55 y/o F admitted to hospital 4/8, +COVID PNA, on HD, pressor, ETT. Request to establish goals of care.

## 2020-04-28 NOTE — CONSULT NOTE ADULT - SUBJECTIVE AND OBJECTIVE BOX
HPI:  55 y/o F patient, w/ PMHx of HTN, works as HD nurse,  presents to the ED w/ fever, chills, nausea, vomiting, and mild cough that began x1 week ago. Patient also reports mild shortness of breath but denies  any other acute complaints. Patient was saturating 96% on 4L NC.  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    Chest X-ray showed: Bilateral airspace opacities suspicious for pneumonia. (08 Apr 2020 01:59)      PAST MEDICAL & SURGICAL HISTORY:  HTN (hypertension)  No significant past surgical history      SOCIAL HISTORY:    Admitted from:  home assisted living [facility]   Substance abuse history:              Tobacco hx:                  Alcohol hx:              Home Opioid hx:  Gnosticism:                                    Preferred Language:    Surrogate/HCP/Guardian:            Phone#:    FAMILY HISTORY:    Baseline ADLs (prior to admission):    Allergies    No Known Allergies    Intolerances      Present Symptoms:   Dyspnea:   Nausea/Vomiting:   Anxiety:  Depressed   Fatigue:  Loss of appetite:   Pain:                                location:          Review of Systems: [All others negative or Unable to obtain due to poor mentation]    MEDICATIONS  (STANDING):  apixaban 2.5 milliGRAM(s) Oral two times a day  cefepime   IVPB 2000 milliGRAM(s) IV Intermittent <User Schedule>  chlorhexidine 0.12% Liquid 15 milliLiter(s) Oral Mucosa every 12 hours  HYDROmorphone Infusion 1 mG/Hr (1 mL/Hr) IV Continuous <Continuous>  midazolam Infusion 0.02 mG/kG/Hr (1.72 mL/Hr) IV Continuous <Continuous>  midodrine 10 milliGRAM(s) Oral every 8 hours  norepinephrine Infusion 0.05 MICROgram(s)/kG/Min (4.04 mL/Hr) IV Continuous <Continuous>  pantoprazole  Injectable 40 milliGRAM(s) IV Push daily  polyethylene glycol 3350 17 Gram(s) Oral daily  senna 2 Tablet(s) Oral at bedtime    MEDICATIONS  (PRN):  acetaminophen    Suspension .. 650 milliGRAM(s) Oral every 6 hours PRN Temp greater or equal to 38C (100.4F)      PHYSICAL EXAM:    Vital Signs Last 24 Hrs  T(C): 34.9 (28 Apr 2020 09:00), Max: 37 (27 Apr 2020 20:15)  T(F): 94.8 (28 Apr 2020 09:00), Max: 98.6 (27 Apr 2020 20:15)  HR: 53 (28 Apr 2020 12:00) (52 - 92)  BP: 102/54 (28 Apr 2020 10:00) (75/41 - 148/70)  BP(mean): 65 (28 Apr 2020 10:00) (44 - 90)  RR: 35 (28 Apr 2020 12:00) (17 - 39)  SpO2: 95% (28 Apr 2020 12:00) (88% - 97%)    General: alert  oriented x ____    [lethargic distressed cachexia  nonverbal  unarousable verbal]  Karnofsky Performance Score/Palliative Performance Status Version2:     %    HEENT: normal  dry mouth  ET tube/trach oral lesions:  Lungs: comfortable tachypnea/labored breathing  excessive secretions  CV: normal  tachycardia  GI: normal  distended  tender  incontinent               PEG/NG/OG tube  constipation  last BM:   : normal  incontinent  oliguria/anuria  parr  Musculoskeletal: normal  weakness  edema             ambulatory  bedbound/wheelchair bound  Skin: normal  pressure ulcers: stage: edema: other:  Neuro: no deficits cognitive impairment dsyphagia/dysarthria paresis: other:  Oral intake ability: unable/only mouth care [minimal moderate full capability]  Diet: [NPO]    LABS:                        9.2    19.88 )-----------( 226      ( 28 Apr 2020 04:52 )             30.2     04-28    133<L>  |  96  |  41<H>  ----------------------------<  103<H>  4.0   |  28  |  4.52<H>    Ca    8.5      28 Apr 2020 04:52  Phos  7.0     04-28  Mg     2.3     04-28    TPro  6.9  /  Alb  1.7<L>  /  TBili  3.9<H>  /  DBili  x   /  AST  51<H>  /  ALT  38  /  AlkPhos  108  04-28        RADIOLOGY & ADDITIONAL STUDIES:    ADVANCE DIRECTIVES:   Advanced Care Planning discussion total time spent:

## 2020-04-28 NOTE — PROGRESS NOTE ADULT - SUBJECTIVE AND OBJECTIVE BOX
OVERNIGHT EVENTS: No acute events overnight. Patient had HD session yesterday. Creatinine slightly improved to 4.52 this morning. Possible HD session today.         Present Symptoms:         Review of Systems:  Unable to obtain - patient sedated/intubated.     MEDICATIONS  (STANDING):  apixaban 2.5 milliGRAM(s) Oral two times a day  cefepime   IVPB 2000 milliGRAM(s) IV Intermittent <User Schedule>  chlorhexidine 0.12% Liquid 15 milliLiter(s) Oral Mucosa every 12 hours  HYDROmorphone Infusion 1 mG/Hr (1 mL/Hr) IV Continuous <Continuous>  midazolam Infusion 0.02 mG/kG/Hr (1.72 mL/Hr) IV Continuous <Continuous>  midodrine 10 milliGRAM(s) Oral every 8 hours  norepinephrine Infusion 0.05 MICROgram(s)/kG/Min (4.04 mL/Hr) IV Continuous <Continuous>  pantoprazole  Injectable 40 milliGRAM(s) IV Push daily  polyethylene glycol 3350 17 Gram(s) Oral daily  senna 2 Tablet(s) Oral at bedtime    MEDICATIONS  (PRN):  acetaminophen    Suspension .. 650 milliGRAM(s) Oral every 6 hours PRN Temp greater or equal to 38C (100.4F)      PHYSICAL EXAM:  Vital Signs Last 24 Hrs  T(C): 34.9 (28 Apr 2020 09:00), Max: 37 (27 Apr 2020 20:15)  T(F): 94.8 (28 Apr 2020 09:00), Max: 98.6 (27 Apr 2020 20:15)  HR: 53 (28 Apr 2020 12:00) (52 - 91)  BP: 102/54 (28 Apr 2020 10:00) (75/41 - 148/70)  BP(mean): 65 (28 Apr 2020 10:00) (44 - 90)  RR: 35 (28 Apr 2020 12:00) (18 - 39)  SpO2: 95% (28 Apr 2020 12:00) (88% - 97%)    General: Patient not medically stable for full physical exam. Patient sedated/intubated, on pressor   Karnofsky Performance Score/Palliative Performance Status Version2:    20 %    HEENT: ET tube    Lungs:  tachypnea, on ventilator, dilaudid, versed  CV: on pressor + midodrine  GI: obese, incontinent, NG tube  :   parr  Musculoskeletal:  patient sedated/intubated/on paralytic - dependent on all ADLs  Skin: patient not medically stable for evaluation  Neuro: patient sedated/intubated, dependent on ADLs  Oral intake ability: unable/only mouth care   Diet: NPO; TF via NG tube    LABS:                          9.2    19.88 )-----------( 226      ( 28 Apr 2020 04:52 )             30.2     04-28    133<L>  |  96  |  41<H>  ----------------------------<  103<H>  4.0   |  28  |  4.52<H>    Ca    8.5      28 Apr 2020 04:52  Phos  7.0     04-28  Mg     2.3     04-28    TPro  6.9  /  Alb  1.7<L>  /  TBili  3.9<H>  /  DBili  x   /  AST  51<H>  /  ALT  38  /  AlkPhos  108  04-28        RADIOLOGY & ADDITIONAL STUDIES:  < from: Xray Chest 1 View- PORTABLE-Routine (04.28.20 @ 07:32) >  EXAM:  XR CHEST PORTABLE ROUTINE 1V                            PROCEDURE DATE:  04/28/2020          INTERPRETATION:  PROCEDURE: AP view of the chest.    CLINICAL INFORMATION: Respiratory failure.    COMPARISON: 4/27/2020.    FINDINGS:     Endotracheal tube, gastric tube, and right internal jugular central venous catheter are noted.    Lungs: There are stable bilateral lung opacities.  Heart: The heart is top normal in size.  Mediastinum: The mediastinum is within normal limits.    IMPRESSION:    Findings of stable bilateral lung opacities are suspicious for pneumonia.    < end of copied text >      ADVANCE DIRECTIVES:

## 2020-04-28 NOTE — PROGRESS NOTE ADULT - SUBJECTIVE AND OBJECTIVE BOX
INTERVAL HPI/OVERNIGHT EVENTS: *** Patient was seen and examined at bed side.     PRESSORS: [ ] YES [ ] NO  WHICH:    ANTIBIOTICS:                  DATE STARTED:  ANTIBIOTICS:                  DATE STARTED:  ANTIBIOTICS:                  DATE STARTED:    Antimicrobial:  cefepime   IVPB 2000 milliGRAM(s) IV Intermittent <User Schedule>    Cardiovascular:  midodrine 10 milliGRAM(s) Oral every 8 hours  norepinephrine Infusion 0.05 MICROgram(s)/kG/Min IV Continuous <Continuous>    Pulmonary:  ALBUTerol    90 MICROgram(s) HFA Inhaler 2 Puff(s) Inhalation every 6 hours    Hematalogic:  apixaban 2.5 milliGRAM(s) Oral two times a day    Other:  acetaminophen    Suspension .. 650 milliGRAM(s) Oral every 6 hours PRN  chlorhexidine 0.12% Liquid 15 milliLiter(s) Oral Mucosa every 12 hours  HYDROmorphone Infusion 1 mG/Hr IV Continuous <Continuous>  midazolam Infusion 0.02 mG/kG/Hr IV Continuous <Continuous>  pantoprazole  Injectable 40 milliGRAM(s) IV Push daily  polyethylene glycol 3350 17 Gram(s) Oral daily  senna 2 Tablet(s) Oral at bedtime    acetaminophen    Suspension .. 650 milliGRAM(s) Oral every 6 hours PRN  ALBUTerol    90 MICROgram(s) HFA Inhaler 2 Puff(s) Inhalation every 6 hours  apixaban 2.5 milliGRAM(s) Oral two times a day  cefepime   IVPB 2000 milliGRAM(s) IV Intermittent <User Schedule>  chlorhexidine 0.12% Liquid 15 milliLiter(s) Oral Mucosa every 12 hours  HYDROmorphone Infusion 1 mG/Hr IV Continuous <Continuous>  midazolam Infusion 0.02 mG/kG/Hr IV Continuous <Continuous>  midodrine 10 milliGRAM(s) Oral every 8 hours  norepinephrine Infusion 0.05 MICROgram(s)/kG/Min IV Continuous <Continuous>  pantoprazole  Injectable 40 milliGRAM(s) IV Push daily  polyethylene glycol 3350 17 Gram(s) Oral daily  senna 2 Tablet(s) Oral at bedtime    Drug Dosing Weight  Height (cm): 134.62 (07 Apr 2020 23:13)  Weight (kg): 86.2 (07 Apr 2020 23:13)  BMI (kg/m2): 47.6 (07 Apr 2020 23:13)  BSA (m2): 1.67 (07 Apr 2020 23:13)    CENTRAL LINE: [ ] YES [ ] NO  LOCATION:   DATE INSERTED:  REMOVE: [ ] YES [ ] NO  EXPLAIN:    RENNER: [ ] YES [ ] NO    DATE INSERTED:  REMOVE:  [ ] YES [ ] NO  EXPLAIN:    A-LINE:  [ ] YES [ ] NO  LOCATION:   DATE INSERTED:  REMOVE:  [ ] YES [ ] NO  EXPLAIN:        ICU Vital Signs Last 24 Hrs  T(C): 35.8 (28 Apr 2020 05:00), Max: 37 (27 Apr 2020 20:15)  T(F): 96.4 (28 Apr 2020 05:00), Max: 98.6 (27 Apr 2020 20:15)  HR: 83 (28 Apr 2020 07:00) (61 - 94)  BP: 112/67 (28 Apr 2020 07:00) (75/41 - 148/70)  BP(mean): 77 (28 Apr 2020 07:00) (44 - 90)  ABP: 123/72 (28 Apr 2020 07:00) (77/45 - 163/75)  ABP(mean): 93 (28 Apr 2020 07:00) (54 - 107)  RR: 35 (28 Apr 2020 07:00) (17 - 39)  SpO2: 96% (28 Apr 2020 07:00) (86% - 97%)      ABG - ( 28 Apr 2020 03:35 )  pH, Arterial: 7.30  pH, Blood: x     /  pCO2: 53    /  pO2: 77    / HCO3: 26    / Base Excess: -0.7  /  SaO2: 94                    04-27 @ 07:01  -  04-28 @ 07:00  --------------------------------------------------------  IN: 783 mL / OUT: 160 mL / NET: 623 mL        Mode: AC/ CMV (Assist Control/ Continuous Mandatory Ventilation)  RR (machine): 35  TV (machine): 400  FiO2: 80  PEEP: 9  ITime: 0.7  MAP: 24  PIP: 45      PHYSICAL EXAM:    GENERAL:NAD, well-groomed, well-developed  HEAD:  Atraumatic, Normocephalic  EYES: EOMI, PERRLA, conjunctiva and sclera clear  ENMT: No tonsillar erythema, exudates, or enlargement; Moist mucous membranes, Good dentition, No lesions  NECK: Supple, normal appearance, No JVD; Normal thyroid; Trachea midline  NERVOUS SYSTEM: Alert & Oriented X3, Good concentration; Motor Strength 5/5 B/L upper and lower extremities; DTRs 2+ intact and symmetric  CHEST/LUNG: No chest deformity;Normal percussion bilaterally; No rales, rhonchi, wheezing   HEART: Regular rate and rhythm; No murmurs, rubs, or gallops  ABDOMEN: Soft, Nontender, Nondistended; Bowel sounds present  EXTREMITIES: 2+ Peripheral Pulses, No clubbing, cyanosis, or edema  LYMPH: No lymphadenopathy noted  SKIN:No rashes or lesions; Good capillary refill      LABS:  CBC Full  -  ( 28 Apr 2020 04:52 )  WBC Count : 19.88 K/uL  RBC Count : 3.42 M/uL  Hemoglobin : 9.2 g/dL  Hematocrit : 30.2 %  Platelet Count - Automated : 226 K/uL  Mean Cell Volume : 88.3 fl  Mean Cell Hemoglobin : 26.9 pg  Mean Cell Hemoglobin Concentration : 30.5 gm/dL  Auto Neutrophil # : x  Auto Lymphocyte # : x  Auto Monocyte # : x  Auto Eosinophil # : x  Auto Basophil # : x  Auto Neutrophil % : x  Auto Lymphocyte % : x  Auto Monocyte % : x  Auto Eosinophil % : x  Auto Basophil % : x    04-28    133<L>  |  96  |  41<H>  ----------------------------<  103<H>  4.0   |  28  |  4.52<H>    Ca    8.5      28 Apr 2020 04:52  Phos  7.0     04-28  Mg     2.3     04-28    TPro  6.9  /  Alb  1.7<L>  /  TBili  3.9<H>  /  DBili  x   /  AST  51<H>  /  ALT  38  /  AlkPhos  108  04-28    PT/INR - ( 27 Apr 2020 06:22 )   PT: 13.9 sec;   INR: 1.22 ratio         PTT - ( 27 Apr 2020 06:22 )  PTT:30.7 sec        RADIOLOGY & ADDITIONAL STUDIES REVIEWED:  ***    GOALS OF CARE DISCUSSION WITH PATIENT/FAMILY/PROXY: full code/DNR/DNI    CRITICAL CARE TIME SPENT: 35 minutes INTERVAL HPI/OVERNIGHT EVENTS: Patient was seen and examined at bed side. No acute events overnight. Patient had HD session yesterday. Creatinine slightly improved to 4.52 this morning. Current vent settings of 35/400/80/9. Possible HD session today.     PRESSORS: Yes   WHICH: Levophed    ANTIBIOTICS: cefepime          DATE STARTED: 4/26  ANTIBIOTICS: vancomycin      DATE STARTED: 4/27    Antimicrobial:  cefepime   IVPB 2000 milliGRAM(s) IV Intermittent <User Schedule>    Cardiovascular:  midodrine 10 milliGRAM(s) Oral every 8 hours  norepinephrine Infusion 0.05 MICROgram(s)/kG/Min IV Continuous <Continuous>    Pulmonary:  ALBUTerol    90 MICROgram(s) HFA Inhaler 2 Puff(s) Inhalation every 6 hours    Hematalogic:  apixaban 2.5 milliGRAM(s) Oral two times a day    Other:  acetaminophen    Suspension .. 650 milliGRAM(s) Oral every 6 hours PRN  chlorhexidine 0.12% Liquid 15 milliLiter(s) Oral Mucosa every 12 hours  HYDROmorphone Infusion 1 mG/Hr IV Continuous <Continuous>  midazolam Infusion 0.02 mG/kG/Hr IV Continuous <Continuous>  pantoprazole  Injectable 40 milliGRAM(s) IV Push daily  polyethylene glycol 3350 17 Gram(s) Oral daily  senna 2 Tablet(s) Oral at bedtime    acetaminophen    Suspension .. 650 milliGRAM(s) Oral every 6 hours PRN  ALBUTerol    90 MICROgram(s) HFA Inhaler 2 Puff(s) Inhalation every 6 hours  apixaban 2.5 milliGRAM(s) Oral two times a day  cefepime   IVPB 2000 milliGRAM(s) IV Intermittent <User Schedule>  chlorhexidine 0.12% Liquid 15 milliLiter(s) Oral Mucosa every 12 hours  HYDROmorphone Infusion 1 mG/Hr IV Continuous <Continuous>  midazolam Infusion 0.02 mG/kG/Hr IV Continuous <Continuous>  midodrine 10 milliGRAM(s) Oral every 8 hours  norepinephrine Infusion 0.05 MICROgram(s)/kG/Min IV Continuous <Continuous>  pantoprazole  Injectable 40 milliGRAM(s) IV Push daily  polyethylene glycol 3350 17 Gram(s) Oral daily  senna 2 Tablet(s) Oral at bedtime    Drug Dosing Weight  Height (cm): 134.62 (07 Apr 2020 23:13)  Weight (kg): 86.2 (07 Apr 2020 23:13)  BMI (kg/m2): 47.6 (07 Apr 2020 23:13)  BSA (m2): 1.67 (07 Apr 2020 23:13)    CENTRAL LINE: Yes  LOCATION: Femoral line  DATE INSERTED: 4/26  REMOVE: No    RENNER: Yes     DATE INSERTED: 4/15  REMOVE: No    A-LINE: Yes  LOCATION: Right axillary  DATE INSERTED: 4/26  REMOVE: No      ICU Vital Signs Last 24 Hrs  T(C): 35.8 (28 Apr 2020 05:00), Max: 37 (27 Apr 2020 20:15)  T(F): 96.4 (28 Apr 2020 05:00), Max: 98.6 (27 Apr 2020 20:15)  HR: 83 (28 Apr 2020 07:00) (61 - 94)  BP: 112/67 (28 Apr 2020 07:00) (75/41 - 148/70)  BP(mean): 77 (28 Apr 2020 07:00) (44 - 90)  ABP: 123/72 (28 Apr 2020 07:00) (77/45 - 163/75)  ABP(mean): 93 (28 Apr 2020 07:00) (54 - 107)  RR: 35 (28 Apr 2020 07:00) (17 - 39)  SpO2: 96% (28 Apr 2020 07:00) (86% - 97%)      ABG - ( 28 Apr 2020 03:35 )  pH, Arterial: 7.30  pH, Blood: x     /  pCO2: 53    /  pO2: 77    / HCO3: 26    / Base Excess: -0.7  /  SaO2: 94          04-27 @ 07:01  -  04-28 @ 07:00  --------------------------------------------------------  IN: 783 mL / OUT: 160 mL / NET: 623 mL      Mode: AC/ CMV (Assist Control/ Continuous Mandatory Ventilation)  RR (machine): 35  TV (machine): 400  FiO2: 80  PEEP: 9  ITime: 0.7  MAP: 24  PIP: 45      PHYSICAL EXAM:  GENERAL: Sedated and intubated  HEAD: Atraumatic, Normocephalic  EYES: PERRLA, conjunctiva and sclera clear  ENMT: No tonsillar erythema, exudates, or enlargement  NECK: Supple, normal appearance  NERVOUS SYSTEM: Sedated and intubated  CHEST/LUNG: No chest deformity; crackles present to auscultation   HEART: Regular rate and rhythm; No murmurs  ABDOMEN: Soft, Nontender, Nondistended; Bowel sounds present  EXTREMITIES: No clubbing, cyanosis, or edema  LYMPH: No lymphadenopathy noted  SKIN: No rashes or lesions; Good capillary refill      LABS:  CBC Full  -  ( 28 Apr 2020 04:52 )  WBC Count : 19.88 K/uL  RBC Count : 3.42 M/uL  Hemoglobin : 9.2 g/dL  Hematocrit : 30.2 %  Platelet Count - Automated : 226 K/uL  Mean Cell Volume : 88.3 fl  Mean Cell Hemoglobin : 26.9 pg  Mean Cell Hemoglobin Concentration : 30.5 gm/dL      04-28    133<L>  |  96  |  41<H>  ----------------------------<  103<H>  4.0   |  28  |  4.52<H>    Ca    8.5      28 Apr 2020 04:52  Phos  7.0     04-28  Mg     2.3     04-28    TPro  6.9  /  Alb  1.7<L>  /  TBili  3.9<H>  /  DBili  x   /  AST  51<H>  /  ALT  38  /  AlkPhos  108  04-28    PT/INR - ( 27 Apr 2020 06:22 )   PT: 13.9 sec;   INR: 1.22 ratio         PTT - ( 27 Apr 2020 06:22 )  PTT:30.7 sec      RADIOLOGY & ADDITIONAL STUDIES REVIEWED: < from: Xray Chest 1 View- PORTABLE-Routine (04.28.20 @ 07:32) >    IMPRESSION:    Findings of stable bilateral lung opacities are suspicious for pneumonia.    < end of copied text >    GOALS OF CARE DISCUSSION WITH PATIENT/FAMILY/PROXY: full code     CRITICAL CARE TIME SPENT: 35 minutes

## 2020-04-28 NOTE — PROGRESS NOTE ADULT - ASSESSMENT
Septic Shock  Bilat Pneumonia - worse on left side  COVID - 19 infection  Leukocytosis   Resp failure    Plan - Change Vancomycin to 750mgs iv q24hrs intradialysis but give only 500mgs iv today as she got 1250mgs iv yesterday  cont Maxipime to 2 gm iv q24hrs  prognosis - guarded  Time spent - 30 mins

## 2020-04-29 NOTE — CHART NOTE - NSCHARTNOTEFT_GEN_A_CORE
Palliative Care:    Reviewed status with ICU team. Spoke with patient's surrogate/Criseldamarielena Egan ([phone]). Discussed status; reviewed medications, labs. Reports she is speaking with patient's  about goals of care - at this time, patient remains a full code. All questions answered; supportive counseling provided.

## 2020-04-29 NOTE — PROGRESS NOTE ADULT - ASSESSMENT
Septic Shock  Bilat Pneumonia - worse on left side  COVID - 19 infection  Leukocytosis - decreased  Resp failure    Plan - Change Vancomycin to 750mgs iv q24hrs intradialysis but give only 500mgs iv today as she got 1250mgs iv yesterday  cont Maxipime to 2 gm iv q24hrs  prognosis - guarded  Time spent - 30 mins

## 2020-04-29 NOTE — PROGRESS NOTE ADULT - SUBJECTIVE AND OBJECTIVE BOX
[facility] : Progress Note :: [phone], (office [phone]),   Dr Rosa / Dr Mosher / Dr Lin / Dr Narvaez / Dr Nando SOTELO / Dr Temple / Dr Silva / Dr Bjorn mendoza  _____________________________________________________________________________________________    remains anuric.  s/p HD yesterday    No Known Allergies    Hospital Medications:   MEDICATIONS  (STANDING):  apixaban 2.5 milliGRAM(s) Oral two times a day  cefepime   IVPB 2000 milliGRAM(s) IV Intermittent <User Schedule>  chlorhexidine 0.12% Liquid 15 milliLiter(s) Oral Mucosa every 12 hours  HYDROmorphone Infusion 1 mG/Hr (1 mL/Hr) IV Continuous <Continuous>  midazolam Infusion 0.02 mG/kG/Hr (1.72 mL/Hr) IV Continuous <Continuous>  midodrine 10 milliGRAM(s) Oral every 8 hours  norepinephrine Infusion 0.05 MICROgram(s)/kG/Min (4.04 mL/Hr) IV Continuous <Continuous>  pantoprazole  Injectable 40 milliGRAM(s) IV Push daily  polyethylene glycol 3350 17 Gram(s) Oral daily  senna 2 Tablet(s) Oral at bedtime  vancomycin  IVPB 750 milliGRAM(s) IV Intermittent once        VITALS:  T(F): 96.5 (04-29-20 @ 12:00), Max: 97.6 (04-29-20 @ 00:07)  HR: 75 (04-29-20 @ 15:06)  BP: 94/59 (04-29-20 @ 08:00)  RR: 24 (04-29-20 @ 12:00)  SpO2: 91% (04-29-20 @ 15:06)  Wt(kg): --    04-28 @ 07:01  -  04-29 @ 07:00  --------------------------------------------------------  IN: 743.2 mL / OUT: 3010 mL / NET: -2266.8 mL    04-29 @ 07:01  -  04-29 @ 15:35  --------------------------------------------------------  IN: 131.6 mL / OUT: 0 mL / NET: 131.6 mL        PHYSICAL EXAM:  Constitutional: NAD  HEENT: anicteric sclera, oropharynx clear.  Neck: No JVD  Respiratory: CTAB, no wheezes, rales or rhonchi  Cardiovascular: S1, S2, RRR  Gastrointestinal: BS+, soft, NT/ND  Extremities:  peripheral edema++  :  parr+   Vascular Access: IJ permacath    LABS:  04-29    135  |  96  |  27<H>  ----------------------------<  77  3.5   |  28  |  3.27<H>    Ca    8.5      29 Apr 2020 03:53  Phos  4.7     04-29  Mg     2.2     04-29    TPro  7.0  /  Alb  1.5<L>  /  TBili  3.6<H>  /  DBili      /  AST  56<H>  /  ALT  38  /  AlkPhos  125<H>  04-29    Creatinine Trend: 3.27 <--, 4.52 <--, 4.92 <--, 3.91 <--, 4.48 <--, 5.44 <--, 5.28 <--                        9.2    14.72 )-----------( 242      ( 29 Apr 2020 03:53 )             29.8     Urine Studies:      RADIOLOGY & ADDITIONAL STUDIES:

## 2020-04-29 NOTE — PROGRESS NOTE ADULT - SUBJECTIVE AND OBJECTIVE BOX
55y Female    Meds:  cefepime   IVPB 2000 milliGRAM(s) IV Intermittent <User Schedule>  vancomycin  IVPB 750 milliGRAM(s) IV Intermittent once    Allergies    No Known Allergies    Intolerances        VITALS:  Vital Signs Last 24 Hrs  T(C): 35.8 (29 Apr 2020 08:00), Max: 36.4 (29 Apr 2020 00:07)  T(F): 96.4 (29 Apr 2020 08:00), Max: 97.6 (29 Apr 2020 00:07)  HR: 58 (29 Apr 2020 10:00) (56 - 98)  BP: 94/59 (29 Apr 2020 08:00) (91/55 - 106/60)  BP(mean): 67 (29 Apr 2020 08:00) (59 - 72)  RR: 20 (29 Apr 2020 10:00) (15 - 37)  SpO2: 98% (29 Apr 2020 10:00) (89% - 98%)    LABS/DIAGNOSTIC TESTS:                          9.2    14.72 )-----------( 242      ( 29 Apr 2020 03:53 )             29.8         04-29    135  |  96  |  27<H>  ----------------------------<  77  3.5   |  28  |  3.27<H>    Ca    8.5      29 Apr 2020 03:53  Phos  4.7     04-29  Mg     2.2     04-29    TPro  7.0  /  Alb  1.5<L>  /  TBili  3.6<H>  /  DBili  x   /  AST  56<H>  /  ALT  38  /  AlkPhos  125<H>  04-29      LIVER FUNCTIONS - ( 29 Apr 2020 03:53 )  Alb: 1.5 g/dL / Pro: 7.0 g/dL / ALK PHOS: 125 U/L / ALT: 38 U/L DA / AST: 56 U/L / GGT: x             CULTURES: .Blood Blood-Peripheral  04-17 @ 18:26   No Growth Final  --  --            RADIOLOGY:      ROS:  [  ] UNABLE TO ELICIT ICU VISIT  55y Female who remains in the ICU, she is sedated , intubated and vent dependent , she is on 1 pressor, she has no fevers, she continues on daily dialysis. She has no fevers, her wbc count is decreasing, CXR is showing some improvement.    Meds:  cefepime   IVPB 2000 milliGRAM(s) IV Intermittent <User Schedule>  vancomycin  IVPB 750 milliGRAM(s) IV Intermittent once    Allergies    No Known Allergies    Intolerances        VITALS:  Vital Signs Last 24 Hrs  T(C): 35.8 (29 Apr 2020 08:00), Max: 36.4 (29 Apr 2020 00:07)  T(F): 96.4 (29 Apr 2020 08:00), Max: 97.6 (29 Apr 2020 00:07)  HR: 58 (29 Apr 2020 10:00) (56 - 98)  BP: 94/59 (29 Apr 2020 08:00) (91/55 - 106/60)  BP(mean): 67 (29 Apr 2020 08:00) (59 - 72)  RR: 20 (29 Apr 2020 10:00) (15 - 37)  SpO2: 98% (29 Apr 2020 10:00) (89% - 98%)    LABS/DIAGNOSTIC TESTS:                          9.2    14.72 )-----------( 242      ( 29 Apr 2020 03:53 )             29.8         04-29    135  |  96  |  27<H>  ----------------------------<  77  3.5   |  28  |  3.27<H>    Ca    8.5      29 Apr 2020 03:53  Phos  4.7     04-29  Mg     2.2     04-29    TPro  7.0  /  Alb  1.5<L>  /  TBili  3.6<H>  /  DBili  x   /  AST  56<H>  /  ALT  38  /  AlkPhos  125<H>  04-29      LIVER FUNCTIONS - ( 29 Apr 2020 03:53 )  Alb: 1.5 g/dL / Pro: 7.0 g/dL / ALK PHOS: 125 U/L / ALT: 38 U/L DA / AST: 56 U/L / GGT: x             CULTURES: .Blood Blood-Peripheral  04-17 @ 18:26   No Growth Final  --  --            RADIOLOGY:< from: Xray Chest 1 View- PORTABLE-Routine (04.29.20 @ 07:53) >  EXAM:  XR CHEST PORTABLE ROUTINE 1V                            PROCEDURE DATE:  04/29/2020          INTERPRETATION:  Chest one view    HISTORY: Viral pneumonia    COMPARISON STUDY: 4/28/2020    Frontal expiratory view of the chest shows the heart to be similar in size. Endotracheal tube, feeding tube and right dialysis catheter remain present.    The lungs show slight clearing of the lungs and there is no evidence of pneumothorax nor pleural effusion.    IMPRESSION:  Partial clearing of the lungs.    Thank you for the courtesy of this referral.                NIRALI GRAY M.D., ATTENDING RADIOLOGIST  This document has been electronically signed. Apr 29 2020  8:19AM                < end of copied text >        ROS:  [ x ] UNABLE TO ELICIT

## 2020-04-29 NOTE — PROGRESS NOTE ADULT - ATTENDING COMMENTS
IMP: This is a 54 yr old woman with  HTN, works as HD nurse,  presents to the ED w/ fever, chills, nausea, vomiting, and mild cough that began x1 week ago. Patient was admitted on medicine floor for COVID pna. ICU consulted for hypoxia and desaturation on NRB.  DDimer is trending up and is on . therapeutic anticoag.  Pat became more dyspnenic and hypoxic, intubated and placed on vent support sedated . WBC and procalcitonin is elevated on iv antibx. Will add vanco, change central line. . NO IL-6 at this time since CXR shows pul edema . Will request from neph for more frequent dialysis and if CXR improve or decrease FiO2 need then no IL-6. CRP, Ferritin and D Dimer are trending down . Started on HD by nephro.    Assessment:  -Acute Hypoxic Respiratory Failure  -B/L  PNA   -ARDS  -COVID 19 infection  -HTN  -Hypercaog state  -Septic shock  -RASHAUN  -MOSF      Plan  - Mechanical ventilation with lung protective strategy   - Decrease Fio2  - Titrate Fio2 and PEEP as tolerated  -elevated plateau pressure, will decrease TV  - Sedation for ventilator synchrony   - Vasopressor support to maintain MAP > 60  - Monitor ABG  - S/p Anakinra and Hydroxychloroquine  - biomarkers are tending down  - Airborne and Contact isolation  - Plan for prone positioning daily  - Cultures negative thus far and cont IV antibiotics will complete 7 day course, leukocytosis is resolving  - Monitor urine output  - Nephrology  for daily dialysis  - HD, f/u with nephrology  - Bowel regimen  - DVT and Stress Ulcer prophylaxis   - Over all prognosis is poor given multiple organ dysfunction .

## 2020-04-29 NOTE — PROGRESS NOTE ADULT - ASSESSMENT
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 Patient was admitted on medicine floor for COVID pna.  Admitted to ICU for hypoxia and desaturation on NRB.     Assessment:  - acute hypoxic respiratory failure secondary to COVID-19 pneumonia  - septic shock requiring pressors   - RASHAUN  - Leukocytosis  - COVID pandemic     Plan:  Neuro:  -Pt sedated with versed   -Dilaudid drip  -no longer on Rocuronium    CV:  - Hold BP medications  -Levophed for pressor support  - on midodrine 10mg q 8       Pulm:  - Intubated for respiratory distress and hypoxia 4/15  - CXR shows bilateral airspace opacities   - Acute Hypoxic Resp Failure secondary to PNA + COVID 19  - S/P ANAKINRA and Hydroxychloroquine (since April 9th)   - Discontinued full dose Lovenox due to epistaxis  - Current vent settings 35/400/80/9    - CXR findings may be due to fluid overload; s/p HD session yesterday 4/28; possible HD session today as per nephro Dr. Rosa  - attempt to decrease FiO2 requirements after HD sessions; will hold off on trial of tocilizumab at this time   - ESR: 96>92  - D-dimer: 1901>1936  - LDH: 362>447     ID:  - completed Hydroxychloroquine, anakinra   Pt was febrile a few nights ago   blood culture negative  on Cefepime    WBC: elevated at 19K this morning    ID Dr. Davey, s/p 1 dose of Vanco 1250 yesterday  *Pt to receive Vanco 1250mg iv  dose Intradialysis as per Dr Davey     Nephro:  RASHAUN:    -Cr/BUN:  4.52/41  no longer on lasix drip as per nephro due to minimal urine output   S/P HD 1 st session of HD on 4/24, second session on 4/25; third session 4/28; possible HD session today   - monitor urine output; minimal urine output; net positive 623cc  - Monitor electrolytes   Hypernatremia: 133 this morning     Metabolic acidosis:  Improved   -off sodium bicarbonate  Monitor BMP  Nephrology: Dr. 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 Patient was admitted on medicine floor for COVID pna.  Admitted to ICU for hypoxia and desaturation on NRB.     Assessment:  - acute hypoxic respiratory failure secondary to COVID-19 pneumonia  - septic shock requiring pressors   - RASHAUN  - Leukocytosis  - COVID pandemic     Plan:  Neuro:  -Pt sedated with versed   -Dilaudid drip  -no longer on Rocuronium    CV:  - Hold BP medications  - currently not requiring levophed; may need during HD sessions  - on midodrine 10mg q 8       Pulm:  - Intubated for respiratory distress and hypoxia 4/15  - CXR shows bilateral airspace opacities; improving s/p HD sessions; may be from fluid overload   - Acute Hypoxic Resp Failure secondary to PNA + COVID 19  - S/P ANAKINRA and Hydroxychloroquine (since April 9th)   - Discontinued full dose Lovenox due to epistaxis  - Current vent settings 35/400/80/9    - CXR findings may be due to fluid overload; s/p HD session 4/28 and 4/29; possible HD session today; f/u nephro Dr. Rosa  - attempt to decrease FiO2 requirements after HD sessions; will hold off on trial of tocilizumab at this time   - ESR: 96>92>108  - D-dimer: 1901>1936>820  - LDH: 362>447>400    ID:  - completed Hydroxychloroquine, anakinra   Pt was febrile a few nights ago   blood culture negative  on Cefepime and vanco intradialysis  WBC: elevated at 14K this morning; improved since yesterday     ID Dr. Davey, s/p 1 dose of Vanco 1250 4/27 and 500mg 4/28; will give 750mg intradialysis today   *Pt to receive Vanco 750mg iv  dose Intradialysis as per Dr Davey     Nephro:  RASHAUN:    -Cr/BUN:  3.27/27  no longer on lasix drip as per nephro due to minimal urine output   S/P HD 1 st session of HD on 4/24, second session on 4/25; third session 4/28; fourth 4/28; possible HD today   - monitor urine output; minimal urine output; net negative 2267cc  - Monitor electrolytes   Hypernatremia: resolved currently     Metabolic acidosis:  Improved   -off sodium bicarbonate  Monitor BMP  Nephrology: Dr. Rosa      GI:  -  Tube feed  - GI ppx with Protonix  - Miralax and senna for prophylaxis  - dc dulcolax     Heme:  - no indication for transfusion   - Hgb stable   - monitor cbc daily     Endo:  - No history of DM  - target CBG < 180  - FS q6 while Tube feed      Prophy:  - eliquis 2.5mg BID   - Protonix for GI prophylaxis 53 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 Patient was admitted on medicine floor for COVID pna.  Admitted to ICU for hypoxia and desaturation on NRB.     Assessment:  - acute hypoxic respiratory failure secondary to COVID-19 pneumonia  - septic shock requiring pressors   - RASHAUN  - Leukocytosis  - COVID pandemic     Plan:  Neuro:  -Pt sedated with versed   -Dilaudid drip  -no longer on Rocuronium    CV:  - Hold BP medications  - currently not requiring levophed; may need during HD sessions  - on midodrine 10mg q 8       Pulm:  - Intubated for respiratory distress and hypoxia 4/15  - CXR shows bilateral airspace opacities; improving s/p HD sessions; may be from fluid overload   - Acute Hypoxic Resp Failure secondary to PNA + COVID 19  - S/P ANAKINRA and Hydroxychloroquine (since April 9th)   - Discontinued full dose Lovenox due to epistaxis  - Current vent settings 35/400/80/9; plateau pressure of 39; will decrease TV to 375 and re-evaluate; repeat BMP; possibly need to paralyze patient if not improving     - CXR findings may be due to fluid overload; s/p HD session 4/28 and 4/29; possible HD session today; f/u nephro Dr. Rosa  - attempt to decrease FiO2 requirements after HD sessions; will hold off on trial of tocilizumab at this time   - ESR: 96>92>108  - D-dimer: 1901>1936>820  - LDH: 362>447>400    ID:  - completed Hydroxychloroquine, anakinra   Pt was febrile a few nights ago   blood culture negative  on Cefepime and vanco intradialysis  WBC: elevated at 14K this morning; improved since yesterday     ID Dr. Davey, s/p 1 dose of Vanco 1250 4/27 and 500mg 4/28; will give 750mg intradialysis today   *Pt to receive Vanco 750mg iv  dose Intradialysis as per Dr Davey     Nephro:  RASHAUN:    -Cr/BUN:  3.27/27  no longer on lasix drip as per nephro due to minimal urine output   S/P HD 1 st session of HD on 4/24, second session on 4/25; third session 4/28; fourth 4/28; possible HD today   - monitor urine output; minimal urine output; net negative 2267cc  - Monitor electrolytes   Hypernatremia: resolved currently     Metabolic acidosis:  Improved   -off sodium bicarbonate  Monitor BMP  Nephrology: Dr. Rosa      GI:  -  Tube feed  - GI ppx with Protonix  - Miralax and senna for prophylaxis  - dc dulcolax     Heme:  - no indication for transfusion   - Hgb stable   - monitor cbc daily     Endo:  - No history of DM  - target CBG < 180  - FS q6 while Tube feed      Prophy:  - eliquis 2.5mg BID   - Protonix for GI prophylaxis

## 2020-04-29 NOTE — PROGRESS NOTE ADULT - SUBJECTIVE AND OBJECTIVE BOX
INTERVAL HPI/OVERNIGHT EVENTS: *** Patient was seen and examined at bed side.     PRESSORS: [ ] YES [ ] NO  WHICH:    ANTIBIOTICS:                  DATE STARTED:  ANTIBIOTICS:                  DATE STARTED:  ANTIBIOTICS:                  DATE STARTED:    Antimicrobial:  cefepime   IVPB 2000 milliGRAM(s) IV Intermittent <User Schedule>    Cardiovascular:  midodrine 10 milliGRAM(s) Oral every 8 hours  norepinephrine Infusion 0.05 MICROgram(s)/kG/Min IV Continuous <Continuous>    Pulmonary:    Hematalogic:  apixaban 2.5 milliGRAM(s) Oral two times a day    Other:  acetaminophen    Suspension .. 650 milliGRAM(s) Oral every 6 hours PRN  chlorhexidine 0.12% Liquid 15 milliLiter(s) Oral Mucosa every 12 hours  HYDROmorphone Infusion 1 mG/Hr IV Continuous <Continuous>  midazolam Infusion 0.02 mG/kG/Hr IV Continuous <Continuous>  pantoprazole  Injectable 40 milliGRAM(s) IV Push daily  polyethylene glycol 3350 17 Gram(s) Oral daily  senna 2 Tablet(s) Oral at bedtime    acetaminophen    Suspension .. 650 milliGRAM(s) Oral every 6 hours PRN  apixaban 2.5 milliGRAM(s) Oral two times a day  cefepime   IVPB 2000 milliGRAM(s) IV Intermittent <User Schedule>  chlorhexidine 0.12% Liquid 15 milliLiter(s) Oral Mucosa every 12 hours  HYDROmorphone Infusion 1 mG/Hr IV Continuous <Continuous>  midazolam Infusion 0.02 mG/kG/Hr IV Continuous <Continuous>  midodrine 10 milliGRAM(s) Oral every 8 hours  norepinephrine Infusion 0.05 MICROgram(s)/kG/Min IV Continuous <Continuous>  pantoprazole  Injectable 40 milliGRAM(s) IV Push daily  polyethylene glycol 3350 17 Gram(s) Oral daily  senna 2 Tablet(s) Oral at bedtime    Drug Dosing Weight  Height (cm): 134.62 (07 Apr 2020 23:13)  Weight (kg): 86.2 (07 Apr 2020 23:13)  BMI (kg/m2): 47.6 (07 Apr 2020 23:13)  BSA (m2): 1.67 (07 Apr 2020 23:13)    CENTRAL LINE: [ ] YES [ ] NO  LOCATION:   DATE INSERTED:  REMOVE: [ ] YES [ ] NO  EXPLAIN:    RENNER: [ ] YES [ ] NO    DATE INSERTED:  REMOVE:  [ ] YES [ ] NO  EXPLAIN:    A-LINE:  [ ] YES [ ] NO  LOCATION:   DATE INSERTED:  REMOVE:  [ ] YES [ ] NO  EXPLAIN:        ICU Vital Signs Last 24 Hrs  T(C): 36.4 (29 Apr 2020 00:07), Max: 36.4 (29 Apr 2020 00:07)  T(F): 97.6 (29 Apr 2020 00:07), Max: 97.6 (29 Apr 2020 00:07)  HR: 65 (29 Apr 2020 07:00) (52 - 98)  BP: 91/55 (29 Apr 2020 07:00) (91/55 - 106/60)  BP(mean): 64 (29 Apr 2020 07:00) (59 - 72)  ABP: 101/53 (29 Apr 2020 07:00) (93/49 - 187/85)  ABP(mean): 70 (29 Apr 2020 07:00) (63 - 124)  RR: 18 (29 Apr 2020 07:00) (15 - 37)  SpO2: 94% (29 Apr 2020 07:00) (89% - 97%)      ABG - ( 29 Apr 2020 03:45 )  pH, Arterial: 7.35  pH, Blood: x     /  pCO2: 49    /  pO2: 89    / HCO3: 27    / Base Excess: 1.3   /  SaO2: 96                    04-28 @ 07:01  -  04-29 @ 07:00  --------------------------------------------------------  IN: 743.2 mL / OUT: 3010 mL / NET: -2266.8 mL        Mode: AC/ CMV (Assist Control/ Continuous Mandatory Ventilation)  RR (machine): 35  TV (machine): 400  FiO2: 80  PEEP: 9  ITime: 0.7  MAP: 25  PIP: 50      PHYSICAL EXAM:    GENERAL:NAD, well-groomed, well-developed  HEAD:  Atraumatic, Normocephalic  EYES: EOMI, PERRLA, conjunctiva and sclera clear  ENMT: No tonsillar erythema, exudates, or enlargement; Moist mucous membranes, Good dentition, No lesions  NECK: Supple, normal appearance, No JVD; Normal thyroid; Trachea midline  NERVOUS SYSTEM: Alert & Oriented X3, Good concentration; Motor Strength 5/5 B/L upper and lower extremities; DTRs 2+ intact and symmetric  CHEST/LUNG: No chest deformity;Normal percussion bilaterally; No rales, rhonchi, wheezing   HEART: Regular rate and rhythm; No murmurs, rubs, or gallops  ABDOMEN: Soft, Nontender, Nondistended; Bowel sounds present  EXTREMITIES: 2+ Peripheral Pulses, No clubbing, cyanosis, or edema  LYMPH: No lymphadenopathy noted  SKIN:No rashes or lesions; Good capillary refill      LABS:  CBC Full  -  ( 29 Apr 2020 03:53 )  WBC Count : 14.72 K/uL  RBC Count : 3.38 M/uL  Hemoglobin : 9.2 g/dL  Hematocrit : 29.8 %  Platelet Count - Automated : 242 K/uL  Mean Cell Volume : 88.2 fl  Mean Cell Hemoglobin : 27.2 pg  Mean Cell Hemoglobin Concentration : 30.9 gm/dL  Auto Neutrophil # : x  Auto Lymphocyte # : x  Auto Monocyte # : x  Auto Eosinophil # : x  Auto Basophil # : x  Auto Neutrophil % : x  Auto Lymphocyte % : x  Auto Monocyte % : x  Auto Eosinophil % : x  Auto Basophil % : x    04-29    135  |  96  |  27<H>  ----------------------------<  77  3.5   |  28  |  3.27<H>    Ca    8.5      29 Apr 2020 03:53  Phos  4.7     04-29  Mg     2.2     04-29    TPro  7.0  /  Alb  1.5<L>  /  TBili  3.6<H>  /  DBili  x   /  AST  56<H>  /  ALT  38  /  AlkPhos  125<H>  04-29    PT/INR - ( 29 Apr 2020 03:53 )   PT: 16.8 sec;   INR: 1.47 ratio         PTT - ( 29 Apr 2020 03:53 )  PTT:34.7 sec        RADIOLOGY & ADDITIONAL STUDIES REVIEWED:  ***    GOALS OF CARE DISCUSSION WITH PATIENT/FAMILY/PROXY: full code/DNR/DNI    CRITICAL CARE TIME SPENT: 35 minutes INTERVAL HPI/OVERNIGHT EVENTS: Patient was seen and examined at bed side. Patient had HD session yesterday. Leukocytosis shows mild improvement of 14K this morning.     PRESSORS: [ ] YES [ ] NO  WHICH:    ANTIBIOTICS:                  DATE STARTED:  ANTIBIOTICS:                  DATE STARTED:  ANTIBIOTICS:                  DATE STARTED:    Antimicrobial:  cefepime   IVPB 2000 milliGRAM(s) IV Intermittent <User Schedule>    Cardiovascular:  midodrine 10 milliGRAM(s) Oral every 8 hours  norepinephrine Infusion 0.05 MICROgram(s)/kG/Min IV Continuous <Continuous>    Pulmonary:    Hematalogic:  apixaban 2.5 milliGRAM(s) Oral two times a day    Other:  acetaminophen    Suspension .. 650 milliGRAM(s) Oral every 6 hours PRN  chlorhexidine 0.12% Liquid 15 milliLiter(s) Oral Mucosa every 12 hours  HYDROmorphone Infusion 1 mG/Hr IV Continuous <Continuous>  midazolam Infusion 0.02 mG/kG/Hr IV Continuous <Continuous>  pantoprazole  Injectable 40 milliGRAM(s) IV Push daily  polyethylene glycol 3350 17 Gram(s) Oral daily  senna 2 Tablet(s) Oral at bedtime    acetaminophen    Suspension .. 650 milliGRAM(s) Oral every 6 hours PRN  apixaban 2.5 milliGRAM(s) Oral two times a day  cefepime   IVPB 2000 milliGRAM(s) IV Intermittent <User Schedule>  chlorhexidine 0.12% Liquid 15 milliLiter(s) Oral Mucosa every 12 hours  HYDROmorphone Infusion 1 mG/Hr IV Continuous <Continuous>  midazolam Infusion 0.02 mG/kG/Hr IV Continuous <Continuous>  midodrine 10 milliGRAM(s) Oral every 8 hours  norepinephrine Infusion 0.05 MICROgram(s)/kG/Min IV Continuous <Continuous>  pantoprazole  Injectable 40 milliGRAM(s) IV Push daily  polyethylene glycol 3350 17 Gram(s) Oral daily  senna 2 Tablet(s) Oral at bedtime    Drug Dosing Weight  Height (cm): 134.62 (07 Apr 2020 23:13)  Weight (kg): 86.2 (07 Apr 2020 23:13)  BMI (kg/m2): 47.6 (07 Apr 2020 23:13)  BSA (m2): 1.67 (07 Apr 2020 23:13)    CENTRAL LINE: [ ] YES [ ] NO  LOCATION:   DATE INSERTED:  REMOVE: [ ] YES [ ] NO  EXPLAIN:    RENNER: [ ] YES [ ] NO    DATE INSERTED:  REMOVE:  [ ] YES [ ] NO  EXPLAIN:    A-LINE:  [ ] YES [ ] NO  LOCATION:   DATE INSERTED:  REMOVE:  [ ] YES [ ] NO  EXPLAIN:        ICU Vital Signs Last 24 Hrs  T(C): 36.4 (29 Apr 2020 00:07), Max: 36.4 (29 Apr 2020 00:07)  T(F): 97.6 (29 Apr 2020 00:07), Max: 97.6 (29 Apr 2020 00:07)  HR: 65 (29 Apr 2020 07:00) (52 - 98)  BP: 91/55 (29 Apr 2020 07:00) (91/55 - 106/60)  BP(mean): 64 (29 Apr 2020 07:00) (59 - 72)  ABP: 101/53 (29 Apr 2020 07:00) (93/49 - 187/85)  ABP(mean): 70 (29 Apr 2020 07:00) (63 - 124)  RR: 18 (29 Apr 2020 07:00) (15 - 37)  SpO2: 94% (29 Apr 2020 07:00) (89% - 97%)      ABG - ( 29 Apr 2020 03:45 )  pH, Arterial: 7.35  pH, Blood: x     /  pCO2: 49    /  pO2: 89    / HCO3: 27    / Base Excess: 1.3   /  SaO2: 96                    04-28 @ 07:01  -  04-29 @ 07:00  --------------------------------------------------------  IN: 743.2 mL / OUT: 3010 mL / NET: -2266.8 mL        Mode: AC/ CMV (Assist Control/ Continuous Mandatory Ventilation)  RR (machine): 35  TV (machine): 400  FiO2: 80  PEEP: 9  ITime: 0.7  MAP: 25  PIP: 50      PHYSICAL EXAM:    GENERAL:NAD, well-groomed, well-developed  HEAD:  Atraumatic, Normocephalic  EYES: EOMI, PERRLA, conjunctiva and sclera clear  ENMT: No tonsillar erythema, exudates, or enlargement; Moist mucous membranes, Good dentition, No lesions  NECK: Supple, normal appearance, No JVD; Normal thyroid; Trachea midline  NERVOUS SYSTEM: Alert & Oriented X3, Good concentration; Motor Strength 5/5 B/L upper and lower extremities; DTRs 2+ intact and symmetric  CHEST/LUNG: No chest deformity;Normal percussion bilaterally; No rales, rhonchi, wheezing   HEART: Regular rate and rhythm; No murmurs, rubs, or gallops  ABDOMEN: Soft, Nontender, Nondistended; Bowel sounds present  EXTREMITIES: 2+ Peripheral Pulses, No clubbing, cyanosis, or edema  LYMPH: No lymphadenopathy noted  SKIN:No rashes or lesions; Good capillary refill      LABS:  CBC Full  -  ( 29 Apr 2020 03:53 )  WBC Count : 14.72 K/uL  RBC Count : 3.38 M/uL  Hemoglobin : 9.2 g/dL  Hematocrit : 29.8 %  Platelet Count - Automated : 242 K/uL  Mean Cell Volume : 88.2 fl  Mean Cell Hemoglobin : 27.2 pg  Mean Cell Hemoglobin Concentration : 30.9 gm/dL  Auto Neutrophil # : x  Auto Lymphocyte # : x  Auto Monocyte # : x  Auto Eosinophil # : x  Auto Basophil # : x  Auto Neutrophil % : x  Auto Lymphocyte % : x  Auto Monocyte % : x  Auto Eosinophil % : x  Auto Basophil % : x    04-29    135  |  96  |  27<H>  ----------------------------<  77  3.5   |  28  |  3.27<H>    Ca    8.5      29 Apr 2020 03:53  Phos  4.7     04-29  Mg     2.2     04-29    TPro  7.0  /  Alb  1.5<L>  /  TBili  3.6<H>  /  DBili  x   /  AST  56<H>  /  ALT  38  /  AlkPhos  125<H>  04-29    PT/INR - ( 29 Apr 2020 03:53 )   PT: 16.8 sec;   INR: 1.47 ratio         PTT - ( 29 Apr 2020 03:53 )  PTT:34.7 sec        RADIOLOGY & ADDITIONAL STUDIES REVIEWED:  ***    GOALS OF CARE DISCUSSION WITH PATIENT/FAMILY/PROXY: full code/DNR/DNI    CRITICAL CARE TIME SPENT: 35 minutes INTERVAL HPI/OVERNIGHT EVENTS: Patient was seen and examined at bed side. Patient had HD session yesterday. Leukocytosis shows mild improvement of 14K this morning. Creatinine shows improvement as compared to before HD. D-dimer downtrending. Current vent settings of 35/400/80/9.     PRESSORS: Yes  WHICH: Levophed    ANTIBIOTICS: cefepime         DATE STARTED: 4/26  ANTIBIOTICS: vancomycin      DATE STARTED: 4/27    Antimicrobial:  cefepime   IVPB 2000 milliGRAM(s) IV Intermittent <User Schedule>    Cardiovascular:  midodrine 10 milliGRAM(s) Oral every 8 hours  norepinephrine Infusion 0.05 MICROgram(s)/kG/Min IV Continuous <Continuous>    Hematalogic:  apixaban 2.5 milliGRAM(s) Oral two times a day    Other:  acetaminophen    Suspension .. 650 milliGRAM(s) Oral every 6 hours PRN  chlorhexidine 0.12% Liquid 15 milliLiter(s) Oral Mucosa every 12 hours  HYDROmorphone Infusion 1 mG/Hr IV Continuous <Continuous>  midazolam Infusion 0.02 mG/kG/Hr IV Continuous <Continuous>  pantoprazole  Injectable 40 milliGRAM(s) IV Push daily  polyethylene glycol 3350 17 Gram(s) Oral daily  senna 2 Tablet(s) Oral at bedtime    acetaminophen    Suspension .. 650 milliGRAM(s) Oral every 6 hours PRN  apixaban 2.5 milliGRAM(s) Oral two times a day  cefepime   IVPB 2000 milliGRAM(s) IV Intermittent <User Schedule>  chlorhexidine 0.12% Liquid 15 milliLiter(s) Oral Mucosa every 12 hours  HYDROmorphone Infusion 1 mG/Hr IV Continuous <Continuous>  midazolam Infusion 0.02 mG/kG/Hr IV Continuous <Continuous>  midodrine 10 milliGRAM(s) Oral every 8 hours  norepinephrine Infusion 0.05 MICROgram(s)/kG/Min IV Continuous <Continuous>  pantoprazole  Injectable 40 milliGRAM(s) IV Push daily  polyethylene glycol 3350 17 Gram(s) Oral daily  senna 2 Tablet(s) Oral at bedtime    Drug Dosing Weight  Height (cm): 134.62 (07 Apr 2020 23:13)  Weight (kg): 86.2 (07 Apr 2020 23:13)  BMI (kg/m2): 47.6 (07 Apr 2020 23:13)  BSA (m2): 1.67 (07 Apr 2020 23:13)    CENTRAL LINE: Yes  LOCATION: Femoral   DATE INSERTED: 4/26  REMOVE: No    RENNER: Yes     DATE INSERTED: 4/15  REMOVE: No    A-LINE: Yes  LOCATION: Right  DATE INSERTED: 4/26  REMOVE: No      ICU Vital Signs Last 24 Hrs  T(C): 36.4 (29 Apr 2020 00:07), Max: 36.4 (29 Apr 2020 00:07)  T(F): 97.6 (29 Apr 2020 00:07), Max: 97.6 (29 Apr 2020 00:07)  HR: 65 (29 Apr 2020 07:00) (52 - 98)  BP: 91/55 (29 Apr 2020 07:00) (91/55 - 106/60)  BP(mean): 64 (29 Apr 2020 07:00) (59 - 72)  ABP: 101/53 (29 Apr 2020 07:00) (93/49 - 187/85)  ABP(mean): 70 (29 Apr 2020 07:00) (63 - 124)  RR: 18 (29 Apr 2020 07:00) (15 - 37)  SpO2: 94% (29 Apr 2020 07:00) (89% - 97%)      ABG - ( 29 Apr 2020 03:45 )  pH, Arterial: 7.35  pH, Blood: x     /  pCO2: 49    /  pO2: 89    / HCO3: 27    / Base Excess: 1.3   /  SaO2: 96          04-28 @ 07:01  -  04-29 @ 07:00  --------------------------------------------------------  IN: 743.2 mL / OUT: 3010 mL / NET: -2266.8 mL      Mode: AC/ CMV (Assist Control/ Continuous Mandatory Ventilation)  RR (machine): 35  TV (machine): 400  FiO2: 80  PEEP: 9  ITime: 0.7  MAP: 25  PIP: 50      PHYSICAL EXAM:  GENERAL: Sedated and intubated  HEAD: Atraumatic, Normocephalic  EYES: PERRLA, conjunctiva and sclera clear  ENMT: No tonsillar erythema, exudates, or enlargement  NECK: Supple, normal appearance  NERVOUS SYSTEM: Sedated and intubated  CHEST/LUNG: No chest deformity; crackles present to auscultation   HEART: Regular rate and rhythm; No murmurs  ABDOMEN: Soft, Nontender, Nondistended; Bowel sounds present  EXTREMITIES: No clubbing, cyanosis, or edema  LYMPH: No lymphadenopathy noted  SKIN: No rashes; pressure ulcer noted over right cheek; covered with bacitracin and dressing      LABS:  CBC Full  -  ( 29 Apr 2020 03:53 )  WBC Count : 14.72 K/uL  RBC Count : 3.38 M/uL  Hemoglobin : 9.2 g/dL  Hematocrit : 29.8 %  Platelet Count - Automated : 242 K/uL  Mean Cell Volume : 88.2 fl  Mean Cell Hemoglobin : 27.2 pg  Mean Cell Hemoglobin Concentration : 30.9 gm/dL      04-29    135  |  96  |  27<H>  ----------------------------<  77  3.5   |  28  |  3.27<H>    Ca    8.5      29 Apr 2020 03:53  Phos  4.7     04-29  Mg     2.2     04-29    TPro  7.0  /  Alb  1.5<L>  /  TBili  3.6<H>  /  DBili  x   /  AST  56<H>  /  ALT  38  /  AlkPhos  125<H>  04-29    PT/INR - ( 29 Apr 2020 03:53 )   PT: 16.8 sec;   INR: 1.47 ratio         PTT - ( 29 Apr 2020 03:53 )  PTT:34.7 sec    RADIOLOGY & ADDITIONAL STUDIES REVIEWED: < from: Xray Chest 1 View- PORTABLE-Routine (04.29.20 @ 07:53) >  IMPRESSION:  Partial clearing of the lungs.    < end of copied text >    GOALS OF CARE DISCUSSION WITH PATIENT/FAMILY/PROXY: full code     CRITICAL CARE TIME SPENT: 35 minutes INTERVAL HPI/OVERNIGHT EVENTS: Patient was seen and examined at bed side. Patient had HD session yesterday. Leukocytosis shows mild improvement of 14K this morning. Creatinine shows improvement as compared to before HD. D-dimer downtrending. Current vent settings of 35/400/80/9. Plateau pressure of 39.    PRESSORS: Yes  WHICH: Levophed    ANTIBIOTICS: cefepime         DATE STARTED: 4/26  ANTIBIOTICS: vancomycin      DATE STARTED: 4/27    Antimicrobial:  cefepime   IVPB 2000 milliGRAM(s) IV Intermittent <User Schedule>    Cardiovascular:  midodrine 10 milliGRAM(s) Oral every 8 hours  norepinephrine Infusion 0.05 MICROgram(s)/kG/Min IV Continuous <Continuous>    Hematalogic:  apixaban 2.5 milliGRAM(s) Oral two times a day    Other:  acetaminophen    Suspension .. 650 milliGRAM(s) Oral every 6 hours PRN  chlorhexidine 0.12% Liquid 15 milliLiter(s) Oral Mucosa every 12 hours  HYDROmorphone Infusion 1 mG/Hr IV Continuous <Continuous>  midazolam Infusion 0.02 mG/kG/Hr IV Continuous <Continuous>  pantoprazole  Injectable 40 milliGRAM(s) IV Push daily  polyethylene glycol 3350 17 Gram(s) Oral daily  senna 2 Tablet(s) Oral at bedtime    acetaminophen    Suspension .. 650 milliGRAM(s) Oral every 6 hours PRN  apixaban 2.5 milliGRAM(s) Oral two times a day  cefepime   IVPB 2000 milliGRAM(s) IV Intermittent <User Schedule>  chlorhexidine 0.12% Liquid 15 milliLiter(s) Oral Mucosa every 12 hours  HYDROmorphone Infusion 1 mG/Hr IV Continuous <Continuous>  midazolam Infusion 0.02 mG/kG/Hr IV Continuous <Continuous>  midodrine 10 milliGRAM(s) Oral every 8 hours  norepinephrine Infusion 0.05 MICROgram(s)/kG/Min IV Continuous <Continuous>  pantoprazole  Injectable 40 milliGRAM(s) IV Push daily  polyethylene glycol 3350 17 Gram(s) Oral daily  senna 2 Tablet(s) Oral at bedtime    Drug Dosing Weight  Height (cm): 134.62 (07 Apr 2020 23:13)  Weight (kg): 86.2 (07 Apr 2020 23:13)  BMI (kg/m2): 47.6 (07 Apr 2020 23:13)  BSA (m2): 1.67 (07 Apr 2020 23:13)    CENTRAL LINE: Yes  LOCATION: Femoral   DATE INSERTED: 4/26  REMOVE: No    RENNER: Yes     DATE INSERTED: 4/15  REMOVE: No    A-LINE: Yes  LOCATION: Right  DATE INSERTED: 4/26  REMOVE: No      ICU Vital Signs Last 24 Hrs  T(C): 36.4 (29 Apr 2020 00:07), Max: 36.4 (29 Apr 2020 00:07)  T(F): 97.6 (29 Apr 2020 00:07), Max: 97.6 (29 Apr 2020 00:07)  HR: 65 (29 Apr 2020 07:00) (52 - 98)  BP: 91/55 (29 Apr 2020 07:00) (91/55 - 106/60)  BP(mean): 64 (29 Apr 2020 07:00) (59 - 72)  ABP: 101/53 (29 Apr 2020 07:00) (93/49 - 187/85)  ABP(mean): 70 (29 Apr 2020 07:00) (63 - 124)  RR: 18 (29 Apr 2020 07:00) (15 - 37)  SpO2: 94% (29 Apr 2020 07:00) (89% - 97%)      ABG - ( 29 Apr 2020 03:45 )  pH, Arterial: 7.35  pH, Blood: x     /  pCO2: 49    /  pO2: 89    / HCO3: 27    / Base Excess: 1.3   /  SaO2: 96          04-28 @ 07:01  -  04-29 @ 07:00  --------------------------------------------------------  IN: 743.2 mL / OUT: 3010 mL / NET: -2266.8 mL      Mode: AC/ CMV (Assist Control/ Continuous Mandatory Ventilation)  RR (machine): 35  TV (machine): 400  FiO2: 80  PEEP: 9  ITime: 0.7  MAP: 25  PIP: 50      PHYSICAL EXAM:  GENERAL: Sedated and intubated  HEAD: Atraumatic, Normocephalic  EYES: PERRLA, conjunctiva and sclera clear  ENMT: No tonsillar erythema, exudates, or enlargement  NECK: Supple, normal appearance  NERVOUS SYSTEM: Sedated and intubated  CHEST/LUNG: No chest deformity; crackles present to auscultation   HEART: Regular rate and rhythm; No murmurs  ABDOMEN: Soft, Nontender, Nondistended; Bowel sounds present  EXTREMITIES: No clubbing, cyanosis, or edema  LYMPH: No lymphadenopathy noted  SKIN: No rashes; pressure ulcer noted over right cheek; covered with bacitracin and dressing      LABS:  CBC Full  -  ( 29 Apr 2020 03:53 )  WBC Count : 14.72 K/uL  RBC Count : 3.38 M/uL  Hemoglobin : 9.2 g/dL  Hematocrit : 29.8 %  Platelet Count - Automated : 242 K/uL  Mean Cell Volume : 88.2 fl  Mean Cell Hemoglobin : 27.2 pg  Mean Cell Hemoglobin Concentration : 30.9 gm/dL      04-29    135  |  96  |  27<H>  ----------------------------<  77  3.5   |  28  |  3.27<H>    Ca    8.5      29 Apr 2020 03:53  Phos  4.7     04-29  Mg     2.2     04-29    TPro  7.0  /  Alb  1.5<L>  /  TBili  3.6<H>  /  DBili  x   /  AST  56<H>  /  ALT  38  /  AlkPhos  125<H>  04-29    PT/INR - ( 29 Apr 2020 03:53 )   PT: 16.8 sec;   INR: 1.47 ratio         PTT - ( 29 Apr 2020 03:53 )  PTT:34.7 sec    RADIOLOGY & ADDITIONAL STUDIES REVIEWED: < from: Xray Chest 1 View- PORTABLE-Routine (04.29.20 @ 07:53) >  IMPRESSION:  Partial clearing of the lungs.    < end of copied text >    GOALS OF CARE DISCUSSION WITH PATIENT/FAMILY/PROXY: full code     CRITICAL CARE TIME SPENT: 35 minutes

## 2020-04-29 NOTE — PROGRESS NOTE ADULT - ASSESSMENT
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 Presented to ED with SOB, fever and hypoxia. Subsequently transfered to ICU intubated and developed ARDS s/p CQ and Anikinra. Subsequently developed RASHAUN with ensuing oliguria prompting a renal consult. Hypernatremia has improved. Severe hypoalbuminemia 1.3     # RASHAUN sec to ATN from COVID - 19 related disease. With ARDS. remains on pressors. hypoalbuminemia.  oliguria.  Pulm edema.  s/p HD yesterday.  ultrafiltration today.  RPT BMP IN AM

## 2020-04-30 NOTE — PROGRESS NOTE ADULT - ATTENDING COMMENTS
IMP: This is a 54 yr old woman with  HTN, works as HD nurse,  presents to the ED w/ fever, chills, nausea, vomiting, and mild cough that began x1 week ago. Patient was admitted on medicine floor for COVID pna. ICU consulted for hypoxia and desaturation on NRB.  DDimer is trending up and is on . therapeutic anticoag.  Pat became more dyspnenic and hypoxic, intubated and placed on vent support sedated . WBC and procalcitonin is elevated on iv antibx. Will add vanco, change central line. . NO IL-6 at this time since CXR shows pul edema . Will request from neph for more frequent dialysis and if CXR improve or decrease FiO2 need then no IL-6. CRP, Ferritin and D Dimer are trending down . Started on HD by nephro s/p x 3 sessions. Plateau pressures still elevated with decrease in TV. biomarkers are trending down but WBC trending up.    Assessment:  -Acute Hypoxic Respiratory Failure  -B/L  PNA   -ARDS  -COVID 19 infection  -HTN  -Hypercaog state  -Septic shock  -RASHAUN  -MOSF      Plan  - Mechanical ventilation with lung protective strategy   - Decrease Fio2  - Titrate Fio2 and PEEP as tolerated  -elevated Plateau pressure  - Sedation for ventilator synchrony   - Vasopressor support to maintain MAP > 60  - Monitor ABG  - S/p Anakinra and Hydroxychloroquine  - biomarkers are tending down  - Airborne and Contact isolation  - Plan for prone positioning daily  - Nephrology  for daily dialysis  - Bowel regimen  - DVT and Stress Ulcer prophylaxis   - Over all prognosis is poor given multiple organ dysfunction .

## 2020-04-30 NOTE — PROGRESS NOTE ADULT - ASSESSMENT
Septic Shock  Bilat Pneumonia - worse on left side  COVID - 19 infection  Leukocytosis   Resp failure    Plan - Cont Vancomycin to 750mgs iv each time patient is dialyzed  cont Maxipime to 2 gm iv q24hrs  prognosis - guarded  Time spent - 30 mins

## 2020-04-30 NOTE — PROGRESS NOTE ADULT - SUBJECTIVE AND OBJECTIVE BOX
ICU VISIT  55y Female    Meds:  cefepime   IVPB 2000 milliGRAM(s) IV Intermittent <User Schedule>  vancomycin  IVPB 750 milliGRAM(s) IV Intermittent once    Allergies    No Known Allergies    Intolerances        VITALS:  Vital Signs Last 24 Hrs  T(C): 35.6 (30 Apr 2020 16:00), Max: 36.8 (30 Apr 2020 08:00)  T(F): 96 (30 Apr 2020 16:00), Max: 98.3 (30 Apr 2020 08:00)  HR: 87 (30 Apr 2020 18:13) (66 - 98)  BP: 87/38 (30 Apr 2020 16:00) (87/38 - 128/66)  BP(mean): 49 (30 Apr 2020 16:00) (49 - 82)  RR: 32 (30 Apr 2020 18:13) (23 - 34)  SpO2: 95% (30 Apr 2020 18:13) (91% - 99%)    LABS/DIAGNOSTIC TESTS:                          9.7    15.65 )-----------( 286      ( 30 Apr 2020 06:34 )             32.5         04-30    131<L>  |  94<L>  |  36<H>  ----------------------------<  76  3.9   |  26  |  4.37<H>    Ca    8.9      30 Apr 2020 06:34  Phos  7.4     04-30  Mg     2.7     04-30    TPro  7.4  /  Alb  1.7<L>  /  TBili  3.3<H>  /  DBili  x   /  AST  56<H>  /  ALT  47  /  AlkPhos  153<H>  04-30      LIVER FUNCTIONS - ( 30 Apr 2020 06:34 )  Alb: 1.7 g/dL / Pro: 7.4 g/dL / ALK PHOS: 153 U/L / ALT: 47 U/L DA / AST: 56 U/L / GGT: x             CULTURES: .Blood Blood-Peripheral  04-17 @ 18:26   No Growth Final  --  --            RADIOLOGY:< from: Xray Chest 1 View-PORTABLE IMMEDIATE (04.30.20 @ 18:21) >  EXAM:  XR CHEST PORTABLE IMMED 1V                            PROCEDURE DATE:  04/30/2020          INTERPRETATION:  AP semisupine chest on April 30, 2020 5:52 PM. Patient requires intubation and had left jugular line placement.    Heart is magnifiedby technique.    Endotracheal tube, nasogastric tube, and right jugular line remain.    Diffuse advanced bilateral infiltrates again noted and may have increased particularly on the left compared to earlier in the day.    Present film also shows leftjugular line with tip in the superior vena caval area. The tip of the jugular line 0.2 the right lateral wall of the vena cava which may impede aspiration.    IMPRESSION: Left jugular line inserted as above. Again noted are advanced bilateral infiltrates somewhat increased on the left.        DISCRETE X-RAY DATA:  Percent of LEFT lung opacification: %  Percent of RIGHT lung opacification: %  Change in lung opacification from most recent x-ray (<=3 days): Increase  Change from prior dated 3 or more days (same admission): Increase                  YAMILEX KELLOGG M.D., ATTENDING RADIOLOGIST  This document has been electronically signed. Apr 30 2020  6:25PM          < end of copied text >        ROS:  [  ] UNABLE TO ELICIT ICU VISIT  55y Female who remains in critical condition in the ICU, she remains sedated, intubated and vent dependent, she was dialyzed again today. She is still on a pressor , she remains afebrile and wbc count remains stable.    Meds:  cefepime   IVPB 2000 milliGRAM(s) IV Intermittent <User Schedule>  vancomycin  IVPB 750 milliGRAM(s) IV Intermittent once    Allergies    No Known Allergies    Intolerances        VITALS:  Vital Signs Last 24 Hrs  T(C): 35.6 (30 Apr 2020 16:00), Max: 36.8 (30 Apr 2020 08:00)  T(F): 96 (30 Apr 2020 16:00), Max: 98.3 (30 Apr 2020 08:00)  HR: 87 (30 Apr 2020 18:13) (66 - 98)  BP: 87/38 (30 Apr 2020 16:00) (87/38 - 128/66)  BP(mean): 49 (30 Apr 2020 16:00) (49 - 82)  RR: 32 (30 Apr 2020 18:13) (23 - 34)  SpO2: 95% (30 Apr 2020 18:13) (91% - 99%)    LABS/DIAGNOSTIC TESTS:                          9.7    15.65 )-----------( 286      ( 30 Apr 2020 06:34 )             32.5         04-30    131<L>  |  94<L>  |  36<H>  ----------------------------<  76  3.9   |  26  |  4.37<H>    Ca    8.9      30 Apr 2020 06:34  Phos  7.4     04-30  Mg     2.7     04-30    TPro  7.4  /  Alb  1.7<L>  /  TBili  3.3<H>  /  DBili  x   /  AST  56<H>  /  ALT  47  /  AlkPhos  153<H>  04-30      LIVER FUNCTIONS - ( 30 Apr 2020 06:34 )  Alb: 1.7 g/dL / Pro: 7.4 g/dL / ALK PHOS: 153 U/L / ALT: 47 U/L DA / AST: 56 U/L / GGT: x             CULTURES: .Blood Blood-Peripheral  04-17 @ 18:26   No Growth Final  --  --            RADIOLOGY:< from: Xray Chest 1 View-PORTABLE IMMEDIATE (04.30.20 @ 18:21) >  EXAM:  XR CHEST PORTABLE IMMED 1V                            PROCEDURE DATE:  04/30/2020          INTERPRETATION:  AP semisupine chest on April 30, 2020 5:52 PM. Patient requires intubation and had left jugular line placement.    Heart is magnifiedby technique.    Endotracheal tube, nasogastric tube, and right jugular line remain.    Diffuse advanced bilateral infiltrates again noted and may have increased particularly on the left compared to earlier in the day.    Present film also shows leftjugular line with tip in the superior vena caval area. The tip of the jugular line 0.2 the right lateral wall of the vena cava which may impede aspiration.    IMPRESSION: Left jugular line inserted as above. Again noted are advanced bilateral infiltrates somewhat increased on the left.        DISCRETE X-RAY DATA:  Percent of LEFT lung opacification: %  Percent of RIGHT lung opacification: %  Change in lung opacification from most recent x-ray (<=3 days): Increase  Change from prior dated 3 or more days (same admission): Increase                  YAMILEX KELLOGG M.D., ATTENDING RADIOLOGIST  This document has been electronically signed. Apr 30 2020  6:25PM          < end of copied text >        ROS:  [ x ] UNABLE TO ELICIT

## 2020-04-30 NOTE — PROGRESS NOTE ADULT - PROBLEM SELECTOR PLAN 4
Reviewed status with ICU team. Received a call from patient's surrogate/Criselda - discussed status; reviewed medications, labs. She reports that after discussing code status with family, "they want her to be resuscitated." MOLST completed as per surrogate' wishes: CPR / trial intubation / trial feeding tube / trial IV fluids / use abx. All questions answered; supportive counseling provided.

## 2020-04-30 NOTE — PROCEDURE NOTE - NSINFORMCONSENT_GEN_A_CORE
This was an emergent procedure.
Benefits, risks, and possible complications of procedure explained to patient/caregiver who verbalized understanding and gave verbal consent.
This was an emergent procedure.
This was an emergent procedure.
Benefits, risks, and possible complications of procedure explained to patient/caregiver who verbalized understanding and gave written consent.
This was an emergent procedure.
This was an emergent procedure.

## 2020-04-30 NOTE — PROCEDURE NOTE - NSSITEPREP_SKIN_A_CORE
chlorhexidine
Adherence to aseptic technique: hand hygiene prior to donning barriers (gown, gloves), don cap and mask, sterile drape over patient/povidone iodine (if allergic to chlorhexidine)

## 2020-04-30 NOTE — PROCEDURE NOTE - PROCEDURE DATE TIME, MLM
15-Apr-2020 12:35
14-Apr-2020 14:45
17-Apr-2020 11:57
30-Apr-2020 17:00
24-Apr-2020 16:44
26-Apr-2020 13:21
26-Apr-2020 18:35

## 2020-04-30 NOTE — PROGRESS NOTE ADULT - SUBJECTIVE AND OBJECTIVE BOX
INTERVAL HPI/OVERNIGHT EVENTS: *** Patient was seen and examined at bed side.     PRESSORS: [ ] YES [ ] NO  WHICH:    ANTIBIOTICS:                  DATE STARTED:  ANTIBIOTICS:                  DATE STARTED:  ANTIBIOTICS:                  DATE STARTED:    Antimicrobial:  cefepime   IVPB 2000 milliGRAM(s) IV Intermittent <User Schedule>    Cardiovascular:  midodrine 10 milliGRAM(s) Oral every 8 hours  norepinephrine Infusion 0.05 MICROgram(s)/kG/Min IV Continuous <Continuous>    Pulmonary:    Hematalogic:  apixaban 2.5 milliGRAM(s) Oral two times a day    Other:  acetaminophen    Suspension .. 650 milliGRAM(s) Oral every 6 hours PRN  chlorhexidine 0.12% Liquid 15 milliLiter(s) Oral Mucosa every 12 hours  HYDROmorphone Infusion 1 mG/Hr IV Continuous <Continuous>  midazolam Infusion 0.02 mG/kG/Hr IV Continuous <Continuous>  pantoprazole  Injectable 40 milliGRAM(s) IV Push daily  polyethylene glycol 3350 17 Gram(s) Oral daily  senna 2 Tablet(s) Oral at bedtime    acetaminophen    Suspension .. 650 milliGRAM(s) Oral every 6 hours PRN  apixaban 2.5 milliGRAM(s) Oral two times a day  cefepime   IVPB 2000 milliGRAM(s) IV Intermittent <User Schedule>  chlorhexidine 0.12% Liquid 15 milliLiter(s) Oral Mucosa every 12 hours  HYDROmorphone Infusion 1 mG/Hr IV Continuous <Continuous>  midazolam Infusion 0.02 mG/kG/Hr IV Continuous <Continuous>  midodrine 10 milliGRAM(s) Oral every 8 hours  norepinephrine Infusion 0.05 MICROgram(s)/kG/Min IV Continuous <Continuous>  pantoprazole  Injectable 40 milliGRAM(s) IV Push daily  polyethylene glycol 3350 17 Gram(s) Oral daily  senna 2 Tablet(s) Oral at bedtime    Drug Dosing Weight  Height (cm): 134.62 (07 Apr 2020 23:13)  Weight (kg): 86.2 (07 Apr 2020 23:13)  BMI (kg/m2): 47.6 (07 Apr 2020 23:13)  BSA (m2): 1.67 (07 Apr 2020 23:13)    CENTRAL LINE: [ ] YES [ ] NO  LOCATION:   DATE INSERTED:  REMOVE: [ ] YES [ ] NO  EXPLAIN:    RENNER: [ ] YES [ ] NO    DATE INSERTED:  REMOVE:  [ ] YES [ ] NO  EXPLAIN:    A-LINE:  [ ] YES [ ] NO  LOCATION:   DATE INSERTED:  REMOVE:  [ ] YES [ ] NO  EXPLAIN:        ICU Vital Signs Last 24 Hrs  T(C): 36.7 (30 Apr 2020 04:00), Max: 36.7 (30 Apr 2020 04:00)  T(F): 98 (30 Apr 2020 04:00), Max: 98 (30 Apr 2020 04:00)  HR: 91 (30 Apr 2020 06:00) (58 - 103)  BP: 94/59 (29 Apr 2020 08:00) (94/59 - 94/59)  BP(mean): 67 (29 Apr 2020 08:00) (67 - 67)  ABP: 136/75 (30 Apr 2020 06:00) (93/54 - 177/83)  ABP(mean): 101 (30 Apr 2020 06:00) (66 - 121)  RR: 30 (30 Apr 2020 06:00) (19 - 30)  SpO2: 96% (30 Apr 2020 06:00) (91% - 98%)      ABG - ( 30 Apr 2020 04:00 )  pH, Arterial: 7.24  pH, Blood: x     /  pCO2: 62    /  pO2: 87    / HCO3: 25    / Base Excess: NR    /  SaO2: NR                    04-29 @ 07:01  -  04-30 @ 07:00  --------------------------------------------------------  IN: 206.8 mL / OUT: 15 mL / NET: 191.8 mL        Mode: AC/ CMV (Assist Control/ Continuous Mandatory Ventilation)  RR (machine): 35  TV (machine): 350  FiO2: 80  PEEP: 9  ITime: 1  MAP: 23  PIP: 41      PHYSICAL EXAM:    GENERAL:NAD, well-groomed, well-developed  HEAD:  Atraumatic, Normocephalic  EYES: EOMI, PERRLA, conjunctiva and sclera clear  ENMT: No tonsillar erythema, exudates, or enlargement; Moist mucous membranes, Good dentition, No lesions  NECK: Supple, normal appearance, No JVD; Normal thyroid; Trachea midline  NERVOUS SYSTEM: Alert & Oriented X3, Good concentration; Motor Strength 5/5 B/L upper and lower extremities; DTRs 2+ intact and symmetric  CHEST/LUNG: No chest deformity;Normal percussion bilaterally; No rales, rhonchi, wheezing   HEART: Regular rate and rhythm; No murmurs, rubs, or gallops  ABDOMEN: Soft, Nontender, Nondistended; Bowel sounds present  EXTREMITIES: 2+ Peripheral Pulses, No clubbing, cyanosis, or edema  LYMPH: No lymphadenopathy noted  SKIN:No rashes or lesions; Good capillary refill      LABS:  CBC Full  -  ( 30 Apr 2020 06:34 )  WBC Count : 15.65 K/uL  RBC Count : 3.62 M/uL  Hemoglobin : 9.7 g/dL  Hematocrit : 32.5 %  Platelet Count - Automated : 286 K/uL  Mean Cell Volume : 89.8 fl  Mean Cell Hemoglobin : 26.8 pg  Mean Cell Hemoglobin Concentration : 29.8 gm/dL  Auto Neutrophil # : x  Auto Lymphocyte # : x  Auto Monocyte # : x  Auto Eosinophil # : x  Auto Basophil # : x  Auto Neutrophil % : x  Auto Lymphocyte % : x  Auto Monocyte % : x  Auto Eosinophil % : x  Auto Basophil % : x    04-30    131<L>  |  94<L>  |  x   ----------------------------<  76  3.9   |  x   |  x     Ca    8.9      30 Apr 2020 06:34  Phos  4.7     04-29  Mg     2.2     04-29    TPro  x   /  Alb  1.7<L>  /  TBili  x   /  DBili  x   /  AST  x   /  ALT  x   /  AlkPhos  x   04-30    PT/INR - ( 29 Apr 2020 03:53 )   PT: 16.8 sec;   INR: 1.47 ratio         PTT - ( 29 Apr 2020 03:53 )  PTT:34.7 sec        RADIOLOGY & ADDITIONAL STUDIES REVIEWED:  ***    GOALS OF CARE DISCUSSION WITH PATIENT/FAMILY/PROXY: full code/DNR/DNI    CRITICAL CARE TIME SPENT: 35 minutes INTERVAL HPI/OVERNIGHT EVENTS: Patient was seen and examined at bed side. Patient had HD session yesterday. Tidal volume was decreased to 350 yesterday due to elevated plateau pressure. Patient continues to have elevated plateau pressures. Minimal urine output. Leukocytosis worsening. Creatinine worsening to 4.37 this morning. Current vent settings of 35/350/80/9.      PRESSORS: Yes   WHICH: Levophed    ANTIBIOTICS: cefepime                          DATE STARTED: 4/26   Antibiotics: Vancomycin during HD days DATE STARTED: 4/27    Antimicrobial:  cefepime   IVPB 2000 milliGRAM(s) IV Intermittent <User Schedule>    Cardiovascular:  midodrine 10 milliGRAM(s) Oral every 8 hours  norepinephrine Infusion 0.05 MICROgram(s)/kG/Min IV Continuous <Continuous>    Hematalogic:  apixaban 2.5 milliGRAM(s) Oral two times a day    Other:  acetaminophen    Suspension .. 650 milliGRAM(s) Oral every 6 hours PRN  chlorhexidine 0.12% Liquid 15 milliLiter(s) Oral Mucosa every 12 hours  HYDROmorphone Infusion 1 mG/Hr IV Continuous <Continuous>  midazolam Infusion 0.02 mG/kG/Hr IV Continuous <Continuous>  pantoprazole  Injectable 40 milliGRAM(s) IV Push daily  polyethylene glycol 3350 17 Gram(s) Oral daily  senna 2 Tablet(s) Oral at bedtime    acetaminophen    Suspension .. 650 milliGRAM(s) Oral every 6 hours PRN  apixaban 2.5 milliGRAM(s) Oral two times a day  cefepime   IVPB 2000 milliGRAM(s) IV Intermittent <User Schedule>  chlorhexidine 0.12% Liquid 15 milliLiter(s) Oral Mucosa every 12 hours  HYDROmorphone Infusion 1 mG/Hr IV Continuous <Continuous>  midazolam Infusion 0.02 mG/kG/Hr IV Continuous <Continuous>  midodrine 10 milliGRAM(s) Oral every 8 hours  norepinephrine Infusion 0.05 MICROgram(s)/kG/Min IV Continuous <Continuous>  pantoprazole  Injectable 40 milliGRAM(s) IV Push daily  polyethylene glycol 3350 17 Gram(s) Oral daily  senna 2 Tablet(s) Oral at bedtime    Drug Dosing Weight  Height (cm): 134.62 (07 Apr 2020 23:13)  Weight (kg): 86.2 (07 Apr 2020 23:13)  BMI (kg/m2): 47.6 (07 Apr 2020 23:13)  BSA (m2): 1.67 (07 Apr 2020 23:13)    CENTRAL LINE: Yes  LOCATION: Femoral line DATE INSERTED: 4/26  REMOVE: No    RENNER: Yes     DATE INSERTED: 4/15  REMOVE: No    A-LINE: Yes  LOCATION: Right axillary DATE INSERTED: 4/26  REMOVE: No      ICU Vital Signs Last 24 Hrs  T(C): 36.7 (30 Apr 2020 04:00), Max: 36.7 (30 Apr 2020 04:00)  T(F): 98 (30 Apr 2020 04:00), Max: 98 (30 Apr 2020 04:00)  HR: 91 (30 Apr 2020 06:00) (58 - 103)  BP: 94/59 (29 Apr 2020 08:00) (94/59 - 94/59)  BP(mean): 67 (29 Apr 2020 08:00) (67 - 67)  ABP: 136/75 (30 Apr 2020 06:00) (93/54 - 177/83)  ABP(mean): 101 (30 Apr 2020 06:00) (66 - 121)  RR: 30 (30 Apr 2020 06:00) (19 - 30)  SpO2: 96% (30 Apr 2020 06:00) (91% - 98%)      ABG - ( 30 Apr 2020 04:00 )  pH, Arterial: 7.24  pH, Blood: x     /  pCO2: 62    /  pO2: 87    / HCO3: 25    / Base Excess: NR    /  SaO2: NR            04-29 @ 07:01  -  04-30 @ 07:00  --------------------------------------------------------  IN: 206.8 mL / OUT: 15 mL / NET: 191.8 mL      Mode: AC/ CMV (Assist Control/ Continuous Mandatory Ventilation)  RR (machine): 35  TV (machine): 350  FiO2: 80  PEEP: 9  ITime: 1  MAP: 23  PIP: 41      PHYSICAL EXAM:  GENERAL: Sedated and intubated   HEAD:  Atraumatic, Normocephalic  EYES: EOMI, PERRLA, conjunctiva and sclera clear  ENMT: No tonsillar erythema, exudates, or enlargement; Moist mucous membranes, Good dentition, No lesions  NECK: Supple, normal appearance, No JVD; Normal thyroid; Trachea midline  NERVOUS SYSTEM: Alert & Oriented X3, Good concentration; Motor Strength 5/5 B/L upper and lower extremities; DTRs 2+ intact and symmetric  CHEST/LUNG: No chest deformity;Normal percussion bilaterally; No rales, rhonchi, wheezing   HEART: Regular rate and rhythm; No murmurs, rubs, or gallops  ABDOMEN: Soft, Nontender, Nondistended; Bowel sounds present  EXTREMITIES: 2+ Peripheral Pulses, No clubbing, cyanosis, or edema  LYMPH: No lymphadenopathy noted  SKIN:No rashes or lesions; Good capillary refill      LABS:  CBC Full  -  ( 30 Apr 2020 06:34 )  WBC Count : 15.65 K/uL  RBC Count : 3.62 M/uL  Hemoglobin : 9.7 g/dL  Hematocrit : 32.5 %  Platelet Count - Automated : 286 K/uL  Mean Cell Volume : 89.8 fl  Mean Cell Hemoglobin : 26.8 pg  Mean Cell Hemoglobin Concentration : 29.8 gm/dL  Auto Neutrophil # : x  Auto Lymphocyte # : x  Auto Monocyte # : x  Auto Eosinophil # : x  Auto Basophil # : x  Auto Neutrophil % : x  Auto Lymphocyte % : x  Auto Monocyte % : x  Auto Eosinophil % : x  Auto Basophil % : x    04-30    131<L>  |  94<L>  |  x   ----------------------------<  76  3.9   |  x   |  x     Ca    8.9      30 Apr 2020 06:34  Phos  4.7     04-29  Mg     2.2     04-29    TPro  x   /  Alb  1.7<L>  /  TBili  x   /  DBili  x   /  AST  x   /  ALT  x   /  AlkPhos  x   04-30    PT/INR - ( 29 Apr 2020 03:53 )   PT: 16.8 sec;   INR: 1.47 ratio         PTT - ( 29 Apr 2020 03:53 )  PTT:34.7 sec        RADIOLOGY & ADDITIONAL STUDIES REVIEWED:  ***    GOALS OF CARE DISCUSSION WITH PATIENT/FAMILY/PROXY: full code/DNR/DNI    CRITICAL CARE TIME SPENT: 35 minutes INTERVAL HPI/OVERNIGHT EVENTS: Patient was seen and examined at bed side. Patient had HD session yesterday. Tidal volume was decreased to 350 yesterday due to elevated plateau pressure. Patient continues to have elevated plateau pressures. Minimal urine output. Leukocytosis worsening. Creatinine worsening to 4.37 this morning. Current vent settings of 35/350/80/9.      PRESSORS: Yes   WHICH: Levophed    ANTIBIOTICS: cefepime                          DATE STARTED: 4/26   Antibiotics: Vancomycin during HD days DATE STARTED: 4/27    Antimicrobial:  cefepime   IVPB 2000 milliGRAM(s) IV Intermittent <User Schedule>    Cardiovascular:  midodrine 10 milliGRAM(s) Oral every 8 hours  norepinephrine Infusion 0.05 MICROgram(s)/kG/Min IV Continuous <Continuous>    Hematalogic:  apixaban 2.5 milliGRAM(s) Oral two times a day    Other:  acetaminophen    Suspension .. 650 milliGRAM(s) Oral every 6 hours PRN  chlorhexidine 0.12% Liquid 15 milliLiter(s) Oral Mucosa every 12 hours  HYDROmorphone Infusion 1 mG/Hr IV Continuous <Continuous>  midazolam Infusion 0.02 mG/kG/Hr IV Continuous <Continuous>  pantoprazole  Injectable 40 milliGRAM(s) IV Push daily  polyethylene glycol 3350 17 Gram(s) Oral daily  senna 2 Tablet(s) Oral at bedtime    acetaminophen    Suspension .. 650 milliGRAM(s) Oral every 6 hours PRN  apixaban 2.5 milliGRAM(s) Oral two times a day  cefepime   IVPB 2000 milliGRAM(s) IV Intermittent <User Schedule>  chlorhexidine 0.12% Liquid 15 milliLiter(s) Oral Mucosa every 12 hours  HYDROmorphone Infusion 1 mG/Hr IV Continuous <Continuous>  midazolam Infusion 0.02 mG/kG/Hr IV Continuous <Continuous>  midodrine 10 milliGRAM(s) Oral every 8 hours  norepinephrine Infusion 0.05 MICROgram(s)/kG/Min IV Continuous <Continuous>  pantoprazole  Injectable 40 milliGRAM(s) IV Push daily  polyethylene glycol 3350 17 Gram(s) Oral daily  senna 2 Tablet(s) Oral at bedtime    Drug Dosing Weight  Height (cm): 134.62 (07 Apr 2020 23:13)  Weight (kg): 86.2 (07 Apr 2020 23:13)  BMI (kg/m2): 47.6 (07 Apr 2020 23:13)  BSA (m2): 1.67 (07 Apr 2020 23:13)    CENTRAL LINE: Yes  LOCATION: Femoral line DATE INSERTED: 4/26  REMOVE: No    RENNER: Yes     DATE INSERTED: 4/15  REMOVE: No    A-LINE: Yes  LOCATION: Right axillary DATE INSERTED: 4/26  REMOVE: No      ICU Vital Signs Last 24 Hrs  T(C): 36.7 (30 Apr 2020 04:00), Max: 36.7 (30 Apr 2020 04:00)  T(F): 98 (30 Apr 2020 04:00), Max: 98 (30 Apr 2020 04:00)  HR: 91 (30 Apr 2020 06:00) (58 - 103)  BP: 94/59 (29 Apr 2020 08:00) (94/59 - 94/59)  BP(mean): 67 (29 Apr 2020 08:00) (67 - 67)  ABP: 136/75 (30 Apr 2020 06:00) (93/54 - 177/83)  ABP(mean): 101 (30 Apr 2020 06:00) (66 - 121)  RR: 30 (30 Apr 2020 06:00) (19 - 30)  SpO2: 96% (30 Apr 2020 06:00) (91% - 98%)      ABG - ( 30 Apr 2020 04:00 )  pH, Arterial: 7.24  pH, Blood: x     /  pCO2: 62    /  pO2: 87    / HCO3: 25    / Base Excess: NR    /  SaO2: NR            04-29 @ 07:01  -  04-30 @ 07:00  --------------------------------------------------------  IN: 206.8 mL / OUT: 15 mL / NET: 191.8 mL      Mode: AC/ CMV (Assist Control/ Continuous Mandatory Ventilation)  RR (machine): 35  TV (machine): 350  FiO2: 80  PEEP: 9  ITime: 1  MAP: 23  PIP: 41      PHYSICAL EXAM:  GENERAL: Sedated and intubated   HEAD: Atraumatic, Normocephalic  EYES: PERRLA   ENMT: No tonsillar erythema, exudates, or enlargement   NECK: Supple, normal appearance   NERVOUS SYSTEM: Sedated and intubated  CHEST/LUNG: No chest deformity; crackles present to auscultation   HEART: Regular rate and rhythm; No murmurs  ABDOMEN: Soft, Nontender, Nondistended; Bowel sounds present  EXTREMITIES: No clubbing, cyanosis, or edema  LYMPH: No lymphadenopathy noted  SKIN: pressure sore covered with bacitracin and dressing on right cheek       LABS:  CBC Full  -  ( 30 Apr 2020 06:34 )  WBC Count : 15.65 K/uL  RBC Count : 3.62 M/uL  Hemoglobin : 9.7 g/dL  Hematocrit : 32.5 %  Platelet Count - Automated : 286 K/uL  Mean Cell Volume : 89.8 fl  Mean Cell Hemoglobin : 26.8 pg  Mean Cell Hemoglobin Concentration : 29.8 gm/dL      04-30    131<L>  |  94<L>  |  x   ----------------------------<  76  3.9   |  x   |  x     Ca    8.9      30 Apr 2020 06:34  Phos  4.7     04-29  Mg     2.2     04-29    TPro  x   /  Alb  1.7<L>  /  TBili  x   /  DBili  x   /  AST  x   /  ALT  x   /  AlkPhos  x   04-30    PT/INR - ( 29 Apr 2020 03:53 )   PT: 16.8 sec;   INR: 1.47 ratio         PTT - ( 29 Apr 2020 03:53 )  PTT:34.7 sec    RADIOLOGY & ADDITIONAL STUDIES REVIEWED: < from: Xray Chest 1 View- PORTABLE-Routine (04.29.20 @ 07:53) >    IMPRESSION:  Partial clearing of the lungs.    < end of copied text >    GOALS OF CARE DISCUSSION WITH PATIENT/FAMILY/PROXY: full code     CRITICAL CARE TIME SPENT: 35 minutes

## 2020-04-30 NOTE — PROCEDURE NOTE - NSINDICATIONS_GEN_A_CORE
critical illness
emergency venous access
arterial puncture to obtain ABG's
critical patient/arterial puncture to obtain ABG's
venous access
venous access/volume resuscitation
dialysis/CRRT/critical illness

## 2020-04-30 NOTE — PROGRESS NOTE ADULT - SUBJECTIVE AND OBJECTIVE BOX
OVERNIGHT EVENTS:    Present Symptoms:   Dyspnea:   Nausea/Vomiting:   Anxiety:    Depressed Mood:   Fatigue:   Loss of appetite:   Pain:                   location:   Review of Systems: [All others negative or Unable to obtain due to poor mentation]    MEDICATIONS  (STANDING):  apixaban 2.5 milliGRAM(s) Oral two times a day  cefepime   IVPB 2000 milliGRAM(s) IV Intermittent <User Schedule>  chlorhexidine 0.12% Liquid 15 milliLiter(s) Oral Mucosa every 12 hours  HYDROmorphone Infusion 1 mG/Hr (1 mL/Hr) IV Continuous <Continuous>  midazolam Infusion 0.02 mG/kG/Hr (1.72 mL/Hr) IV Continuous <Continuous>  midodrine 10 milliGRAM(s) Oral every 8 hours  norepinephrine Infusion 0.05 MICROgram(s)/kG/Min (4.04 mL/Hr) IV Continuous <Continuous>  pantoprazole  Injectable 40 milliGRAM(s) IV Push daily  polyethylene glycol 3350 17 Gram(s) Oral daily  senna 2 Tablet(s) Oral at bedtime  vancomycin  IVPB 750 milliGRAM(s) IV Intermittent once    MEDICATIONS  (PRN):  acetaminophen    Suspension .. 650 milliGRAM(s) Oral every 6 hours PRN Temp greater or equal to 38C (100.4F)      PHYSICAL EXAM:  Vital Signs Last 24 Hrs  T(C): 35.6 (30 Apr 2020 11:05), Max: 36.8 (30 Apr 2020 08:00)  T(F): 96 (30 Apr 2020 11:05), Max: 98.3 (30 Apr 2020 08:00)  HR: 70 (30 Apr 2020 13:00) (61 - 103)  BP: 108/55 (30 Apr 2020 13:00) (101/57 - 128/66)  BP(mean): 67 (30 Apr 2020 13:00) (67 - 82)  RR: 25 (30 Apr 2020 13:00) (19 - 34)  SpO2: 91% (30 Apr 2020 13:00) (91% - 99%)  General: alert  oriented x ____    [lethargic distressed cachexia  nonverbal  unarousable verbal]  Karnofsky Performance Score/Palliative Performance Status Version2:    %    HEENT: normal  dry mouth  ET tube/trach oral lesions:  Lungs: comfortable tachypnea/labored breathing  excessive secretions  CV: normal  tachycardia  GI: normal  distended  tender  incontinent               PEG/NG/OG tube  constipation  last BM:   : normal  incontinent  oliguria/anuria  parr  Musculoskeletal: normal  weakness  edema             ambulatory  bedbound/wheelchair bound  Skin: normal  pressure ulcers: stage: edema: other:  Neuro: no deficits cognitive impairment dsyphagia/dysarthria paresis: other:  Oral intake ability: unable/only mouth care [minimal moderate full capability]  Diet: NPO,     LABS:                          9.7    15.65 )-----------( 286      ( 30 Apr 2020 06:34 )             32.5     04-30    131<L>  |  94<L>  |  36<H>  ----------------------------<  76  3.9   |  26  |  4.37<H>    Ca    8.9      30 Apr 2020 06:34  Phos  7.4     04-30  Mg     2.7     04-30    TPro  7.4  /  Alb  1.7<L>  /  TBili  3.3<H>  /  DBili  x   /  AST  56<H>  /  ALT  47  /  AlkPhos  153<H>  04-30        RADIOLOGY & ADDITIONAL STUDIES:    ADVANCE DIRECTIVES:  Advanced Care Planning discussion total time spent: OVERNIGHT EVENTS: HD yesterday; no acute events.     Present Symptoms:         Review of Systems:  Unable to obtain - patient sedated/intubated.     MEDICATIONS  (STANDING):  apixaban 2.5 milliGRAM(s) Oral two times a day  cefepime   IVPB 2000 milliGRAM(s) IV Intermittent <User Schedule>  chlorhexidine 0.12% Liquid 15 milliLiter(s) Oral Mucosa every 12 hours  HYDROmorphone Infusion 1 mG/Hr (1 mL/Hr) IV Continuous <Continuous>  midazolam Infusion 0.02 mG/kG/Hr (1.72 mL/Hr) IV Continuous <Continuous>  midodrine 10 milliGRAM(s) Oral every 8 hours  norepinephrine Infusion 0.05 MICROgram(s)/kG/Min (4.04 mL/Hr) IV Continuous <Continuous>  pantoprazole  Injectable 40 milliGRAM(s) IV Push daily  polyethylene glycol 3350 17 Gram(s) Oral daily  senna 2 Tablet(s) Oral at bedtime  vancomycin  IVPB 750 milliGRAM(s) IV Intermittent once    MEDICATIONS  (PRN):  acetaminophen    Suspension .. 650 milliGRAM(s) Oral every 6 hours PRN Temp greater or equal to 38C (100.4F)      PHYSICAL EXAM:  Vital Signs Last 24 Hrs  T(C): 35.6 (30 Apr 2020 11:05), Max: 36.8 (30 Apr 2020 08:00)  T(F): 96 (30 Apr 2020 11:05), Max: 98.3 (30 Apr 2020 08:00)  HR: 70 (30 Apr 2020 13:00) (61 - 103)  BP: 108/55 (30 Apr 2020 13:00) (101/57 - 128/66)  BP(mean): 67 (30 Apr 2020 13:00) (67 - 82)  RR: 25 (30 Apr 2020 13:00) (19 - 34)  SpO2: 91% (30 Apr 2020 13:00) (91% - 99%)  General: Patient not medically stable for full physical exam. Patient sedated/intubated, on pressor   Karnofsky Performance Score/Palliative Performance Status Version2:    20 %    HEENT: ET tube    Lungs:  tachypnea, on ventilator, dilaudid, versed  CV: on pressor  GI: obese, incontinent, NG tube  :   parr  Musculoskeletal:  patient sedated/intubated - dependent on all ADLs  Skin: patient not medically stable for evaluation  Neuro: patient sedated/intubated, dependent on ADLs  Oral intake ability: unable/only mouth care   Diet: NPO; TF via NG tube        LABS:                          9.7    15.65 )-----------( 286      ( 30 Apr 2020 06:34 )             32.5     04-30    131<L>  |  94<L>  |  36<H>  ----------------------------<  76  3.9   |  26  |  4.37<H>    Ca    8.9      30 Apr 2020 06:34  Phos  7.4     04-30  Mg     2.7     04-30    TPro  7.4  /  Alb  1.7<L>  /  TBili  3.3<H>  /  DBili  x   /  AST  56<H>  /  ALT  47  /  AlkPhos  153<H>  04-30        RADIOLOGY & ADDITIONAL STUDIES:  < from: Xray Chest 1 View- PORTABLE-Routine (04.30.20 @ 07:59) >  EXAM:  XR CHEST PORTABLE ROUTINE 1V                            PROCEDURE DATE:  04/30/2020          INTERPRETATION:    Examination: XR CHEST    History: ADMDIAG1: B34.9 VIRAL INFECTION, UNSPECIFIED/, interval change    Portable chest x-ray is compared to a previous examination dated 4/29/2020.    Impression: Stable ET tube, NG tube and right IJ central venous catheter.    Pulmonary opacifications in both lungs, slightly progressed.    No evidence pleural effusion or pneumothorax.    Stable cardiac silhouette.        DISCRETE X-RAY DATA:  Percent of LEFT lung opacification: 34-66%  Percent of RIGHT lung opacification: %  Change in lung opacification from most recent x-ray (<=3 days): Increase  Change from prior dated 3 or more days (same admission): Stable      < end of copied text >      ADVANCE DIRECTIVES: MOLST: CPR / trial intubation / trial feeding tube / trial IV fluids / use abx

## 2020-04-30 NOTE — PROGRESS NOTE ADULT - SUBJECTIVE AND OBJECTIVE BOX
[facility] : Progress Note :: [phone], (office [phone]),   Dr Rosa / Dr Mosher / Dr Lin / Dr Narvaez / Dr Nando SOTELO / Dr Temple / Dr Silva / Dr Bjorn mendoza  _____________________________________________________________________________________________    s/p ultrafiltration yesterday.  remains vent dependent with oligo-anuria    No Known Allergies    Hospital Medications:   MEDICATIONS  (STANDING):  apixaban 2.5 milliGRAM(s) Oral two times a day  cefepime   IVPB 2000 milliGRAM(s) IV Intermittent <User Schedule>  chlorhexidine 0.12% Liquid 15 milliLiter(s) Oral Mucosa every 12 hours  HYDROmorphone Infusion 1 mG/Hr (1 mL/Hr) IV Continuous <Continuous>  midazolam Infusion 0.02 mG/kG/Hr (1.72 mL/Hr) IV Continuous <Continuous>  midodrine 10 milliGRAM(s) Oral every 8 hours  norepinephrine Infusion 0.05 MICROgram(s)/kG/Min (4.04 mL/Hr) IV Continuous <Continuous>  pantoprazole  Injectable 40 milliGRAM(s) IV Push daily  polyethylene glycol 3350 17 Gram(s) Oral daily  senna 2 Tablet(s) Oral at bedtime  vancomycin  IVPB 750 milliGRAM(s) IV Intermittent once        VITALS:  T(F): 96 (04-30-20 @ 11:05), Max: 98.3 (04-30-20 @ 08:00)  HR: 89 (04-30-20 @ 11:05)  BP: 109/57 (04-30-20 @ 11:05)  RR: 34 (04-30-20 @ 11:05)  SpO2: 95% (04-30-20 @ 11:05)  Wt(kg): --    04-29 @ 07:01  -  04-30 @ 07:00  --------------------------------------------------------  IN: 206.8 mL / OUT: 15 mL / NET: 191.8 mL    04-30 @ 07:01  -  04-30 @ 12:00  --------------------------------------------------------  IN: 57.9 mL / OUT: 20 mL / NET: 37.9 mL        PHYSICAL EXAM:  deferred sec to COVID-19 status    LABS:  04-30    131<L>  |  94<L>  |  36<H>  ----------------------------<  76  3.9   |  26  |  4.37<H>    Ca    8.9      30 Apr 2020 06:34  Phos  7.4     04-30  Mg     2.7     04-30    TPro  7.4  /  Alb  1.7<L>  /  TBili  3.3<H>  /  DBili      /  AST  56<H>  /  ALT  47  /  AlkPhos  153<H>  04-30    Creatinine Trend: 4.37 <--, 4.02 <--, 3.27 <--, 4.52 <--, 4.92 <--, 3.91 <--, 4.48 <--, 5.44 <--                        9.7    15.65 )-----------( 286      ( 30 Apr 2020 06:34 )             32.5     Urine Studies:      RADIOLOGY & ADDITIONAL STUDIES:

## 2020-04-30 NOTE — PROGRESS NOTE ADULT - ASSESSMENT
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 Patient was admitted on medicine floor for COVID pna.  Admitted to ICU for hypoxia and desaturation on NRB.     Assessment:  - acute hypoxic respiratory failure secondary to COVID-19 pneumonia  - septic shock requiring pressors   - RASHAUN  - Leukocytosis  - COVID pandemic     Plan:  Neuro:  -Pt sedated with versed   -Dilaudid drip  -no longer on Rocuronium    CV:  - Hold BP medications  - currently not requiring levophed; may need during HD sessions  - on midodrine 10mg q 8       Pulm:  - Intubated for respiratory distress and hypoxia 4/15  - CXR shows bilateral airspace opacities; improving s/p HD sessions; may be from fluid overload   - Acute Hypoxic Resp Failure secondary to PNA + COVID 19  - S/P ANAKINRA and Hydroxychloroquine (since April 9th)   - Discontinued full dose Lovenox due to epistaxis  - Current vent settings 35/400/80/9; plateau pressure of 39; will decrease TV to 375 and re-evaluate; repeat BMP; possibly need to paralyze patient if not improving     - CXR findings may be due to fluid overload; s/p HD session 4/28 and 4/29; possible HD session today; f/u nephro Dr. Rosa  - attempt to decrease FiO2 requirements after HD sessions; will hold off on trial of tocilizumab at this time   - ESR: 96>92>108  - D-dimer: 1901>1936>820  - LDH: 362>447>400    ID:  - completed Hydroxychloroquine, anakinra   Pt was febrile a few nights ago   blood culture negative  on Cefepime and vanco intradialysis  WBC: elevated at 14K this morning; improved since yesterday     ID Dr. Davey, s/p 1 dose of Vanco 1250 4/27 and 500mg 4/28; will give 750mg intradialysis today   *Pt to receive Vanco 750mg iv  dose Intradialysis as per Dr Davey     Nephro:  RASHAUN:    -Cr/BUN:  3.27/27  no longer on lasix drip as per nephro due to minimal urine output   S/P HD 1 st session of HD on 4/24, second session on 4/25; third session 4/28; fourth 4/28; possible HD today   - monitor urine output; minimal urine output; net negative 2267cc  - Monitor electrolytes   Hypernatremia: resolved currently     Metabolic acidosis:  Improved   -off sodium bicarbonate  Monitor BMP  Nephrology: Dr. 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 Patient was admitted on medicine floor for COVID pna.  Admitted to ICU for hypoxia and desaturation on NRB.     Assessment:  - acute hypoxic respiratory failure secondary to COVID-19 pneumonia  - septic shock requiring pressors   - RASHAUN  - Leukocytosis  - COVID pandemic     Plan:  Neuro:  -Pt sedated with versed   -Dilaudid drip  -no longer on Rocuronium    CV:  - Hold BP medications  - currently on small dose of levophed  - on midodrine 10mg q 8       Pulm:  - Intubated for respiratory distress and hypoxia 4/15  - CXR shows bilateral airspace opacities; improving s/p HD sessions; may be from fluid overload   - Acute Hypoxic Resp Failure secondary to PNA + COVID 19  - S/P ANAKINRA and Hydroxychloroquine (since April 9th)   - Discontinued full dose Lovenox due to epistaxis  - Current vent settings 35/350/80/9; plateau pressure elevated; possibly need to paralyze patient if not improving     - CXR findings may be due to fluid overload; s/p HD session 4/28 and 4/29; f/u nephro Dr. Rosa  - attempt to decrease FiO2 requirements once fluid overload improves; will hold off on trial of tocilizumab at this time   - ESR: 96>92>108  - D-dimer: 1901>1936>820  - LDH: 362>447>400    ID:  - completed Hydroxychloroquine, anakinra   Pt was febrile a few nights ago   blood culture negative  on Cefepime 2g daily and vanco 750 intradialysis  WBC: elevated at 15.65K this morning   ID Dr. Davey,    *Pt to receive Vanco 750mg iv  dose Intradialysis as per Dr Davey     Nephro:  RASHAUN:    -Cr/BUN:  4.37/36  no longer on lasix drip as per nephro due to minimal urine output   S/P HD 1 st session of HD on 4/24, second session on 4/25; third session 4/28; fourth 4/28; fifth 4/29   - monitor urine output; minimal urine output; net positive 192cc  - Monitor electrolytes   Hypornatremia: 133; stable      Metabolic acidosis:  Improved   -off sodium bicarbonate  Monitor BMP  Nephrology: Dr. Rosa      GI:  -  Tube feed  - GI ppx with Protonix  - Miralax and senna for prophylaxis  - dc dulcolax     Heme:  - no indication for transfusion   - Hgb stable   - monitor cbc daily     Endo:  - No history of DM  - target CBG < 180  - FS q6 while Tube feed      Prophy:  - eliquis 2.5mg BID   - Protonix for GI prophylaxis 53 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 Patient was admitted on medicine floor for COVID pna.  Admitted to ICU for hypoxia and desaturation on NRB.     Assessment:  - acute hypoxic respiratory failure secondary to COVID-19 pneumonia  - septic shock requiring pressors   - RASHAUN  - Leukocytosis  - COVID pandemic     Plan:  Neuro:  -Pt sedated with versed   -Dilaudid drip  -no longer on Rocuronium    CV:  - Hold BP medications  - currently on small dose of levophed  - on midodrine 10mg q 8       Pulm:  - Intubated for respiratory distress and hypoxia 4/15  - CXR shows bilateral airspace opacities; improving s/p HD sessions; may be from fluid overload   - Acute Hypoxic Resp Failure secondary to PNA + COVID 19  - S/P ANAKINRA and Hydroxychloroquine (since April 9th)   - Discontinued full dose Lovenox due to epistaxis  - Current vent settings 35/350/80/9; plateau pressure elevated; possibly need to paralyze patient if not improving     - CXR findings may be due to fluid overload; s/p HD session 4/28 and 4/29; f/u nephro Dr. Rosa  - attempt to decrease FiO2 requirements once fluid overload improves; will hold off on trial of tocilizumab at this time   - ESR: 96>92>108  - D-dimer: 1901>1936>820  - LDH: 362>447>400    ID:  - completed Hydroxychloroquine, anakinra   Pt was febrile a few nights ago   blood culture negative  on Cefepime 2g daily and vanco 750 intradialysis  WBC: elevated at 15.65K this morning   ID Dr. Davey, f/u regarding further recommendations   *Pt to receive Vanco 750mg iv  dose Intradialysis as per Dr Davey     Nephro:  RASHAUN:    -Cr/BUN:  4.37/36  no longer on lasix drip as per nephro due to minimal urine output   S/P HD 1 st session of HD on 4/24, second session on 4/25; third session 4/28; fourth 4/28; fifth 4/29   - monitor urine output; minimal urine output; net positive 192cc  - Monitor electrolytes   Hypornatremia: 133; stable      Metabolic acidosis:  Improved   -off sodium bicarbonate  Monitor BMP  Nephrology: Dr. Rosa      GI:  -  Tube feed  - GI ppx with Protonix  - Miralax and senna for prophylaxis  - dc dulcolax     Heme:  - no indication for transfusion   - Hgb stable   - monitor cbc daily     Endo:  - No history of DM  - target CBG < 180  - FS q6 while Tube feed      Prophy:  - eliquis 2.5mg BID   - Protonix for GI prophylaxis

## 2020-04-30 NOTE — PROCEDURE NOTE - NSPOSTPRCRAD_GEN_A_CORE
post-procedure radiography performed
post-procedure radiography performed
no pneumothorax/post-procedure radiography performed
central line located in the superior vena cava

## 2020-04-30 NOTE — PROCEDURE NOTE - NSANESTHESIA_GEN_A_CORE
no anesthesia administered
2% lidocaine
no anesthesia administered

## 2020-04-30 NOTE — PROCEDURE NOTE - NSPOSTCAREGUIDE_GEN_A_CORE
Care for catheter as per unit/ICU protocols
Keep the cast/splint/dressing clean and dry
Care for catheter as per unit/ICU protocols

## 2020-04-30 NOTE — PROCEDURE NOTE - NSPROCDETAILS_GEN_ALL_CORE
location identified, draped/prepped, sterile technique used, needle inserted/introduced
positive blood return obtained via catheter/ultrasound guidance/sutured in place/connected to a pressurized flush line/Seldinger technique/all materials/supplies accounted for at end of procedure/location identified, draped/prepped, sterile technique used, needle inserted/introduced
sterile dressing applied/sterile technique, catheter placed/ultrasound guidance/guidewire recovered/lumen(s) aspirated and flushed
sterile technique, catheter placed/ultrasound guidance/lumen(s) aspirated and flushed/guidewire recovered/sterile dressing applied
ultrasound guidance/sterile dressing applied/sterile technique, catheter placed/lumen(s) aspirated and flushed/guidewire recovered

## 2020-04-30 NOTE — PROGRESS NOTE ADULT - ASSESSMENT
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 Presented to ED with SOB, fever and hypoxia. Subsequently transfered to ICU intubated and developed ARDS s/p CQ and Anikinra. Subsequently developed RASHAUN with ensuing oliguria prompting a renal consult. Hypernatremia has improved. Severe hypoalbuminemia 1.3     # RASHAUN sec to ATN from COVID - 19 related disease. With ARDS. remains on pressors. hypoalbuminemia.  oliguria.  Pulm edema.  s/p HD ultrafiltration yesterday.  HD today  RPT BMP IN AM

## 2020-05-01 NOTE — CHART NOTE - NSCHARTNOTEFT_GEN_A_CORE
Assessment:   Patient is a 55y old  Female who presents with a chief complaint of Pneumonia (01 May 2020 08:08). Pt intubated. Covid+. MOF, HD. Nepro @ 10 ml/hr on flow records, at times.       Factors impacting intake: [ ] none [ ] nausea  [ ] vomiting [ ] diarrhea [ ] constipation  [ ]chewing problems [ ] swallowing issues  [x ] other: critically ill, intubated.    Diet Prescription: Diet, NPO with Tube Feed:   Tube Feeding Modality: Nasogastric  Nepro with Carb Steady  Total Volume for 24 Hours (mL): 480  Continuous  Starting Tube Feed Rate {mL per Hour}: 10  Increase Tube Feed Rate by (mL): 10     Every hour  Until Goal Tube Feed Rate (mL per Hour): 20  Tube Feed Duration (in Hours): 24  Tube Feed Start Time: 09:30  No Carb Prosource (1pkg = 15gms Protein)     Qty per Day:  2 (20 @ 17:33)        Daily Height in cm: 134.62 (2020 23:13)      Daily Weight in k.6 (2020 17:58)  Weight in k.2 (2020 15:10)  Weight in k.5 (2020 08:00)  Weight in k (2020 08:00)  Weight in k.1 (2020 19:10)  Weight in k.1 (2020 16:33)  Weight in k.6 (2020 18:39)  Weight in k.8 (2020 08:00)  Weight in k.5 (2020 08:00)  Weight in k.8 (2020 08:00)  Weight in k.9 (2020 04:00)  Weight in k (2020 04:27)  Weight in k.7 (2020 04:30)  Weight in k.4 (15 Apr 2020 07:00)  Weight in k.1 (10 Apr 2020 12:18)        Pertinent Medications: MEDICATIONS  (STANDING):  apixaban 2.5 milliGRAM(s) Oral two times a day  cefepime   IVPB 2000 milliGRAM(s) IV Intermittent <User Schedule>  chlorhexidine 0.12% Liquid 15 milliLiter(s) Oral Mucosa every 12 hours  HYDROmorphone Infusion 1 mG/Hr (1 mL/Hr) IV Continuous <Continuous>  midazolam Infusion 0.02 mG/kG/Hr (1.72 mL/Hr) IV Continuous <Continuous>  midodrine 10 milliGRAM(s) Oral every 8 hours  norepinephrine Infusion 0.05 MICROgram(s)/kG/Min (4.04 mL/Hr) IV Continuous <Continuous>  pantoprazole  Injectable 40 milliGRAM(s) IV Push daily  polyethylene glycol 3350 17 Gram(s) Oral daily  senna 2 Tablet(s) Oral at bedtime  vancomycin  IVPB 750 milliGRAM(s) IV Intermittent once    MEDICATIONS  (PRN):  acetaminophen    Suspension .. 650 milliGRAM(s) Oral every 6 hours PRN Temp greater or equal to 38C (100.4F)    Pertinent Labs:  Na133 mmol/L<L> Glu 82 mg/dL K+ 3.8 mmol/L Cr  3.46 mg/dL<H> BUN 25 mg/dL<H>  Phos 6.2 mg/dL<H>  Alb 1.8 g/dL<L>  KdenonzpcrO0R 6.4 %<H>  Chol --    LDL --    HDL --    Trig 435 mg/dL<H>     CAPILLARY BLOOD GLUCOSE      POCT Blood Glucose.: 85 mg/dL (01 May 2020 11:40)  POCT Blood Glucose.: 93 mg/dL (2020 15:56)        Estimated Needs:   [x ] no change since previous assessment  [ ] recalculated:       Previous Nutrition Diagnosis:   [ ] Altered GI function  [ ]Inadequate Oral Intake [ ] Swallowing Difficulty   [ ] Altered nutrition related labs [ ] Increased Nutrient Needs [ ] Overweight/Obesity   [ ] Unintended Weight Loss [ ] Food & Nutrition Related Knowledge Deficit [x ] Malnutrition (severe)  [ ] Other:     Nutrition Diagnosis is [x ] ongoing  [ ] resolved [ ] not applicable     Interventions:   Recommend  [ ] Change Diet To:  [ ] Nutrition Supplement  [x ] Nutrition Support: TF as above, as medically feasible and consistent with goals of care   [x ] Other: TG level monitoring on Propofol    Monitoring and Evaluation:   [ x ] follow up per protocol

## 2020-05-01 NOTE — PROGRESS NOTE ADULT - SUBJECTIVE AND OBJECTIVE BOX
ICU VISIT  55y Female    Meds:  cefepime   IVPB 2000 milliGRAM(s) IV Intermittent <User Schedule>  vancomycin  IVPB 750 milliGRAM(s) IV Intermittent once    Allergies    No Known Allergies    Intolerances        VITALS:  Vital Signs Last 24 Hrs  T(C): 36.1 (01 May 2020 16:37), Max: 36.3 (01 May 2020 05:25)  T(F): 97 (01 May 2020 16:37), Max: 97.4 (01 May 2020 08:00)  HR: 105 (01 May 2020 15:07) (73 - 132)  BP: 107/55 (01 May 2020 08:00) (85/43 - 115/56)  BP(mean): 68 (01 May 2020 08:00) (52 - 70)  RR: 36 (01 May 2020 08:00) (19 - 36)  SpO2: 98% (01 May 2020 15:07) (93% - 98%)    LABS/DIAGNOSTIC TESTS:                          10.1   15.88 )-----------( 276      ( 01 May 2020 04:20 )             34.1         05-01    133<L>  |  97  |  25<H>  ----------------------------<  82  3.8   |  28  |  3.46<H>    Ca    9.2      01 May 2020 04:20  Phos  6.2     05-01  Mg     2.4     05-01    TPro  7.9  /  Alb  1.8<L>  /  TBili  4.0<H>  /  DBili  x   /  AST  65<H>  /  ALT  51  /  AlkPhos  209<H>  05-01      LIVER FUNCTIONS - ( 01 May 2020 04:20 )  Alb: 1.8 g/dL / Pro: 7.9 g/dL / ALK PHOS: 209 U/L / ALT: 51 U/L DA / AST: 65 U/L / GGT: x             CULTURES: .Blood Blood-Peripheral  04-17 @ 18:26   No Growth Final  --  --            RADIOLOGY:< from: Xray Chest 1 View- PORTABLE-Routine (05.01.20 @ 07:44) >  EXAM:  XR CHEST PORTABLE ROUTINE 1V                            PROCEDURE DATE:  05/01/2020          INTERPRETATION:    Examination: XR CHEST    History: ADMDIAG1: B34.9 VIRAL INFECTION, UNSPECIFIED/, interval change    COMPARISON: 4/30/2020    Findings:    Endotracheal tube, gastric tube, and bilateral internal jugular central venous catheters are noted.    Lungs: There are stable lung opacities.  Heart: The heart is top normal in size.  Mediastinum: The mediastinum is within normal limits.    Impression:  1.  Stable lung opacities.      DISCRETE X-RAY DATA:  Percent of LEFT lung opacification: %  Percent of RIGHT lung opacification: %  Change in lung opacification from most recent x-ray (<=3 days): Stable  Change from prior dated 3 or more days (same admission): Stable          < end of copied text >        ROS:  [  ] UNABLE TO ELICIT ICU VISIT  55y Female who remains in the ICU, she is back on a pressor, sedated , intubated and vent dependent, she has no fevers, her swelling is less overall and her vitals are more stable , she has negative blood cultures and her cr is stable with her frequent dialysis treatments. Her CXR shows stable infiltrates.    Meds:  cefepime   IVPB 2000 milliGRAM(s) IV Intermittent <User Schedule>  vancomycin  IVPB 750 milliGRAM(s) IV Intermittent once    Allergies    No Known Allergies    Intolerances        VITALS:  Vital Signs Last 24 Hrs  T(C): 36.1 (01 May 2020 16:37), Max: 36.3 (01 May 2020 05:25)  T(F): 97 (01 May 2020 16:37), Max: 97.4 (01 May 2020 08:00)  HR: 105 (01 May 2020 15:07) (73 - 132)  BP: 107/55 (01 May 2020 08:00) (85/43 - 115/56)  BP(mean): 68 (01 May 2020 08:00) (52 - 70)  RR: 36 (01 May 2020 08:00) (19 - 36)  SpO2: 98% (01 May 2020 15:07) (93% - 98%)    LABS/DIAGNOSTIC TESTS:                          10.1   15.88 )-----------( 276      ( 01 May 2020 04:20 )             34.1         05-01    133<L>  |  97  |  25<H>  ----------------------------<  82  3.8   |  28  |  3.46<H>    Ca    9.2      01 May 2020 04:20  Phos  6.2     05-01  Mg     2.4     05-01    TPro  7.9  /  Alb  1.8<L>  /  TBili  4.0<H>  /  DBili  x   /  AST  65<H>  /  ALT  51  /  AlkPhos  209<H>  05-01      LIVER FUNCTIONS - ( 01 May 2020 04:20 )  Alb: 1.8 g/dL / Pro: 7.9 g/dL / ALK PHOS: 209 U/L / ALT: 51 U/L DA / AST: 65 U/L / GGT: x             CULTURES: .Blood Blood-Peripheral  04-17 @ 18:26   No Growth Final  --  --            RADIOLOGY:< from: Xray Chest 1 View- PORTABLE-Routine (05.01.20 @ 07:44) >  EXAM:  XR CHEST PORTABLE ROUTINE 1V                            PROCEDURE DATE:  05/01/2020          INTERPRETATION:    Examination: XR CHEST    History: ADMDIAG1: B34.9 VIRAL INFECTION, UNSPECIFIED/, interval change    COMPARISON: 4/30/2020    Findings:    Endotracheal tube, gastric tube, and bilateral internal jugular central venous catheters are noted.    Lungs: There are stable lung opacities.  Heart: The heart is top normal in size.  Mediastinum: The mediastinum is within normal limits.    Impression:  1.  Stable lung opacities.      DISCRETE X-RAY DATA:  Percent of LEFT lung opacification: %  Percent of RIGHT lung opacification: %  Change in lung opacification from most recent x-ray (<=3 days): Stable  Change from prior dated 3 or more days (same admission): Stable          < end of copied text >        ROS:  [ x ] UNABLE TO ELICIT

## 2020-05-01 NOTE — PROGRESS NOTE ADULT - SUBJECTIVE AND OBJECTIVE BOX
[facility] : Progress Note :: [phone], (office [phone]),   Dr Rosa / Dr Mosher / Dr Lin / Dr Narvaez / Dr Nando SOTELO / Dr Temple / Dr Silva / Dr Bjorn mendoza  _____________________________________________________________________________________________    remains oliguric.  requiring high levels of FIO2    No Known Allergies    Hospital Medications:   MEDICATIONS  (STANDING):  apixaban 2.5 milliGRAM(s) Oral two times a day  cefepime   IVPB 2000 milliGRAM(s) IV Intermittent <User Schedule>  chlorhexidine 0.12% Liquid 15 milliLiter(s) Oral Mucosa every 12 hours  HYDROmorphone Infusion 1 mG/Hr (1 mL/Hr) IV Continuous <Continuous>  midazolam Infusion 0.02 mG/kG/Hr (1.72 mL/Hr) IV Continuous <Continuous>  midodrine 10 milliGRAM(s) Oral every 8 hours  norepinephrine Infusion 0.05 MICROgram(s)/kG/Min (4.04 mL/Hr) IV Continuous <Continuous>  pantoprazole  Injectable 40 milliGRAM(s) IV Push daily  polyethylene glycol 3350 17 Gram(s) Oral daily  senna 2 Tablet(s) Oral at bedtime  vancomycin  IVPB 750 milliGRAM(s) IV Intermittent once        VITALS:  T(F): 97 (05-01-20 @ 16:37), Max: 97.4 (05-01-20 @ 08:00)  HR: 105 (05-01-20 @ 15:07)  BP: 107/55 (05-01-20 @ 08:00)  RR: 36 (05-01-20 @ 08:00)  SpO2: 98% (05-01-20 @ 15:07)  Wt(kg): --    04-30 @ 07:01  -  05-01 @ 07:00  --------------------------------------------------------  IN: 501.1 mL / OUT: 3055 mL / NET: -2553.9 mL        PHYSICAL EXAM:  Constitutional: remains intubated  HEENT: anicteric sclera, oropharynx clear.  Neck: No JVD  Respiratory: CTAB, no wheezes, rales or rhonchi  Cardiovascular: S1, S2, RRR  Gastrointestinal: BS+, soft, NT/ND  Extremities:  peripheral edema+  :  parr+    LABS:  05-01    133<L>  |  97  |  25<H>  ----------------------------<  82  3.8   |  28  |  3.46<H>    Ca    9.2      01 May 2020 04:20  Phos  6.2     05-01  Mg     2.4     05-01    TPro  7.9  /  Alb  1.8<L>  /  TBili  4.0<H>  /  DBili      /  AST  65<H>  /  ALT  51  /  AlkPhos  209<H>  05-01    Creatinine Trend: 3.46 <--, 4.37 <--, 4.02 <--, 3.27 <--, 4.52 <--, 4.92 <--, 3.91 <--, 4.48 <--                        10.1   15.88 )-----------( 276      ( 01 May 2020 04:20 )             34.1     Urine Studies:      RADIOLOGY & ADDITIONAL STUDIES:

## 2020-05-01 NOTE — PROGRESS NOTE ADULT - ASSESSMENT
Septic Shock  Bilat Pneumonia   COVID - 19 infection  Leukocytosis - stable  Resp failure    Plan - Cont Vancomycin to 750mgs iv each time patient is dialyzed  cont Maxipime to 2 gm iv q24hrs  prognosis - guarded  Time spent - 30 mins

## 2020-05-01 NOTE — CHART NOTE - NSCHARTNOTEFT_GEN_A_CORE
Palliative Care:    Reviewed status with ICU team. Spoke with patient's surrogate/Criselda Egan ([phone]). Discussed status. All questions answered; supportive counseling provided.

## 2020-05-01 NOTE — PROGRESS NOTE ADULT - SUBJECTIVE AND OBJECTIVE BOX
INTERVAL HPI/OVERNIGHT EVENTS: *** Patient was seen and examined at bed side.     PRESSORS: [ ] YES [ ] NO  WHICH:    ANTIBIOTICS:                  DATE STARTED:  ANTIBIOTICS:                  DATE STARTED:  ANTIBIOTICS:                  DATE STARTED:    Antimicrobial:  cefepime   IVPB 2000 milliGRAM(s) IV Intermittent <User Schedule>  vancomycin  IVPB 750 milliGRAM(s) IV Intermittent once    Cardiovascular:  midodrine 10 milliGRAM(s) Oral every 8 hours  norepinephrine Infusion 0.05 MICROgram(s)/kG/Min IV Continuous <Continuous>    Pulmonary:    Hematalogic:  apixaban 2.5 milliGRAM(s) Oral two times a day    Other:  acetaminophen    Suspension .. 650 milliGRAM(s) Oral every 6 hours PRN  chlorhexidine 0.12% Liquid 15 milliLiter(s) Oral Mucosa every 12 hours  HYDROmorphone Infusion 1 mG/Hr IV Continuous <Continuous>  midazolam Infusion 0.02 mG/kG/Hr IV Continuous <Continuous>  pantoprazole  Injectable 40 milliGRAM(s) IV Push daily  polyethylene glycol 3350 17 Gram(s) Oral daily  senna 2 Tablet(s) Oral at bedtime    acetaminophen    Suspension .. 650 milliGRAM(s) Oral every 6 hours PRN  apixaban 2.5 milliGRAM(s) Oral two times a day  cefepime   IVPB 2000 milliGRAM(s) IV Intermittent <User Schedule>  chlorhexidine 0.12% Liquid 15 milliLiter(s) Oral Mucosa every 12 hours  HYDROmorphone Infusion 1 mG/Hr IV Continuous <Continuous>  midazolam Infusion 0.02 mG/kG/Hr IV Continuous <Continuous>  midodrine 10 milliGRAM(s) Oral every 8 hours  norepinephrine Infusion 0.05 MICROgram(s)/kG/Min IV Continuous <Continuous>  pantoprazole  Injectable 40 milliGRAM(s) IV Push daily  polyethylene glycol 3350 17 Gram(s) Oral daily  senna 2 Tablet(s) Oral at bedtime  vancomycin  IVPB 750 milliGRAM(s) IV Intermittent once    Drug Dosing Weight  Height (cm): 134.62 (07 Apr 2020 23:13)  Weight (kg): 86.2 (07 Apr 2020 23:13)  BMI (kg/m2): 47.6 (07 Apr 2020 23:13)  BSA (m2): 1.67 (07 Apr 2020 23:13)    CENTRAL LINE: [ ] YES [ ] NO  LOCATION:   DATE INSERTED:  REMOVE: [ ] YES [ ] NO  EXPLAIN:    RENNER: [ ] YES [ ] NO    DATE INSERTED:  REMOVE:  [ ] YES [ ] NO  EXPLAIN:    A-LINE:  [ ] YES [ ] NO  LOCATION:   DATE INSERTED:  REMOVE:  [ ] YES [ ] NO  EXPLAIN:        ICU Vital Signs Last 24 Hrs  T(C): 36.3 (01 May 2020 05:25), Max: 36.3 (01 May 2020 05:25)  T(F): 97.3 (01 May 2020 05:25), Max: 97.3 (01 May 2020 05:25)  HR: 101 (01 May 2020 08:00) (66 - 132)  BP: 107/55 (01 May 2020 08:00) (85/43 - 128/66)  BP(mean): 68 (01 May 2020 08:00) (49 - 82)  ABP: 106/50 (01 May 2020 08:00) (90/49 - 144/75)  ABP(mean): 70 (01 May 2020 08:00) (61 - 104)  RR: 36 (01 May 2020 08:00) (19 - 36)  SpO2: 95% (01 May 2020 08:00) (91% - 99%)      ABG - ( 01 May 2020 04:19 )  pH, Arterial: 7.22  pH, Blood: x     /  pCO2: 66    /  pO2: 89    / HCO3: 26    / Base Excess: -2.2  /  SaO2: 96                    04-30 @ 07:01  -  05-01 @ 07:00  --------------------------------------------------------  IN: 501.1 mL / OUT: 3055 mL / NET: -2553.9 mL        Mode: AC/ CMV (Assist Control/ Continuous Mandatory Ventilation)  RR (machine): 35  TV (machine): 350  FiO2: 95  PEEP: 9  ITime: 1  MAP: 20  PIP: 34      PHYSICAL EXAM:    GENERAL:NAD, well-groomed, well-developed  HEAD:  Atraumatic, Normocephalic  EYES: EOMI, PERRLA, conjunctiva and sclera clear  ENMT: No tonsillar erythema, exudates, or enlargement; Moist mucous membranes, Good dentition, No lesions  NECK: Supple, normal appearance, No JVD; Normal thyroid; Trachea midline  NERVOUS SYSTEM: Alert & Oriented X3, Good concentration; Motor Strength 5/5 B/L upper and lower extremities; DTRs 2+ intact and symmetric  CHEST/LUNG: No chest deformity;Normal percussion bilaterally; No rales, rhonchi, wheezing   HEART: Regular rate and rhythm; No murmurs, rubs, or gallops  ABDOMEN: Soft, Nontender, Nondistended; Bowel sounds present  EXTREMITIES: 2+ Peripheral Pulses, No clubbing, cyanosis, or edema  LYMPH: No lymphadenopathy noted  SKIN:No rashes or lesions; Good capillary refill      LABS:  CBC Full  -  ( 01 May 2020 04:20 )  WBC Count : 15.88 K/uL  RBC Count : 3.72 M/uL  Hemoglobin : 10.1 g/dL  Hematocrit : 34.1 %  Platelet Count - Automated : 276 K/uL  Mean Cell Volume : 91.7 fl  Mean Cell Hemoglobin : 27.2 pg  Mean Cell Hemoglobin Concentration : 29.6 gm/dL  Auto Neutrophil # : x  Auto Lymphocyte # : x  Auto Monocyte # : x  Auto Eosinophil # : x  Auto Basophil # : x  Auto Neutrophil % : x  Auto Lymphocyte % : x  Auto Monocyte % : x  Auto Eosinophil % : x  Auto Basophil % : x    05-01    133<L>  |  97  |  25<H>  ----------------------------<  82  3.8   |  28  |  3.46<H>    Ca    9.2      01 May 2020 04:20  Phos  6.2     05-01  Mg     2.4     05-01    TPro  7.9  /  Alb  1.8<L>  /  TBili  4.0<H>  /  DBili  x   /  AST  65<H>  /  ALT  51  /  AlkPhos  209<H>  05-01    PT/INR - ( 01 May 2020 04:20 )   PT: 15.8 sec;   INR: 1.38 ratio         PTT - ( 01 May 2020 04:20 )  PTT:35.3 sec        RADIOLOGY & ADDITIONAL STUDIES REVIEWED:  ***    GOALS OF CARE DISCUSSION WITH PATIENT/FAMILY/PROXY: full code/DNR/DNI    CRITICAL CARE TIME SPENT: 35 minutes INTERVAL HPI/OVERNIGHT EVENTS: Patient was seen and examined at bed side. Patient had HD yesterday. [facility] central line placed and femoral line removed. Leukocytosis noted to worsen. Creatinine improved s/p HD.     PRESSORS: Yes   WHICH: Levophed    ANTIBIOTICS: cefepime            DATE STARTED: 4/26  ANTIBIOTICS: vancomycin         DATE STARTED: 4/27    Antimicrobial:  cefepime   IVPB 2000 milliGRAM(s) IV Intermittent <User Schedule>  vancomycin  IVPB 750 milliGRAM(s) IV Intermittent once    Cardiovascular:  midodrine 10 milliGRAM(s) Oral every 8 hours  norepinephrine Infusion 0.05 MICROgram(s)/kG/Min IV Continuous <Continuous>    Hematalogic:  apixaban 2.5 milliGRAM(s) Oral two times a day    Other:  acetaminophen    Suspension .. 650 milliGRAM(s) Oral every 6 hours PRN  chlorhexidine 0.12% Liquid 15 milliLiter(s) Oral Mucosa every 12 hours  HYDROmorphone Infusion 1 mG/Hr IV Continuous <Continuous>  midazolam Infusion 0.02 mG/kG/Hr IV Continuous <Continuous>  pantoprazole  Injectable 40 milliGRAM(s) IV Push daily  polyethylene glycol 3350 17 Gram(s) Oral daily  senna 2 Tablet(s) Oral at bedtime    acetaminophen    Suspension .. 650 milliGRAM(s) Oral every 6 hours PRN  apixaban 2.5 milliGRAM(s) Oral two times a day  cefepime   IVPB 2000 milliGRAM(s) IV Intermittent <User Schedule>  chlorhexidine 0.12% Liquid 15 milliLiter(s) Oral Mucosa every 12 hours  HYDROmorphone Infusion 1 mG/Hr IV Continuous <Continuous>  midazolam Infusion 0.02 mG/kG/Hr IV Continuous <Continuous>  midodrine 10 milliGRAM(s) Oral every 8 hours  norepinephrine Infusion 0.05 MICROgram(s)/kG/Min IV Continuous <Continuous>  pantoprazole  Injectable 40 milliGRAM(s) IV Push daily  polyethylene glycol 3350 17 Gram(s) Oral daily  senna 2 Tablet(s) Oral at bedtime  vancomycin  IVPB 750 milliGRAM(s) IV Intermittent once    Drug Dosing Weight  Height (cm): 134.62 (07 Apr 2020 23:13)  Weight (kg): 86.2 (07 Apr 2020 23:13)  BMI (kg/m2): 47.6 (07 Apr 2020 23:13)  BSA (m2): 1.67 (07 Apr 2020 23:13)    CENTRAL LINE: Yes   LOCATION: Left IJ  DATE INSERTED: 4/30  REMOVE: No    RENNER: Yes     REMOVE: No    A-LINE: Yes  LOCATION: Right axillary  DATE INSERTED: 4/26  REMOVE: No      ICU Vital Signs Last 24 Hrs  T(C): 36.3 (01 May 2020 05:25), Max: 36.3 (01 May 2020 05:25)  T(F): 97.3 (01 May 2020 05:25), Max: 97.3 (01 May 2020 05:25)  HR: 101 (01 May 2020 08:00) (66 - 132)  BP: 107/55 (01 May 2020 08:00) (85/43 - 128/66)  BP(mean): 68 (01 May 2020 08:00) (49 - 82)  ABP: 106/50 (01 May 2020 08:00) (90/49 - 144/75)  ABP(mean): 70 (01 May 2020 08:00) (61 - 104)  RR: 36 (01 May 2020 08:00) (19 - 36)  SpO2: 95% (01 May 2020 08:00) (91% - 99%)      ABG - ( 01 May 2020 04:19 )  pH, Arterial: 7.22  pH, Blood: x     /  pCO2: 66    /  pO2: 89    / HCO3: 26    / Base Excess: -2.2  /  SaO2: 96          04-30 @ 07:01  -  05-01 @ 07:00  --------------------------------------------------------  IN: 501.1 mL / OUT: 3055 mL / NET: -2553.9 mL      Mode: AC/ CMV (Assist Control/ Continuous Mandatory Ventilation)  RR (machine): 35  TV (machine): 350  FiO2: 95  PEEP: 9  ITime: 1  MAP: 20  PIP: 34      PHYSICAL EXAM:  GENERAL: Sedated and intubated   HEAD: Atraumatic, Normocephalic  EYES: PERRLA, conjunctiva and sclera clear  ENMT: No tonsillar erythema, exudates, or enlargement  NECK: Supple, normal appearance  NERVOUS SYSTEM: Sedated and intubated   CHEST/LUNG: No chest deformity; crackles present to auscultation   HEART: Regular rate and rhythm; No murmurs  ABDOMEN: Soft, Nontender, Nondistended; Bowel sounds present  EXTREMITIES: mild edema noted in upper extremities  LYMPH: No lymphadenopathy noted  SKIN: pressure sore noted over right cheek covered with bacitracin and dressing       LABS:  CBC Full  -  ( 01 May 2020 04:20 )  WBC Count : 15.88 K/uL  RBC Count : 3.72 M/uL  Hemoglobin : 10.1 g/dL  Hematocrit : 34.1 %  Platelet Count - Automated : 276 K/uL  Mean Cell Volume : 91.7 fl  Mean Cell Hemoglobin : 27.2 pg  Mean Cell Hemoglobin Concentration : 29.6 gm/dL    05-01    133<L>  |  97  |  25<H>  ----------------------------<  82  3.8   |  28  |  3.46<H>    Ca    9.2      01 May 2020 04:20  Phos  6.2     05-01  Mg     2.4     05-01    TPro  7.9  /  Alb  1.8<L>  /  TBili  4.0<H>  /  DBili  x   /  AST  65<H>  /  ALT  51  /  AlkPhos  209<H>  05-01    PT/INR - ( 01 May 2020 04:20 )   PT: 15.8 sec;   INR: 1.38 ratio         PTT - ( 01 May 2020 04:20 )  PTT:35.3 sec    RADIOLOGY & ADDITIONAL STUDIES REVIEWED: < from: Xray Chest 1 View- PORTABLE-Routine (05.01.20 @ 07:44) >  Impression:  1.  Stable lung opacities.    < end of copied text >    GOALS OF CARE DISCUSSION WITH PATIENT/FAMILY/PROXY: full code     CRITICAL CARE TIME SPENT: 35 minutes

## 2020-05-01 NOTE — PROGRESS NOTE ADULT - ASSESSMENT
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 Presented to ED with SOB, fever and hypoxia. Subsequently transfered to ICU intubated and developed ARDS s/p CQ and Anikinra. Subsequently developed RASHAUN with ensuing oliguria prompting a renal consult. Hypernatremia has improved. Severe hypoalbuminemia 1.3     # RASHAUN sec to ATN from COVID - 19 related disease. With ARDS. remains on pressors. hypoalbuminemia.  oliguria.  Pulm edema.  s/p HD yesterday.  HD in AM  RPT BMP IN AM

## 2020-05-01 NOTE — PROGRESS NOTE ADULT - ASSESSMENT
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 Patient was admitted on medicine floor for COVID pna.  Admitted to ICU for hypoxia and desaturation on NRB.     Assessment:  - acute hypoxic respiratory failure secondary to COVID-19 pneumonia  - septic shock requiring pressors   - RASHAUN  - Leukocytosis  - COVID pandemic     Plan:  Neuro:  -Pt sedated with versed   -Dilaudid drip  -no longer on Rocuronium    CV:  - Hold BP medications  - currently on small dose of levophed  - on midodrine 10mg q 8       Pulm:  - Intubated for respiratory distress and hypoxia 4/15  - CXR shows bilateral airspace opacities; improving s/p HD sessions; may be from fluid overload   - Acute Hypoxic Resp Failure secondary to PNA + COVID 19  - S/P ANAKINRA and Hydroxychloroquine (since April 9th)   - Discontinued full dose Lovenox due to epistaxis  - Current vent settings 35/350/80/9; plateau pressure elevated; possibly need to paralyze patient if not improving     - CXR findings may be due to fluid overload; s/p HD session 4/28 and 4/29; f/u nephro Dr. Rosa  - attempt to decrease FiO2 requirements once fluid overload improves; will hold off on trial of tocilizumab at this time   - ESR: 96>92>108  - D-dimer: 1901>1936>820  - LDH: 362>447>400    ID:  - completed Hydroxychloroquine, anakinra   Pt was febrile a few nights ago   blood culture negative  on Cefepime 2g daily and vanco 750 intradialysis  WBC: elevated at 15.65K this morning   ID Dr. Davey, f/u regarding further recommendations   *Pt to receive Vanco 750mg iv  dose Intradialysis as per Dr Davey     Nephro:  RASHAUN:    -Cr/BUN:  4.37/36  no longer on lasix drip as per nephro due to minimal urine output   S/P HD 1 st session of HD on 4/24, second session on 4/25; third session 4/28; fourth 4/28; fifth 4/29   - monitor urine output; minimal urine output; net positive 192cc  - Monitor electrolytes   Hypornatremia: 133; stable      Metabolic acidosis:  Improved   -off sodium bicarbonate  Monitor BMP  Nephrology: Dr. 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 Patient was admitted on medicine floor for COVID pna.  Admitted to ICU for hypoxia and desaturation on NRB.     Assessment:  - acute hypoxic respiratory failure secondary to COVID-19 pneumonia  - septic shock requiring pressors   - RASHAUN  - Leukocytosis  - COVID pandemic     Plan:  Neuro:  -Pt sedated with versed   -Dilaudid drip  -no longer on Rocuronium    CV:  - Hold BP medications  - currently on levophed  - on midodrine 10mg q 8     Pulm:  - Intubated for respiratory distress and hypoxia 4/15  - CXR shows bilateral airspace opacities; improving s/p HD sessions; may be from fluid overload   - Acute Hypoxic Resp Failure secondary to PNA + COVID 19  - S/P ANAKINRA and Hydroxychloroquine (since April 9th)   - Discontinued full dose Lovenox due to epistaxis  - Current vent settings 35/350/80/9; plateau pressure elevated; possibly need to paralyze patient if not improving     - CXR findings may be due to fluid overload; s/p HD session 4/28, 4/29, 4/30; f/u nephro Dr. Rosa  - attempt to decrease FiO2 requirements once fluid overload improves; will hold off on trial of tocilizumab at this time   - ESR: 96>92>108>96  - D-dimer: 1901>1936>820>692  - LDH: 362>447>400>409    ID:  - completed Hydroxychloroquine, anakinra   Pt was febrile a few nights ago   blood culture negative  on Cefepime 2g daily and vanco 750 intradialysis  WBC: elevated at 15.88K this morning   ID Dr. Davey, f/u regarding further recommendations   *Pt to receive Vanco 750mg iv  dose Intradialysis as per Dr Davey     Nephro:  RASHAUN:  -Cr/BUN:  3.46/25  no longer on lasix drip as per nephro due to minimal urine output   S/P HD on 4/24, 4/25, 4/28, 4/28, 4/29, 4/30; f/u nephro Dr. Rosa  - monitor urine output; minimal urine output; net negative 2554cc  - Monitor electrolytes   Hyponatremia: 133; stable      Metabolic acidosis:  Improved   -off sodium bicarbonate  Monitor BMP  Nephrology: Dr. Rosa      GI:  -  Tube feed  - GI ppx with Protonix  - Miralax and senna for prophylaxis  - dc dulcolax     Heme:  - no indication for transfusion   - Hgb stable   - monitor cbc daily     Endo:  - No history of DM  - target CBG < 180  - FS q6 while Tube feed      Prophy:  - eliquis 2.5mg BID   - Protonix for GI prophylaxis

## 2020-05-02 NOTE — PROGRESS NOTE ADULT - SUBJECTIVE AND OBJECTIVE BOX
[facility] : Progress Note :: [phone], (office [phone]),   Dr Rosa / Dr Mosher / Dr Lin / Dr Narvaez / Dr Nando SOTELO / Dr Temple / Dr Silva / Dr Bjorn mendoza  _____________________________________________________________________________________________    remains with high FIO2, PEEP requirement    No Known Allergies    Hospital Medications:   MEDICATIONS  (STANDING):  apixaban 2.5 milliGRAM(s) Oral two times a day  cefepime   IVPB 2000 milliGRAM(s) IV Intermittent <User Schedule>  chlorhexidine 0.12% Liquid 15 milliLiter(s) Oral Mucosa every 12 hours  chlorhexidine 2% Cloths 1 Application(s) Topical <User Schedule>  fentaNYL   Infusion 0.5 MICROgram(s)/kG/Hr (0.86 mL/Hr) IV Continuous <Continuous>  HYDROmorphone Infusion 1 mG/Hr (1 mL/Hr) IV Continuous <Continuous>  midazolam Infusion 0.02 mG/kG/Hr (1.72 mL/Hr) IV Continuous <Continuous>  midodrine 10 milliGRAM(s) Oral every 8 hours  norepinephrine Infusion 0.05 MICROgram(s)/kG/Min (4.04 mL/Hr) IV Continuous <Continuous>  pantoprazole  Injectable 40 milliGRAM(s) IV Push daily  polyethylene glycol 3350 17 Gram(s) Oral daily  senna 2 Tablet(s) Oral at bedtime  vancomycin  IVPB 750 milliGRAM(s) IV Intermittent once  vancomycin  IVPB 750 milliGRAM(s) IV Intermittent once  vancomycin  IVPB 750 milliGRAM(s) IV Intermittent once        VITALS:  T(F): 97 (05-02-20 @ 11:50), Max: 97.8 (05-02-20 @ 09:18)  HR: 91 (05-02-20 @ 14:28)  BP: 131/73 (05-02-20 @ 07:00)  RR: 28 (05-02-20 @ 13:00)  SpO2: 95% (05-02-20 @ 14:28)  Wt(kg): --    05-01 @ 07:01  -  05-02 @ 07:00  --------------------------------------------------------  IN: 218.5 mL / OUT: 150 mL / NET: 68.5 mL    05-02 @ 07:01  -  05-02 @ 15:05  --------------------------------------------------------  IN: 600.9 mL / OUT: 0 mL / NET: 600.9 mL        PHYSICAL EXAM:  deferred sec to COVID-19 status    LABS:  05-02    133<L>  |  97  |  41<H>  ----------------------------<  81  4.1   |  26  |  4.73<H>    Ca    8.9      02 May 2020 04:45  Phos  7.0     05-02  Mg     2.7     05-02    TPro  7.9  /  Alb  1.9<L>  /  TBili  3.9<H>  /  DBili      /  AST  55<H>  /  ALT  49  /  AlkPhos  239<H>  05-02    Creatinine Trend: 4.73 <--, 3.46 <--, 4.37 <--, 4.02 <--, 3.27 <--, 4.52 <--, 4.92 <--, 3.91 <--                        9.1    13.23 )-----------( 270      ( 02 May 2020 04:45 )             30.4     Urine Studies:      RADIOLOGY & ADDITIONAL STUDIES:

## 2020-05-02 NOTE — PROGRESS NOTE ADULT - SUBJECTIVE AND OBJECTIVE BOX
INTERVAL HPI/OVERNIGHT EVENTS: No acute events overnight. Possible HD today.    PRESSORS: [x ] YES [ ] NO  WHICH: Levophed         Antimicrobial:  cefepime   IVPB 2000 milliGRAM(s) IV Intermittent <User Schedule>  vancomycin  IVPB 750 milliGRAM(s) IV Intermittent once  vancomycin  IVPB 750 milliGRAM(s) IV Intermittent once    Cardiovascular:  midodrine 10 milliGRAM(s) Oral every 8 hours  norepinephrine Infusion 0.05 MICROgram(s)/kG/Min IV Continuous <Continuous>    Pulmonary:    Hematalogic:  apixaban 2.5 milliGRAM(s) Oral two times a day    Other:  acetaminophen    Suspension .. 650 milliGRAM(s) Oral every 6 hours PRN  chlorhexidine 0.12% Liquid 15 milliLiter(s) Oral Mucosa every 12 hours  HYDROmorphone Infusion 1 mG/Hr IV Continuous <Continuous>  midazolam Infusion 0.02 mG/kG/Hr IV Continuous <Continuous>  pantoprazole  Injectable 40 milliGRAM(s) IV Push daily  polyethylene glycol 3350 17 Gram(s) Oral daily  senna 2 Tablet(s) Oral at bedtime      Drug Dosing Weight  Height (cm): 134.62 (07 Apr 2020 23:13)  Weight (kg): 86.2 (07 Apr 2020 23:13)  BMI (kg/m2): 47.6 (07 Apr 2020 23:13)  BSA (m2): 1.67 (07 Apr 2020 23:13)    CENTRAL LINE: [x ] YES [ ] NO  LOCATION:   [facility] DATE INSERTED: 4/30  REMOVE: [ ] YES [ ] NO  EXPLAIN:    RENNER: [x ] YES [ ] NO    DATE INSERTED:  REMOVE:  [ ] YES [ ] NO  EXPLAIN:    A-LINE:  [x ] YES [ ] NO  LOCATION:   DATE INSERTED:  REMOVE:  [ ] YES [ ] NO  EXPLAIN:    PMH/Social Hx/Fam Hx -reviewed admission note, no change since admission  PAST MEDICAL & SURGICAL HISTORY:  HTN (hypertension)  No significant past surgical history      T(C): 36.2 (05-01-20 @ 21:21), Max: 36.3 (05-01-20 @ 05:25)  HR: 102 (05-02-20 @ 04:00)  BP: 123/70 (05-02-20 @ 04:00)  BP(mean): 82 (05-02-20 @ 04:00)  ABP: 130/69 (05-02-20 @ 04:00)  ABP(mean): 92 (05-02-20 @ 04:00)  RR: 35 (05-02-20 @ 04:00)  SpO2: 96% (05-02-20 @ 04:00)  Wt(kg): --    ABG - ( 02 May 2020 04:26 )  pH, Arterial: 7.23  pH, Blood: x     /  pCO2: 62    /  pO2: 87    / HCO3: 25    / Base Excess: -3.3  /  SaO2: 95                    04-30 @ 07:01  -  05-01 @ 07:00  --------------------------------------------------------  IN: 501.1 mL / OUT: 3055 mL / NET: -2553.9 mL        Mode: AC/ CMV (Assist Control/ Continuous Mandatory Ventilation)  RR (machine): 35  TV (machine): 400  FiO2: 70  PEEP: 8  ITime: 1  MAP: 20  PIP: 37            PHYSICAL EXAM:  GENERAL: Sedated and intubated   HEAD: Atraumatic, Normocephalic  EYES: PERRLA, conjunctiva and sclera clear  ENMT: No tonsillar erythema, exudates, or enlargement  NECK: Supple, normal appearance  NERVOUS SYSTEM: Sedated and intubated   CHEST/LUNG: No chest deformity; crackles present to auscultation   HEART: Regular rate and rhythm; No murmurs  ABDOMEN: Soft, Nontender, Nondistended; Bowel sounds present  EXTREMITIES: mild edema noted in upper extremities  LYMPH: No lymphadenopathy noted  SKIN: pressure sore noted over right cheek covered with bacitracin and dressing     LABS:  CBC Full  -  ( 01 May 2020 04:20 )  WBC Count : 15.88 K/uL  RBC Count : 3.72 M/uL  Hemoglobin : 10.1 g/dL  Hematocrit : 34.1 %  Platelet Count - Automated : 276 K/uL  Mean Cell Volume : 91.7 fl  Mean Cell Hemoglobin : 27.2 pg  Mean Cell Hemoglobin Concentration : 29.6 gm/dL  Auto Neutrophil # : x  Auto Lymphocyte # : x  Auto Monocyte # : x  Auto Eosinophil # : x  Auto Basophil # : x  Auto Neutrophil % : x  Auto Lymphocyte % : x  Auto Monocyte % : x  Auto Eosinophil % : x  Auto Basophil % : x    05-01    133<L>  |  97  |  25<H>  ----------------------------<  82  3.8   |  28  |  3.46<H>    Ca    9.2      01 May 2020 04:20  Phos  6.2     05-01  Mg     2.4     05-01    TPro  7.9  /  Alb  1.8<L>  /  TBili  4.0<H>  /  DBili  x   /  AST  65<H>  /  ALT  51  /  AlkPhos  209<H>  05-01    PT/INR - ( 01 May 2020 04:20 )   PT: 15.8 sec;   INR: 1.38 ratio         PTT - ( 01 May 2020 04:20 )  PTT:35.3 sec        RADIOLOGY & ADDITIONAL STUDIES REVIEWED:      [ ]GOALS OF CARE DISCUSSION WITH PATIENT/FAMILY/PROXY:    CRITICAL CARE TIME SPENT: 35 minutes

## 2020-05-02 NOTE — PROGRESS NOTE ADULT - ATTENDING COMMENTS
IMP: This is a 54 yr old woman with  HTN, works as HD nurse,  presents to the ED w/ fever, chills, nausea, vomiting, and mild cough that began x1 week ago. Patient was admitted on medicine floor for COVID pna. ICU consulted for hypoxia and desaturation on NRB.  DDimer is trending up and is on . therapeutic anticoag.  Pat became more dyspnenic and hypoxic, intubated and placed on vent support sedated . WBC and procalcitonin is elevated on iv antibx. Will add vanco, change central line. . NO IL-6 at this time since CXR shows pul edema . Will request from neph for more frequent dialysis and if CXR improve or decrease FiO2 need then no IL-6. CRP, Ferritin and D Dimer are trending down . Started on HD by nephro s/p x 3 sessions. Plateau pressures still elevated with decrease in TV. biomarkers are trending down but WBC trending up.    Assessment:  -Acute Hypoxic Respiratory Failure  -B/L  PNA   -ARDS  -COVID 19 infection  -HTN  -Hypercaog state  -Septic shock  -RASHAUN  -MOSF      Plan  - Mechanical ventilation with lung protective strategy   - Decrease Fio2  - Titrate Fio2 and PEEP as tolerated  -elevated Plateau pressure  - Sedation for ventilator synchrony   - Vasopressor support to maintain MAP > 60  - Monitor ABG  - S/p Anakinra and Hydroxychloroquine  - biomarkers are tending down  - Airborne and Contact isolation  - Plan for prone positioning daily  - Nephrology  for daily dialysis  - Bowel regimen  - DVT and Stress Ulcer prophylaxis   - Vanco level  - Over all prognosis is poor given multiple organ dysfunction .

## 2020-05-02 NOTE — PROGRESS NOTE ADULT - SUBJECTIVE AND OBJECTIVE BOX
ICU VISIT  55y Female    Meds:  cefepime   IVPB 2000 milliGRAM(s) IV Intermittent <User Schedule>  vancomycin  IVPB 750 milliGRAM(s) IV Intermittent once  vancomycin  IVPB 750 milliGRAM(s) IV Intermittent once  vancomycin  IVPB 750 milliGRAM(s) IV Intermittent once    Allergies    No Known Allergies    Intolerances        VITALS:  Vital Signs Last 24 Hrs  T(C): 36.1 (02 May 2020 11:50), Max: 36.6 (02 May 2020 09:18)  T(F): 97 (02 May 2020 11:50), Max: 97.8 (02 May 2020 09:18)  HR: 91 (02 May 2020 14:28) (82 - 122)  BP: 131/73 (02 May 2020 07:00) (87/44 - 145/73)  BP(mean): 86 (02 May 2020 07:00) (55 - 90)  RR: 28 (02 May 2020 13:00) (15 - 35)  SpO2: 95% (02 May 2020 14:28) (76% - 98%)    LABS/DIAGNOSTIC TESTS:                          9.1    13.23 )-----------( 270      ( 02 May 2020 04:45 )             30.4         05-02    133<L>  |  97  |  41<H>  ----------------------------<  81  4.1   |  26  |  4.73<H>    Ca    8.9      02 May 2020 04:45  Phos  7.0     05-02  Mg     2.7     05-02    TPro  7.9  /  Alb  1.9<L>  /  TBili  3.9<H>  /  DBili  x   /  AST  55<H>  /  ALT  49  /  AlkPhos  239<H>  05-02      LIVER FUNCTIONS - ( 02 May 2020 04:45 )  Alb: 1.9 g/dL / Pro: 7.9 g/dL / ALK PHOS: 239 U/L / ALT: 49 U/L DA / AST: 55 U/L / GGT: x             CULTURES: .Blood Blood-Peripheral  04-17 @ 18:26   No Growth Final  --  --            RADIOLOGY:< from: Xray Chest 1 View- PORTABLE-Routine (05.02.20 @ 09:59) >  EXAM:  XR CHEST PORTABLE ROUTINE 1V                            PROCEDURE DATE:  05/02/2020          INTERPRETATION:  XR CHEST    Single AP view    HISTORY:  interval change    Comparison: Chest x-ray one day prior      The tip of the ET tube is above the dinesh.    The NG tube is in the stomach.    Right IJ line tip in the SVC.    Left IJ line tip in the SVC.    The cardiac silhouette is within normal limits. Diffuse bilateral airspace disease. No pleural abnormality.    IMPRESSION: Bilateral airspace disease compatible with atypical/viral pneumonia, unchanged.                JEN PEDRAZA M.D., ATTENDING RADIOLOGIST  This document has been electronically signed. May  2 2020 10:30AM          < end of copied text >        ROS:  [  ] UNABLE TO ELICIT ICU VISIT  55y Female who remains in the ICU, she is sedated, intubated and vent dependent, she is still on a pressor , she is still being daily but might not be dialyzed tomorrow. She has no fevers and blood cultures remain negative.    Meds:  cefepime   IVPB 2000 milliGRAM(s) IV Intermittent <User Schedule>  vancomycin  IVPB 750 milliGRAM(s) IV Intermittent once  vancomycin  IVPB 750 milliGRAM(s) IV Intermittent once  vancomycin  IVPB 750 milliGRAM(s) IV Intermittent once    Allergies    No Known Allergies    Intolerances        VITALS:  Vital Signs Last 24 Hrs  T(C): 36.1 (02 May 2020 11:50), Max: 36.6 (02 May 2020 09:18)  T(F): 97 (02 May 2020 11:50), Max: 97.8 (02 May 2020 09:18)  HR: 91 (02 May 2020 14:28) (82 - 122)  BP: 131/73 (02 May 2020 07:00) (87/44 - 145/73)  BP(mean): 86 (02 May 2020 07:00) (55 - 90)  RR: 28 (02 May 2020 13:00) (15 - 35)  SpO2: 95% (02 May 2020 14:28) (76% - 98%)    LABS/DIAGNOSTIC TESTS:                          9.1    13.23 )-----------( 270      ( 02 May 2020 04:45 )             30.4         05-02    133<L>  |  97  |  41<H>  ----------------------------<  81  4.1   |  26  |  4.73<H>    Ca    8.9      02 May 2020 04:45  Phos  7.0     05-02  Mg     2.7     05-02    TPro  7.9  /  Alb  1.9<L>  /  TBili  3.9<H>  /  DBili  x   /  AST  55<H>  /  ALT  49  /  AlkPhos  239<H>  05-02      LIVER FUNCTIONS - ( 02 May 2020 04:45 )  Alb: 1.9 g/dL / Pro: 7.9 g/dL / ALK PHOS: 239 U/L / ALT: 49 U/L DA / AST: 55 U/L / GGT: x             CULTURES: .Blood Blood-Peripheral  04-17 @ 18:26   No Growth Final  --  --            RADIOLOGY:< from: Xray Chest 1 View- PORTABLE-Routine (05.02.20 @ 09:59) >  EXAM:  XR CHEST PORTABLE ROUTINE 1V                            PROCEDURE DATE:  05/02/2020          INTERPRETATION:  XR CHEST    Single AP view    HISTORY:  interval change    Comparison: Chest x-ray one day prior      The tip of the ET tube is above the dinesh.    The NG tube is in the stomach.    Right IJ line tip in the SVC.    Left IJ line tip in the SVC.    The cardiac silhouette is within normal limits. Diffuse bilateral airspace disease. No pleural abnormality.    IMPRESSION: Bilateral airspace disease compatible with atypical/viral pneumonia, unchanged.                JEN PEDRAZA M.D., ATTENDING RADIOLOGIST  This document has been electronically signed. May  2 2020 10:30AM          < end of copied text >        ROS:  [ x ] UNABLE TO ELICIT

## 2020-05-02 NOTE — PROGRESS NOTE ADULT - ASSESSMENT
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 Presented to ED with SOB, fever and hypoxia. Subsequently transfered to ICU intubated and developed ARDS s/p CQ and Anikinra. Subsequently developed RASHAUN with ensuing oliguria prompting a renal consult. Hypernatremia has improved. Severe hypoalbuminemia 1.3     # RASHAUN sec to ATN from COVID - 19 related disease. With ARDS. remains on pressors. hypoalbuminemia.  oliguria.  Pulm edema.  s/p HD this morning  RPT BMP IN AM

## 2020-05-02 NOTE — PROGRESS NOTE ADULT - ASSESSMENT
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 Patient was admitted on medicine floor for COVID pna.  Admitted to ICU for hypoxia and desaturation on NRB.     Assessment:  - acute hypoxic respiratory failure secondary to COVID-19 pneumonia  - septic shock requiring pressors   - RASHAUN  - Leukocytosis  - COVID pandemic     Plan:  Neuro:  -Pt sedated with versed   -Dilaudid drip  -no longer on Rocuronium    CV:  - Hold BP medications  - currently on levophed  - on midodrine 10mg q 8     Pulm:  - Intubated for respiratory distress and hypoxia 4/15  - CXR shows bilateral airspace opacities; improving s/p HD sessions; may be from fluid overload   - Acute Hypoxic Resp Failure secondary to PNA + COVID 19  - S/P ANAKINRA and Hydroxychloroquine (since April 9th)   - Discontinued full dose Lovenox due to epistaxis  - Current vent settings 35/350/80/9; plateau pressure elevated; possibly need to paralyze patient if not improving     - CXR findings may be due to fluid overload; s/p HD session 4/28, 4/29, 4/30; f/u nephro Dr. Rosa  - attempt to decrease FiO2 requirements once fluid overload improves; will hold off on trial of tocilizumab at this time   - ESR: 96>92>108>96  - D-dimer: 1901>1936>820>692  - LDH: 362>447>400>409    ID:  - completed Hydroxychloroquine, anakinra   Pt was febrile a few nights ago   blood culture negative  on Cefepime 2g daily and vanco 750 intradialysis  WBC: elevated at 15.88K this morning   ID Dr. Davey, f/u regarding further recommendations   *Pt to receive Vanco 750mg iv  dose Intradialysis as per Dr Davey     Nephro:  RASHAUN:  -Cr/BUN:  3.46/25  no longer on lasix drip as per nephro due to minimal urine output   S/P HD on 4/24, 4/25, 4/28, 4/28, 4/29, 4/30; f/u nephro Dr. Rosa  - monitor urine output; minimal urine output; net negative 2554cc  - Monitor electrolytes   Possible HD today  Hyponatremia: 133; stable      Metabolic acidosis:  Improved   -off sodium bicarbonate  Monitor BMP  Nephrology: Dr. 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 Patient was admitted on medicine floor for COVID pna.  Admitted to ICU for hypoxia and desaturation on NRB.     Assessment:  - acute hypoxic respiratory failure secondary to COVID-19 pneumonia  - septic shock requiring pressors   - RASHAUN  - Leukocytosis  - COVID pandemic     Plan:  Neuro:  -Pt sedated with versed   -Dilaudid drip  -no longer on Rocuronium    CV:  - Hold BP medications  - currently on levophed  - on midodrine 10mg q 8     Pulm:  - Intubated for respiratory distress and hypoxia 4/15  - CXR shows bilateral airspace opacities; improving s/p HD sessions; may be from fluid overload   - Acute Hypoxic Resp Failure secondary to PNA + COVID 19  - S/P ANAKINRA and Hydroxychloroquine (since April 9th)   - Discontinued full dose Lovenox due to epistaxis  - Current vent settings 35/400/70/8; plateau pressure elevated; possibly need to paralyze patient if not improving     - CXR findings may be due to fluid overload; s/p HD session 4/28, 4/29, 4/30; f/u nephro Dr. Rosa  - attempt to decrease FiO2 requirements once fluid overload improves; will hold off on trial of tocilizumab at this time   - ESR: 96>92>108>96  - D-dimer: 1901>1936>820>692  - LDH: 362>447>400>409    ID:  - completed Hydroxychloroquine, anakinra   Pt was febrile a few nights ago   blood culture negative  on Cefepime 2g daily and vanco 750 intradialysis  WBC: elevated at 15.88K this morning   ID Dr. Davey, f/u regarding further recommendations   *Pt to receive Vanco 750mg iv  dose Intradialysis as per Dr Davey     Nephro:  RASHAUN:  -Cr/BUN:  4.73/41  no longer on lasix drip as per nephro due to minimal urine output   S/P HD on 4/24, 4/25, 4/28, 4/28, 4/29, 4/30; f/u nephro Dr. Rosa  - monitor urine output; minimal urine output; net negative 2554cc  - Monitor electrolytes   Possible HD today  Hyponatremia: 133; stable      Metabolic acidosis:  Improved   -off sodium bicarbonate  Monitor BMP  Nephrology: Dr. Rosa      GI:  -  Tube feed  - GI ppx with Protonix  - Miralax and senna for prophylaxis  - dc dulcolax     Heme:  - no indication for transfusion   - Hgb stable   - monitor cbc daily     Endo:  - No history of DM  - target CBG < 180  - FS q6 while Tube feed      Prophy:  - eliquis 2.5mg BID   - Protonix for GI prophylaxis

## 2020-05-03 NOTE — PROGRESS NOTE ADULT - SUBJECTIVE AND OBJECTIVE BOX
ICU VISIT  55y Female    Meds:  cefepime   IVPB 2000 milliGRAM(s) IV Intermittent <User Schedule>    Allergies    No Known Allergies    Intolerances        VITALS:  Vital Signs Last 24 Hrs  T(C): 36.3 (03 May 2020 07:00), Max: 36.7 (02 May 2020 19:00)  T(F): 97.3 (03 May 2020 07:00), Max: 98 (02 May 2020 19:00)  HR: 90 (03 May 2020 11:30) (72 - 114)  BP: 93/46 (03 May 2020 07:00) (92/52 - 143/64)  BP(mean): 58 (03 May 2020 07:00) (58 - 83)  RR: 35 (03 May 2020 11:30) (18 - 37)  SpO2: 96% (03 May 2020 11:30) (89% - 100%)    LABS/DIAGNOSTIC TESTS:                          8.9    15.00 )-----------( 277      ( 03 May 2020 06:34 )             29.9         05-03    133<L>  |  96  |  32<H>  ----------------------------<  95  3.8   |  24  |  4.17<H>    Ca    8.7      03 May 2020 06:34  Phos  4.9     05-03  Mg     2.4     05-03    TPro  7.7  /  Alb  1.8<L>  /  TBili  3.6<H>  /  DBili  x   /  AST  57<H>  /  ALT  46  /  AlkPhos  259<H>  05-03      LIVER FUNCTIONS - ( 03 May 2020 06:34 )  Alb: 1.8 g/dL / Pro: 7.7 g/dL / ALK PHOS: 259 U/L / ALT: 46 U/L DA / AST: 57 U/L / GGT: x             CULTURES: .Blood Blood-Peripheral  04-17 @ 18:26   No Growth Final  --  --            RADIOLOGY:< from: Xray Chest 1 View- PORTABLE-Routine (05.03.20 @ 10:26) >  EXAM:  XR CHEST PORTABLE ROUTINE 1V                            PROCEDURE DATE:  05/03/2020          INTERPRETATION:  CLINICAL INDICATION: 55 years  Female with interval change.    COMPARISON: 5/2/2020    The tip of the ET tube is at the clavicular heads 5.6 cm above the dinesh. The left jugular catheter tip overlies the expected location of the junction of the brachiocephalic veins. The right dialysis catheter tip overlies the superior vena cava The NG tube extends below the diaphragm. The tip is not included in the image.    AP view of the chest demonstrates diffuse left-sided airspace infiltrate unchanged. Right-sided airspace infiltrates slightly increased from prior.. There is no pleural effusion. There is no pneumothorax.    The heart,mediastinum and kain cannot be assessed due to rotation.     The pulmonary vasculature is normal.     Mild thoracic degenerative changes are present.    IMPRESSION:    Tubes and catheters in satisfactory position.    I lateral infiltrates reidentified. The left is unchanged. The right has increased.        DISCRETE X-RAY DATA:  Percent of LEFT lung opacification: %  Percent of RIGHT lung opacification: 34-66%  Change in lung opacification from most recent x-ray (<=3 days): Increase  Change from prior dated 3 or more days (same admission): Stable                BOBBY GARCIA M.D., ATTENDING RADIOLOGIST  This document has been electronically signed. May  3 2020 10:55AM        < end of copied text >        ROS:  [  ] UNABLE TO ELICIT ICU VISIT  55y Female who remains in the ICU , sedated , intubated and vent dependent , she did not get dialyzed today , she has no fevers , her wbc count has been fluctuating a little. Still has bilat infiltrates on CXR with slight worsening on the right.    Meds:  cefepime   IVPB 2000 milliGRAM(s) IV Intermittent <User Schedule>    Allergies    No Known Allergies    Intolerances        VITALS:  Vital Signs Last 24 Hrs  T(C): 36.3 (03 May 2020 07:00), Max: 36.7 (02 May 2020 19:00)  T(F): 97.3 (03 May 2020 07:00), Max: 98 (02 May 2020 19:00)  HR: 90 (03 May 2020 11:30) (72 - 114)  BP: 93/46 (03 May 2020 07:00) (92/52 - 143/64)  BP(mean): 58 (03 May 2020 07:00) (58 - 83)  RR: 35 (03 May 2020 11:30) (18 - 37)  SpO2: 96% (03 May 2020 11:30) (89% - 100%)    LABS/DIAGNOSTIC TESTS:                          8.9    15.00 )-----------( 277      ( 03 May 2020 06:34 )             29.9         05-03    133<L>  |  96  |  32<H>  ----------------------------<  95  3.8   |  24  |  4.17<H>    Ca    8.7      03 May 2020 06:34  Phos  4.9     05-03  Mg     2.4     05-03    TPro  7.7  /  Alb  1.8<L>  /  TBili  3.6<H>  /  DBili  x   /  AST  57<H>  /  ALT  46  /  AlkPhos  259<H>  05-03      LIVER FUNCTIONS - ( 03 May 2020 06:34 )  Alb: 1.8 g/dL / Pro: 7.7 g/dL / ALK PHOS: 259 U/L / ALT: 46 U/L DA / AST: 57 U/L / GGT: x             CULTURES: .Blood Blood-Peripheral  04-17 @ 18:26   No Growth Final  --  --            RADIOLOGY:< from: Xray Chest 1 View- PORTABLE-Routine (05.03.20 @ 10:26) >  EXAM:  XR CHEST PORTABLE ROUTINE 1V                            PROCEDURE DATE:  05/03/2020          INTERPRETATION:  CLINICAL INDICATION: 55 years  Female with interval change.    COMPARISON: 5/2/2020    The tip of the ET tube is at the clavicular heads 5.6 cm above the dinesh. The left jugular catheter tip overlies the expected location of the junction of the brachiocephalic veins. The right dialysis catheter tip overlies the superior vena cava The NG tube extends below the diaphragm. The tip is not included in the image.    AP view of the chest demonstrates diffuse left-sided airspace infiltrate unchanged. Right-sided airspace infiltrates slightly increased from prior.. There is no pleural effusion. There is no pneumothorax.    The heart,mediastinum and kain cannot be assessed due to rotation.     The pulmonary vasculature is normal.     Mild thoracic degenerative changes are present.    IMPRESSION:    Tubes and catheters in satisfactory position.    I lateral infiltrates reidentified. The left is unchanged. The right has increased.        DISCRETE X-RAY DATA:  Percent of LEFT lung opacification: %  Percent of RIGHT lung opacification: 34-66%  Change in lung opacification from most recent x-ray (<=3 days): Increase  Change from prior dated 3 or more days (same admission): Stable                BOBBY GARCIA M.D., ATTENDING RADIOLOGIST  This document has been electronically signed. May  3 2020 10:55AM        < end of copied text >        ROS:  [ x ] UNABLE TO ELICIT

## 2020-05-03 NOTE — PROGRESS NOTE ADULT - ASSESSMENT
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 Patient was admitted on medicine floor for COVID pna.  Admitted to ICU for hypoxia and desaturation on NRB.     Assessment:  - acute hypoxic respiratory failure secondary to COVID-19 pneumonia  - septic shock requiring pressors   - RASHAUN  - Leukocytosis  - COVID pandemic     Plan:  Neuro:  -Pt sedated with versed and fentanyl drip  -no longer on Rocuronium    CV:  - Hold BP medications  - currently on levophed  - on midodrine 10mg q 8     Pulm:  - Intubated for respiratory distress and hypoxia 4/15  - CXR shows bilateral airspace opacities; improving s/p HD sessions; may be from fluid overload   - Acute Hypoxic Resp Failure secondary to PNA + COVID 19  - S/P ANAKINRA and Hydroxychloroquine (since April 9th)   - Discontinued full dose Lovenox due to epistaxis  - Current vent settings 35/400/70/8; plateau pressure elevated; possibly need to paralyze patient if not improving     - CXR findings may be due to fluid overload; s/p HD session 4/28, 4/29, 4/30; f/u nephro Dr. Rosa  - attempt to decrease FiO2 requirements once fluid overload improves; will hold off on trial of tocilizumab at this time   - ESR: 96>92>108>96  - D-dimer: 1901>1936>820>692  - LDH: 362>447>400>409    ID:  - completed Hydroxychloroquine, anakinra   Pt was febrile a few nights ago   blood culture negative  on Cefepime 2g daily and vanco 750 intradialysis  WBC: elevated at 13.23K this morning   ID Dr. Davey, f/u regarding further recommendations   *Pt to receive Vanco 750mg iv  dose Intradialysis as per Dr Davey     Nephro:  RASHAUN:  -Cr/BUN:  4.73/41  no longer on lasix drip as per nephro due to minimal urine output   S/P HD on 4/24, 4/25, 4/28, 4/28, 4/29, 4/30; f/u nephro Dr. Rosa  - monitor urine output; minimal urine output; net negative 2554cc  - Monitor electrolytes   dialysis on 2/5  Hyponatremia: 133; stable      Metabolic acidosis:  Improved   -off sodium bicarbonate  Monitor BMP  Nephrology: Dr. 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 Patient was admitted on medicine floor for COVID pna.  Admitted to ICU for hypoxia and desaturation on NRB.     Assessment:  - acute hypoxic respiratory failure secondary to COVID-19 pneumonia  - septic shock requiring pressors   - RASHAUN  - Leukocytosis  - COVID pandemic     Plan:  Neuro:  -Pt sedated with fentanyl drip  -no longer on Rocuronium    CV:  - Hold BP medications  - currently on levophed  - on midodrine 10mg q 8     Pulm:  - Intubated for respiratory distress and hypoxia 4/15  - CXR shows bilateral airspace opacities; improving s/p HD sessions; may be from fluid overload   - Acute Hypoxic Resp Failure secondary to PNA + COVID 19  - S/P ANAKINRA and Hydroxychloroquine (since April 9th)   - Discontinued full dose Lovenox due to epistaxis  - Current vent settings 35/400/80/9; plateau pressure elevated; possibly need to paralyze patient if not improving     - CXR findings may be due to fluid overload; s/p HD session 4/28, 4/29, 4/30, 5/2; f/u nephro Dr. Rosa  - attempt to decrease FiO2 requirements once fluid overload improves; will hold off on trial of tocilizumab at this time   - ESR: 96>92>108>96  - D-dimer: 1901>1936>820>692  - LDH: 362>447>400>409    ID:  - completed Hydroxychloroquine, anakinra   Pt was febrile a few nights ago   blood culture negative  on Cefepime 2g daily and vanco 750 intradialysis; VT of 19.5 this morning   WBC: elevated at 15K this morning   ID Dr. Davey, f/u regarding further recommendations   *Pt to receive Vanco 750mg iv  dose Intradialysis as per Dr Petar Castrejon:  RASHAUN:  -Cr/BUN:  4.17/32  no longer on lasix drip as per nephro due to minimal urine output   S/P HD on 4/24, 4/25, 4/28, 4/28, 4/29, 4/30, 5/2; f/u nephro Dr. Rosa  - monitor urine output; minimal urine output; net positive 1333cc  - minimal urine output   - Monitor electrolytes   Hyponatremia: 133; stable      Metabolic acidosis:  Improved   -off sodium bicarbonate  Monitor BMP  Nephrology: Dr. Rosa      GI:  -  Tube feed  - GI ppx with Protonix  - Miralax and senna for prophylaxis  - dc dulcolax     Heme:  - no indication for transfusion   - Hgb stable   - monitor cbc daily     Endo:  - No history of DM  - target CBG < 180  - FS q6 while Tube feed      Prophy:  - eliquis 2.5mg BID   - Protonix for GI prophylaxis

## 2020-05-03 NOTE — PROGRESS NOTE ADULT - ASSESSMENT
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 Presented to ED with SOB, fever and hypoxia. Subsequently transfered to ICU intubated and developed ARDS s/p CQ and Anikinra. Subsequently developed RASHAUN with ensuing oliguria prompting a renal consult. Hypernatremia has improved. Severe hypoalbuminemia 1.3     # RASHAUN sec to ATN from COVID - 19 related disease. With ARDS. remains on pressors. hypoalbuminemia.  oliguria.  Pulm edema.  metabolic acidosis   HD in AM  s/p HD this morning  RPT BMP IN AM

## 2020-05-03 NOTE — PROGRESS NOTE ADULT - ATTENDING COMMENTS
IMP: This is a 54 yr old woman with  HTN, works as HD nurse,  presents to the ED w/ fever, chills, nausea, vomiting, and mild cough that began x1 week ago. Patient was admitted on medicine floor for COVID pna. ICU consulted for hypoxia and desaturation on NRB.  DDimer is trending up and is on . therapeutic anticoag.  Pat became more dyspnenic and hypoxic, intubated and placed on vent support sedated . WBC and procalcitonin is elevated on iv antibx. Will add vanco, change central line. . NO IL-6 at this time since CXR shows pul edema . Will request from neph for more frequent dialysis and if CXR improve or decrease FiO2 need then no IL-6. CRP, Ferritin and D Dimer are trending down . Started on HD by nephro s/p x 3 sessions. Plateau pressures still elevated with decrease in TV. biomarkers are trending down but WBC trending up. CXR no change today    Assessment:  -Acute Hypoxic Respiratory Failure  -B/L  PNA   -ARDS  -COVID 19 infection  -HTN  -Hypercaog state  -Septic shock  -RASHAUN  -MOSF      Plan  - Mechanical ventilation with lung protective strategy   - Decrease Fio2  - Titrate Fio2 and PEEP as tolerated  - elevated Plateau pressure  - Sedation for ventilator synchrony   - Vasopressor support to maintain MAP > 60  - Monitor ABG  - S/p Anakinra and Hydroxychloroquine  - biomarkers are tending down  - Airborne and Contact isolation  - Plan for prone positioning daily  - Nephrology  for daily dialysis  - Bowel regimen  - DVT and Stress Ulcer prophylaxis   - Vanco level  - Over all prognosis is poor given multiple organ dysfunction .

## 2020-05-03 NOTE — PROGRESS NOTE ADULT - SUBJECTIVE AND OBJECTIVE BOX
[facility] : Progress Note :: [phone], (office [phone]),   Dr Rosa / Dr Mosher / Dr Lin / Dr Narvaez / Dr Nando SOTELO / Dr Temple / Dr Silva / Dr Bjorn mendoza  _____________________________________________________________________________________________    metabolic acidosis.  remains on vent.    No Known Allergies    Hospital Medications:   MEDICATIONS  (STANDING):  apixaban 2.5 milliGRAM(s) Oral two times a day  cefepime   IVPB 2000 milliGRAM(s) IV Intermittent <User Schedule>  chlorhexidine 0.12% Liquid 15 milliLiter(s) Oral Mucosa every 12 hours  chlorhexidine 2% Cloths 1 Application(s) Topical <User Schedule>  fentaNYL   Infusion 0.5 MICROgram(s)/kG/Hr (4.31 mL/Hr) IV Continuous <Continuous>  midodrine 10 milliGRAM(s) Oral every 8 hours  norepinephrine Infusion 0.05 MICROgram(s)/kG/Min (4.04 mL/Hr) IV Continuous <Continuous>  pantoprazole  Injectable 40 milliGRAM(s) IV Push daily  polyethylene glycol 3350 17 Gram(s) Oral daily  senna 2 Tablet(s) Oral at bedtime        VITALS:  T(F): 97.5 (05-03-20 @ 16:15), Max: 98 (05-02-20 @ 19:00)  HR: 87 (05-03-20 @ 16:30)  BP: 93/46 (05-03-20 @ 07:00)  RR: 32 (05-03-20 @ 16:30)  SpO2: 95% (05-03-20 @ 16:30)  Wt(kg): --    05-02 @ 07:01  -  05-03 @ 07:00  --------------------------------------------------------  IN: 1480 mL / OUT: 100 mL / NET: 1380 mL    05-03 @ 07:01  -  05-03 @ 17:03  --------------------------------------------------------  IN: 697.1 mL / OUT: 0 mL / NET: 697.1 mL        PHYSICAL EXAM:  Constitutional:  HEENT: anicteric sclera, oropharynx clear.  Neck: No JVD  Respiratory: CTAB, no wheezes, rales or rhonchi  Cardiovascular: S1, S2, RRR  Gastrointestinal: BS+, soft, NT/ND  Extremities:  No peripheral edema  Neurological: A/O x 3, no focal deficits  : No CVA tenderness. No parr.       LABS:  05-03    133<L>  |  96  |  32<H>  ----------------------------<  95  3.8   |  24  |  4.17<H>    Ca    8.7      03 May 2020 06:34  Phos  4.9     05-03  Mg     2.4     05-03    TPro  7.7  /  Alb  1.8<L>  /  TBili  3.6<H>  /  DBili      /  AST  57<H>  /  ALT  46  /  AlkPhos  259<H>  05-03    Creatinine Trend: 4.17 <--, 4.73 <--, 3.46 <--, 4.37 <--, 4.02 <--, 3.27 <--, 4.52 <--, 4.92 <--                        8.9    15.00 )-----------( 277      ( 03 May 2020 06:34 )             29.9     Urine Studies:      RADIOLOGY & ADDITIONAL STUDIES:

## 2020-05-03 NOTE — PROGRESS NOTE ADULT - ASSESSMENT
Septic Shock  Bilat Pneumonia   COVID - 19 infection  Leukocytosis - stable  Resp failure    Plan - Cont Vancomycin to 750mgs iv each time patient is dialyzed  cont Maxipime to 2 gm iv q24hrs  might just give abxs for 2-3 days more and then watch off abxs.  prognosis - guarded  Time spent - 30 mins

## 2020-05-03 NOTE — PROGRESS NOTE ADULT - SUBJECTIVE AND OBJECTIVE BOX
INTERVAL HPI/OVERNIGHT EVENTS: No significant event overnight, NG tube was in stomach, cultures negative    PRESSORS: [ x] YES [ ] NO  WHICH: levophed    ANTIBIOTICS:                  DATE STARTED:  ANTIBIOTICS:                  DATE STARTED:  ANTIBIOTICS:                  DATE STARTED:    Antimicrobial:  cefepime   IVPB 2000 milliGRAM(s) IV Intermittent <User Schedule>    Cardiovascular:  midodrine 10 milliGRAM(s) Oral every 8 hours  norepinephrine Infusion 0.05 MICROgram(s)/kG/Min IV Continuous <Continuous>    Pulmonary:    Hematalogic:  apixaban 2.5 milliGRAM(s) Oral two times a day    Other:  acetaminophen    Suspension .. 650 milliGRAM(s) Oral every 6 hours PRN  chlorhexidine 0.12% Liquid 15 milliLiter(s) Oral Mucosa every 12 hours  chlorhexidine 2% Cloths 1 Application(s) Topical <User Schedule>  fentaNYL   Infusion 0.5 MICROgram(s)/kG/Hr IV Continuous <Continuous>  midazolam Infusion 0.02 mG/kG/Hr IV Continuous <Continuous>  pantoprazole  Injectable 40 milliGRAM(s) IV Push daily  polyethylene glycol 3350 17 Gram(s) Oral daily  senna 2 Tablet(s) Oral at bedtime    acetaminophen    Suspension .. 650 milliGRAM(s) Oral every 6 hours PRN  apixaban 2.5 milliGRAM(s) Oral two times a day  cefepime   IVPB 2000 milliGRAM(s) IV Intermittent <User Schedule>  chlorhexidine 0.12% Liquid 15 milliLiter(s) Oral Mucosa every 12 hours  chlorhexidine 2% Cloths 1 Application(s) Topical <User Schedule>  fentaNYL   Infusion 0.5 MICROgram(s)/kG/Hr IV Continuous <Continuous>  midazolam Infusion 0.02 mG/kG/Hr IV Continuous <Continuous>  midodrine 10 milliGRAM(s) Oral every 8 hours  norepinephrine Infusion 0.05 MICROgram(s)/kG/Min IV Continuous <Continuous>  pantoprazole  Injectable 40 milliGRAM(s) IV Push daily  polyethylene glycol 3350 17 Gram(s) Oral daily  senna 2 Tablet(s) Oral at bedtime    Drug Dosing Weight  Height (cm): 134.62 (07 Apr 2020 23:13)  Weight (kg): 86.2 (07 Apr 2020 23:13)  BMI (kg/m2): 47.6 (07 Apr 2020 23:13)  BSA (m2): 1.67 (07 Apr 2020 23:13)    CENTRAL LINE: [x ] YES [ ] NO  LOCATION: [facility]  DATE INSERTED: 4/30  REMOVE: [ ] YES [ ] NO  EXPLAIN:    RENNER: [ x] YES [ ] NO    DATE INSERTED:   REMOVE:  [ ] YES [ ] NO  EXPLAIN:    A-LINE:  [ x] YES [ ] NO  LOCATION: R axillary  DATE INSERTED: 4/26  REMOVE:  [ ] YES [ ] NO  EXPLAIN:    PMH -reviewed admission note, no change since admission  PAST MEDICAL & SURGICAL HISTORY:  HTN (hypertension)  No significant past surgical history      ICU Vital Signs Last 24 Hrs  T(C): 36.7 (02 May 2020 19:00), Max: 36.7 (02 May 2020 19:00)  T(F): 98 (02 May 2020 19:00), Max: 98 (02 May 2020 19:00)  HR: 102 (02 May 2020 23:00) (72 - 122)  BP: 117/60 (02 May 2020 23:00) (94/51 - 132/66)  BP(mean): 73 (02 May 2020 23:00) (60 - 86)  ABP: 132/71 (02 May 2020 23:00) (68/33 - 162/87)  ABP(mean): 94 (02 May 2020 23:00) (43 - 117)  RR: 32 (02 May 2020 23:00) (23 - 35)  SpO2: 95% (02 May 2020 23:00) (76% - 100%)      ABG - ( 02 May 2020 04:26 )  pH, Arterial: 7.23  pH, Blood: x     /  pCO2: 62    /  pO2: 87    / HCO3: 25    / Base Excess: -3.3  /  SaO2: 95                    05-01 @ 07:01  -  05-02 @ 07:00  --------------------------------------------------------  IN: 218.5 mL / OUT: 150 mL / NET: 68.5 mL        Mode: AC/ CMV (Assist Control/ Continuous Mandatory Ventilation)  RR (machine): 35  TV (machine): 400  FiO2: 70  PEEP: 9  ITime: 0.7  MAP: 26  PIP: 53      PHYSICAL EXAM:    GENERAL: Sedated and intubated   HEAD: Atraumatic, Normocephalic  EYES: PERRLA, conjunctiva and sclera clear  ENMT: No tonsillar erythema, exudates, or enlargement  NECK: Supple, normal appearance  NERVOUS SYSTEM: Sedated and intubated   CHEST/LUNG: No chest deformity; crackles present to auscultation   HEART: Regular rate and rhythm; No murmurs  ABDOMEN: Soft, Nontender, Nondistended; Bowel sounds present  EXTREMITIES: mild edema noted in upper extremities  LYMPH: No lymphadenopathy noted  SKIN: pressure sore noted over right cheek covered with bacitracin and dressing       LABS:  CBC Full  -  ( 02 May 2020 04:45 )  WBC Count : 13.23 K/uL  RBC Count : 3.33 M/uL  Hemoglobin : 9.1 g/dL  Hematocrit : 30.4 %  Platelet Count - Automated : 270 K/uL  Mean Cell Volume : 91.3 fl  Mean Cell Hemoglobin : 27.3 pg  Mean Cell Hemoglobin Concentration : 29.9 gm/dL  Auto Neutrophil # : x  Auto Lymphocyte # : x  Auto Monocyte # : x  Auto Eosinophil # : x  Auto Basophil # : x  Auto Neutrophil % : x  Auto Lymphocyte % : x  Auto Monocyte % : x  Auto Eosinophil % : x  Auto Basophil % : x    05-02    133<L>  |  97  |  41<H>  ----------------------------<  81  4.1   |  26  |  4.73<H>    Ca    8.9      02 May 2020 04:45  Phos  7.0     05-02  Mg     2.7     05-02    TPro  7.9  /  Alb  1.9<L>  /  TBili  3.9<H>  /  DBili  x   /  AST  55<H>  /  ALT  49  /  AlkPhos  239<H>  05-02    PT/INR - ( 01 May 2020 04:20 )   PT: 15.8 sec;   INR: 1.38 ratio         PTT - ( 01 May 2020 04:20 )  PTT:35.3 sec        RADIOLOGY & ADDITIONAL STUDIES REVIEWED:  yes    [ ]GOALS OF CARE DISCUSSION WITH PATIENT/FAMILY/PROXY:    CRITICAL CARE TIME SPENT: 35 minutes INTERVAL HPI/OVERNIGHT EVENTS: No significant event overnight, NG tube was in stomach, cultures negative. Patient had HD session yesterday. Current vent settings of 35/400/80/9.     PRESSORS: [ x] YES [ ] NO  WHICH: levophed    ANTIBIOTICS: cefepime          DATE STARTED: 4/17  ANTIBIOTICS: vancomycin       DATE STARTED: 4/27    Antimicrobial:  cefepime   IVPB 2000 milliGRAM(s) IV Intermittent <User Schedule>    Cardiovascular:  midodrine 10 milliGRAM(s) Oral every 8 hours  norepinephrine Infusion 0.05 MICROgram(s)/kG/Min IV Continuous <Continuous>    Hematalogic:  apixaban 2.5 milliGRAM(s) Oral two times a day    Other:  acetaminophen    Suspension .. 650 milliGRAM(s) Oral every 6 hours PRN  chlorhexidine 0.12% Liquid 15 milliLiter(s) Oral Mucosa every 12 hours  chlorhexidine 2% Cloths 1 Application(s) Topical <User Schedule>  fentaNYL   Infusion 0.5 MICROgram(s)/kG/Hr IV Continuous <Continuous>  midazolam Infusion 0.02 mG/kG/Hr IV Continuous <Continuous>  pantoprazole  Injectable 40 milliGRAM(s) IV Push daily  polyethylene glycol 3350 17 Gram(s) Oral daily  senna 2 Tablet(s) Oral at bedtime    acetaminophen    Suspension .. 650 milliGRAM(s) Oral every 6 hours PRN  apixaban 2.5 milliGRAM(s) Oral two times a day  cefepime   IVPB 2000 milliGRAM(s) IV Intermittent <User Schedule>  chlorhexidine 0.12% Liquid 15 milliLiter(s) Oral Mucosa every 12 hours  chlorhexidine 2% Cloths 1 Application(s) Topical <User Schedule>  fentaNYL   Infusion 0.5 MICROgram(s)/kG/Hr IV Continuous <Continuous>  midazolam Infusion 0.02 mG/kG/Hr IV Continuous <Continuous>  midodrine 10 milliGRAM(s) Oral every 8 hours  norepinephrine Infusion 0.05 MICROgram(s)/kG/Min IV Continuous <Continuous>  pantoprazole  Injectable 40 milliGRAM(s) IV Push daily  polyethylene glycol 3350 17 Gram(s) Oral daily  senna 2 Tablet(s) Oral at bedtime    Drug Dosing Weight  Height (cm): 134.62 (07 Apr 2020 23:13)  Weight (kg): 86.2 (07 Apr 2020 23:13)  BMI (kg/m2): 47.6 (07 Apr 2020 23:13)  BSA (m2): 1.67 (07 Apr 2020 23:13)    CENTRAL LINE: [x ] YES LOCATION: [facility]  DATE INSERTED: 4/30  REMOVE: NO     RENNER: [ x] YES    REMOVE: NO     A-LINE: [ x] YES LOCATION: R axillary  DATE INSERTED: 4/26  REMOVE: NO  EXPLAIN:    PMH -reviewed admission note, no change since admission  PAST MEDICAL & SURGICAL HISTORY:  HTN (hypertension)  No significant past surgical history      ICU Vital Signs Last 24 Hrs  T(C): 36.7 (02 May 2020 19:00), Max: 36.7 (02 May 2020 19:00)  T(F): 98 (02 May 2020 19:00), Max: 98 (02 May 2020 19:00)  HR: 102 (02 May 2020 23:00) (72 - 122)  BP: 117/60 (02 May 2020 23:00) (94/51 - 132/66)  BP(mean): 73 (02 May 2020 23:00) (60 - 86)  ABP: 132/71 (02 May 2020 23:00) (68/33 - 162/87)  ABP(mean): 94 (02 May 2020 23:00) (43 - 117)  RR: 32 (02 May 2020 23:00) (23 - 35)  SpO2: 95% (02 May 2020 23:00) (76% - 100%)      ABG - ( 02 May 2020 04:26 )  pH, Arterial: 7.23  pH, Blood: x     /  pCO2: 62    /  pO2: 87    / HCO3: 25    / Base Excess: -3.3  /  SaO2: 95          05-01 @ 07:01  -  05-02 @ 07:00  --------------------------------------------------------  IN: 218.5 mL / OUT: 150 mL / NET: 68.5 mL        Mode: AC/ CMV (Assist Control/ Continuous Mandatory Ventilation)  RR (machine): 35  TV (machine): 400  FiO2: 70  PEEP: 9  ITime: 0.7  MAP: 26  PIP: 53      PHYSICAL EXAM:  GENERAL: Sedated and intubated   HEAD: Atraumatic, Normocephalic  EYES: PERRLA, conjunctiva and sclera clear  ENMT: No tonsillar erythema, exudates, or enlargement  NECK: Supple, normal appearance  NERVOUS SYSTEM: Sedated and intubated   CHEST/LUNG: No chest deformity; crackles present to auscultation   HEART: Regular rate and rhythm; No murmurs  ABDOMEN: Soft, Nontender, Nondistended; Bowel sounds present  EXTREMITIES: mild edema noted in upper extremities  LYMPH: No lymphadenopathy noted  SKIN: pressure sore noted over right cheek covered with bacitracin and dressing       LABS:  CBC Full  -  ( 02 May 2020 04:45 )  WBC Count : 13.23 K/uL  RBC Count : 3.33 M/uL  Hemoglobin : 9.1 g/dL  Hematocrit : 30.4 %  Platelet Count - Automated : 270 K/uL  Mean Cell Volume : 91.3 fl  Mean Cell Hemoglobin : 27.3 pg  Mean Cell Hemoglobin Concentration : 29.9 gm/dL    05-02    133<L>  |  97  |  41<H>  ----------------------------<  81  4.1   |  26  |  4.73<H>    Ca    8.9      02 May 2020 04:45  Phos  7.0     05-02  Mg     2.7     05-02    TPro  7.9  /  Alb  1.9<L>  /  TBili  3.9<H>  /  DBili  x   /  AST  55<H>  /  ALT  49  /  AlkPhos  239<H>  05-02    PT/INR - ( 01 May 2020 04:20 )   PT: 15.8 sec;   INR: 1.38 ratio         PTT - ( 01 May 2020 04:20 )  PTT:35.3 sec      RADIOLOGY & ADDITIONAL STUDIES REVIEWED:  yes    < from: Xray Chest 1 View- PORTABLE-Routine (05.02.20 @ 09:59) >    IMPRESSION: Bilateral airspace disease compatible with atypical/viral pneumonia, unchanged.    < end of copied text >    GOALS OF CARE DISCUSSION WITH PATIENT/FAMILY/PROXY: FULL CODE     CRITICAL CARE TIME SPENT: 35 minutes

## 2020-05-03 NOTE — PROGRESS NOTE ADULT - NSTELEHEALTH_GEN_ALL_CORE
Problem: Patient Care Overview  Goal: Plan of Care/Patient Progress Review  Outcome: Improving  Motrin given for back pain and uterine cramping, relief obtained. Fundus firm and midline, U1. Patient states that she's not sure if she wants to continue breastfeeding, will reevaluate later in the shift. Giving baby formula ad wing. Vss       No

## 2020-05-04 NOTE — PROGRESS NOTE ADULT - ATTENDING COMMENTS
Assessment:  1. Acute Hypoxic Respiratory Failure  2. ARDS  3. COVID 19 infection  4. HTN  5. Hypercoagulable state  6. Septic shock  7. RASHAUN  8. Multi system organ failure      Plan  - Mechanical ventilation with lung protective strategy though remains with elevated plateau pressures  - Titrate Fio2 and PEEP as tolerated  - Sedation for ventilator synchrony   - Vasopressor support to maintain MAP > 60  - Monitor ABG  - S/p Anakinra and Hydroxychloroquine  - biomarkers are tending down  - Airborne and Contact isolation  - Nephrology  for daily dialysis  - Empiric antibiotics   - Bowel regimen  - DVT and Stress Ulcer prophylaxis   - D/c parr  - Over all prognosis is poor given multiple organ dysfunction .

## 2020-05-04 NOTE — PROGRESS NOTE ADULT - ASSESSMENT
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 Presented to ED with SOB, fever and hypoxia. Subsequently transfered to ICU intubated and developed ARDS s/p CQ and Anikinra. Subsequently developed RASHAUN with ensuing oliguria prompting a renal consult. Hypernatremia has improved. Severe hypoalbuminemia 1.3     # RASHAUN sec to ATN from COVID - 19 related disease. With ARDS. remains on pressors. hypoalbuminemia.  oliguria.  Pulm edema.  hypoxia  metabolic acidosis - resolved  HD in AM

## 2020-05-04 NOTE — CHART NOTE - NSCHARTNOTEFT_GEN_A_CORE
Palliative Care:    Reviewed status with ICU team. Received call from patient's surrogate/Criselda Egan ([phone]). Discussed status; reviewed medications and labs. All questions answered; supportive counseling provided.

## 2020-05-04 NOTE — PROGRESS NOTE ADULT - SUBJECTIVE AND OBJECTIVE BOX
55y Female    Meds:  cefepime   IVPB 2000 milliGRAM(s) IV Intermittent <User Schedule>    Allergies    No Known Allergies    Intolerances        VITALS:  Vital Signs Last 24 Hrs  T(C): 36.4 (04 May 2020 16:25), Max: 36.4 (04 May 2020 16:25)  T(F): 97.5 (04 May 2020 16:25), Max: 97.5 (04 May 2020 16:25)  HR: 107 (04 May 2020 18:00) (46 - 110)  BP: 119/68 (04 May 2020 12:00) (91/51 - 125/64)  BP(mean): 79 (04 May 2020 12:00) (59 - 81)  RR: 31 (04 May 2020 18:00) (25 - 35)  SpO2: 91% (04 May 2020 18:00) (88% - 99%)    LABS/DIAGNOSTIC TESTS:                          8.7    13.15 )-----------( 251      ( 04 May 2020 06:30 )             29.2         05-04    130<L>  |  96  |  46<H>  ----------------------------<  82  4.0   |  23  |  5.03<H>    Ca    9.4      04 May 2020 06:30  Phos  6.0     05-04  Mg     2.6     05-04    TPro  7.5  /  Alb  1.8<L>  /  TBili  3.3<H>  /  DBili  x   /  AST  56<H>  /  ALT  44  /  AlkPhos  236<H>  05-04      LIVER FUNCTIONS - ( 04 May 2020 06:30 )  Alb: 1.8 g/dL / Pro: 7.5 g/dL / ALK PHOS: 236 U/L / ALT: 44 U/L DA / AST: 56 U/L / GGT: x             CULTURES: .Blood Blood-Peripheral  04-17 @ 18:26   No Growth Final  --  --            RADIOLOGY:< from: Xray Chest 1 View- PORTABLE-Routine (05.04.20 @ 06:47) >  EXAM:  XR CHEST PORTABLE ROUTINE 1V                            PROCEDURE DATE:  05/04/2020          INTERPRETATION:    Examination: XR CHEST    History: ADMDIAG1: B34.9 VIRAL INFECTION, UNSPECIFIED/, interval change    Portable chest x-ray is compared to a previous examination dated 5/3/2020.    Impression: The examination is limited by patient's rotated position. Grossly stable ET tube, NG tube, and right IJ central venous catheter.    The left IJ central line appears to terminate at the left superior mediastinum. This catheter may terminate at the left brachiocephalic vein or aorta. Clinical correlation with blood gas from this central line is recommended.    Airspace opacifications in both lungs, progressed on the right and slightly improved on the left.    No evidence for pleural effusion or pneumothorax.    Grossly stable cardiac silhouette.    Dr. Caputo is informed.      DISCRETE X-RAY DATA:  Percent of LEFT lung opacification: %  Percent of RIGHT lung opacification: %  Change in lung opacification from most recent x-ray (<=3 days): Increase  Change from prior dated 3 or more days (same admission): Increase              < end of copied text >        ROS:  [  ] UNABLE TO ELICIT ICU VISIT  55y Female who remains in the ICU , she is sedated and intubated and vent dependent and is on an FIO2 of 100% today, she is still on a pressor. She is getting her dialysis on a daily basis for now. She is afebrile but is currently on a heating blanket.    Meds:  cefepime   IVPB 2000 milliGRAM(s) IV Intermittent <User Schedule>    Allergies    No Known Allergies    Intolerances        VITALS:  Vital Signs Last 24 Hrs  T(C): 36.4 (04 May 2020 16:25), Max: 36.4 (04 May 2020 16:25)  T(F): 97.5 (04 May 2020 16:25), Max: 97.5 (04 May 2020 16:25)  HR: 107 (04 May 2020 18:00) (46 - 110)  BP: 119/68 (04 May 2020 12:00) (91/51 - 125/64)  BP(mean): 79 (04 May 2020 12:00) (59 - 81)  RR: 31 (04 May 2020 18:00) (25 - 35)  SpO2: 91% (04 May 2020 18:00) (88% - 99%)    LABS/DIAGNOSTIC TESTS:                          8.7    13.15 )-----------( 251      ( 04 May 2020 06:30 )             29.2         05-04    130<L>  |  96  |  46<H>  ----------------------------<  82  4.0   |  23  |  5.03<H>    Ca    9.4      04 May 2020 06:30  Phos  6.0     05-04  Mg     2.6     05-04    TPro  7.5  /  Alb  1.8<L>  /  TBili  3.3<H>  /  DBili  x   /  AST  56<H>  /  ALT  44  /  AlkPhos  236<H>  05-04      LIVER FUNCTIONS - ( 04 May 2020 06:30 )  Alb: 1.8 g/dL / Pro: 7.5 g/dL / ALK PHOS: 236 U/L / ALT: 44 U/L DA / AST: 56 U/L / GGT: x             CULTURES: .Blood Blood-Peripheral  04-17 @ 18:26   No Growth Final  --  --            RADIOLOGY:< from: Xray Chest 1 View- PORTABLE-Routine (05.04.20 @ 06:47) >  EXAM:  XR CHEST PORTABLE ROUTINE 1V                            PROCEDURE DATE:  05/04/2020          INTERPRETATION:    Examination: XR CHEST    History: ADMDIAG1: B34.9 VIRAL INFECTION, UNSPECIFIED/, interval change    Portable chest x-ray is compared to a previous examination dated 5/3/2020.    Impression: The examination is limited by patient's rotated position. Grossly stable ET tube, NG tube, and right IJ central venous catheter.    The left IJ central line appears to terminate at the left superior mediastinum. This catheter may terminate at the left brachiocephalic vein or aorta. Clinical correlation with blood gas from this central line is recommended.    Airspace opacifications in both lungs, progressed on the right and slightly improved on the left.    No evidence for pleural effusion or pneumothorax.    Grossly stable cardiac silhouette.    Dr. Caputo is informed.      DISCRETE X-RAY DATA:  Percent of LEFT lung opacification: %  Percent of RIGHT lung opacification: %  Change in lung opacification from most recent x-ray (<=3 days): Increase  Change from prior dated 3 or more days (same admission): Increase              < end of copied text >        ROS:  [ x ] UNABLE TO ELICIT

## 2020-05-04 NOTE — PROGRESS NOTE ADULT - ASSESSMENT
Septic Shock  Bilat Pneumonia   COVID - 19 infection  Leukocytosis - decreased  Resp failure    Plan - Cont Vancomycin to 750mgs iv each time patient is dialyzed  cont Maxipime to 2 gm iv q24hrs  might just give abxs for 1-2  days more and then watch off abxs.  prognosis - guarded  Time spent - 30 mins

## 2020-05-04 NOTE — ADVANCED PRACTICE NURSE CONSULT - ASSESSMENT
This is a 55yr old female patient admitted for Viral Infection, presenting with R. Cheek Epidermal Stripping with red tissue and scant drainage. At this time, the patient is still medically unstable. Further evaluation of the patients posterior is not advised at this time.

## 2020-05-04 NOTE — PROGRESS NOTE ADULT - SUBJECTIVE AND OBJECTIVE BOX
Time of Visit:  Patient seen and examined.     MEDICATIONS  (STANDING):  apixaban 2.5 milliGRAM(s) Oral two times a day  cefepime   IVPB 2000 milliGRAM(s) IV Intermittent <User Schedule>  chlorhexidine 0.12% Liquid 15 milliLiter(s) Oral Mucosa every 12 hours  chlorhexidine 2% Cloths 1 Application(s) Topical <User Schedule>  fentaNYL   Infusion 0.5 MICROgram(s)/kG/Hr (4.31 mL/Hr) IV Continuous <Continuous>  midodrine 10 milliGRAM(s) Oral every 8 hours  norepinephrine Infusion 0.05 MICROgram(s)/kG/Min (4.04 mL/Hr) IV Continuous <Continuous>  pantoprazole  Injectable 40 milliGRAM(s) IV Push daily  polyethylene glycol 3350 17 Gram(s) Oral daily  senna 2 Tablet(s) Oral at bedtime      MEDICATIONS  (PRN):  acetaminophen    Suspension .. 650 milliGRAM(s) Oral every 6 hours PRN Temp greater or equal to 38C (100.4F)       Medications up to date at time of exam.      PHYSICAL EXAMINATION:  Patient has no new complaints.  GENERAL: The patient is a well-developed, well-nourished, in no apparent distress.     Vital Signs Last 24 Hrs  T(C): 36.4 (04 May 2020 16:25), Max: 36.4 (04 May 2020 16:25)  T(F): 97.5 (04 May 2020 16:25), Max: 97.5 (04 May 2020 16:25)  HR: 110 (04 May 2020 16:25) (46 - 110)  BP: 119/68 (04 May 2020 12:00) (91/51 - 125/64)  BP(mean): 79 (04 May 2020 12:00) (59 - 81)  RR: 35 (04 May 2020 16:25) (25 - 35)  SpO2: 90% (04 May 2020 16:25) (88% - 99%)  Mode: AC/ CMV (Assist Control/ Continuous Mandatory Ventilation),VC+  RR (machine): 35  TV (machine): 400  FiO2: 100  PEEP: 5  ITime: 0.7  MAP: 20  PIP: 40   (if applicable)    Chest Tube (if applicable)    HEENT: Head is normocephalic and atraumatic. Extraocular muscles are intact. Mucous membranes are moist.     NECK: Supple, no palpable adenopathy.    LUNGS: Clear to auscultation, no wheezing, rales, or rhonchi.    HEART: Regular rate and rhythm without murmur.    ABDOMEN: Soft, nontender, and nondistended.  No hepatosplenomegaly is noted.    : No painful voiding, no pelvic pain    EXTREMITIES: Without any cyanosis, clubbing, rash, lesions or edema.    NEUROLOGIC: Awake, alert, oriented, grossly intact    SKIN: Warm, dry, good turgor.      LABS:                        8.7    13.15 )-----------( 251      ( 04 May 2020 06:30 )             29.2     05-04    130<L>  |  96  |  46<H>  ----------------------------<  82  4.0   |  23  |  5.03<H>    Ca    9.4      04 May 2020 06:30  Phos  6.0     05-04  Mg     2.6     05-04    TPro  7.5  /  Alb  1.8<L>  /  TBili  3.3<H>  /  DBili  x   /  AST  56<H>  /  ALT  44  /  AlkPhos  236<H>  05-04    PT/INR - ( 04 May 2020 06:30 )   PT: 14.0 sec;   INR: 1.23 ratio         PTT - ( 04 May 2020 06:30 )  PTT:31.8 sec    ABG - ( 04 May 2020 16:27 )  pH, Arterial: 7.15  pH, Blood: x     /  pCO2: 68    /  pO2: 69    / HCO3: 23    / Base Excess: -6.1  /  SaO2: 90                CARDIAC MARKERS ( 04 May 2020 06:30 )  <0.015 ng/mL / x     / x     / x     / x          D-Dimer Assay, Quantitative: 675 ng/mL DDU (05-04-20 @ 06:30)        Procalcitonin, Serum: 2.42 ng/mL (05-04-20 @ 09:40)      MICROBIOLOGY: (if applicable)    RADIOLOGY & ADDITIONAL STUDIES:  EKG:   CXR:  ECHO:    IMPRESSION: 55y Female PAST MEDICAL & SURGICAL HISTORY:  HTN (hypertension)  No significant past surgical history   p/w           RECOMMENDATIONS: Time of Visit:  Patient seen and examined.     MEDICATIONS  (STANDING):  apixaban 2.5 milliGRAM(s) Oral two times a day  cefepime   IVPB 2000 milliGRAM(s) IV Intermittent <User Schedule>  chlorhexidine 0.12% Liquid 15 milliLiter(s) Oral Mucosa every 12 hours  chlorhexidine 2% Cloths 1 Application(s) Topical <User Schedule>  fentaNYL   Infusion 0.5 MICROgram(s)/kG/Hr (4.31 mL/Hr) IV Continuous <Continuous>  midodrine 10 milliGRAM(s) Oral every 8 hours  norepinephrine Infusion 0.05 MICROgram(s)/kG/Min (4.04 mL/Hr) IV Continuous <Continuous>  pantoprazole  Injectable 40 milliGRAM(s) IV Push daily  polyethylene glycol 3350 17 Gram(s) Oral daily  senna 2 Tablet(s) Oral at bedtime      MEDICATIONS  (PRN):  acetaminophen    Suspension .. 650 milliGRAM(s) Oral every 6 hours PRN Temp greater or equal to 38C (100.4F)       Medications up to date at time of exam.      PHYSICAL EXAMINATION:  Patient has no new complaints.  GENERAL: The patient is a well-developed, well-nourished, in no apparent distress.     Vital Signs Last 24 Hrs  T(C): 36.4 (04 May 2020 16:25), Max: 36.4 (04 May 2020 16:25)  T(F): 97.5 (04 May 2020 16:25), Max: 97.5 (04 May 2020 16:25)  HR: 110 (04 May 2020 16:25) (46 - 110)  BP: 119/68 (04 May 2020 12:00) (91/51 - 125/64)  BP(mean): 79 (04 May 2020 12:00) (59 - 81)  RR: 35 (04 May 2020 16:25) (25 - 35)  SpO2: 90% (04 May 2020 16:25) (88% - 99%)  Mode: AC/ CMV (Assist Control/ Continuous Mandatory Ventilation),VC+  RR (machine): 35  TV (machine): 400  FiO2: 100  PEEP: 5  ITime: 0.7  MAP: 20  PIP: 40   (if applicable)    Chest Tube (if applicable)    HEENT: Head is normocephalic and atraumatic. Extraocular muscles are intact. Mucous membranes are moist.  +ETT    NECK: Supple, no palpable adenopathy.    LUNGS: Clear to auscultation, no wheezing, rales, or rhonchi.    HEART: Regular rate and rhythm without murmur.    ABDOMEN: Soft, nontender, and nondistended.  No hepatosplenomegaly is noted.    : No painful voiding, no pelvic pain    EXTREMITIES: +2 edema.    NEUROLOGIC:sedated    SKIN: Warm, dry, good turgor.      LABS:                        8.7    13.15 )-----------( 251      ( 04 May 2020 06:30 )             29.2     05-04    130<L>  |  96  |  46<H>  ----------------------------<  82  4.0   |  23  |  5.03<H>    Ca    9.4      04 May 2020 06:30  Phos  6.0     05-04  Mg     2.6     05-04    TPro  7.5  /  Alb  1.8<L>  /  TBili  3.3<H>  /  DBili  x   /  AST  56<H>  /  ALT  44  /  AlkPhos  236<H>  05-04    PT/INR - ( 04 May 2020 06:30 )   PT: 14.0 sec;   INR: 1.23 ratio         PTT - ( 04 May 2020 06:30 )  PTT:31.8 sec    ABG - ( 04 May 2020 16:27 )  pH, Arterial: 7.15  pH, Blood: x     /  pCO2: 68    /  pO2: 69    / HCO3: 23    / Base Excess: -6.1  /  SaO2: 90                CARDIAC MARKERS ( 04 May 2020 06:30 )  <0.015 ng/mL / x     / x     / x     / x          D-Dimer Assay, Quantitative: 675 ng/mL DDU (05-04-20 @ 06:30)        Procalcitonin, Serum: 2.42 ng/mL (05-04-20 @ 09:40)      MICROBIOLOGY: (if applicable)    RADIOLOGY & ADDITIONAL STUDIES:  EKG:   CXR:  ECHO:    IMPRESSION: 55y Female PAST MEDICAL & SURGICAL HISTORY:  HTN (hypertension)  No significant past surgical history   p/w           IMP: This is a 54 yr old woman with  HTN, works as HD nurse,  presents to the ED w/ fever, chills, nausea, vomiting, and mild cough that began x1 week ago. Patient was admitted on medicine floor for COVID pna. ICU consulted for hypoxia and desaturation on NRB.  DDimer is trending up and is on . therapeutic anticoag.  Pat became more dyspnenic and hypoxic, intubated and placed on vent support sedated . WBC and procalcitonin is elevated on iv antibx. Will add vanco, change central line. . NO IL-6 at this time since CXR shows pul edema . Will request from neph for more frequent dialysis and if CXR improve or decrease FiO2 need then no IL-6. CRP, Ferritin and D Dimer are trending down . Started on HD by nephro s/p x 3 sessions. Plateau pressures still elevated with decrease in TV. biomarkers are trending down but WBC trending up. CXR no change today    Assessment:  -Acute Hypoxic Respiratory Failure  -B/L  PNA   -ARDS  -COVID 19 infection  -HTN  -Hypercaog state  -Septic shock  -RASHAUN  -MOSF      Plan  - Mechanical ventilation with lung protective strategy   - Decrease Fio2  - Titrate Fio2 and PEEP as tolerated  - elevated Plateau pressure  - Sedation for ventilator synchrony   - Vasopressor support to maintain MAP > 60  - Monitor ABG  - S/p Anakinra and Hydroxychloroquine  - biomarkers are tending down  - Airborne and Contact isolation  - Plan for prone positioning daily  - Nephrology  for daily dialysis  - Bowel regimen  - DVT and Stress Ulcer prophylaxis   - Neph for dialysis  - Over all prognosis is poor given multiple organ dysfunction .

## 2020-05-04 NOTE — PROGRESS NOTE ADULT - ASSESSMENT
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 Patient was admitted on medicine floor for COVID pna.  Admitted to ICU for hypoxia and desaturation on NRB.     Assessment:  - acute hypoxic respiratory failure secondary to COVID-19 pneumonia  - septic shock requiring pressors   - RASHAUN  - Leukocytosis  - COVID pandemic     Plan:  Neuro:  -Pt sedated with fentanyl drip    CV:  - Hold BP medications  - currently on levophed  - on midodrine 10mg q 8     Pulm:  - Intubated for respiratory distress and hypoxia 4/15  - CXR shows bilateral airspace opacities; improving s/p HD sessions; may be from fluid overload   - Acute Hypoxic Resp Failure secondary to PNA + COVID 19  - S/P ANAKINRA and Hydroxychloroquine (since April 9th)   - Discontinued full dose Lovenox due to epistaxis  - Current vent settings 35/380/80/5; plateau pressure elevated; possibly need to paralyze patient if not improving     - CXR findings may be due to fluid overload; s/p HD session 4/28, 4/29, 4/30, 5/2; planned for today nephro Dr. Rosa  - attempt to decrease FiO2 requirements once fluid overload improves; will hold off on trial of tocilizumab at this time   - ESR: 96>92>108>  - D-dimer: 1901>1936>820>692- 675  - LDH: 362>447>400>409->443  - procal 2.42  - crp 8.14    ID:  - completed Hydroxychloroquine, anakinra   Pt was febrile a few nights ago   blood culture negative  on Cefepime 2g daily since 4/17 vanco 750 intradialysis; VT of 19.5 5/3  WBC: elevated  ID Dr. Davey, f/u regarding further recommendations   *Pt to receive Vanco 750mg iv  dose Intradialysis as per Dr Davey     Nephro:  RASHAUN:  -Cr/BUN:  5.03/46  no longer on lasix drip as per nephro due to minimal urine output   S/P HD on 4/24, 4/25, 4/28, 4/28, 4/29, 4/30, 5/2; f/u nephro Dr. Rosa  - Monitor electrolytes   Hyponatremia: 130    Metabolic acidosis:  Improved   -off sodium bicarbonate  Monitor BMP  Nephrology: Dr. Rosa      GI:  -  Tube feed  - GI ppx with Protonix  - Miralax and senna for prophylaxis  - dc dulcolax     Heme:  - no indication for transfusion   - Hgb stable 8.7  - monitor cbc daily     Endo:  - No history of DM  - target CBG < 180  - FS q6 while Tube feed      Prophy:  - eliquis 2.5mg BID   - Protonix for GI prophylaxis

## 2020-05-04 NOTE — ADVANCED PRACTICE NURSE CONSULT - RECOMMEDATIONS
-Clean the R. Cheek wound with normal saline and apply skin prep to the surrounding skin  -Apply Bacitracin ointment to the wound bed and cover with a non-adherent dry dressing Daily PRN  -Elevate/float the patients heels using heel protectors and reposition the patient Q 2hrs using wedges or pillows

## 2020-05-04 NOTE — PROGRESS NOTE ADULT - SUBJECTIVE AND OBJECTIVE BOX
HISTORY  55y Female with COVID-19(+) pneumonia requiring intubation.    24 HOUR EVENTS:    SUBJECTIVE/ROS: Due to intubation with sedation, subjective information was not able to be obtained from the patient. History was obtained, to the extent possible, from review of the chart and collateral sources of information.      NEURO  Exam: sedated, no acute distress  Meds: acetaminophen    Suspension .. 650 milliGRAM(s) Oral every 6 hours PRN Temp greater or equal to 38C (100.4F)  fentaNYL   Infusion 0.5 MICROgram(s)/kG/Hr IV Continuous <Continuous>    Adequacy of sedation and pain control has been assessed and adjusted.      RESPIRATORY  RR: 29 (25 - 35)  SpO2: 93% (88% - 99%)  Exam: without dissynchrony  Mechanical Ventilation: Mode: AC/ CMV (Assist Control/ Continuous Mandatory Ventilation),VC+, RR (machine): 35, TV (machine): 400, FiO2: 100, PEEP: 5  Predicted Body Weight:  Tidal Volume/PBW ratio:  PaO2 to FiO2 ratio:  Extubation readiness assessed.  ABG - ( 04 May 2020 04:20 )  pH: 7.22  /  pCO2: 55    /  pO2: 63    / HCO3: 22    / Base Excess: -5.3  /  SaO2: 89      Lactate: x                CARDIOVASCULAR  HR: 81 (46 - 91)  ABP: 110/51 (87/46 - 127/66)  ABP(mean): 71 (60 - 89)  Cardiac Rhythm: sinus  Most recent QTc:   Meds: midodrine  norepinephrine Infusion (4.04 mL/Hr)        GI/NUTRITION  Diet: Diet, NPO with Tube Feed:   Tube Feeding Modality: Nasogastric  Nepro with Carb Steady  Total Volume for 24 Hours (mL): 960  Continuous  Starting Tube Feed Rate mL per Hour: 10  Increase Tube Feed Rate by (mL): 10     Every hour  Until Goal Tube Feed Rate (mL per Hour): 40  Tube Feed Duration (in Hours): 24  Tube Feed Start Time: 09:30  No Carb Prosource (1pkg = 15gms Protein)     Qty per Day:  2 (05-04-20 @ 09:09)    Most recent bowel movement:  Meds/stress ulcer prophylaxis: pantoprazole  Injectable 40 milliGRAM(s) IV Push daily  polyethylene glycol 3350 17 Gram(s) Oral daily  senna 2 Tablet(s) Oral at bedtime        GENITOURINARY  I&O's Detail    05-03 @ 07:01  -  05-04 @ 07:00  --------------------------------------------------------  IN:    Enteral Tube Flush: 600 mL    fentaNYL  Infusion: 948.4 mL    Nepro with Carb Steady: 340 mL    norepinephrine Infusion: 22.2 mL  Total IN: 1910.6 mL    OUT:    Indwelling Catheter - Urethral: 25 mL  Total OUT: 25 mL    Total NET: 1885.6 mL      05-04 @ 07:01 - 05-04 @ 10:48  --------------------------------------------------------  IN:    fentaNYL  Infusion: 129.3 mL    Nepro with Carb Steady: 60 mL    norepinephrine Infusion: 12 mL  Total IN: 201.3 mL    OUT:  Total OUT: 0 mL    Total NET: 201.3 mL          05-04    130<L>  |  96  |  46<H>  ----------------------------<  82  4.0   |  23  |  5.03<H>    Ca    9.4      04 May 2020 06:30  Phos  6.0     05-04  Mg     2.6     05-04    TPro  7.5  /  Alb  1.8<L>  /  TBili  3.3<H>  /  DBili  x   /  AST  56<H>  /  ALT  44  /  AlkPhos  236<H>  05-04    [x] Renner catheter, indication: urine output monitoring in critically ill patient.  Meds:         HEMATOLOGIC  Meds/VTE prophylaxis: apixaban 2.5 milliGRAM(s) Oral two times a day                          8.7    13.15 )-----------( 251      ( 04 May 2020 06:30 )             29.2     PT/INR - ( 04 May 2020 06:30 )   PT: 14.0 sec;   INR: 1.23 ratio         PTT - ( 04 May 2020 06:30 )  PTT:31.8 sec  [id number] @ 09:39        INFECTIOUS DISEASES  T(C): 36.1, Max: 36.4 (05-03-20 @ 16:15)  WBC Count: 13.15 (05-04-20 @ 06:30)  WBC Count: 15.00 (05-03-20 @ 06:34)    Recent Cultures:    Meds: cefepime   IVPB 2000 milliGRAM(s) IV Intermittent <User Schedule>    Procalcitonin, Serum: 2.42 ng/mL (05-04-20 @ 09:40)        ENDOCRINE  Fingersticks: 105, 101, 98  Meds:       ACCESS DEVICES:    CENTRAL LINE: [x ] YES LOCATION: [facility]  DATE INSERTED: 4/30  REMOVE: NO     RENNER: [ x] YES    REMOVE: NO     A-LINE: [ x] YES LOCATION: R axillary  DATE INSERTED: 4/26  REMOVE: NO  EXPLAIN:  [ ] [facility] Access             [x ] R [ ] L [ ] IJ  [ ] Fem     Placed:4/24  [x] Urinary Catheter, Date Placed:   Necessity of urinary, arterial, and venous catheters discussed.      CODE STATUS: full code     IMAGING:

## 2020-05-04 NOTE — PROGRESS NOTE ADULT - SUBJECTIVE AND OBJECTIVE BOX
[facility] : Progress Note :: [phone], (office [phone]),   Dr Rosa / Dr Mosher / Dr Lin / Dr Narvaez / Dr Nando SOTELO / Dr Temple / Dr Silva / Dr Bjorn mendoza  _____________________________________________________________________________________________    remains with high FI02 reguirements.    No Known Allergies    Hospital Medications:   MEDICATIONS  (STANDING):  apixaban 2.5 milliGRAM(s) Oral two times a day  cefepime   IVPB 2000 milliGRAM(s) IV Intermittent <User Schedule>  chlorhexidine 0.12% Liquid 15 milliLiter(s) Oral Mucosa every 12 hours  chlorhexidine 2% Cloths 1 Application(s) Topical <User Schedule>  fentaNYL   Infusion 0.5 MICROgram(s)/kG/Hr (4.31 mL/Hr) IV Continuous <Continuous>  midodrine 10 milliGRAM(s) Oral every 8 hours  norepinephrine Infusion 0.05 MICROgram(s)/kG/Min (4.04 mL/Hr) IV Continuous <Continuous>  pantoprazole  Injectable 40 milliGRAM(s) IV Push daily  polyethylene glycol 3350 17 Gram(s) Oral daily  senna 2 Tablet(s) Oral at bedtime        VITALS:  T(F): 97.2 (05-04-20 @ 13:00), Max: 97.5 (05-03-20 @ 16:15)  HR: 108 (05-04-20 @ 15:00)  BP: 119/68 (05-04-20 @ 12:00)  RR: 28 (05-04-20 @ 15:00)  SpO2: 88% (05-04-20 @ 15:00)  Wt(kg): --    05-03 @ 07:01  -  05-04 @ 07:00  --------------------------------------------------------  IN: 1910.6 mL / OUT: 25 mL / NET: 1885.6 mL    05-04 @ 07:01  -  05-04 @ 16:03  --------------------------------------------------------  IN: 469.7 mL / OUT: 0 mL / NET: 469.7 mL        PHYSICAL EXAM:  Constitutional: NAD  HEENT: anicteric sclera, oropharynx yoly.  Neck: No JVD  Respiratory: CTAB, no wheezes, rales or rhonchi  Cardiovascular: S1, S2, RRR  Gastrointestinal: BS+, soft, NT/ND  Extremities:  No peripheral edema  Neurological: A/O x 3, no focal deficits  : parr.+  Vascular Access: IJ permacath    LABS:  05-04    130<L>  |  96  |  46<H>  ----------------------------<  82  4.0   |  23  |  5.03<H>    Ca    9.4      04 May 2020 06:30  Phos  6.0     05-04  Mg     2.6     05-04    TPro  7.5  /  Alb  1.8<L>  /  TBili  3.3<H>  /  DBili      /  AST  56<H>  /  ALT  44  /  AlkPhos  236<H>  05-04    Creatinine Trend: 5.03 <--, 4.17 <--, 4.73 <--, 3.46 <--, 4.37 <--, 4.02 <--, 3.27 <--, 4.52 <--                        8.7    13.15 )-----------( 251      ( 04 May 2020 06:30 )             29.2     Urine Studies:      RADIOLOGY & ADDITIONAL STUDIES:

## 2020-05-05 NOTE — ADVANCED PRACTICE NURSE CONSULT - RECOMMEDATIONS
-Clean the R. Cheek wound with normal saline and apply skin prep to the surrounding skin  -Apply Collagenase ointment to the eschar areas of the R. Cheek wound, apply saline moistened gauze to the wound bed, and cover with a Foam dressing Daily PRN  -Elevate/float the patients heels using heel protectors and reposition the patient Q 2hrs using wedges or pillows

## 2020-05-05 NOTE — CHART NOTE - NSCHARTNOTEFT_GEN_A_CORE
SW PC  participated in a conversation with PC NP and patient's sister in law Criselda Egan [phone].  NP provided a clinical update stating the patient's overall prognosis is poor.  Emotional support was provided.  Criselda shared feeling overwhelmed communicating "bad news" to her brother who is the patient's .  PC Sw asked if Criselda would prefer to have a member of the medical team contact her brother as this seems to be distressing. Criselda agreed and also stated her niece Rere [phone] would benefit from support. PC Sw stated after her father updates her regarding her mothers' condition this  will follow up with Rere.  Prior to this admission Rere and her mother were residing together in [place]. Rere is a college student and has been ill and quarantined, therefore does not have support.  PC  will remain available for collaboration with the Medicine Sw and to provide emotional support. PC  participated in a conversation with PC NP and patient's sister in law Criselda Egan [phone].  NP provided a clinical update stating the patient's overall prognosis is poor.  Emotional support was provided.  Criselda shared feeling overwhelmed communicating "bad news" to her brother who is the patient's  Dr. Lees [phone].  Sw asked if Criselda would prefer to have a member of the medical team contact her brother as this seems to be distressing. Criselda agreed and also stated her niece Rere [phone] would benefit from support. PC Sw stated after her father updates her regarding her mothers' condition this  will follow up with Rere.  Prior to this admission Rere and her mother were residing together in [place]. Rere is a college student and has been ill and quarantined, therefore does not have support.    will remain available for collaboration with the Medicine Sw and to provide emotional support.

## 2020-05-05 NOTE — ADVANCED PRACTICE NURSE CONSULT - ASSESSMENT
This is a 55yr old female patient admitted for Viral Infection, presenting with R. Cheek Ischemic wound with evidence of ischemia, necrotic tissue, eschar, red tissue, induration, and scant drainage. At this time, the patient is still medically unstable. Further evaluation of the patients posterior is not advised at this time.

## 2020-05-05 NOTE — PROGRESS NOTE ADULT - PROBLEM SELECTOR PROBLEM 3
ARDS (adult respiratory distress syndrome) Acute respiratory distress syndrome (ARDS) due to COVID-19 virus

## 2020-05-05 NOTE — PROGRESS NOTE ADULT - SUBJECTIVE AND OBJECTIVE BOX
ICU VISIT  55y Female    Meds:  cefepime   IVPB 2000 milliGRAM(s) IV Intermittent <User Schedule>    Allergies    No Known Allergies    Intolerances        VITALS:  Vital Signs Last 24 Hrs  T(C): 36.6 (05 May 2020 16:00), Max: 37.3 (05 May 2020 03:44)  T(F): 97.8 (05 May 2020 16:00), Max: 99.1 (05 May 2020 03:44)  HR: 89 (05 May 2020 16:30) (89 - 124)  BP: 114/62 (05 May 2020 16:00) (77/38 - 132/72)  BP(mean): 75 (05 May 2020 16:00) (47 - 80)  RR: 19 (05 May 2020 16:30) (17 - 35)  SpO2: 95% (05 May 2020 16:30) (86% - 97%)    LABS/DIAGNOSTIC TESTS:                          8.7    15.10 )-----------( 274      ( 05 May 2020 06:31 )             29.4         05-05    128<L>  |  94<L>  |  54<H>  ----------------------------<  100<H>  4.8   |  20<L>  |  5.92<H>    Ca    8.7      05 May 2020 06:31  Phos  8.5     05-05  Mg     2.8     05-05    TPro  8.1  /  Alb  1.9<L>  /  TBili  3.5<H>  /  DBili  x   /  AST  66<H>  /  ALT  55  /  AlkPhos  272<H>  05-05      LIVER FUNCTIONS - ( 05 May 2020 06:31 )  Alb: 1.9 g/dL / Pro: 8.1 g/dL / ALK PHOS: 272 U/L / ALT: 55 U/L DA / AST: 66 U/L / GGT: x             CULTURES: .Blood Blood-Peripheral  04-17 @ 18:26   No Growth Final  --  --            RADIOLOGY:< from: Xray Chest 1 View- PORTABLE-Routine (05.05.20 @ 09:24) >  EXAM:  XR CHEST PORTABLE ROUTINE 1V                            PROCEDURE DATE:  05/05/2020          INTERPRETATION:    Examination: XR CHEST    History: ADMDIAG1: B34.9 VIRAL INFECTION, UNSPECIFIED/, intubated    Portable chest x-ray is compared toa previous examination dated 5/4/2020.    Impression: ET tube terminates at 4.9 cm above the dinesh. Stable NG tube and bilateral IJ central venous catheters.    The right costophrenic angle is excluded from the radiograph. No gross pleural effusion or pneumothorax.    Airspace opacifications in both lungs, grossly unchanged.    The trachea is midline.    Stable cardiac silhouette.    DISCRETE X-RAY DATA:  Percent of LEFT lung opacification: %  Percent of RIGHT lung opacification: %  Change in lung opacification from most recent x-ray (<=3 days): Stable  Change from prior dated 3 or more days (same admission): Increase                  JIMY TRACEY M.D., ATTENDING RADIOLOGIST  This document has been electronically signed. May  5 2020  9:48AM              < end of copied text >        ROS:  [  ] UNABLE TO ELICIT ICU VISIT  55y Female who remains in the ICU, she is sedated , intubated and vent dependent, still on a pressor, she is bleeding from her mouth currently when suctioned. She is not doing well clinically and has been made DNR by her family. She has no fevers and has an elevated WBC count but has been stable.    Meds:  cefepime   IVPB 2000 milliGRAM(s) IV Intermittent <User Schedule>    Allergies    No Known Allergies    Intolerances        VITALS:  Vital Signs Last 24 Hrs  T(C): 36.6 (05 May 2020 16:00), Max: 37.3 (05 May 2020 03:44)  T(F): 97.8 (05 May 2020 16:00), Max: 99.1 (05 May 2020 03:44)  HR: 89 (05 May 2020 16:30) (89 - 124)  BP: 114/62 (05 May 2020 16:00) (77/38 - 132/72)  BP(mean): 75 (05 May 2020 16:00) (47 - 80)  RR: 19 (05 May 2020 16:30) (17 - 35)  SpO2: 95% (05 May 2020 16:30) (86% - 97%)    LABS/DIAGNOSTIC TESTS:                          8.7    15.10 )-----------( 274      ( 05 May 2020 06:31 )             29.4         05-05    128<L>  |  94<L>  |  54<H>  ----------------------------<  100<H>  4.8   |  20<L>  |  5.92<H>    Ca    8.7      05 May 2020 06:31  Phos  8.5     05-05  Mg     2.8     05-05    TPro  8.1  /  Alb  1.9<L>  /  TBili  3.5<H>  /  DBili  x   /  AST  66<H>  /  ALT  55  /  AlkPhos  272<H>  05-05      LIVER FUNCTIONS - ( 05 May 2020 06:31 )  Alb: 1.9 g/dL / Pro: 8.1 g/dL / ALK PHOS: 272 U/L / ALT: 55 U/L DA / AST: 66 U/L / GGT: x             CULTURES: .Blood Blood-Peripheral  04-17 @ 18:26   No Growth Final  --  --            RADIOLOGY:< from: Xray Chest 1 View- PORTABLE-Routine (05.05.20 @ 09:24) >  EXAM:  XR CHEST PORTABLE ROUTINE 1V                            PROCEDURE DATE:  05/05/2020          INTERPRETATION:    Examination: XR CHEST    History: ADMDIAG1: B34.9 VIRAL INFECTION, UNSPECIFIED/, intubated    Portable chest x-ray is compared toa previous examination dated 5/4/2020.    Impression: ET tube terminates at 4.9 cm above the dinesh. Stable NG tube and bilateral IJ central venous catheters.    The right costophrenic angle is excluded from the radiograph. No gross pleural effusion or pneumothorax.    Airspace opacifications in both lungs, grossly unchanged.    The trachea is midline.    Stable cardiac silhouette.    DISCRETE X-RAY DATA:  Percent of LEFT lung opacification: %  Percent of RIGHT lung opacification: %  Change in lung opacification from most recent x-ray (<=3 days): Stable  Change from prior dated 3 or more days (same admission): Increase                  JIMY TRACEY M.D., ATTENDING RADIOLOGIST  This document has been electronically signed. May  5 2020  9:48AM              < end of copied text >        ROS:  [ x ] UNABLE TO ELICIT

## 2020-05-05 NOTE — PROGRESS NOTE ADULT - SUBJECTIVE AND OBJECTIVE BOX
[facility] : Progress Note :: [phone], (office [phone]),   Dr Rosa / Dr Mosher / Dr Lin / Dr Narvaez / Dr Nando SOTELO / Dr Temple / Dr Silva / Dr Bjorn mendoza  _____________________________________________________________________________________________    remains with poor ventilation. resp acidosis, hypoxia and pulm edema    No Known Allergies    Hospital Medications:   MEDICATIONS  (STANDING):  apixaban 2.5 milliGRAM(s) Oral two times a day  calcium acetate 667 milliGRAM(s) Oral three times a day with meals  cefepime   IVPB 2000 milliGRAM(s) IV Intermittent <User Schedule>  chlorhexidine 0.12% Liquid 15 milliLiter(s) Oral Mucosa every 12 hours  chlorhexidine 2% Cloths 1 Application(s) Topical <User Schedule>  collagenase Ointment 1 Application(s) Topical daily  dexMEDEtomidine Infusion 0.2 MICROgram(s)/kG/Hr (4.31 mL/Hr) IV Continuous <Continuous>  fentaNYL   Infusion 0.5 MICROgram(s)/kG/Hr (4.31 mL/Hr) IV Continuous <Continuous>  midodrine 10 milliGRAM(s) Oral every 8 hours  mupirocin 2% Nasal 1 Application(s) Nasal two times a day  norepinephrine Infusion 0.05 MICROgram(s)/kG/Min (4.04 mL/Hr) IV Continuous <Continuous>  pantoprazole  Injectable 40 milliGRAM(s) IV Push daily  polyethylene glycol 3350 17 Gram(s) Oral daily  senna 2 Tablet(s) Oral at bedtime      VITALS:  T(F): 97.1 (05-05-20 @ 13:20), Max: 99.1 (05-05-20 @ 03:44)  HR: 108 (05-05-20 @ 14:00)  BP: 124/72 (05-05-20 @ 13:20)  RR: 23 (05-05-20 @ 14:00)  SpO2: 96% (05-05-20 @ 14:00)  Wt(kg): --    05-04 @ 07:01  -  05-05 @ 07:00  --------------------------------------------------------  IN: 1464.1 mL / OUT: 0 mL / NET: 1464.1 mL    05-05 @ 07:01  -  05-05 @ 15:24  --------------------------------------------------------  IN: 0 mL / OUT: 1700 mL / NET: -1700 mL        PHYSICAL EXAM:  deferred    LABS:  05-05    128<L>  |  94<L>  |  54<H>  ----------------------------<  100<H>  4.8   |  20<L>  |  5.92<H>    Ca    8.7      05 May 2020 06:31  Phos  8.5     05-05  Mg     2.8     05-05    TPro  8.1  /  Alb  1.9<L>  /  TBili  3.5<H>  /  DBili      /  AST  66<H>  /  ALT  55  /  AlkPhos  272<H>  05-05    Creatinine Trend: 5.92 <--, 5.03 <--, 4.17 <--, 4.73 <--, 3.46 <--, 4.37 <--, 4.02 <--, 3.27 <--                        8.7    15.10 )-----------( 274      ( 05 May 2020 06:31 )             29.4     Urine Studies:      RADIOLOGY & ADDITIONAL STUDIES:

## 2020-05-05 NOTE — PROGRESS NOTE ADULT - SUBJECTIVE AND OBJECTIVE BOX
OVERNIGHT EVENTS:    Present Symptoms:   Dyspnea:   Nausea/Vomiting:   Anxiety:    Depressed Mood:   Fatigue:   Loss of appetite:   Pain:                   location:   Review of Systems: [All others negative or Unable to obtain due to poor mentation]    MEDICATIONS  (STANDING):  apixaban 2.5 milliGRAM(s) Oral two times a day  calcium acetate 667 milliGRAM(s) Oral three times a day with meals  cefepime   IVPB 2000 milliGRAM(s) IV Intermittent <User Schedule>  chlorhexidine 0.12% Liquid 15 milliLiter(s) Oral Mucosa every 12 hours  chlorhexidine 2% Cloths 1 Application(s) Topical <User Schedule>  fentaNYL   Infusion 0.5 MICROgram(s)/kG/Hr (4.31 mL/Hr) IV Continuous <Continuous>  midodrine 10 milliGRAM(s) Oral every 8 hours  norepinephrine Infusion 0.05 MICROgram(s)/kG/Min (4.04 mL/Hr) IV Continuous <Continuous>  pantoprazole  Injectable 40 milliGRAM(s) IV Push daily  polyethylene glycol 3350 17 Gram(s) Oral daily  senna 2 Tablet(s) Oral at bedtime    MEDICATIONS  (PRN):  acetaminophen    Suspension .. 650 milliGRAM(s) Oral every 6 hours PRN Temp greater or equal to 38C (100.4F)      PHYSICAL EXAM:  Vital Signs Last 24 Hrs  T(C): 36.3 (05 May 2020 07:53), Max: 37.3 (05 May 2020 03:44)  T(F): 97.3 (05 May 2020 07:53), Max: 99.1 (05 May 2020 03:44)  HR: 94 (05 May 2020 08:49) (77 - 124)  BP: 115/68 (05 May 2020 07:00) (77/38 - 123/56)  BP(mean): 79 (05 May 2020 07:00) (47 - 80)  RR: 29 (05 May 2020 07:00) (17 - 35)  SpO2: 95% (05 May 2020 08:49) (86% - 95%)  General: alert  oriented x ____    [lethargic distressed cachexia  nonverbal  unarousable verbal]  Karnofsky Performance Score/Palliative Performance Status Version2:    %    HEENT: normal  dry mouth  ET tube/trach oral lesions:  Lungs: comfortable tachypnea/labored breathing  excessive secretions  CV: normal  tachycardia  GI: normal  distended  tender  incontinent               PEG/NG/OG tube  constipation  last BM:   : normal  incontinent  oliguria/anuria  parr  Musculoskeletal: normal  weakness  edema             ambulatory  bedbound/wheelchair bound  Skin: normal  pressure ulcers: stage: edema: other:  Neuro: no deficits cognitive impairment dsyphagia/dysarthria paresis: other:  Oral intake ability: unable/only mouth care [minimal moderate full capability]  Diet: NPO,     LABS:                          8.7    15.10 )-----------( 274      ( 05 May 2020 06:31 )             29.4     05-05    128<L>  |  94<L>  |  54<H>  ----------------------------<  100<H>  4.8   |  20<L>  |  5.92<H>    Ca    8.7      05 May 2020 06:31  Phos  8.5     05-05  Mg     2.8     05-05    TPro  8.1  /  Alb  1.9<L>  /  TBili  3.5<H>  /  DBili  x   /  AST  66<H>  /  ALT  55  /  AlkPhos  272<H>  05-05        RADIOLOGY & ADDITIONAL STUDIES:    ADVANCE DIRECTIVES:  Advanced Care Planning discussion total time spent: OVERNIGHT EVENTS: Per team, desaturating despite FiO2 100%, Peep 10    Present Symptoms:         Review of Systems:  Unable to obtain - patient sedated/intubated.     MEDICATIONS  (STANDING):  apixaban 2.5 milliGRAM(s) Oral two times a day  calcium acetate 667 milliGRAM(s) Oral three times a day with meals  cefepime   IVPB 2000 milliGRAM(s) IV Intermittent <User Schedule>  chlorhexidine 0.12% Liquid 15 milliLiter(s) Oral Mucosa every 12 hours  chlorhexidine 2% Cloths 1 Application(s) Topical <User Schedule>  fentaNYL   Infusion 0.5 MICROgram(s)/kG/Hr (4.31 mL/Hr) IV Continuous <Continuous>  midodrine 10 milliGRAM(s) Oral every 8 hours  norepinephrine Infusion 0.05 MICROgram(s)/kG/Min (4.04 mL/Hr) IV Continuous <Continuous>  pantoprazole  Injectable 40 milliGRAM(s) IV Push daily  polyethylene glycol 3350 17 Gram(s) Oral daily  senna 2 Tablet(s) Oral at bedtime    MEDICATIONS  (PRN):  acetaminophen    Suspension .. 650 milliGRAM(s) Oral every 6 hours PRN Temp greater or equal to 38C (100.4F)      PHYSICAL EXAM:  Vital Signs Last 24 Hrs  T(C): 36.3 (05 May 2020 07:53), Max: 37.3 (05 May 2020 03:44)  T(F): 97.3 (05 May 2020 07:53), Max: 99.1 (05 May 2020 03:44)  HR: 94 (05 May 2020 08:49) (77 - 124)  BP: 115/68 (05 May 2020 07:00) (77/38 - 123/56)  BP(mean): 79 (05 May 2020 07:00) (47 - 80)  RR: 29 (05 May 2020 07:00) (17 - 35)  SpO2: 95% (05 May 2020 08:49) (86% - 95%)  General: Patient not medically stable for full physical exam. Patient sedated/intubated, on pressor   Karnofsky Performance Score/Palliative Performance Status Version2:    20 %    HEENT: ET tube    Lungs:  tachypnea, on ventilator, dilaudid, versed  CV: on pressor  GI: obese, incontinent, NG tube  :   parr  Musculoskeletal:  patient sedated/intubated - dependent on all ADLs  Skin: patient not medically stable for evaluation  Neuro: patient sedated/intubated, dependent on ADLs  Oral intake ability: unable/only mouth care   Diet: NPO; TF via NG tube      LABS:                          8.7    15.10 )-----------( 274      ( 05 May 2020 06:31 )             29.4     05-05    128<L>  |  94<L>  |  54<H>  ----------------------------<  100<H>  4.8   |  20<L>  |  5.92<H>    Ca    8.7      05 May 2020 06:31  Phos  8.5     05-05  Mg     2.8     05-05    TPro  8.1  /  Alb  1.9<L>  /  TBili  3.5<H>  /  DBili  x   /  AST  66<H>  /  ALT  55  /  AlkPhos  272<H>  05-05        RADIOLOGY & ADDITIONAL STUDIES:    ADVANCE DIRECTIVES:  Advanced Care Planning discussion total time spent: OVERNIGHT EVENTS: Per team, desaturating despite FiO2 100%, Peep 10    Present Symptoms:         Review of Systems:  Unable to obtain - patient sedated/intubated.     MEDICATIONS  (STANDING):  apixaban 2.5 milliGRAM(s) Oral two times a day  calcium acetate 667 milliGRAM(s) Oral three times a day with meals  cefepime   IVPB 2000 milliGRAM(s) IV Intermittent <User Schedule>  chlorhexidine 0.12% Liquid 15 milliLiter(s) Oral Mucosa every 12 hours  chlorhexidine 2% Cloths 1 Application(s) Topical <User Schedule>  fentaNYL   Infusion 0.5 MICROgram(s)/kG/Hr (4.31 mL/Hr) IV Continuous <Continuous>  midodrine 10 milliGRAM(s) Oral every 8 hours  norepinephrine Infusion 0.05 MICROgram(s)/kG/Min (4.04 mL/Hr) IV Continuous <Continuous>  pantoprazole  Injectable 40 milliGRAM(s) IV Push daily  polyethylene glycol 3350 17 Gram(s) Oral daily  senna 2 Tablet(s) Oral at bedtime    MEDICATIONS  (PRN):  acetaminophen    Suspension .. 650 milliGRAM(s) Oral every 6 hours PRN Temp greater or equal to 38C (100.4F)      PHYSICAL EXAM:  Vital Signs Last 24 Hrs  T(C): 36.3 (05 May 2020 07:53), Max: 37.3 (05 May 2020 03:44)  T(F): 97.3 (05 May 2020 07:53), Max: 99.1 (05 May 2020 03:44)  HR: 94 (05 May 2020 08:49) (77 - 124)  BP: 115/68 (05 May 2020 07:00) (77/38 - 123/56)  BP(mean): 79 (05 May 2020 07:00) (47 - 80)  RR: 29 (05 May 2020 07:00) (17 - 35)  SpO2: 95% (05 May 2020 08:49) (86% - 95%)  General: Patient not medically stable for full physical exam. Patient sedated/intubated, on pressor   Karnofsky Performance Score/Palliative Performance Status Version2:    20 %    HEENT: ET tube    Lungs:  tachypnea, on ventilator, precedex, fentanyl, FiO2 100  CV: on pressor and midodrine  GI:  incontinent, NG tube  :   parr  Musculoskeletal:  patient sedated/intubated - dependent on all ADLs  Skin: patient not medically stable for evaluation  Neuro: patient sedated/intubated, dependent on ADLs  Oral intake ability: unable/only mouth care   Diet: NPO; TF via NG tube      LABS:                          8.7    15.10 )-----------( 274      ( 05 May 2020 06:31 )             29.4     05-05    128<L>  |  94<L>  |  54<H>  ----------------------------<  100<H>  4.8   |  20<L>  |  5.92<H>    Ca    8.7      05 May 2020 06:31  Phos  8.5     05-05  Mg     2.8     05-05    TPro  8.1  /  Alb  1.9<L>  /  TBili  3.5<H>  /  DBili  x   /  AST  66<H>  /  ALT  55  /  AlkPhos  272<H>  05-05    Albumin: 1.9    RADIOLOGY & ADDITIONAL STUDIES:  < from: Xray Chest 1 View- PORTABLE-Routine (05.05.20 @ 09:24) >  EXAM:  XR CHEST PORTABLE ROUTINE 1V                        PROCEDURE DATE:  05/05/2020    INTERPRETATION:    Examination: XR CHEST  History: ADMDIAG1: B34.9 VIRAL INFECTION, UNSPECIFIED/, intubated  Portable chest x-ray is compared toa previous examination dated 5/4/2020.    Impression: ET tube terminates at 4.9 cm above the dinesh. Stable NG tube and bilateral IJ central venous catheters.  The right costophrenic angle is excluded from the radiograph. No gross pleural effusion or pneumothorax.  Airspace opacifications in both lungs, grossly unchanged.  The trachea is midline.  Stable cardiac silhouette.    DISCRETE X-RAY DATA:  Percent of LEFT lung opacification: %  Percent of RIGHT lung opacification: %  Change in lung opacification from most recent x-ray (<=3 days): Stable  Change from prior dated 3 or more days (same admission): Increase    < end of copied text >      ADVANCE DIRECTIVES: Full code

## 2020-05-05 NOTE — PROGRESS NOTE ADULT - ASSESSMENT
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 Patient was admitted on medicine floor for COVID pna.  Admitted to ICU for hypoxia and desaturation on NRB.     Assessment:  - acute hypoxic respiratory failure secondary to COVID-19 pneumonia  - septic shock requiring pressors   - RASHAUN  - Leukocytosis  - COVID pandemic     Plan:  Neuro:  -Pt sedated with fentanyl, precedex will taper to check mental status   CV:  - Hold BP medications  - currently on levophed  - on midodrine 10mg q 8     Pulm:  - Intubated for respiratory distress and hypoxia 4/15  - CXR shows bilateral airspace opacities; improving s/p HD sessions; may be from fluid overload   - Acute Hypoxic Resp Failure secondary to PNA + COVID 19  - S/P ANAKINRA and Hydroxychloroquine (since April 9th)   - Discontinued full dose Lovenox due to epistaxis  - Current vent settings 35/380/80/5; plateau pressure elevated; possibly need to paralyze patient if not improving     - CXR findings may be due to fluid overload; s/p HD session 4/28, 4/29, 4/30, 5/2; HD for today nephro Dr. Rosa  - attempt to decrease FiO2 requirements once fluid overload improves; will hold off on trial of tocilizumab at this time   - ESR: 96>92>108>  - D-dimer: 1901>1936>820>692- 675  - LDH: 362>447>400>409->443  - procal 2.42  - crp 8.14    ID:  - completed Hydroxychloroquine, anakinra   Pt was febrile a few nights ago   blood culture negative  on Cefepime 2g daily since 4/17 vanco 750 intradialysis; VT of 19.5 5/3  WBC: elevated  ID Dr. Davey, f/u regarding further recommendations   *Pt to receive Vanco 750mg iv  dose Intradialysis 5/5 as per Dr Davey     Nephro:  RASHAUN:  -Cr/BUN:  5.03/46  no longer on lasix drip as per nephro due to minimal urine output   S/P HD on 4/24, 4/25, 4/28, 4/28, 4/29, 4/30, 5/2; 5/5 f/u nephro Dr. Rosa  - Monitor electrolytes   Hyponatremia: 128    Metabolic acidosis:  Improved   -off sodium bicarbonate  Monitor BMP  Nephrology: Dr. Rosa      GI:  -  Tube feed  - GI ppx with Protonix  - Miralax and senna for prophylaxis  - dc dulcolax     Heme:  - no indication for transfusion   - Hgb stable 8.7  - monitor cbc daily     Endo:  - No history of DM  - target CBG < 180  - FS q6 while Tube feed      Prophy:  - eliquis 2.5mg BID   - Protonix for GI prophylaxis

## 2020-05-05 NOTE — PROGRESS NOTE ADULT - SUBJECTIVE AND OBJECTIVE BOX
Time of Visit:  Patient seen and examined.     MEDICATIONS  (STANDING):  calcium acetate 667 milliGRAM(s) Oral three times a day with meals  cefepime   IVPB 2000 milliGRAM(s) IV Intermittent <User Schedule>  chlorhexidine 0.12% Liquid 15 milliLiter(s) Oral Mucosa every 12 hours  chlorhexidine 2% Cloths 1 Application(s) Topical <User Schedule>  collagenase Ointment 1 Application(s) Topical daily  dexMEDEtomidine Infusion 0.2 MICROgram(s)/kG/Hr (4.31 mL/Hr) IV Continuous <Continuous>  fentaNYL   Infusion 0.5 MICROgram(s)/kG/Hr (4.31 mL/Hr) IV Continuous <Continuous>  midodrine 10 milliGRAM(s) Oral every 8 hours  mupirocin 2% Nasal 1 Application(s) Nasal two times a day  norepinephrine Infusion 0.05 MICROgram(s)/kG/Min (4.04 mL/Hr) IV Continuous <Continuous>  pantoprazole  Injectable 40 milliGRAM(s) IV Push daily  polyethylene glycol 3350 17 Gram(s) Oral daily  senna 2 Tablet(s) Oral at bedtime      MEDICATIONS  (PRN):  acetaminophen    Suspension .. 650 milliGRAM(s) Oral every 6 hours PRN Temp greater or equal to 38C (100.4F)       Medications up to date at time of exam.      PHYSICAL EXAMINATION:  Patient has no new complaints.  GENERAL: The patient is a well-developed, well-nourished, in no apparent distress.     Vital Signs Last 24 Hrs  T(C): 36.6 (05 May 2020 16:00), Max: 37.3 (05 May 2020 03:44)  T(F): 97.8 (05 May 2020 16:00), Max: 99.1 (05 May 2020 03:44)  HR: 90 (05 May 2020 18:00) (87 - 124)  BP: 93/56 (05 May 2020 18:00) (87/44 - 132/72)  BP(mean): 65 (05 May 2020 18:00) (53 - 80)  RR: 27 (05 May 2020 18:00) (17 - 35)  SpO2: 97% (05 May 2020 18:00) (86% - 98%)  Mode: AC/ CMV (Assist Control/ Continuous Mandatory Ventilation)  RR (machine): 35  TV (machine): 400  FiO2: 100  PEEP: 10  ITime: 0.7  MAP: 28  PIP: 48   (if applicable)    Chest Tube (if applicable)    HEENT: Head is normocephalic and atraumatic. Extraocular muscles are intact. Mucous membranes are moist.  +ETT    NECK: Supple, no palpable adenopathy.    LUNGS: Clear to auscultation, no wheezing, rales, or rhonchi.    HEART: Regular rate and rhythm without murmur.    ABDOMEN: Soft, nontender, and nondistended.  No hepatosplenomegaly is noted.    : No painful voiding, no pelvic pain    EXTREMITIES: Without any cyanosis, clubbing, rash, lesions or edema.    NEUROLOGIC: sedated    SKIN: Warm, dry, good turgor.      LABS:                        8.7    15.10 )-----------( 274      ( 05 May 2020 06:31 )             29.4     05-05    128<L>  |  94<L>  |  54<H>  ----------------------------<  100<H>  4.8   |  20<L>  |  5.92<H>    Ca    8.7      05 May 2020 06:31  Phos  8.5     05-05  Mg     2.8     05-05    TPro  8.1  /  Alb  1.9<L>  /  TBili  3.5<H>  /  DBili  x   /  AST  66<H>  /  ALT  55  /  AlkPhos  272<H>  05-05    PT/INR - ( 05 May 2020 06:31 )   PT: 14.8 sec;   INR: 1.30 ratio         PTT - ( 04 May 2020 06:30 )  PTT:31.8 sec    ABG - ( 05 May 2020 16:48 )  pH, Arterial: 7.19  pH, Blood: x     /  pCO2: 64    /  pO2: 97    / HCO3: 23    / Base Excess: -5.2  /  SaO2: 97                CARDIAC MARKERS ( 04 May 2020 06:30 )  <0.015 ng/mL / x     / x     / x     / x          D-Dimer Assay, Quantitative: 1351 ng/mL DDU (05-05-20 @ 06:31)        Procalcitonin, Serum: 2.42 ng/mL (05-04-20 @ 09:40)      MICROBIOLOGY: (if applicable)    RADIOLOGY & ADDITIONAL STUDIES:  EKG:   CXR:  < from: Xray Chest 1 View- PORTABLE-Routine (05.05.20 @ 09:24) >    EXAM:  XR CHEST PORTABLE ROUTINE 1V                            PROCEDURE DATE:  05/05/2020          INTERPRETATION:    Examination: XR CHEST    History: ADMDIAG1: B34.9 VIRAL INFECTION, UNSPECIFIED/, intubated    Portable chest x-ray is compared toa previous examination dated 5/4/2020.    Impression: ET tube terminates at 4.9 cm above the dinesh. Stable NG tube and bilateral IJ central venous catheters.    The right costophrenic angle is excluded from the radiograph. No gross pleural effusion or pneumothorax.    Airspace opacifications in both lungs, grossly unchanged.    The trachea is midline.    Stable cardiac silhouette.    DISCRETE X-RAY DATA:  Percent of LEFT lung opacification: %  Percent of RIGHT lung opacification: %  Change in lung opacification from most recent x-ray (<=3 days): Stable  Change from prior dated 3 or more days (same admission): Increase                  JIMY TRACEY M.D., ATTENDING RADIOLOGIST  This document has been electronically signed. May  5 2020  9:48AM                < end of copied text >  ECHO:    IMPRESSION: 55y Female PAST MEDICAL & SURGICAL HISTORY:  HTN (hypertension)  No significant past surgical history   p/w         IMP: This is a 54 yr old woman with  HTN, works as HD nurse,  presents to the ED w/ fever, chills, nausea, vomiting, and mild cough that began x1 week ago. Patient was admitted on medicine floor for COVID pna. ICU consulted for hypoxia and desaturation on NRB.  DDimer is trending up and is on . therapeutic anticoag.  Pat became more dyspnenic and hypoxic, intubated and placed on vent support sedated . WBC and procalcitonin is elevated on iv antibx. Will add vanco, change central line. . NO IL-6 at this time since CXR shows pul edema . Will request from neph for more frequent dialysis and if CXR improve or decrease FiO2 need then no IL-6. CRP, Ferritin and D Dimer are trending down . Started on HD by nephro s/p x 3 sessions. Plateau pressures still elevated with decrease in TV. biomarkers are trending down but WBC trending up. CXR no change today    Assessment:  -Acute Hypoxic Respiratory Failure  -B/L  PNA   -ARDS  -COVID 19 infection  -HTN  -Hypercaog state  -Septic shock  -RASHAUN  -MOSF      Plan  - Mechanical ventilation with lung protective strategy   - Decrease Fio2  - Titrate Fio2 and PEEP as tolerated  - elevated Plateau pressure  - Sedation for ventilator synchrony   - Vasopressor support to maintain MAP > 60  - Monitor ABG  - S/p Anakinra and Hydroxychloroquine  - biomarkers are tending down  - Airborne and Contact isolation  - Plan for prone positioning daily  - Nephrology  for daily dialysis  - Bowel regimen  - DVT and Stress Ulcer prophylaxis   - neph for dialysis  - Over all prognosis is poor given multiple organ dysfunction .      time spent 38 min    d/c with icu team

## 2020-05-05 NOTE — CHART NOTE - NSCHARTNOTEFT_GEN_A_CORE
Brief Palliative Note    Spoke with patient's  who is in the [facility], who is a physician regarding current condition, increasing O2 requirements on ventilator, on pressor support, HD. High risk of mortality due to continued clinical decline. He verbalized understanding.  Advised if patient succumbs to COVID infection and her heart stops, CPR would be futile. He is agreeable for DNR to allow for natural death, wishes to continue all other measures at this time. Support provided. ICU team made aware. Brief Palliative Note    Spoke with patient's  who is in the [facility], who is a physician regarding current condition, increasing O2 requirements on ventilator, on pressor support, HD. High risk of mortality due to continued clinical decline. He verbalized understanding.  Advised if patient succumbs to COVID infection and her heart stops, CPR would be futile. He is agreeable for DNR to allow for natural death, wishes to continue all other measures at this time. Support provided. ICU team made aware.    Update 8:45 PM:   Received text message from Jefferson Lees patient's  that after discussing it further with the rest of the family, they want all measures to be taken including CPR if her heart stops. Requesting convalescent plasma therapy. DNR rescinded, patient remains FULL CODE. ICU team notified.

## 2020-05-05 NOTE — CHART NOTE - NSCHARTNOTEFT_GEN_A_CORE
Reassessment:   55yFemalePatient is a 55y old  Female who presents with a chief complaint of Pneumonia (05 May 2020 15:23)      Factors impacting intake: [ ] none [ ] nausea  [ ] vomiting [ ] diarrhea [ ] constipation  [ ]chewing problems [ ] swallowing issues  [ ] other:     Diet Prescription: Diet, NPO with Tube Feed:   Tube Feeding Modality: Nasogastric  Nepro with Carb Steady  Total Volume for 24 Hours (mL): 960  Continuous  Starting Tube Feed Rate {mL per Hour}: 10  Increase Tube Feed Rate by (mL): 10     Every hour  Until Goal Tube Feed Rate (mL per Hour): 40  Tube Feed Duration (in Hours): 24  Tube Feed Start Time: 09:30  No Carb Prosource (1pkg = 15gms Protein)     Qty per Day:  2 (20 @ 09:09)    Intake: Pt remain intubated, sedated, being dialyzed; may be paralyzed. Not on propofol. HD today. MOF; poor prognosis, palliative following. Per flow records pt receiving nepro @[id number]. Current TF/prosource regimen in excess of needs. See recs below. Generalized 2+edema noted.     Daily Weight in k (05 May 2020 13:20)  Weight in k.7 (05 May 2020 10:15)  Weight in k.1 (04 May 2020 08:00)  Weight in k.7 (03 May 2020 07:00)  Weight in k.1 (02 May 2020 07:00)  Weight in k.6 (2020 17:58)  Weight in k.2 (2020 15:10)  Weight in k.5 (2020 08:00)    % Weight Change- being dialyzed    Pertinent Medications: MEDICATIONS  (STANDING):  apixaban 2.5 milliGRAM(s) Oral two times a day  calcium acetate 667 milliGRAM(s) Oral three times a day with meals  cefepime   IVPB 2000 milliGRAM(s) IV Intermittent <User Schedule>  chlorhexidine 0.12% Liquid 15 milliLiter(s) Oral Mucosa every 12 hours  chlorhexidine 2% Cloths 1 Application(s) Topical <User Schedule>  collagenase Ointment 1 Application(s) Topical daily  dexMEDEtomidine Infusion 0.2 MICROgram(s)/kG/Hr (4.31 mL/Hr) IV Continuous <Continuous>  fentaNYL   Infusion 0.5 MICROgram(s)/kG/Hr (4.31 mL/Hr) IV Continuous <Continuous>  midodrine 10 milliGRAM(s) Oral every 8 hours  mupirocin 2% Nasal 1 Application(s) Nasal two times a day  norepinephrine Infusion 0.05 MICROgram(s)/kG/Min (4.04 mL/Hr) IV Continuous <Continuous>  pantoprazole  Injectable 40 milliGRAM(s) IV Push daily  polyethylene glycol 3350 17 Gram(s) Oral daily  senna 2 Tablet(s) Oral at bedtime    MEDICATIONS  (PRN):  acetaminophen    Suspension .. 650 milliGRAM(s) Oral every 6 hours PRN Temp greater or equal to 38C (100.4F)    Pertinent Labs:  Na128 mmol/L<L> Glu 100 mg/dL<H> K+ 4.8 mmol/L Cr  5.92 mg/dL<H> BUN 54 mg/dL<H>  Phos 8.5 mg/dL<H>  Alb 1.9 g/dL<L>  [id number] 6.4 %<H>  Chol --    LDL --    HDL --    Trig 435 mg/dL<H>     CAPILLARY BLOOD GLUCOSE      POCT Blood Glucose.: 128 mg/dL (05 May 2020 11:00)  POCT Blood Glucose.: 85 mg/dL (05 May 2020 04:08)  POCT Blood Glucose.: 133 mg/dL (04 May 2020 23:19)  POCT Blood Glucose.: 104 mg/dL (04 May 2020 17:05)    Skin: wound to right cheek, stage 1 to sacrum.     Estimated Needs:   [X ] no change since previous assessment wt of 86.1kg. 15-20kcal 1291-1722kcal/d. 1-1.2g/kg 86-103g/d protein.   [ ] recalculated:     Previous Nutrition Diagnosis:   [ ] Inadequate Energy Intake [ ]Inadequate Oral Intake [ ] Excessive Energy Intake   [ ] Underweight [ ] Increased Nutrient Needs [ ] Overweight/Obesity   [ ] Altered GI Function [ ] Unintended Weight Loss [ ] Food & Nutrition Related Knowledge Deficit [ X] Malnutrition severe    Nutrition Diagnosis is [X ] ongoing  [ ] resolved [ ] not applicable     New Nutrition Diagnosis: [ ] not applicable       Interventions:   Recommend  [ ] Change Diet To:  [ ] Nutrition Supplement  [X ] Nutrition Support Nepro @ [id number] to provide 720ml of formula; 1296kcal/d, 58g pro, 523ml free water. Add prosource 1pkt BID (120kcal, 30g pro). Total kcal/pro: 1416kcal, 88g pro.   [ ] Other:     Monitoring and Evaluation:   [ ] PO intake [ x ] Tolerance to diet prescription [ x ] weights [ x ] labs[ x ] follow up per protocol  [ ] other:

## 2020-05-05 NOTE — PROGRESS NOTE ADULT - GENITOURINARY COMMENTS
parr in place with minimal output
parr in place with poor urine output
parr in place with scant urine in it
parr in place with very dark small volume of urine in it
parr in place with very little urine in it
no parr
parr in place with scant urine in it

## 2020-05-05 NOTE — PROGRESS NOTE ADULT - PROBLEM SELECTOR PLAN 4
Reviewed status with ICU team. Spoke with patient's surrogate/Criselda; palliative SW present. Discussed status; reviewed medications, labs. Surrogate acknowledges overall poor prognosis. Requesting we speak with patient's , who is a physician, to give him a medical update ([phone]). Reviewed status with ICU team. Spoke with patient's surrogate/Criselda; palliative SW present. Discussed status; reviewed medications, labs. Surrogate acknowledges overall poor prognosis. Requesting we speak with patient's , who is a physician, to give him a medical update ([phone]). At this time, patient remains a FULL CODE. All questions answered; supportive counseling provided.    Update: Phone number provided for patient's  not currently working. Will f/u tomorrow.

## 2020-05-05 NOTE — PROGRESS NOTE ADULT - ASSESSMENT
Septic Shock  Bilat Pneumonia   COVID - 19 infection  Leukocytosis   Resp failure    Plan - Cont Vancomycin to 750mgs iv each time patient is dialyzed  cont Maxipime to 2 gm iv q24hrs  DC all abxs tomorrow  prognosis - poor  Time spent - 30 mins

## 2020-05-05 NOTE — PROGRESS NOTE ADULT - ASSESSMENT
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 Presented to ED with SOB, fever and hypoxia. Subsequently transfered to ICU intubated and developed ARDS s/p CQ and Anikinra. Subsequently developed RASHAUN with ensuing oliguria prompting a renal consult. Hypernatremia has improved. Severe hypoalbuminemia 1.3     # RASHAUN sec to ATN from COVID - 19 related disease. With ARDS. remains on pressors. hypoalbuminemia.  oliguria.  Pulm edema.  hypoxia  resp acidosis  HD today.

## 2020-05-05 NOTE — PROGRESS NOTE ADULT - ATTENDING COMMENTS
Assessment:  1. Acute Hypoxic Respiratory Failure  2. ARDS  3. COVID 19 infection  4. HTN  5. Hypercoagulable state  6. Septic shock  7. RASHAUN  8. Multi system organ failure      Plan  - Mechanical ventilation with lung protective strategy though remains with elevated plateau pressures  - Titrate Fio2 and PEEP as tolerated  - Repeat ABG this afternoon  - Sedation for ventilator synchrony   - Vasopressor support to maintain MAP > 60  - Monitor ABG  - S/p Anakinra and Hydroxychloroquine  - Antibiotics as per ID  - biomarkers are tending down  - Airborne and Contact isolation  - Nephrology  for daily dialysis  - Empiric antibiotics   - Bowel regimen  - DVT and Stress Ulcer prophylaxis   - Over all prognosis is poor given multiple organ dysfunction and prolonged hospitalization with intubation. Palliative care follow up requested.

## 2020-05-06 NOTE — PROGRESS NOTE ADULT - ASSESSMENT
Septic Shock  Bilat Pneumonia   COVID - 19 infection  Leukocytosis - decreased slightly  Resp failure    Plan -   DC all abxs today  prognosis - poor  Time spent - 30 mins

## 2020-05-06 NOTE — PROGRESS NOTE ADULT - SUBJECTIVE AND OBJECTIVE BOX
[facility] : Progress Note :: [phone], (office [phone]),   Dr Rosa / Dr Mosher / Dr Lin / Dr Narvaez / Dr Nando SOTELO / Dr Temple / Dr Silva / Dr Bjorn mendoza  _____________________________________________________________________________________________    remains oliguric and with poor ventilation.    No Known Allergies    Hospital Medications:   MEDICATIONS  (STANDING):  calcium acetate 667 milliGRAM(s) Oral three times a day with meals  chlorhexidine 0.12% Liquid 15 milliLiter(s) Oral Mucosa every 12 hours  chlorhexidine 2% Cloths 1 Application(s) Topical <User Schedule>  collagenase Ointment 1 Application(s) Topical daily  dexMEDEtomidine Infusion 0.2 MICROgram(s)/kG/Hr (4.31 mL/Hr) IV Continuous <Continuous>  fentaNYL   Infusion 0.5 MICROgram(s)/kG/Hr (4.31 mL/Hr) IV Continuous <Continuous>  midodrine 10 milliGRAM(s) Oral every 8 hours  mupirocin 2% Nasal 1 Application(s) Nasal two times a day  norepinephrine Infusion 0.05 MICROgram(s)/kG/Min (4.04 mL/Hr) IV Continuous <Continuous>  pantoprazole  Injectable 40 milliGRAM(s) IV Push daily  polyethylene glycol 3350 17 Gram(s) Oral daily  senna 2 Tablet(s) Oral at bedtime        VITALS:  T(F): 97.5 (05-06-20 @ 16:00), Max: 97.5 (05-06-20 @ 16:00)  HR: 121 (05-06-20 @ 18:00)  BP: 119/64 (05-06-20 @ 18:00)  RR: 28 (05-06-20 @ 18:00)  SpO2: 88% (05-06-20 @ 18:00)  Wt(kg): --    05-05 @ 07:01  -  05-06 @ 07:00  --------------------------------------------------------  IN: 792.5 mL / OUT: 1700 mL / NET: -907.5 mL    05-06 @ 07:01  -  05-06 @ 19:10  --------------------------------------------------------  IN: 759.8 mL / OUT: 0 mL / NET: 759.8 mL        PHYSICAL EXAM:  Constitutional: NAD  HEENT: anicteric sclera, oropharynx clear, MMM      LABS:  05-06    130<L>  |  95<L>  |  33<H>  ----------------------------<  85  3.5   |  23  |  3.84<H>    Ca    8.9      06 May 2020 06:41  Phos  4.8     05-06  Mg     2.2     05-06    TPro  7.9  /  Alb  1.8<L>  /  TBili  3.2<H>  /  DBili      /  AST  54<H>  /  ALT  51  /  AlkPhos  253<H>  05-06    Creatinine Trend: 3.84 <--, 5.92 <--, 5.03 <--, 4.17 <--, 4.73 <--, 3.46 <--, 4.37 <--, 4.02 <--                        8.3    13.38 )-----------( 218      ( 06 May 2020 06:41 )             27.9     Urine Studies:      RADIOLOGY & ADDITIONAL STUDIES:

## 2020-05-06 NOTE — CHART NOTE - NSCHARTNOTEFT_GEN_A_CORE
Patient noted to be desaturating below 80% on optimal ventilator settings with FiO2 100%. STAT chest xray shows worsening ARDS. Prone positioning attempted for approximately 7 minutes with worsening oxygen saturation down to 56% observed. Lateral recumbent positioning attempted with no success. Patient was placed back in supine positioning with oxygen saturation now persistently 70%.    Emergency contact Criselda Egan notified of patient's worsening respiratory status. She asks that I contact patient's  in the [facility] Dr. Lees and defers decision making to him. I contacted Dr. Lees and notified him of the aforementioned updates. He continues to maintain that all life saving measures be attempted. Despite explanation of patient's poor prognosis, Dr. Lees says all measures are to be taken and that he will speak to his other family members regarding her code status.

## 2020-05-06 NOTE — PROGRESS NOTE ADULT - ASSESSMENT
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 Patient was admitted on medicine floor for COVID pna.  Admitted to ICU for hypoxia and desaturation on NRB- intubated on 4/15.     Assessment:  - acute hypoxic respiratory failure secondary to COVID-19 pneumonia  - septic shock requiring pressors   - RASHAUN  - Leukocytosis  - COVID pandemic     Plan:  Neuro:  -Pt sedated with fentanyl, precedex will taper to check mental status- pt becomes HD unstable     CV:  - Hold BP medications  - currently on levophed  - on midodrine 10mg q 8     Pulm:  - Intubated for respiratory distress and hypoxia 4/15  - CXR shows bilateral airspace opacities; improving s/p HD sessions; may be from fluid overload   - Acute Hypoxic Resp Failure secondary to PNA + COVID 19  - S/P ANAKINRA and Hydroxychloroquine (since April 9th)   - Discontinued full dose Lovenox due to epistaxis  - Current vent settings 35/380/80/5; plateau pressure elevated; possibly need to paralyze patient if not improving     - CXR findings may be due to fluid overload; s/p HD session 4/28, 4/29, 4/30, 5/2; 5/5 nephro Dr. Rosa  - attempt to decrease FiO2 requirements once fluid overload improves; will hold off on trial of tocilizumab at this time   - ESR: 96>92>108>  - D-dimer: 1901>1936>820>692- 675  - LDH: 362>447>400>409->443  - procal 2.42  - crp 8.14    ID:  - completed Hydroxychloroquine, anakinra   Pt was febrile a few nights ago   blood culture negative  completed Cefepime 2g daily since 4/17- 5/6    WBC: elevated 13k   ID Dr. Davey, f/u regarding further recommendations   *Pt to receive Vanco 750mg iv  dose Intradialysis 5/5 as per Dr Davey  last dose 5/5 s; VT of 19.5 5/3    Nephro:  RASHAUN:  -Cr/BUN:  5.03/46  no longer on lasix drip as per nephro due to minimal urine output   S/P HD on 4/24, 4/25, 4/28, 4/28, 4/29, 4/30, 5/2; 5/5 f/u nephro Dr. Rosa  - Monitor electrolytes   Hyponatremia: 130    Metabolic acidosis:  Improved   -off sodium bicarbonate  Monitor BMP  Nephrology: Dr. Rosa      GI:  -  Tube feed  - GI ppx with Protonix  - Miralax and senna for prophylaxis  - dc dulcolax     Heme:  - no indication for transfusion   - continues to bleed through mouth  - Hgb stable 8.4  - monitor cbc daily     Endo:  - No history of DM  - target CBG < 180  - FS q6 while Tube feed      Prophy:  - holding eliquis 2.5mg in the setting of bleeding   - Protonix for GI prophylaxis

## 2020-05-06 NOTE — CHART NOTE - NSCHARTNOTEFT_GEN_A_CORE
PC  received numerous text messages from the patients' spouse  in the [facility].  Spouse was requesting specific medical management, CPR, prone position, etc.  PC Sw stated she is not currently in the hospital as it is the evening in the [place]  In addition, this  advised  to contact the ICU directly and speak with the attending regarding his wife's care.  PC  provided the contact information for the ICU.   continued to text this  challenging the medical management, therefore this  contacted the ICU and spoke with .   stated he spoke with the patient's spouse and addressed his concerns.  No addl. PC  needs at this time.  PC Aldo will remain available as needed.

## 2020-05-06 NOTE — CHART NOTE - NSCHARTNOTEFT_GEN_A_CORE
PC  received a text message from the patient's spouse  requesting assistance in facilitating communication with the patient.  Dr. Lees stated he wants to send an audio message to be played to his wife.  PC  agreed to facilitate the patient hearing this message, however patient's spouse stated he needs to "use caution and have a [facility] nurse take care of this."  PC MD stated the spouse made similar comments to her regarding cultural mistrust.  Pc Sw assured the patient's spouse that she will remain available should any needs arise.  PC Sw will remain available as needed.

## 2020-05-06 NOTE — CHART NOTE - NSCHARTNOTEFT_GEN_A_CORE
spoke with patient's sister in law Criselda Egan [phone]. and provided clinical update stating the patient's overall prognosis is poor.  Emotional support was provided.  Criselda asked to call her brother who is the patient's  Dr. Lees [phone]. Pt's  stated to do everything until he discusses new findings with other family members

## 2020-05-06 NOTE — PROGRESS NOTE ADULT - ASSESSMENT
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 Presented to ED with SOB, fever and hypoxia. Subsequently transfered to ICU intubated and developed ARDS s/p CQ and Anikinra. Subsequently developed RASHAUN with ensuing oliguria prompting a renal consult. Hypernatremia has improved. Severe hypoalbuminemia 1.3     # RASHAUN sec to ATN from COVID - 19 related disease. With ARDS. remains on pressors. hypoalbuminemia.  oliguria.  Pulm edema.  hypoxia  resp acidosis  HD tiw  poor prognosis

## 2020-05-06 NOTE — PROGRESS NOTE ADULT - SUBJECTIVE AND OBJECTIVE BOX
55y Female    Meds:    Allergies    No Known Allergies    Intolerances        VITALS:  Vital Signs Last 24 Hrs  T(C): 36.2 (06 May 2020 12:00), Max: 36.6 (05 May 2020 16:00)  T(F): 97.1 (06 May 2020 12:00), Max: 97.8 (05 May 2020 16:00)  HR: 71 (06 May 2020 13:00) (71 - 110)  BP: 105/64 (06 May 2020 13:00) (92/58 - 148/74)  BP(mean): 74 (06 May 2020 13:00) (64 - 91)  RR: 30 (06 May 2020 13:00) (16 - 39)  SpO2: 96% (06 May 2020 13:00) (86% - 99%)    LABS/DIAGNOSTIC TESTS:                          8.3    13.38 )-----------( 218      ( 06 May 2020 06:41 )             27.9         05-06    130<L>  |  95<L>  |  33<H>  ----------------------------<  85  3.5   |  23  |  3.84<H>    Ca    8.9      06 May 2020 06:41  Phos  4.8     05-06  Mg     2.2     05-06    TPro  7.9  /  Alb  1.8<L>  /  TBili  3.2<H>  /  DBili  x   /  AST  54<H>  /  ALT  51  /  AlkPhos  253<H>  05-06      LIVER FUNCTIONS - ( 06 May 2020 06:41 )  Alb: 1.8 g/dL / Pro: 7.9 g/dL / ALK PHOS: 253 U/L / ALT: 51 U/L DA / AST: 54 U/L / GGT: x             CULTURES: .Blood Blood-Peripheral  04-17 @ 18:26   No Growth Final  --  --            RADIOLOGY:< from: Xray Chest 1 View- PORTABLE-Routine (05.06.20 @ 10:05) >  EXAM:  XR CHEST PORTABLE ROUTINE 1V                            PROCEDURE DATE:  05/06/2020          INTERPRETATION:    CLINICAL STATEMENT: Follow-up chest pain.    TECHNIQUE: AP view of the chest.    COMPARISON: 5/5/2020    FINDINGS/  IMPRESSION:  ET tube, feeding tube, bilateral central lines again noted. Left central line courses over the aortic arch. Venous return confirmation recommended if it has not been performed.    No pneumothorax.    Bilateral airspace opacities without significant change given differences in technique.    No significant pleural effusion    Heart size cannot be accurately assessed in this projection.              AKIN AMOS M.D., ATTENDING RADIOLOGIST  This document has been electronically signed. May  6 2020  9:31AM        < end of copied text >        ROS:  [  ] UNABLE TO ELICIT ICU VISIT  55y Female who remains in the ICU, she is sedated , intubated and vent dependent, she is still on a pressor bu we stopped her abxs today as she has completed an adequate course of them. Her  rescinded her DNR order. She is afebrile and her wbc count is a little lower.    Meds:    Allergies    No Known Allergies    Intolerances        VITALS:  Vital Signs Last 24 Hrs  T(C): 36.2 (06 May 2020 12:00), Max: 36.6 (05 May 2020 16:00)  T(F): 97.1 (06 May 2020 12:00), Max: 97.8 (05 May 2020 16:00)  HR: 71 (06 May 2020 13:00) (71 - 110)  BP: 105/64 (06 May 2020 13:00) (92/58 - 148/74)  BP(mean): 74 (06 May 2020 13:00) (64 - 91)  RR: 30 (06 May 2020 13:00) (16 - 39)  SpO2: 96% (06 May 2020 13:00) (86% - 99%)    LABS/DIAGNOSTIC TESTS:                          8.3    13.38 )-----------( 218      ( 06 May 2020 06:41 )             27.9         05-06    130<L>  |  95<L>  |  33<H>  ----------------------------<  85  3.5   |  23  |  3.84<H>    Ca    8.9      06 May 2020 06:41  Phos  4.8     05-06  Mg     2.2     05-06    TPro  7.9  /  Alb  1.8<L>  /  TBili  3.2<H>  /  DBili  x   /  AST  54<H>  /  ALT  51  /  AlkPhos  253<H>  05-06      LIVER FUNCTIONS - ( 06 May 2020 06:41 )  Alb: 1.8 g/dL / Pro: 7.9 g/dL / ALK PHOS: 253 U/L / ALT: 51 U/L DA / AST: 54 U/L / GGT: x             CULTURES: .Blood Blood-Peripheral  04-17 @ 18:26   No Growth Final  --  --            RADIOLOGY:< from: Xray Chest 1 View- PORTABLE-Routine (05.06.20 @ 10:05) >  EXAM:  XR CHEST PORTABLE ROUTINE 1V                            PROCEDURE DATE:  05/06/2020          INTERPRETATION:    CLINICAL STATEMENT: Follow-up chest pain.    TECHNIQUE: AP view of the chest.    COMPARISON: 5/5/2020    FINDINGS/  IMPRESSION:  ET tube, feeding tube, bilateral central lines again noted. Left central line courses over the aortic arch. Venous return confirmation recommended if it has not been performed.    No pneumothorax.    Bilateral airspace opacities without significant change given differences in technique.    No significant pleural effusion    Heart size cannot be accurately assessed in this projection.              AKIN AMOS M.D., ATTENDING RADIOLOGIST  This document has been electronically signed. May  6 2020  9:31AM        < end of copied text >        ROS:  [ x ] UNABLE TO ELICIT

## 2020-05-06 NOTE — PROGRESS NOTE ADULT - SUBJECTIVE AND OBJECTIVE BOX
HISTORY  55y Female with COVID-19(+) pneumonia requiring intubation.    24 HOUR EVENTS:    SUBJECTIVE/ROS: Due to intubation with sedation, subjective information was not able to be obtained from the patient. History was obtained, to the extent possible, from review of the chart and collateral sources of information.      NEURO  Exam: sedated, no acute distress  Meds: acetaminophen    Suspension .. 650 milliGRAM(s) Oral every 6 hours PRN Temp greater or equal to 38C (100.4F)  dexMEDEtomidine Infusion 0.2 MICROgram(s)/kG/Hr IV Continuous <Continuous>  fentaNYL   Infusion 0.5 MICROgram(s)/kG/Hr IV Continuous <Continuous>    Adequacy of sedation and pain control has been assessed and adjusted.      RESPIRATORY  RR: 16 (16 - 35)  SpO2: 95% (89% - 98%)  Exam: without dissynchrony  Mechanical Ventilation: Mode: AC/ CMV (Assist Control/ Continuous Mandatory Ventilation), RR (machine): 35, TV (machine): 400, FiO2: 100, PEEP: 10  Predicted Body Weight:  Tidal Volume/PBW ratio:  PaO2 to FiO2 ratio:  Extubation readiness assessed.  ABG - ( 06 May 2020 03:57 )  pH: 7.20  /  pCO2: 66    /  pO2: 139   / HCO3: 25    / Base Excess: -4.4  /  SaO2: 98      Lactate: x                CARDIOVASCULAR  HR: 91 (85 - 119)  ABP: 116/67 (96/57 - 179/74)  ABP(mean): 86 (68 - 142)  Cardiac Rhythm: sinus  Most recent QTc:   Meds: midodrine  norepinephrine Infusion (4.04 mL/Hr)        GI/NUTRITION  Diet: Diet, NPO with Tube Feed:   Tube Feeding Modality: Nasogastric  Nepro with Carb Steady  Total Volume for 24 Hours (mL): 960  Continuous  Starting Tube Feed Rate mL per Hour: 10  Increase Tube Feed Rate by (mL): 10     Every hour  Until Goal Tube Feed Rate (mL per Hour): 40  Tube Feed Duration (in Hours): 24  Tube Feed Start Time: 09:30  No Carb Prosource (1pkg = 15gms Protein)     Qty per Day:  2 (05-04-20 @ 09:09)    Most recent bowel movement:  Meds/stress ulcer prophylaxis: calcium acetate 667 milliGRAM(s) Oral three times a day with meals  pantoprazole  Injectable 40 milliGRAM(s) IV Push daily  polyethylene glycol 3350 17 Gram(s) Oral daily  senna 2 Tablet(s) Oral at bedtime        GENITOURINARY  I&O's Detail    05-05 @ 07:01  -  05-06 @ 07:00  --------------------------------------------------------  IN:    dexmedetomidine Infusion: 95.4 mL    fentaNYL  Infusion: 215.1 mL    Nepro with Carb Steady: 20 mL    norepinephrine Infusion: 36 mL  Total IN: 366.5 mL    OUT:    Other: 1700 mL  Total OUT: 1700 mL    Total NET: -1333.5 mL          05-05    128<L>  |  94<L>  |  54<H>  ----------------------------<  100<H>  4.8   |  20<L>  |  5.92<H>    Ca    8.7      05 May 2020 06:31  Phos  8.5     05-05  Mg     2.8     05-05    TPro  8.1  /  Alb  1.9<L>  /  TBili  3.5<H>  /  DBili  x   /  AST  66<H>  /  ALT  55  /  AlkPhos  272<H>  05-05    [x] Stephenson catheter, indication: urine output monitoring in critically ill patient.  Meds: calcium acetate 667 milliGRAM(s) Oral three times a day with meals          HEMATOLOGIC  Meds/VTE prophylaxis:                         8.3    13.38 )-----------( 218      ( 06 May 2020 06:41 )             27.9     PT/INR - ( 06 May 2020 06:41 )   PT: 15.2 sec;   INR: 1.33 ratio           [id number] @ 09:05        INFECTIOUS DISEASES  T(C): 36.1, Max: 36.6 (05-05-20 @ 16:00)  WBC Count: 13.38 (05-06-20 @ 06:41)  WBC Count: 16.08 (05-05-20 @ 23:25)    Recent Cultures:    Meds: cefepime   IVPB 2000 milliGRAM(s) IV Intermittent <User Schedule>    Procalcitonin, Serum: 2.42 ng/mL (05-04-20 @ 09:40)        ENDOCRINE  Fingersticks: 82, 121, 128, 85, 133  Meds:       ACCESS DEVICES:  [ ] Peripheral IV  [x] Central Venous Line	[ ] R	[ ] L	[ ] IJ	[ ] Fem	[ ] SC	Placed:   [x] Arterial Line		[ ] R	[ ] L	[ ] Fem	[ ] Rad	[ ] Ax	Placed:   [ ] [facility] HD Access             [ ] R [ ] L [ ] IJ  [ ] Fem     Placed:  [x] Urinary Catheter, Date Placed:   Necessity of urinary, arterial, and venous catheters discussed.      CODE STATUS:     IMAGING: HISTORY  55y Female with COVID-19(+) pneumonia requiring intubation.    24 HOUR EVENTS:    SUBJECTIVE/ROS: Due to intubation with sedation, subjective information was not able to be obtained from the patient. History was obtained, to the extent possible, from review of the chart and collateral sources of information.      NEURO  Exam: sedated, no acute distress  Meds: acetaminophen    Suspension .. 650 milliGRAM(s) Oral every 6 hours PRN Temp greater or equal to 38C (100.4F)  dexMEDEtomidine Infusion 0.2 MICROgram(s)/kG/Hr IV Continuous <Continuous>  fentaNYL   Infusion 0.5 MICROgram(s)/kG/Hr IV Continuous <Continuous>    Adequacy of sedation and pain control has been assessed and adjusted.      RESPIRATORY  RR: 16 (16 - 35)  SpO2: 95% (89% - 98%)  Exam: without dissynchrony  Mechanical Ventilation: Mode: AC/ CMV (Assist Control/ Continuous Mandatory Ventilation), RR (machine): 35, TV (machine): 400, FiO2: 100, PEEP: 10  Predicted Body Weight:  Tidal Volume/PBW ratio:  PaO2 to FiO2 ratio:  Extubation readiness assessed.  ABG - ( 06 May 2020 03:57 )  pH: 7.20  /  pCO2: 66    /  pO2: 139   / HCO3: 25    / Base Excess: -4.4  /  SaO2: 98      Lactate: x                CARDIOVASCULAR  HR: 91 (85 - 119)  ABP: 116/67 (96/57 - 179/74)  ABP(mean): 86 (68 - 142)  Cardiac Rhythm: sinus  Most recent QTc:   Meds: midodrine  norepinephrine Infusion (4.04 mL/Hr)        GI/NUTRITION  Diet: Diet, NPO with Tube Feed:   Tube Feeding Modality: Nasogastric  Nepro with Carb Steady  Total Volume for 24 Hours (mL): 960  Continuous  Starting Tube Feed Rate mL per Hour: 10  Increase Tube Feed Rate by (mL): 10     Every hour  Until Goal Tube Feed Rate (mL per Hour): 40  Tube Feed Duration (in Hours): 24  Tube Feed Start Time: 09:30  No Carb Prosource (1pkg = 15gms Protein)     Qty per Day:  2 (05-04-20 @ 09:09)    Most recent bowel movement: 05/03/20  Meds/stress ulcer prophylaxis: calcium acetate 667 milliGRAM(s) Oral three times a day with meals  pantoprazole  Injectable 40 milliGRAM(s) IV Push daily  polyethylene glycol 3350 17 Gram(s) Oral daily  senna 2 Tablet(s) Oral at bedtime        GENITOURINARY  I&O's Detail    05-05 @ 07:01  -  05-06 @ 07:00  --------------------------------------------------------  IN:    dexmedetomidine Infusion: 95.4 mL    fentaNYL  Infusion: 215.1 mL    Nepro with Carb Steady: 20 mL    norepinephrine Infusion: 36 mL  Total IN: 366.5 mL    OUT:    Other: 1700 mL  Total OUT: 1700 mL    Total NET: -1333.5 mL          05-05    128<L>  |  94<L>  |  54<H>  ----------------------------<  100<H>  4.8   |  20<L>  |  5.92<H>    Ca    8.7      05 May 2020 06:31  Phos  8.5     05-05  Mg     2.8     05-05    TPro  8.1  /  Alb  1.9<L>  /  TBili  3.5<H>  /  DBili  x   /  AST  66<H>  /  ALT  55  /  AlkPhos  272<H>  05-05    [x] Stephenson catheter, indication: urine output monitoring in critically ill patient.  Meds: calcium acetate 667 milliGRAM(s) Oral three times a day with meals          HEMATOLOGIC  Meds/VTE prophylaxis:                         8.3    13.38 )-----------( 218      ( 06 May 2020 06:41 )             27.9     PT/INR - ( 06 May 2020 06:41 )   PT: 15.2 sec;   INR: 1.33 ratio           [id number] @ 09:05        INFECTIOUS DISEASES  T(C): 36.1, Max: 36.6 (05-05-20 @ 16:00)  WBC Count: 13.38 (05-06-20 @ 06:41)  WBC Count: 16.08 (05-05-20 @ 23:25)    Recent Cultures:    Meds: Cont Vancomycin to 750mgs iv each time patient is dialyzed last dose 5/5     Procalcitonin, Serum: 2.42 ng/mL (05-04-20 @ 09:40)        ENDOCRINE  Fingersticks: 82, 121, 128, 85, 133  Meds:       ACCESS DEVICES:  [ ] Peripheral IV  [x] Central Venous Line	[ ] R	[ ] L	[ ] IJ	[ ] Fem	[ ] SC	Placed:   [x] Arterial Line		[ ] R	[ ] L	[ ] Fem	[ ] Rad	[ ] Ax	Placed:   [ ] [facility] HD Access             [ ] R [ ] L [ ] IJ  [ ] Fem     Placed:  [x] Urinary Catheter, Date Placed:   Necessity of urinary, arterial, and venous catheters discussed.      CODE STATUS: FULL CODE

## 2020-05-06 NOTE — PROGRESS NOTE ADULT - ATTENDING COMMENTS
Assessment:  1. Acute Hypoxic Respiratory Failure  2. ARDS  3. COVID 19 infection  4. HTN  5. Hypercoagulable state  6. Septic shock  7. RASHAUN  8. Multi system organ failure      Plan  - Mechanical ventilation with lung protective strategy though remains with elevated plateau pressures  - Titrate Fio2 and PEEP as tolerated, on 100% fio2  - Repeat ABG this afternoon  - Sedation for ventilator synchrony   - Vasopressor support to maintain MAP > 60  - Monitor ABG  - S/p Anakinra and Hydroxychloroquine  - Antibiotics as per ID  - biomarkers are tending down  - Airborne and Contact isolation  - Nephrology  for daily dialysis  - Empiric antibiotics   - Bowel regimen  - DVT and Stress Ulcer prophylaxis   - Over all prognosis is poor given multiple organ dysfunction and prolonged hospitalization with intubation. Palliative care follow up requested.

## 2020-05-07 NOTE — PROGRESS NOTE ADULT - ASSESSMENT
Septic Shock  Bilat Pneumonia   COVID - 19 infection  Leukocytosis - dramatically increased  Resp failure  Hypothermia    Plan -   Start Meropenem 500mgs iv q12hrs  prognosis - very poor  Time spent - 30 mins

## 2020-05-07 NOTE — PROGRESS NOTE ADULT - NSHPATTENDINGPLANDISCUSS_GEN_ALL_CORE
icu team on rounds
Dr. Kamara, ICU team
icu team on rounds
Dr. Kamara, ICU team
Dr. Mistry, ICU team
Dr. Mistry, ICU team

## 2020-05-07 NOTE — PROGRESS NOTE ADULT - ASSESSMENT
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 Patient was admitted on medicine floor for COVID pna.  Admitted to ICU for hypoxia and desaturation on NRB- intubated on 4/15.     Assessment:  - acute hypoxic respiratory failure secondary to COVID-19 pneumonia  - septic shock requiring pressors   - RASHAUN  - Leukocytosis  - COVID pandemic     Plan:  Neuro:  -Pt sedated with fentanyl, precedex    CV:  - Hold BP medications  - currently on levophed .1  - on midodrine 10mg q 8     Pulm:  - Intubated for respiratory distress and hypoxia 4/15  - CXR shows bilateral airspace opacities; improving s/p HD sessions; may be from fluid overload   - Acute Hypoxic Resp Failure secondary to PNA + COVID 19  - S/P ANAKINRA and Hydroxychloroquine (since April 9th)   - Discontinued full dose Lovenox due to epistaxis  - Current vent settings 35/380/80/5; plateau pressure elevated; possibly need to paralyze patient if not improving     - CXR findings may be due to fluid overload; s/p HD session 5/7 will do TID HD nephro Dr. Rosa  - attempt to decrease FiO2 requirements once fluid overload improves; will hold off on trial of tocilizumab at this time   - ESR: 96>92>108>  - D-dimer: 1901>1936>820>692- 675  - LDH: 362>447>400>409->443  - procal 2.42-2.7  - crp 20    ID:  - completed Hydroxychloroquine, anakinra   Pt was febrile a few nights ago   blood culture negative  completed Cefepime 2g daily since 4/17- 5/6  and vanc 5/5  WBC: elevated 13k   ID Dr. Davey, f/u regarding further recommendations   will dc all abx as per Dr Davey     Nephro:  RASHAUN:  -Cr/BUN:  4.59/46  no longer on lasix drip as per nephro due to no urine output   S/P HD on 5/7   f/u nephro Dr. Rosa  - Monitor electrolytes   Hyponatremia: 131  Monitor BMP  Nephrology: Dr. Rosa      GI:  -  Tube feed  - GI ppx with Protonix  - Miralax and senna for prophylaxis  - dc dulcolax     Heme:  - no indication for transfusion   - continues to bleed through mouth- continue to hold dvt ppx   - Hgb stable 8.4  - monitor cbc daily     Endo:  - No history of DM- hypoglycemic overnight  - target CBG < 180  - FS q6 while Tube feed      Prophy:  - holding eliquis 2.5mg in the setting of bleeding   - Protonix for GI prophylaxis   - prognosis is poor

## 2020-05-07 NOTE — PROGRESS NOTE ADULT - ASSESSMENT
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 Presented to ED with SOB, fever and hypoxia. Subsequently transfered to ICU intubated and developed ARDS s/p CQ and Anikinra. Subsequently developed RASHAUN with ensuing oliguria prompting a renal consult. Hypernatremia has improved. Severe hypoalbuminemia 1.3     # RASHAUN sec to ATN from COVID - 19 related disease. With ARDS. remains on pressors. hypoalbuminemia.  oliguria.  remains with high FIO2/PEEP requirements and ppoor minute ventilation.  uptitration of pressors to maintain BP for HD  on HD today  poor prognosis overall. cont to discuss with family regarding goals of care.   Will evaluate hemodynamic stability in the next 24-48hrs for continuing HD

## 2020-05-07 NOTE — PROGRESS NOTE ADULT - SUBJECTIVE AND OBJECTIVE BOX
55y Female    Meds:    Allergies    No Known Allergies    Intolerances        VITALS:  Vital Signs Last 24 Hrs  T(C): 33.4 (07 May 2020 16:43), Max: 35.1 (07 May 2020 07:00)  T(F): 92.1 (07 May 2020 16:43), Max: 95.2 (07 May 2020 07:00)  HR: 74 (07 May 2020 17:00) (65 - 134)  BP: 128/77 (07 May 2020 08:00) (80/47 - 130/106)  BP(mean): 89 (07 May 2020 08:00) (55 - 112)  RR: 35 (07 May 2020 17:00) (17 - 35)  SpO2: 98% (07 May 2020 17:00) (72% - 99%)    LABS/DIAGNOSTIC TESTS:                          8.8    33.13 )-----------( 188      ( 07 May 2020 06:27 )             29.6         05-07    131<L>  |  97  |  46<H>  ----------------------------<  87  3.3<L>   |  24  |  4.59<H>    Ca    9.4      07 May 2020 06:27  Phos  5.2     05-07  Mg     2.3     05-07    TPro  8.0  /  Alb  1.7<L>  /  TBili  3.7<H>  /  DBili  x   /  AST  63<H>  /  ALT  47  /  AlkPhos  293<H>  05-07      LIVER FUNCTIONS - ( 07 May 2020 06:27 )  Alb: 1.7 g/dL / Pro: 8.0 g/dL / ALK PHOS: 293 U/L / ALT: 47 U/L DA / AST: 63 U/L / GGT: x             CULTURES: .Blood Blood-Peripheral  04-17 @ 18:26   No Growth Final  --  --            RADIOLOGY:      ROS:  [  ] UNABLE TO ELICIT ICU VISIT  55y Female who is doing poorly, she remains sedated , intubated and vent dependent but is on PEEP of 12 , FIO2 of 100% and rate of 35 to maintain her O2 saturation. She is still on a pressor and her wbc count increased to 33k and she is very hypothermic and on a heating blanket.    Meds:    Allergies    No Known Allergies    Intolerances        VITALS:  Vital Signs Last 24 Hrs  T(C): 33.4 (07 May 2020 16:43), Max: 35.1 (07 May 2020 07:00)  T(F): 92.1 (07 May 2020 16:43), Max: 95.2 (07 May 2020 07:00)  HR: 74 (07 May 2020 17:00) (65 - 134)  BP: 128/77 (07 May 2020 08:00) (80/47 - 130/106)  BP(mean): 89 (07 May 2020 08:00) (55 - 112)  RR: 35 (07 May 2020 17:00) (17 - 35)  SpO2: 98% (07 May 2020 17:00) (72% - 99%)    LABS/DIAGNOSTIC TESTS:                          8.8    33.13 )-----------( 188      ( 07 May 2020 06:27 )             29.6         05-07    131<L>  |  97  |  46<H>  ----------------------------<  87  3.3<L>   |  24  |  4.59<H>    Ca    9.4      07 May 2020 06:27  Phos  5.2     05-07  Mg     2.3     05-07    TPro  8.0  /  Alb  1.7<L>  /  TBili  3.7<H>  /  DBili  x   /  AST  63<H>  /  ALT  47  /  AlkPhos  293<H>  05-07      LIVER FUNCTIONS - ( 07 May 2020 06:27 )  Alb: 1.7 g/dL / Pro: 8.0 g/dL / ALK PHOS: 293 U/L / ALT: 47 U/L DA / AST: 63 U/L / GGT: x             CULTURES: .Blood Blood-Peripheral  04-17 @ 18:26   No Growth Final  --  --            RADIOLOGY:      ROS:  [ x ] UNABLE TO ELICIT

## 2020-05-07 NOTE — PROGRESS NOTE ADULT - PROBLEM SELECTOR PLAN 2
2/2 PNA/COVID 19. Comorbid MOF involving lungs, heart, kidneys. WBC 19.88.  CXR indicates stable bilateral lung opacities are suspicious for pneumonia. Patient remains sedated/intubated, on IV abx, pressor support. Guarded prognosis.
2/2 ARDS/COVID 19. Comorbid MOF involving lungs, heart, kidneys, liver. WBC 33.13. CXR indicates Unchanged diffuse bilateral airspace disease  Patient remains sedated/intubated, pressor support. Overall, patient with very poor prognosis.
2/2 ARDS/COVID 19. Comorbid MOF involving lungs, heart, kidneys, liver. WBC 15.10. CXR indicates Airspace opacifications in both lungs, grossly unchanged. Patient remains sedated/intubated, on IV abx, pressor support. Overall, patient with poor prognosis.
2/2 PNA/COVID 19. Comorbid MOF involving lungs, heart, kidneys. WBC 15.65. CXR indicates Pulmonary opacifications in both lungs, slightly progressed. Patient remains sedated/intubated, on IV abx, pressor support. Guarded prognosis.

## 2020-05-07 NOTE — PROGRESS NOTE ADULT - SUBJECTIVE AND OBJECTIVE BOX
[facility] : Progress Note :: [phone], (office [phone]),   Dr Rosa / Dr Mosher / Dr Lin / Dr Narvaez / Dr Nando SOTELO / Dr Temple / Dr Silva / Dr Bjorn mendoza  _____________________________________________________________________________________________    seen on HD. On low dose of levophed but requiring uptitration of levophed dose  on 100% FIO2     No Known Allergies    Hospital Medications:   MEDICATIONS  (STANDING):  calcium acetate 667 milliGRAM(s) Oral three times a day with meals  chlorhexidine 0.12% Liquid 15 milliLiter(s) Oral Mucosa every 12 hours  chlorhexidine 2% Cloths 1 Application(s) Topical <User Schedule>  collagenase Ointment 1 Application(s) Topical daily  dexMEDEtomidine Infusion 0.2 MICROgram(s)/kG/Hr (4.31 mL/Hr) IV Continuous <Continuous>  fentaNYL   Infusion 0.5 MICROgram(s)/kG/Hr (4.31 mL/Hr) IV Continuous <Continuous>  midodrine 10 milliGRAM(s) Oral every 8 hours  mupirocin 2% Nasal 1 Application(s) Nasal two times a day  norepinephrine Infusion 0.05 MICROgram(s)/kG/Min (4.04 mL/Hr) IV Continuous <Continuous>  pantoprazole  Injectable 40 milliGRAM(s) IV Push daily  polyethylene glycol 3350 17 Gram(s) Oral daily  rocuronium Infusion 8 MICROgram(s)/kG/Min (8.28 mL/Hr) IV Continuous <Continuous>  senna 2 Tablet(s) Oral at bedtime        VITALS:  T(F): 97.2 (05-07-20 @ 07:00), Max: 97.5 (05-06-20 @ 16:00)  HR: 78 (05-07-20 @ 10:00)  BP: 128/77 (05-07-20 @ 08:00)  RR: 35 (05-07-20 @ 10:30)  SpO2: 96% (05-07-20 @ 10:30)  Wt(kg): --    05-06 @ 07:01  -  05-07 @ 07:00  --------------------------------------------------------  IN: 787.8 mL / OUT: 0 mL / NET: 787.8 mL        PHYSICAL EXAM:  Constitutional: NAD  HEENT: anicteric sclera, oropharynx clear.  Neck: No JVD  Respiratory: CTAB, no wheezes, rales or rhonchi  Cardiovascular: S1, S2, RRR  Gastrointestinal: BS+, soft, NT/ND  Extremities:  peripheral edema+  Neurological: A/O x 3, no focal deficits  Vascular Access: IJ [facility]    LABS:  05-07    131<L>  |  97  |  46<H>  ----------------------------<  87  3.3<L>   |  24  |  4.59<H>    Ca    9.4      07 May 2020 06:27  Phos  5.2     05-07  Mg     2.3     05-07    TPro  8.0  /  Alb  1.7<L>  /  TBili  3.7<H>  /  DBili      /  AST  63<H>  /  ALT  47  /  AlkPhos  293<H>  05-07    Creatinine Trend: 4.59 <--, 3.84 <--, 5.92 <--, 5.03 <--, 4.17 <--, 4.73 <--, 3.46 <--                        8.8    33.13 )-----------( 188      ( 07 May 2020 06:27 )             29.6     Urine Studies:      RADIOLOGY & ADDITIONAL STUDIES:

## 2020-05-07 NOTE — PROGRESS NOTE ADULT - SUBJECTIVE AND OBJECTIVE BOX
OVERNIGHT EVENTS:    Present Symptoms:   Dyspnea:   Nausea/Vomiting:   Anxiety:    Depressed Mood:   Fatigue:   Loss of appetite:   Pain:                   location:   Review of Systems: [All others negative or Unable to obtain due to poor mentation]    MEDICATIONS  (STANDING):  calcium acetate 667 milliGRAM(s) Oral three times a day with meals  chlorhexidine 0.12% Liquid 15 milliLiter(s) Oral Mucosa every 12 hours  chlorhexidine 2% Cloths 1 Application(s) Topical <User Schedule>  collagenase Ointment 1 Application(s) Topical daily  dexMEDEtomidine Infusion 0.2 MICROgram(s)/kG/Hr (4.31 mL/Hr) IV Continuous <Continuous>  fentaNYL   Infusion 0.5 MICROgram(s)/kG/Hr (4.31 mL/Hr) IV Continuous <Continuous>  midodrine 10 milliGRAM(s) Oral every 8 hours  mupirocin 2% Nasal 1 Application(s) Nasal two times a day  norepinephrine Infusion 0.05 MICROgram(s)/kG/Min (4.04 mL/Hr) IV Continuous <Continuous>  pantoprazole  Injectable 40 milliGRAM(s) IV Push daily  polyethylene glycol 3350 17 Gram(s) Oral daily  rocuronium Infusion 8 MICROgram(s)/kG/Min (8.28 mL/Hr) IV Continuous <Continuous>  senna 2 Tablet(s) Oral at bedtime    MEDICATIONS  (PRN):  acetaminophen    Suspension .. 650 milliGRAM(s) Oral every 6 hours PRN Temp greater or equal to 38C (100.4F)      PHYSICAL EXAM:  Vital Signs Last 24 Hrs  T(C): 36.4 (06 May 2020 16:00), Max: 36.4 (06 May 2020 16:00)  T(F): 97.5 (06 May 2020 16:00), Max: 97.5 (06 May 2020 16:00)  HR: 82 (07 May 2020 06:00) (71 - 134)  BP: 112/68 (07 May 2020 06:00) (80/47 - 162/75)  BP(mean): 79 (07 May 2020 06:00) (55 - 112)  RR: 35 (07 May 2020 06:00) (17 - 39)  SpO2: 95% (07 May 2020 06:00) (72% - 97%)  General: alert  oriented x ____    [lethargic distressed cachexia  nonverbal  unarousable verbal]  Karnofsky Performance Score/Palliative Performance Status Version2:    %    HEENT: normal  dry mouth  ET tube/trach oral lesions:  Lungs: comfortable tachypnea/labored breathing  excessive secretions  CV: normal  tachycardia  GI: normal  distended  tender  incontinent               PEG/NG/OG tube  constipation  last BM:   : normal  incontinent  oliguria/anuria  parr  Musculoskeletal: normal  weakness  edema             ambulatory  bedbound/wheelchair bound  Skin: normal  pressure ulcers: stage: edema: other:  Neuro: no deficits cognitive impairment dsyphagia/dysarthria paresis: other:  Oral intake ability: unable/only mouth care [minimal moderate full capability]  Diet: NPO,     LABS:                          8.8    33.13 )-----------( 188      ( 07 May 2020 06:27 )             29.6     05-07    131<L>  |  97  |  46<H>  ----------------------------<  87  3.3<L>   |  24  |  4.59<H>    Ca    9.4      07 May 2020 06:27  Phos  5.2     05-07  Mg     2.3     05-07    TPro  8.0  /  Alb  1.7<L>  /  TBili  3.7<H>  /  DBili  x   /  AST  63<H>  /  ALT  47  /  AlkPhos  293<H>  05-07        RADIOLOGY & ADDITIONAL STUDIES:    ADVANCE DIRECTIVES:  Advanced Care Planning discussion total time spent: OVERNIGHT EVENTS:  Overnight, patient with episodes of desaturation <80% on despite FiO2 100%. STAT chest xray shows worsening ARDS.     Present Symptoms:         Review of Systems:  Unable to obtain - patient sedated/intubated.       MEDICATIONS  (STANDING):  calcium acetate 667 milliGRAM(s) Oral three times a day with meals  chlorhexidine 0.12% Liquid 15 milliLiter(s) Oral Mucosa every 12 hours  chlorhexidine 2% Cloths 1 Application(s) Topical <User Schedule>  collagenase Ointment 1 Application(s) Topical daily  dexMEDEtomidine Infusion 0.2 MICROgram(s)/kG/Hr (4.31 mL/Hr) IV Continuous <Continuous>  fentaNYL   Infusion 0.5 MICROgram(s)/kG/Hr (4.31 mL/Hr) IV Continuous <Continuous>  midodrine 10 milliGRAM(s) Oral every 8 hours  mupirocin 2% Nasal 1 Application(s) Nasal two times a day  norepinephrine Infusion 0.05 MICROgram(s)/kG/Min (4.04 mL/Hr) IV Continuous <Continuous>  pantoprazole  Injectable 40 milliGRAM(s) IV Push daily  polyethylene glycol 3350 17 Gram(s) Oral daily  rocuronium Infusion 8 MICROgram(s)/kG/Min (8.28 mL/Hr) IV Continuous <Continuous>  senna 2 Tablet(s) Oral at bedtime    MEDICATIONS  (PRN):  acetaminophen    Suspension .. 650 milliGRAM(s) Oral every 6 hours PRN Temp greater or equal to 38C (100.4F)      PHYSICAL EXAM:  Vital Signs Last 24 Hrs  T(C): 36.4 (06 May 2020 16:00), Max: 36.4 (06 May 2020 16:00)  T(F): 97.5 (06 May 2020 16:00), Max: 97.5 (06 May 2020 16:00)  HR: 82 (07 May 2020 06:00) (71 - 134)  BP: 112/68 (07 May 2020 06:00) (80/47 - 162/75)  BP(mean): 79 (07 May 2020 06:00) (55 - 112)  RR: 35 (07 May 2020 06:00) (17 - 39)  SpO2: 95% (07 May 2020 06:00) (72% - 97%)  General: Patient not medically stable for full physical exam. Patient sedated/intubated, on pressor   Karnofsky Performance Score/Palliative Performance Status Version2:    10%    HEENT: ET tube    Lungs:  tachypnea, on ventilator, precedex, fentanyl, FiO2 100  CV: on pressor and midodrine  GI:  incontinent, NG tube  :   parr  Musculoskeletal:  patient sedated/intubated - dependent on all ADLs  Skin: patient not medically stable for evaluation  Neuro: patient sedated/intubated, dependent on ADLs  Oral intake ability: unable/only mouth care   Diet: NPO; TF via NG tube      LABS:                          8.8    33.13 )-----------( 188      ( 07 May 2020 06:27 )             29.6     05-07    131<L>  |  97  |  46<H>  ----------------------------<  87  3.3<L>   |  24  |  4.59<H>    Ca    9.4      07 May 2020 06:27  Phos  5.2     05-07  Mg     2.3     05-07    TPro  8.0  /  Alb  1.7<L>  /  TBili  3.7<H>  /  DBili  x   /  AST  63<H>  /  ALT  47  /  AlkPhos  293<H>  05-07        RADIOLOGY & ADDITIONAL STUDIES:  < from: Xray Chest 1 View- PORTABLE-Urgent (05.06.20 @ 20:29) >  EXAM:  XR CHEST PORTABLE URGENT 1V                            PROCEDURE DATE:  05/06/2020          INTERPRETATION:  XR CHEST URGENT    Single AP view    HISTORY:  advanced ETT    Comparison: Same day chest x-ray      The tip of the ET tube is abovethe dinesh.    The NG tube is in the stomach.    Right IJ line tip in the SVC.    Left IJ line tip in the brachiocephalic.    The cardiac silhouette is rotated. Diffuse bilateral airspace disease. No pleural abnormality.    IMPRESSION: Unchanged diffuse bilateral airspace disease        < end of copied text >      ADVANCE DIRECTIVES: FULL CODE

## 2020-05-07 NOTE — PROGRESS NOTE ADULT - PROBLEM SELECTOR PLAN 4
Per report, patient's surrogate/Criselda now deferring to patient's /Dr. Lees for all medical decision making. Patient with overall very poor prognosis; patient remains a full code.

## 2020-05-07 NOTE — CHART NOTE - NSCHARTNOTEFT_GEN_A_CORE
Srinivas Carlson continues to receive text messages from the patient's spouse regarding her medical management and expressing dissatisfaction regarding the patient's care.  SRINIVAS Carlson relayed this to her PC attending as well as the ICU attending.  SRINIVAS Carlson sent a text response to the patient's spouse advising him to direct all medical matters to the ICU.  PC  will remain available as needed.

## 2020-05-07 NOTE — PROGRESS NOTE ADULT - ATTENDING COMMENTS
Assessment:  1. Acute Hypoxic Respiratory Failure  2. ARDS  3. COVID 19 infection  4. HTN  5. Hypercoagulable state  6. Septic shock  7. RASHAUN  8. Multi system organ failure      Plan  - Mechanical ventilation with lung protective strategy though remains with elevated plateau pressures  - Titrate Fio2 and PEEP as tolerated, on 100% fio2 though remains hypoxic  - Sedation for ventilator synchrony   - Vasopressor support to maintain MAP > 60, hypotensive during HD   - Monitor ABG  - S/p Anakinra, Tocilizumab and Hydroxychloroquine  - Antibiotics completed per ID  - Airborne and Contact isolation  - Nephrology  for dialysis, though now having hemodynamic issues during HD  - Bowel regimen  - DVT and Stress Ulcer prophylaxis   - Over all prognosis is poor given multiple organ dysfunction and prolonged hospitalization with intubation. Palliative care follow up requested.  - Discussed with  Dr. Montes [phone]. Explained the hospital course for the patient. He asked about Remdesivir and Convalescent plasma, explained that at this time patient is not a candidate for those therapies as she has progressed to multiple organ failure. Dr. Lees seemed frustrated and apologetic. He understands the guarded prognosis. Explained at this time we will continue current level of care and if there are any changes, he will be updated.

## 2020-05-07 NOTE — PROGRESS NOTE ADULT - PROBLEM SELECTOR PLAN 3
2/2 COVID19+; comorbid shock, multi-organ failure. CXR indicates stable bilateral lung opacities are suspicious for pneumonia. Patient remains sedated/intubated, on pressor support, IV abx. Guarded prognosis. Full code.
2/2 COVID19+; comorbid shock, multi-organ failure. CXR indicates Unchanged diffuse bilateral airspace disease. Patient remains sedated/intubated, on pressor support. Full code.
2/2 COVID19+; comorbid shock, multi-organ failure. CXR indicates Airspace opacifications in both lungs, grossly unchanged. Patient remains sedated/intubated, on pressor support, IV abx. Full code.
2/2 COVID19+; comorbid shock, multi-organ failure. CXR indicates Pulmonary opacifications in both lungs, slightly progressed. Patient remains sedated/intubated, on pressor support, IV abx. Guarded prognosis. Full code.

## 2020-05-07 NOTE — PROGRESS NOTE ADULT - PROBLEM SELECTOR PLAN 1
2/2 shock/PNA due to COVID19+. Involving lungs (patient intubated), heart (on pressor support), kidneys (RASHAUN, on HD, Cr 4.52). CXR indicates stable bilateral lung opacities are suspicious for pneumonia. Patient remains sedated/intubated, on pressor support, IV abx. Guarded prognosis. Patient remains a full code.
2/2 shock/ARDS due to COVID19+. Involving lungs (patient intubated), heart (on pressor support), kidneys (RASHAUN, on HD, Cr 4.59); liver (TB: 3.7, Alk Phos: 293, AST: 63). Reported episodes of hypoxia despite FiO2 100%. CXR indicates Unchanged diffuse bilateral airspace disease. Patient remains sedated/intubated, on pressor support; HD. Overall very poor prognosis. Patient remains a full code.
2/2 shock/ARDS due to COVID19+. Involving lungs (patient intubated), heart (on pressor support), kidneys (RASHAUN, on HD, Cr 5.92); liver (TB: 3.5, Alk Phos: 272, AST: 66). CXR indicates Airspace opacifications in both lungs, grossly unchanged.  Patient remains sedated/intubated, on pressor support, IV abx; HD. Overall poor prognosis. Patient remains a full code.
2/2 shock/PNA due to COVID19+. Involving lungs (patient intubated), heart (on pressor support), kidneys (RASHAUN, on HD, Cr 4.37). CXR indicates Pulmonary opacifications in both lungs, slightly progressed. Patient remains sedated/intubated, on pressor support, IV abx; HD. Guarded prognosis. Patient remains a full code.

## 2020-05-07 NOTE — PROGRESS NOTE ADULT - SUBJECTIVE AND OBJECTIVE BOX
HISTORY  55y Female with COVID-19(+) pneumonia requiring intubation.    24 HOUR EVENTS:    SUBJECTIVE/ROS: Due to intubation with sedation, subjective information was not able to be obtained from the patient. History was obtained, to the extent possible, from review of the chart and collateral sources of information.      NEURO  Exam: sedated, no acute distress  Meds: acetaminophen    Suspension .. 650 milliGRAM(s) Oral every 6 hours PRN Temp greater or equal to 38C (100.4F)  dexMEDEtomidine Infusion 0.2 MICROgram(s)/kG/Hr IV Continuous <Continuous>  fentaNYL   Infusion 0.5 MICROgram(s)/kG/Hr IV Continuous <Continuous>  rocuronium Infusion 8 MICROgram(s)/kG/Min IV Continuous <Continuous>    Adequacy of sedation and pain control has been assessed and adjusted.      RESPIRATORY  RR: 35 (17 - 39)  SpO2: 95% (72% - 99%)  Exam: without dissynchrony  Mechanical Ventilation: Mode: AC/ CMV (Assist Control/ Continuous Mandatory Ventilation), RR (machine): 35, TV (machine): 380, FiO2: 100, PEEP: 12  Predicted Body Weight:  Tidal Volume/PBW ratio:  PaO2 to FiO2 ratio:  Extubation readiness assessed.  ABG - ( 07 May 2020 04:14 )  pH: 7.16  /  pCO2: 64    /  pO2: 64    / HCO3: 22    / Base Excess: -5.3  /  SaO2: 90      Lactate: x                CARDIOVASCULAR  HR: 82 (71 - 134)  ABP: 131/83 (90/52 - 170/80)  ABP(mean): 105 (63 - 116)  Cardiac Rhythm: sinus  Most recent QTc:   Meds: midodrine  norepinephrine Infusion (4.04 mL/Hr)        GI/NUTRITION  Diet: Diet, NPO with Tube Feed:   Tube Feeding Modality: Nasogastric  Nepro with Carb Steady  Total Volume for 24 Hours (mL): 720  Continuous  Starting Tube Feed Rate mL per Hour: 10  Increase Tube Feed Rate by (mL): 10     Every hour  Until Goal Tube Feed Rate (mL per Hour): 30  Tube Feed Duration (in Hours): 24  Tube Feed Start Time: 09:30  No Carb Prosource (1pkg = 15gms Protein)     Qty per Day:  2 (05-06-20 @ 10:02)    Most recent bowel movement:  Meds/stress ulcer prophylaxis: calcium acetate 667 milliGRAM(s) Oral three times a day with meals  pantoprazole  Injectable 40 milliGRAM(s) IV Push daily  polyethylene glycol 3350 17 Gram(s) Oral daily  senna 2 Tablet(s) Oral at bedtime        GENITOURINARY  I&O's Detail    05-06 @ 07:01  -  05-07 @ 07:00  --------------------------------------------------------  IN:    dexmedetomidine Infusion: 129.6 mL    Enteral Tube Flush: 200 mL    fentaNYL  Infusion: 189.2 mL    Nepro with Carb Steady: 250 mL    norepinephrine Infusion: 19 mL  Total IN: 787.8 mL    OUT:  Total OUT: 0 mL    Total NET: 787.8 mL          05-07    131<L>  |  97  |  46<H>  ----------------------------<  87  3.3<L>   |  24  |  4.59<H>    Ca    9.4      07 May 2020 06:27  Phos  5.2     05-07  Mg     2.3     05-07    TPro  8.0  /  Alb  1.7<L>  /  TBili  3.7<H>  /  DBili  x   /  AST  63<H>  /  ALT  47  /  AlkPhos  293<H>  05-07    [x] Stephenson catheter, indication: urine output monitoring in critically ill patient.  Meds: calcium acetate 667 milliGRAM(s) Oral three times a day with meals          HEMATOLOGIC  Meds/VTE prophylaxis:                         8.8    33.13 )-----------( 188      ( 07 May 2020 06:27 )             29.6     PT/INR - ( 07 May 2020 06:27 )   PT: 15.6 sec;   INR: 1.37 ratio           [id number] @ 09:05        INFECTIOUS DISEASES  T(C): 36.4, Max: 36.4 (05-06-20 @ 16:00)  WBC Count: 33.13 (05-07-20 @ 06:27)  WBC Count: 13.38 (05-06-20 @ 06:41)    Recent Cultures:    Meds:   Procalcitonin, Serum: 2.74 ng/mL (05-06-20 @ 09:14)        ENDOCRINE  Fingersticks: 51, 126, 82  Meds:       ACCESS DEVICES:  [ ] Peripheral IV  [x] Central Venous Line	[ ] R	[ ] L	[ ] IJ	[ ] Fem	[ ] SC	Placed:   [x] Arterial Line		[ ] R	[ ] L	[ ] Fem	[ ] Rad	[ ] Ax	Placed:   [ ] [facility] HD Access             [ ] R [ ] L [ ] IJ  [ ] Fem     Placed:  [x] Urinary Catheter, Date Placed:   Necessity of urinary, arterial, and venous catheters discussed.      CODE STATUS:     IMAGING: HISTORY  55y Female with COVID-19(+) pneumonia requiring intubation.    24 HOUR EVENTS: desaturated overnight was proned and paralyzed    SUBJECTIVE/ROS: Due to intubation with sedation, subjective information was not able to be obtained from the patient. History was obtained, to the extent possible, from review of the chart and collateral sources of information.      NEURO  Exam: sedated, no acute distress  Meds: acetaminophen    Suspension .. 650 milliGRAM(s) Oral every 6 hours PRN Temp greater or equal to 38C (100.4F)  dexMEDEtomidine Infusion 0.2 MICROgram(s)/kG/Hr IV Continuous <Continuous>  fentaNYL   Infusion 0.5 MICROgram(s)/kG/Hr IV Continuous <Continuous>  rocuronium Infusion 8 MICROgram(s)/kG/Min IV Continuous <Continuous>    Adequacy of sedation and pain control has been assessed and adjusted.      RESPIRATORY  RR: 35 (17 - 39)  SpO2: 95% (72% - 99%)  Exam: without dissynchrony  Mechanical Ventilation: Mode: AC/ CMV (Assist Control/ Continuous Mandatory Ventilation), RR (machine): 35, TV (machine): 380, FiO2: 100, PEEP: 12  Predicted Body Weight:  Tidal Volume/PBW ratio:  PaO2 to FiO2 ratio:  Extubation readiness assessed.  ABG - ( 07 May 2020 04:14 )  pH: 7.16  /  pCO2: 64    /  pO2: 64    / HCO3: 22    / Base Excess: -5.3  /  SaO2: 90      Lactate: x                CARDIOVASCULAR  HR: 82 (71 - 134)  ABP: 131/83 (90/52 - 170/80)  ABP(mean): 105 (63 - 116)  Cardiac Rhythm: sinus  Most recent QTc:   Meds: midodrine  norepinephrine Infusion (4.04 mL/Hr)        GI/NUTRITION  Diet: Diet, NPO with Tube Feed:   Tube Feeding Modality: Nasogastric  Nepro with Carb Steady  Total Volume for 24 Hours (mL): 720  Continuous  Starting Tube Feed Rate mL per Hour: 10  Increase Tube Feed Rate by (mL): 10     Every hour  Until Goal Tube Feed Rate (mL per Hour): 30  Tube Feed Duration (in Hours): 24  Tube Feed Start Time: 09:30  No Carb Prosource (1pkg = 15gms Protein)     Qty per Day:  2 (05-06-20 @ 10:02)    Most recent bowel movement:  Meds/stress ulcer prophylaxis: calcium acetate 667 milliGRAM(s) Oral three times a day with meals  pantoprazole  Injectable 40 milliGRAM(s) IV Push daily  polyethylene glycol 3350 17 Gram(s) Oral daily  senna 2 Tablet(s) Oral at bedtime        GENITOURINARY  I&O's Detail    05-06 @ 07:01  -  05-07 @ 07:00  --------------------------------------------------------  IN:    dexmedetomidine Infusion: 129.6 mL    Enteral Tube Flush: 200 mL    fentaNYL  Infusion: 189.2 mL    Nepro with Carb Steady: 250 mL    norepinephrine Infusion: 19 mL  Total IN: 787.8 mL    OUT:  Total OUT: 0 mL    Total NET: 787.8 mL          05-07    131<L>  |  97  |  46<H>  ----------------------------<  87  3.3<L>   |  24  |  4.59<H>    Ca    9.4      07 May 2020 06:27  Phos  5.2     05-07  Mg     2.3     05-07    TPro  8.0  /  Alb  1.7<L>  /  TBili  3.7<H>  /  DBili  x   /  AST  63<H>  /  ALT  47  /  AlkPhos  293<H>  05-07    [x] Stephenson catheter, indication: urine output monitoring in critically ill patient.  Meds: calcium acetate 667 milliGRAM(s) Oral three times a day with meals          HEMATOLOGIC  Meds/VTE prophylaxis:                         8.8    33.13 )-----------( 188      ( 07 May 2020 06:27 )             29.6     PT/INR - ( 07 May 2020 06:27 )   PT: 15.6 sec;   INR: 1.37 ratio           [id number] @ 09:05        INFECTIOUS DISEASES  T(C): 36.4, Max: 36.4 (05-06-20 @ 16:00)  WBC Count: 33.13 (05-07-20 @ 06:27)  WBC Count: 13.38 (05-06-20 @ 06:41)    Recent Cultures:    Meds:   Procalcitonin, Serum: 2.74 ng/mL (05-06-20 @ 09:14)        ENDOCRINE  Fingersticks: 51, 126, 82  Meds:       ACCESS DEVICES:  [ ] Peripheral IV  [x] Central Venous Line	[ ] R	[ ] L	[ ] IJ	[ ] Fem	[ ] SC	Placed:   [x] Arterial Line		[ ] R	[ ] L	[ ] Fem	[ ] Rad	[ ] Ax	Placed:   [ ] [facility] HD Access             [ ] R [ ] L [ ] IJ  [ ] Fem     Placed:  [x] Urinary Catheter, Date Placed:   Necessity of urinary, arterial, and venous catheters discussed.      CODE STATUS:     IMAGING: HISTORY  55y Female with COVID-19(+) pneumonia requiring intubation.    24 HOUR EVENTS: desaturated overnight was proned and paralyzed    SUBJECTIVE/ROS: Due to intubation with sedation, subjective information was not able to be obtained from the patient. History was obtained, to the extent possible, from review of the chart and collateral sources of information.      NEURO  Exam: sedated, paralyzed, no acute distress  Meds: acetaminophen    Suspension .. 650 milliGRAM(s) Oral every 6 hours PRN Temp greater or equal to 38C (100.4F)  dexMEDEtomidine Infusion 0.2 MICROgram(s)/kG/Hr IV Continuous <Continuous>  fentaNYL   Infusion 0.5 MICROgram(s)/kG/Hr IV Continuous <Continuous>  rocuronium Infusion 8 MICROgram(s)/kG/Min IV Continuous <Continuous>    Adequacy of sedation and pain control has been assessed and adjusted.      RESPIRATORY  RR: 35 (17 - 39)  SpO2: 95% (72% - 99%)  Exam: without dissynchrony  Mechanical Ventilation: Mode: AC/ CMV (Assist Control/ Continuous Mandatory Ventilation), RR (machine): 35, TV (machine): 380, FiO2: 100, PEEP: 12  Predicted Body Weight:  Tidal Volume/PBW ratio:  PaO2 to FiO2 ratio:  Extubation readiness assessed.  ABG - ( 07 May 2020 04:14 )  pH: 7.16  /  pCO2: 64    /  pO2: 64    / HCO3: 22    / Base Excess: -5.3  /  SaO2: 90      Lactate: x                CARDIOVASCULAR  HR: 82 (71 - 134)  ABP: 131/83 (90/52 - 170/80)  ABP(mean): 105 (63 - 116)  Cardiac Rhythm: sinus  Most recent QTc:   Meds: midodrine  norepinephrine Infusion (4.04 mL/Hr)        GI/NUTRITION  Diet: Diet, NPO with Tube Feed:   Tube Feeding Modality: Nasogastric  Nepro with Carb Steady  Total Volume for 24 Hours (mL): 720  Continuous  Starting Tube Feed Rate mL per Hour: 10  Increase Tube Feed Rate by (mL): 10     Every hour  Until Goal Tube Feed Rate (mL per Hour): 30  Tube Feed Duration (in Hours): 24  Tube Feed Start Time: 09:30  No Carb Prosource (1pkg = 15gms Protein)     Qty per Day:  2 (05-06-20 @ 10:02)    Most recent bowel movement:  Meds/stress ulcer prophylaxis: calcium acetate 667 milliGRAM(s) Oral three times a day with meals  pantoprazole  Injectable 40 milliGRAM(s) IV Push daily  polyethylene glycol 3350 17 Gram(s) Oral daily  senna 2 Tablet(s) Oral at bedtime        GENITOURINARY  I&O's Detail    05-06 @ 07:01  -  05-07 @ 07:00  --------------------------------------------------------  IN:    dexmedetomidine Infusion: 129.6 mL    Enteral Tube Flush: 200 mL    fentaNYL  Infusion: 189.2 mL    Nepro with Carb Steady: 250 mL    norepinephrine Infusion: 19 mL  Total IN: 787.8 mL    OUT:  Total OUT: 0 mL    Total NET: 787.8 mL          05-07    131<L>  |  97  |  46<H>  ----------------------------<  87  3.3<L>   |  24  |  4.59<H>    Ca    9.4      07 May 2020 06:27  Phos  5.2     05-07  Mg     2.3     05-07    TPro  8.0  /  Alb  1.7<L>  /  TBili  3.7<H>  /  DBili  x   /  AST  63<H>  /  ALT  47  /  AlkPhos  293<H>  05-07    [x] Renner catheter, indication: urine output monitoring in critically ill patient.  Meds: calcium acetate 667 milliGRAM(s) Oral three times a day with meals          HEMATOLOGIC  Meds/VTE prophylaxis:                         8.8    33.13 )-----------( 188      ( 07 May 2020 06:27 )             29.6     PT/INR - ( 07 May 2020 06:27 )   PT: 15.6 sec;   INR: 1.37 ratio           [id number] @ 09:05        INFECTIOUS DISEASES  T(C): 36.4, Max: 36.4 (05-06-20 @ 16:00)  WBC Count: 33.13 (05-07-20 @ 06:27)  WBC Count: 13.38 (05-06-20 @ 06:41)    Recent Cultures:    Meds:   Procalcitonin, Serum: 2.74 ng/mL (05-06-20 @ 09:14)        ENDOCRINE  Fingersticks: 51, 126, 82  Meds:       ACCESS DEVICES:  CENTRAL LINE: [x ] YES LOCATION: [facility]  DATE INSERTED: 4/30  REMOVE: NO     RENNER: [ x] YES    REMOVE: NO     A-LINE: [ x] YES LOCATION: R axillary  DATE INSERTED: 4/26  REMOVE: NO  EXPLAIN:  [ ] [facility] Access             [x ] R [ ] L [ ] IJ  [ ] Fem     Placed:4/24  [x] Urinary Catheter, Date Placed:   Necessity of urinary, arterial, and venous catheters discussed.      CODE STATUS: Full code

## 2020-05-08 NOTE — PROGRESS NOTE ADULT - SUBJECTIVE AND OBJECTIVE BOX
HISTORY  55y Female with COVID-19(+) pneumonia requiring intubation.    24 HOUR EVENTS:    SUBJECTIVE/ROS: Due to intubation with sedation, subjective information was not able to be obtained from the patient. History was obtained, to the extent possible, from review of the chart and collateral sources of information.      NEURO  Exam: sedated, no acute distress  Meds: acetaminophen    Suspension .. 650 milliGRAM(s) Oral every 6 hours PRN Temp greater or equal to 38C (100.4F)  dexMEDEtomidine Infusion 0.2 MICROgram(s)/kG/Hr IV Continuous <Continuous>  fentaNYL   Infusion 0.5 MICROgram(s)/kG/Hr IV Continuous <Continuous>  rocuronium Infusion 8 MICROgram(s)/kG/Min IV Continuous <Continuous>    Adequacy of sedation and pain control has been assessed and adjusted.      RESPIRATORY  RR: 35 (28 - 35)  SpO2: 88% (88% - 99%)  Exam: without dissynchrony  Mechanical Ventilation: Mode: AC/ CMV (Assist Control/ Continuous Mandatory Ventilation), RR (machine): 35, TV (machine): 380, FiO2: 100, PEEP: 12  Predicted Body Weight:  Tidal Volume/PBW ratio:  PaO2 to FiO2 ratio:  Extubation readiness assessed.  ABG - ( 08 May 2020 03:53 )  pH: 7.14  /  pCO2: 78    /  pO2: 68    / HCO3: 26    / Base Excess: -3.7  /  SaO2: 92      Lactate: x                CARDIOVASCULAR  HR: 127 (65 - 127)  ABP: 89/42 (80/48 - 154/81)  ABP(mean): 57 (57 - 116)  Cardiac Rhythm: sinus  Most recent QTc:   Meds: midodrine  norepinephrine Infusion (4.04 mL/Hr)        GI/NUTRITION  Diet: Diet, NPO with Tube Feed:   Tube Feeding Modality: Nasogastric  Nepro with Carb Steady  Total Volume for 24 Hours (mL): 720  Continuous  Starting Tube Feed Rate mL per Hour: 10  Increase Tube Feed Rate by (mL): 10     Every hour  Until Goal Tube Feed Rate (mL per Hour): 30  Tube Feed Duration (in Hours): 24  Tube Feed Start Time: 09:30  No Carb Prosource (1pkg = 15gms Protein)     Qty per Day:  2 (05-06-20 @ 10:02)    Most recent bowel movement:  Meds/stress ulcer prophylaxis: calcium acetate 667 milliGRAM(s) Oral three times a day with meals  pantoprazole  Injectable 40 milliGRAM(s) IV Push daily  polyethylene glycol 3350 17 Gram(s) Oral daily  senna 2 Tablet(s) Oral at bedtime        GENITOURINARY  I&O's Detail    05-07 @ 07:01  -  05-08 @ 07:00  --------------------------------------------------------  IN:    dexmedetomidine Infusion: 118.8 mL    Enteral Tube Flush: 260 mL    fentaNYL  Infusion: 189.2 mL    norepinephrine Infusion: 84.8 mL    rocuronium Infusion: 54.8 mL    Sodium Chloride 0.9% IV Bolus: 1000 mL  Total IN: 1707.7 mL    OUT:    Other: 2500 mL  Total OUT: 2500 mL    Total NET: -792.3 mL          05-08    133<L>  |  95<L>  |  28<H>  ----------------------------<  69<L>  3.7   |  25  |  3.44<H>    Ca    9.6      08 May 2020 06:42  Phos  6.0     05-08  Mg     2.3     05-08    TPro  8.2  /  Alb  1.8<L>  /  TBili  3.3<H>  /  DBili  x   /  AST  48<H>  /  ALT  49  /  AlkPhos  278<H>  05-08    [x] Stephenson catheter, indication: urine output monitoring in critically ill patient.  Meds: calcium acetate 667 milliGRAM(s) Oral three times a day with meals          HEMATOLOGIC  Meds/VTE prophylaxis:                         8.3    23.74 )-----------( 183      ( 08 May 2020 06:42 )             28.2     PT/INR - ( 08 May 2020 06:42 )   PT: 16.7 sec;   INR: 1.46 ratio           60905-05-18 @ 09:05        INFECTIOUS DISEASES  T(C): 37.1, Max: 37.1 (05-08-20 @ 05:25)  WBC Count: 23.74 (05-08-20 @ 06:42)  WBC Count: 33.13 (05-07-20 @ 06:27)    Recent Cultures:    Meds: meropenem  IVPB 500 milliGRAM(s) IV Intermittent every 12 hours  meropenem  IVPB        Procalcitonin, Serum: 2.74 ng/mL (05-06-20 @ 09:14)        ENDOCRINE  Fingersticks: 57, 85, 105, 97, 51  Meds:       ACCESS DEVICES:  [ ] Peripheral IV  [x] Central Venous Line	[ ] R	[ ] L	[ ] IJ	[ ] Fem	[ ] SC	Placed:   [x] Arterial Line		[ ] R	[ ] L	[ ] Fem	[ ] Rad	[ ] Ax	Placed:   [ ] [facility] HD Access             [ ] R [ ] L [ ] IJ  [ ] Fem     Placed:  [x] Urinary Catheter, Date Placed:   Necessity of urinary, arterial, and venous catheters discussed.      CODE STATUS:     IMAGING: HISTORY  55y Female with COVID-19(+) pneumonia requiring intubation.    24 HOUR EVENTS: continues to remain acidotic     SUBJECTIVE/ROS: Due to intubation with sedation, subjective information was not able to be obtained from the patient. History was obtained, to the extent possible, from review of the chart and collateral sources of information.      NEURO  Exam: sedated, no acute distress  Meds: acetaminophen    Suspension .. 650 milliGRAM(s) Oral every 6 hours PRN Temp greater or equal to 38C (100.4F)  dexMEDEtomidine Infusion 0.2 MICROgram(s)/kG/Hr IV Continuous <Continuous>  fentaNYL   Infusion 0.5 MICROgram(s)/kG/Hr IV Continuous <Continuous>  rocuronium Infusion 8 MICROgram(s)/kG/Min IV Continuous <Continuous>    Adequacy of sedation and pain control has been assessed and adjusted.      RESPIRATORY  RR: 35 (28 - 35)  SpO2: 88% (88% - 99%)  Exam: without dissynchrony  Mechanical Ventilation: Mode: AC/ CMV (Assist Control/ Continuous Mandatory Ventilation), RR (machine): 35, TV (machine): 380, FiO2: 100, PEEP: 12  Predicted Body Weight:  Tidal Volume/PBW ratio:  PaO2 to FiO2 ratio:  Extubation readiness assessed.  ABG - ( 08 May 2020 03:53 )  pH: 7.14  /  pCO2: 78    /  pO2: 68    / HCO3: 26    / Base Excess: -3.7  /  SaO2: 92      Lactate: x                CARDIOVASCULAR  HR: 127 (65 - 127)  ABP: 89/42 (80/48 - 154/81)  ABP(mean): 57 (57 - 116)  Cardiac Rhythm: sinus  Most recent QTc:   Meds: midodrine  norepinephrine Infusion (4.04 mL/Hr)        GI/NUTRITION  Diet: Diet, NPO with Tube Feed:   Tube Feeding Modality: Nasogastric  Nepro with Carb Steady  Total Volume for 24 Hours (mL): 720  Continuous  Starting Tube Feed Rate mL per Hour: 10  Increase Tube Feed Rate by (mL): 10     Every hour  Until Goal Tube Feed Rate (mL per Hour): 30  Tube Feed Duration (in Hours): 24  Tube Feed Start Time: 09:30  No Carb Prosource (1pkg = 15gms Protein)     Qty per Day:  2 (05-06-20 @ 10:02)    Most recent bowel movement:  Meds/stress ulcer prophylaxis: calcium acetate 667 milliGRAM(s) Oral three times a day with meals  pantoprazole  Injectable 40 milliGRAM(s) IV Push daily  polyethylene glycol 3350 17 Gram(s) Oral daily  senna 2 Tablet(s) Oral at bedtime        GENITOURINARY  I&O's Detail    05-07 @ 07:01  -  05-08 @ 07:00  --------------------------------------------------------  IN:    dexmedetomidine Infusion: 118.8 mL    Enteral Tube Flush: 260 mL    fentaNYL  Infusion: 189.2 mL    norepinephrine Infusion: 84.8 mL    rocuronium Infusion: 54.8 mL    Sodium Chloride 0.9% IV Bolus: 1000 mL  Total IN: 1707.7 mL    OUT:    Other: 2500 mL  Total OUT: 2500 mL    Total NET: -792.3 mL          05-08    133<L>  |  95<L>  |  28<H>  ----------------------------<  69<L>  3.7   |  25  |  3.44<H>    Ca    9.6      08 May 2020 06:42  Phos  6.0     05-08  Mg     2.3     05-08    TPro  8.2  /  Alb  1.8<L>  /  TBili  3.3<H>  /  DBili  x   /  AST  48<H>  /  ALT  49  /  AlkPhos  278<H>  05-08    [x] Renner catheter, indication: urine output monitoring in critically ill patient.  Meds: calcium acetate 667 milliGRAM(s) Oral three times a day with meals          HEMATOLOGIC  Meds/VTE prophylaxis:                         8.3    23.74 )-----------( 183      ( 08 May 2020 06:42 )             28.2     PT/INR - ( 08 May 2020 06:42 )   PT: 16.7 sec;   INR: 1.46 ratio           81265-13-14 @ 09:05        INFECTIOUS DISEASES  T(C): 37.1, Max: 37.1 (05-08-20 @ 05:25)  WBC Count: 23.74 (05-08-20 @ 06:42)  WBC Count: 33.13 (05-07-20 @ 06:27)    Recent Cultures:    Meds: meropenem  IVPB 500 milliGRAM(s) IV Intermittent every 12 hours  meropenem  IVPB        Procalcitonin, Serum: 2.74 ng/mL (05-06-20 @ 09:14)        ENDOCRINE  Fingersticks: 57, 85, 105, 97, 51  Meds:     ACCESS DEVICES:  CENTRAL LINE: [x ] YES LOCATION: [facility]  DATE INSERTED: 4/30  REMOVE: NO     RENNER: [ x] YES    REMOVE: NO     A-LINE: [ x] YES LOCATION: R axillary  DATE INSERTED: 4/26  REMOVE: NO  EXPLAIN:  [ ] [facility] Access             [x ] R [ ] L [ ] IJ  [ ] Fem     Placed:4/24  [x] Urinary Catheter, Date Placed:   Necessity of urinary, arterial, and venous catheters discussed.      CODE STATUS: Full code    IMAGING:

## 2020-05-08 NOTE — PROGRESS NOTE ADULT - ATTENDING COMMENTS
Assessment:  1. Acute Hypoxic Respiratory Failure  2. ARDS  3. COVID 19 infection  4. HTN  5. Hypercoagulable state  6. Septic shock  7. RASHAUN  8. Multi system organ failure      Plan  - Mechanical ventilation with lung protective strategy though remains with elevated plateau pressures  - Titrate Fio2 and PEEP as tolerated, on 100% fio2 though remains hypoxic  - Sedation for ventilator synchrony   - Vasopressor support to maintain MAP > 60, hypotensive during HD   - Change levophed to phenylephrine  - Monitor ABG  - S/p Anakinra, Tocilizumab and Hydroxychloroquine  - Antibiotics completed per ID  - Airborne and Contact isolation  - Nephrology  for dialysis, though now having hemodynamic issues during HD  - Bowel regimen  - DVT and Stress Ulcer prophylaxis   - Over all prognosis is poor given multiple organ dysfunction and prolonged hospitalization with intubation. Palliative care follow up requested

## 2020-05-08 NOTE — PROGRESS NOTE ADULT - ASSESSMENT
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 Presented to ED with SOB, fever and hypoxia. Subsequently transfered to ICU intubated and developed ARDS s/p CQ and Anikinra. Subsequently developed RAHSAUN with ensuing oliguria prompting a renal consult. Hypernatremia has improved. Severe hypoalbuminemia 1.3     # RASHAUN sec to ATN from COVID - 19 related disease. With ARDS. remains on pressors. hypoalbuminemia.  oliguria.  remains with high FIO2/PEEP requirements and poor minute ventilation.  Able to get HD with minimal uptitration of levophed.  HD in AM  poor prognosis overall. cont to discuss with family regarding goals of care.

## 2020-05-08 NOTE — PROGRESS NOTE ADULT - SUBJECTIVE AND OBJECTIVE BOX
[facility] : Progress Note :: [phone], (office [phone]),   Dr Rosa / Dr Mosher / Dr Lin / Dr Narvaez / Dr Nando SOTELO / Dr Temple / Dr Silva / Dr Bjorn mendoza  _____________________________________________________________________________________________    s/p HD Yesterday.  remains with hypoxia    No Known Allergies    Hospital Medications:   MEDICATIONS  (STANDING):  calcium acetate 667 milliGRAM(s) Oral three times a day with meals  chlorhexidine 0.12% Liquid 15 milliLiter(s) Oral Mucosa every 12 hours  chlorhexidine 2% Cloths 1 Application(s) Topical <User Schedule>  collagenase Ointment 1 Application(s) Topical daily  dexMEDEtomidine Infusion 0.2 MICROgram(s)/kG/Hr (4.31 mL/Hr) IV Continuous <Continuous>  fentaNYL   Infusion 0.5 MICROgram(s)/kG/Hr (4.31 mL/Hr) IV Continuous <Continuous>  meropenem  IVPB 500 milliGRAM(s) IV Intermittent every 12 hours  meropenem  IVPB      midodrine 10 milliGRAM(s) Oral every 8 hours  mupirocin 2% Nasal 1 Application(s) Nasal two times a day  norepinephrine Infusion 0.05 MICROgram(s)/kG/Min (4.04 mL/Hr) IV Continuous <Continuous>  pantoprazole  Injectable 40 milliGRAM(s) IV Push daily  phenylephrine    Infusion 0.1 MICROgram(s)/kG/Min (1.62 mL/Hr) IV Continuous <Continuous>  polyethylene glycol 3350 17 Gram(s) Oral daily  rocuronium Infusion 8 MICROgram(s)/kG/Min (8.28 mL/Hr) IV Continuous <Continuous>  senna 2 Tablet(s) Oral at bedtime        VITALS:  T(F): 97.8 (05-08-20 @ 15:00), Max: 99.9 (05-08-20 @ 07:00)  HR: 99 (05-08-20 @ 16:00)  BP: --  RR: 35 (05-08-20 @ 16:00)  SpO2: 98% (05-08-20 @ 16:00)  Wt(kg): --    05-07 @ 07:01  -  05-08 @ 07:00  --------------------------------------------------------  IN: 1755 mL / OUT: 2500 mL / NET: -745 mL    05-08 @ 07:01  -  05-08 @ 16:33  --------------------------------------------------------  IN: 1457.1 mL / OUT: 0 mL / NET: 1457.1 mL        PHYSICAL EXAM:  Constitutional: intubated  HEENT: anicteric sclera, oropharynx clear.  Neck: No JVD  Respiratory: CTAB, no wheezes, rales or rhonchi  Cardiovascular: S1, S2, RRR  Gastrointestinal: BS+, soft, NT/ND  Extremities:  No peripheral edema  Vascular Access: IJ permacath.    LABS:  05-08    133<L>  |  95<L>  |  28<H>  ----------------------------<  69<L>  3.7   |  25  |  3.44<H>    Ca    9.6      08 May 2020 06:42  Phos  6.0     05-08  Mg     2.3     05-08    TPro  8.2  /  Alb  1.8<L>  /  TBili  3.3<H>  /  DBili      /  AST  48<H>  /  ALT  49  /  AlkPhos  278<H>  05-08    Creatinine Trend: 3.44 <--, 4.59 <--, 3.84 <--, 5.92 <--, 5.03 <--, 4.17 <--, 4.73 <--                        8.3    23.74 )-----------( 183      ( 08 May 2020 06:42 )             28.2     Urine Studies:      RADIOLOGY & ADDITIONAL STUDIES:

## 2020-05-08 NOTE — PROGRESS NOTE ADULT - ASSESSMENT
1.	Septic Shock  2.	Bilat Pneumonia with resp failure  3.	COVID - 19 infection  4.	Leukocytosis - decreased  5.	Hypothermia  ·	cont Meropenem 500mgs iv q12hrs  ·	prognosis - very poor  Time spent - 30 mins

## 2020-05-08 NOTE — PROGRESS NOTE ADULT - ASSESSMENT
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 Patient was admitted on medicine floor for COVID pna.  Admitted to ICU for hypoxia and desaturation on NRB- intubated on 4/15.     Assessment:  - acute hypoxic respiratory failure secondary to COVID-19 pneumonia  - septic shock requiring pressors   - RASHAUN  - Leukocytosis  - COVID pandemic     Plan:  Neuro:  -Pt sedated with fentanyl, precedex    CV:  - Hold BP medications  - currently on levophed .2  - on midodrine 10mg q 8     Pulm:  - Intubated for respiratory distress and hypoxia 4/15  - CXR shows bilateral airspace opacities; improving s/p HD sessions; may be from fluid overload   - Acute Hypoxic Resp Failure secondary to PNA + COVID 19  - S/P ANAKINRA and Hydroxychloroquine (since April 9th)   - Discontinued full dose Lovenox due to epistaxis  - Current vent settings 35/380/80/5; plateau pressure elevated; possibly need to paralyze patient if not improving     - CXR findings may be due to fluid overload; s/p HD session 5/7 HD nephro Dr. Rosa  - attempt to decrease FiO2 requirements once fluid overload improves; will hold off on trial of tocilizumab at this time   - ESR: 96>92>108>  - D-dimer: 1901>1936>820>692- 675  - LDH: 362>447>400>409->443  - procal 2.42-2.7  - crp 20    ID:  - completed Hydroxychloroquine, anakinra   Pt was febrile a few nights ago   blood culture negative  completed Cefepime 2g daily since 4/17- 5/6  and vanc 5/5  WBC: elevated   started on meropenam 5/7    ID Dr. Davey, f/u regarding further recommendations     Nephro:  RASHAUN:  -Cr/BUN:  3.44/28  no longer on lasix drip as per nephro due to no urine output   S/P HD on 5/7   f/u nephro Dr. Rosa  - Monitor electrolytes   Hyponatremia: 133  Monitor BMP  Nephrology: Dr. Rosa      GI:  -  Tube feed  - GI ppx with Protonix  - Miralax and senna for prophylaxis    Heme:  - no indication for transfusion   - continues to bleed through mouth- continue to hold dvt ppx   - Hgb stable 8.4  - monitor cbc daily     Endo:  - No history of DM- hypoglycemic overnight  - will fu cortisol levels   - target CBG < 180  - FS q6 while Tube feed      Prophy:  - holding eliquis 2.5mg in the setting of bleeding   - Protonix for GI prophylaxis   - prognosis is poor

## 2020-05-09 NOTE — PROGRESS NOTE ADULT - ATTENDING COMMENTS
Assessment:  1. Acute Hypoxic Respiratory Failure  2. ARDS  3. COVID 19 infection  4. HTN  5. Hypercoagulable state  6. Septic shock  7. RASHAUN  8. Multi system organ failure      Plan  - Worsening shock and hypoxia, on higher dose of vasopressors. Will hold off HD today due to poor hemodynamics.   - Mechanical ventilation with lung protective strategy though remains with elevated plateau pressures  - Titrate Fio2 and PEEP as tolerated, on 100% fio2 though remains hypoxic  - Sedation for ventilator synchrony   - Vasopressor support to maintain MAP > 60  - Monitor ABG  - S/p Anakinra, Tocilizumab and Hydroxychloroquine  - Antibiotics completed per ID  - Airborne and Contact isolation  - Nephrology follow up noted  - Bowel regimen  - DVT and Stress Ulcer prophylaxis   - Over all prognosis is poor given multiple organ dysfunction and prolonged hospitalization with intubation. Palliative care follow up requested

## 2020-05-09 NOTE — PROGRESS NOTE ADULT - PROBLEM SELECTOR PROBLEM 1
Multi-organ failure with heart failure
Multi-organ failure with heart failure
RASHAUN (acute kidney injury)
Multi-organ failure with heart failure
RASHAUN (acute kidney injury)
Multi-organ failure with heart failure

## 2020-05-09 NOTE — PROGRESS NOTE ADULT - MENTAL STATUS
sedated

## 2020-05-09 NOTE — PROGRESS NOTE ADULT - RALES
bilat
rales bilat
scattered bilat
bilat
few rales bilat
few rales bilat
few scattered creps bilat
few scattered rales bilat
bilat
few bilat

## 2020-05-09 NOTE — PROGRESS NOTE ADULT - GASTROINTESTINAL DETAILS
no distention/no masses palpable/no guarding/no organomegaly/no rigidity/soft
no masses palpable/no organomegaly/no guarding/no rigidity/soft/no distention
no rigidity/no distention/soft/no masses palpable/no guarding
no rigidity/soft/no distention
no distention/bowel sounds normal/no guarding/no rigidity/soft
no distention/bowel sounds normal/no guarding/soft/no rigidity
no distention/no guarding/no rigidity/soft
no guarding/no distention/no rigidity/no organomegaly/bowel sounds normal/soft
no masses palpable/no guarding/no rigidity/no distention/soft/bowel sounds normal
no organomegaly/bowel sounds normal/no rigidity/soft/no masses palpable/no distention/no guarding
soft/no distention/no guarding/no rigidity
no rigidity/no guarding/soft/no masses palpable
bowel sounds normal/no rigidity/no guarding/soft/no masses palpable

## 2020-05-09 NOTE — PROGRESS NOTE ADULT - CARDIOVASCULAR
detailed exam

## 2020-05-09 NOTE — PROGRESS NOTE ADULT - SUBJECTIVE AND OBJECTIVE BOX
55y Female    Meds:  meropenem  IVPB 500 milliGRAM(s) IV Intermittent every 12 hours  meropenem  IVPB        Allergies    No Known Allergies    Intolerances        VITALS:  Vital Signs Last 24 Hrs  T(C): 36.2 (09 May 2020 04:00), Max: 36.6 (08 May 2020 15:00)  T(F): 97.2 (09 May 2020 04:00), Max: 97.9 (08 May 2020 20:00)  HR: 105 (09 May 2020 06:45) (88 - 133)  BP: --  BP(mean): --  RR: 35 (09 May 2020 06:45) (31 - 35)  SpO2: 85% (09 May 2020 06:45) (80% - 98%)    LABS/DIAGNOSTIC TESTS:                          7.9    19.36 )-----------( 173      ( 09 May 2020 06:21 )             27.1         05-09    134<L>  |  100  |  39<H>  ----------------------------<  69<L>  3.7   |  22  |  3.98<H>    Ca    9.1      09 May 2020 06:21  Phos  6.1     05-09  Mg     2.5     05-09    TPro  7.7  /  Alb  1.7<L>  /  TBili  3.5<H>  /  DBili  x   /  AST  64<H>  /  ALT  52  /  AlkPhos  291<H>  05-09      LIVER FUNCTIONS - ( 09 May 2020 06:21 )  Alb: 1.7 g/dL / Pro: 7.7 g/dL / ALK PHOS: 291 U/L / ALT: 52 U/L DA / AST: 64 U/L / GGT: x             CULTURES: .Blood Blood-Peripheral  04-17 @ 18:26   No Growth Final  --  --            RADIOLOGY:      ROS:  [  ] UNABLE TO ELICIT ICU VISIT  55y Female who remains in the ICU, she is on 2 pressors , sedated , intubated and vent dependent, she is on a FIO2 of 100% and PEEP of 12 and still only saturating at 74% , she is tachy to 130's just now. She is unresponsive, No fevers , and wbc count is decreasing but patient is actively dying just now.    Meds:  meropenem  IVPB 500 milliGRAM(s) IV Intermittent every 12 hours  meropenem  IVPB        Allergies    No Known Allergies    Intolerances        VITALS:  Vital Signs Last 24 Hrs  T(C): 36.2 (09 May 2020 04:00), Max: 36.6 (08 May 2020 15:00)  T(F): 97.2 (09 May 2020 04:00), Max: 97.9 (08 May 2020 20:00)  HR: 105 (09 May 2020 06:45) (88 - 133)  BP: --  BP(mean): --  RR: 35 (09 May 2020 06:45) (31 - 35)  SpO2: 85% (09 May 2020 06:45) (80% - 98%)    LABS/DIAGNOSTIC TESTS:                          7.9    19.36 )-----------( 173      ( 09 May 2020 06:21 )             27.1         05-09    134<L>  |  100  |  39<H>  ----------------------------<  69<L>  3.7   |  22  |  3.98<H>    Ca    9.1      09 May 2020 06:21  Phos  6.1     05-09  Mg     2.5     05-09    TPro  7.7  /  Alb  1.7<L>  /  TBili  3.5<H>  /  DBili  x   /  AST  64<H>  /  ALT  52  /  AlkPhos  291<H>  05-09      LIVER FUNCTIONS - ( 09 May 2020 06:21 )  Alb: 1.7 g/dL / Pro: 7.7 g/dL / ALK PHOS: 291 U/L / ALT: 52 U/L DA / AST: 64 U/L / GGT: x             CULTURES: .Blood Blood-Peripheral  04-17 @ 18:26   No Growth Final  --  --            RADIOLOGY:      ROS:  [ x ] UNABLE TO ELICIT

## 2020-05-09 NOTE — PROGRESS NOTE ADULT - MS EXT PE MLT D E PC
no cyanosis/pedal edema
no cyanosis/no pedal edema
no cyanosis/pedal edema
no pedal edema/no cyanosis
no cyanosis/no pedal edema
no cyanosis/pedal edema
no cyanosis/no pedal edema
no cyanosis/no pedal edema
no cyanosis/pedal edema
no cyanosis/pedal edema

## 2020-05-09 NOTE — PROGRESS NOTE ADULT - ASSESSMENT
1.	Septic Shock  2.	Bilat Pneumonia with resp failure  3.	COVID - 19 infection  4.	Leukocytosis - decreased    ·	cont Meropenem 500mgs iv q12hrs  ·	prognosis - very poor, pt is actively dying just now despite all aggressive measures.  Time spent - 30 mins

## 2020-05-09 NOTE — PROGRESS NOTE ADULT - ENMT COMMENTS
orally intubated, NGT in place
orally intubated , NGT in place
orally intubated, NGT in place

## 2020-05-09 NOTE — PROGRESS NOTE ADULT - GIT ABD PE PAL DETAILS PC
bowel sounds hypoactive

## 2020-05-09 NOTE — PROGRESS NOTE ADULT - ATTENDING COMMENTS
Keanu Narvaez MD  [facility]  Office [phone]  Ans Serv [phone] cell - [phone]

## 2020-05-09 NOTE — PROGRESS NOTE ADULT - NECK DETAILS
no JVD/supple
supple
supple/no JVD
supple/no JVD
no JVD/supple
supple
supple/no JVD
supple/no JVD
supple

## 2020-05-09 NOTE — PROGRESS NOTE ADULT - [facility] HE PE MLT D E PC
regular rate and rhythm

## 2020-05-09 NOTE — PROGRESS NOTE ADULT - CONSTITUTIONAL DETAILS
no distress/well-groomed/obese/well-developed/well-nourished
no distress/well-nourished/obese/well-developed
well-developed/no distress/well-nourished/well-groomed
no distress/well-developed/well-nourished
obese/well-nourished/well-developed/no distress
respiratory distress/well-developed/well-nourished
well-developed/respiratory distress
well-groomed/no distress/obese/well-nourished/well-developed
well-groomed/no distress/well-developed/well-nourished
well-nourished/no distress/obese/well-developed/well-groomed
well-nourished/respiratory distress/well-developed
well-nourished/well-developed/well-groomed/no distress
well-nourished/well-groomed/no distress/well-developed

## 2020-05-09 NOTE — PROGRESS NOTE ADULT - ASSESSMENT
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 Patient was admitted on medicine floor for COVID pna.  Admitted to ICU for hypoxia and desaturation on NRB- intubated on 4/15.     Assessment:  - acute hypoxic respiratory failure secondary to COVID-19 pneumonia  - septic shock requiring pressors   - RASHAUN  - Leukocytosis  - COVID pandemic     Plan:  Neuro:  -Pt sedated with fentanyl, precedex    CV:  - Hold BP medications  - currently on levophed .2  - on midodrine 10mg q 8     Pulm:  - Intubated for respiratory distress and hypoxia 4/15  - CXR shows bilateral airspace opacities; improving s/p HD sessions; may be from fluid overload   - Acute Hypoxic Resp Failure secondary to PNA + COVID 19  - S/P ANAKINRA and Hydroxychloroquine (since April 9th)   - Discontinued full dose Lovenox due to epistaxis  - Current vent settings 35/380/80/5; plateau pressure elevated; possibly need to paralyze patient if not improving     - CXR findings may be due to fluid overload; s/p HD session 5/7 HD nephro Dr. Rosa  - attempt to decrease FiO2 requirements once fluid overload improves; will hold off on trial of tocilizumab at this time   - ESR: 96>92>108>  - D-dimer: 1901>1936>820>692- 675  - LDH: 362>447>400>409->443  - procal 2.42-2.7  - crp 20    ID:  - completed Hydroxychloroquine, anakinra   Pt was febrile a few nights ago   blood culture negative  completed Cefepime 2g daily since 4/17- 5/6  and vanc 5/5  WBC: elevated   started on meropenam 5/7    ID Dr. Davey, f/u regarding further recommendations     Nephro:  RASHAUN:  -Cr/BUN:  3.44/28  no longer on lasix drip as per nephro due to no urine output   S/P HD on 5/7   f/u nephro Dr. Rosa  - Monitor electrolytes   Hyponatremia: 133  Monitor BMP  Nephrology: Dr. Rosa      GI:  -  Tube feed on hold while on paralytic agent  - GI ppx with Protonix  - Miralax and senna for prophylaxis    Heme:  - no indication for transfusion   - continues to bleed through mouth- continue to hold dvt ppx   - Hgb stable 8.4  - monitor cbc daily     Endo:  - No history of DM- hypoglycemic overnight  - will fu cortisol levels   - target CBG < 180  - FS q6 while Tube feed      Prophy:  - holding eliquis 2.5mg in the setting of bleeding   - Protonix for GI prophylaxis   - prognosis is poor 55 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 Patient was admitted on medicine floor for COVID pna.  Admitted to ICU for hypoxia and desaturation on NRB- intubated on 4/15.     Assessment:  - acute hypoxic respiratory failure secondary to COVID-19 pneumonia  - septic shock requiring pressors   - RASHAUN  - Leukocytosis  - COVID pandemic     Plan:  Neuro:  -Pt sedated with fentanyl, precedex    CV:  - Hold BP medications  - currently on levophed max and pheny max  - on midodrine 10mg q 8     Pulm:  - Intubated for respiratory distress and hypoxia 4/15  - CXR shows bilateral airspace opacities; improving s/p HD sessions; may be from fluid overload   - Acute Hypoxic Resp Failure secondary to PNA + COVID 19  - S/P ANAKINRA and Hydroxychloroquine (since April 9th)   - Discontinued full dose Lovenox due to epistaxis  - Current vent settings 35/380/80/5; plateau pressure elevated; possibly need to paralyze patient if not improving     - CXR findings may be due to fluid overload; started on HD session; last 5/7 HD, currently pt is unstable to get HD  -  nephro Dr. Rosa  - ESR: 96>92>108>  - D-dimer: 1901>1936>820>692- 675  - LDH: 362>447>400>409->443  - procal 2.42-2.7  - crp 20    ID:  - completed Hydroxychloroquine, anakinra   Pt was febrile a few nights ago   blood culture negative  completed Cefepime 2g daily since 4/17- 5/6  and vanc 5/5  WBC: elevated   started on meropenam 5/7    ID Dr. Davey, f/u regarding further recommendations     Nephro:  RASHAUN:  -Cr/BUN:  3.98/28  no longer on lasix drip as per nephro due to no urine output   last 5/7 HD, currently pt is unstable to get HD  f/u nephro Dr. Rosa  - Monitor electrolytes   Hyponatremia: 134  Monitor BMP  Nephrology: Dr. Rosa      GI:  -  Tube feed on hold while on paralytic agent  - GI ppx with Protonix  - Miralax and senna for prophylaxis    Heme:  - no indication for transfusion   - continues to bleed through mouth- continue to hold dvt ppx   - Hgb 7.9  - monitor cbc daily     Endo:  - No history of DM-   - wnl cortisol levels   - target CBG < 180  - FS q6 while Tube feed      Prophy:  - holding eliquis 2.5mg in the setting of bleeding   - Protonix for GI prophylaxis   - prognosis is poor

## 2020-05-09 NOTE — DISCHARGE NOTE FOR THE EXPIRED PATIENT - HOSPITAL COURSE
54F with PMH of HTN, worked as HD nurse,  presented to the ED with  fever, chills, nausea, vomiting, and mild cough that began x1 week ago. Patient was admitted on medicine floor for COVID pna. Admitted to ICU for hypoxia and desaturation on NRB- intubated on 4/15. She received Anakinra, Tocilizumab and Hydroxychloroquine. She had worsening renal failure requiring dialysis and multi-organ failure requiring maximum pressor and bud support. Over all prognosis was poor given multiple organ dysfunction and prolonged hospitalization with intubation. “Called by nurse about patient being in asystole. Patient was resuscitated as per ACLS protocol and 3epi, 1 sodium bicarbonate and 1 calcium chloride was given. Patient pronounced dead at 22:13 hours on saturday. Attending,  was at bedside. Dr. Parada called patient's family,  Mr. hart's number is not reachable and sister did not answer the call, left voice message. 54F with PMH of HTN, worked as HD nurse,  presented to the ED with  fever, chills, nausea, vomiting, and mild cough that began x1 week ago. Patient was admitted on medicine floor for COVID pna. Admitted to ICU for hypoxia and desaturation on NRB- intubated on 4/15. She received Anakinra, Tocilizumab and Hydroxychloroquine. She had worsening renal failure requiring dialysis and multi-organ failure requiring maximum pressor and bud support. Over all prognosis was poor given multiple organ dysfunction and prolonged hospitalization with intubation. “Called by nurse about patient being in asystole. Patient was resuscitated as per ACLS protocol and 3epi, 1 sodium bicarbonate and 1 calcium chloride was given. Patient pronounced dead at 22:13 hours on saturday. Attending,  was at bedside. Dr. Tyler called patient's family,  Mr. hart's number is not reachable, hence sister, Ailyn was informed and condolences given. 55F with PMH of HTN, worked as HD nurse,  presented to the ED with  fever, chills, nausea, vomiting, and mild cough that began x1 week ago. Patient was admitted on medicine floor for COVID pna. Admitted to ICU for hypoxia and desaturation on NRB- intubated on 4/15. She received Anakinra, Tocilizumab and Hydroxychloroquine. She had worsening renal failure requiring dialysis and multi-organ failure requiring maximum pressor and bud support. Over all prognosis was poor given multiple organ dysfunction and prolonged hospitalization with intubation. “Called by nurse about patient being in asystole. Patient was resuscitated as per ACLS protocol and 3epi, 1 sodium bicarbonate and 1 calcium chloride was given. Patient pronounced dead at 22:13 hours on saturday. Attending,  was at bedside. Dr. Tyler called patient's family,  Mr. hart's number is not reachable, hence sister, Ailyn was informed and condolences given.

## 2020-05-09 NOTE — PROGRESS NOTE ADULT - ASSESSMENT
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 Presented to ED with SOB, fever and hypoxia. Subsequently transfered to ICU intubated and developed ARDS s/p CQ and Anikinra. Subsequently developed RASHAUN with ensuing oliguria prompting a renal consult. Hypernatremia has improved. Severe hypoalbuminemia 1.3     # RASHAUN sec to ATN from COVID - 19 related disease. With ARDS. remains on pressors. hypoalbuminemia.  Anuric.  remains with high FIO2/PEEP requirements and poor minute ventilation. Worsening hypoxia.   Due to severe hypotension and hypoxia, will not dialyze today.   K and bicarb acceptable anyway.  reassess daily for HD needs.    poor prognosis overall. cont to discuss with family regarding goals of care.

## 2020-05-09 NOTE — PROGRESS NOTE ADULT - EXTREMITIES COMMENTS
edema of hands
mild edema of hands
edema of hands
edema of hands
hands swollen
edema of arms ++
anasarca
edema of arms

## 2020-05-09 NOTE — PROGRESS NOTE ADULT - SUBJECTIVE AND OBJECTIVE BOX
Nephrology Followup Note - [phone] - Dr Narvaez / Dr Silva / Dr Lin / Dr Temple / Dr Collier / Dr Menezes / Dr Mosher / Dr Rosa  Pt seen and examined at bedside  Pt doing poorly. Increasingly hypoxic, hypotensive.     Allergies:  No Known Allergies    Hospital Medications:   MEDICATIONS  (STANDING):  calcium acetate 667 milliGRAM(s) Oral three times a day with meals  chlorhexidine 2% Cloths 1 Application(s) Topical <User Schedule>  collagenase Ointment 1 Application(s) Topical daily  dexMEDEtomidine Infusion 0.2 MICROgram(s)/kG/Hr (4.31 mL/Hr) IV Continuous <Continuous>  fentaNYL   Infusion 0.5 MICROgram(s)/kG/Hr (4.31 mL/Hr) IV Continuous <Continuous>  meropenem  IVPB 500 milliGRAM(s) IV Intermittent every 12 hours  meropenem  IVPB      midodrine 10 milliGRAM(s) Oral every 8 hours  mupirocin 2% Nasal 1 Application(s) Nasal two times a day  norepinephrine Infusion 0.05 MICROgram(s)/kG/Min (4.04 mL/Hr) IV Continuous <Continuous>  pantoprazole  Injectable 40 milliGRAM(s) IV Push daily  phenylephrine    Infusion 0.1 MICROgram(s)/kG/Min (1.62 mL/Hr) IV Continuous <Continuous>  polyethylene glycol 3350 17 Gram(s) Oral daily  rocuronium Infusion 8 MICROgram(s)/kG/Min (8.28 mL/Hr) IV Continuous <Continuous>  senna 2 Tablet(s) Oral at bedtime    VITALS:  T(F): 97 (05-09-20 @ 07:00), Max: 97.9 (05-08-20 @ 20:00)  HR: 131 (05-09-20 @ 08:30)  BP: --  RR: 31 (05-09-20 @ 08:30)  SpO2: 74% (05-09-20 @ 08:30)  Wt(kg): --    05-08 @ 07:01  -  05-09 @ 07:00  --------------------------------------------------------  IN: 4279.9 mL / OUT: 0 mL / NET: 4279.9 mL    05-09 @ 07:01  -  05-09 @ 09:51  --------------------------------------------------------  IN: 473.8 mL / OUT: 0 mL / NET: 473.8 mL        PHYSICAL EXAM:  Constitutional: NAD  HEENT: ETT NGT   Neck: No JVD  Respiratory: Course breath sounds   Cardiovascular: S1, S2, RRR  Gastrointestinal: BS+, soft, NT/ND  Extremities: No cyanosis or clubbing. peripheral edema  Neurological: sedated  : No CVA tenderness. No parr.   Skin: No rashes    LABS:  05-09    134<L>  |  100  |  39<H>  ----------------------------<  69<L>  3.7   |  22  |  3.98<H>    Ca    9.1      09 May 2020 06:21  Phos  6.1     05-09  Mg     2.5     05-09    TPro  7.7  /  Alb  1.7<L>  /  TBili  3.5<H>  /  DBili      /  AST  64<H>  /  ALT  52  /  AlkPhos  291<H>  05-09    Creatinine Trend: 3.98 <--, 3.44 <--, 4.59 <--, 3.84 <--, 5.92 <--, 5.03 <--, 4.17 <--                        7.9    19.36 )-----------( 173      ( 09 May 2020 06:21 )             27.1     Urine Studies:      RADIOLOGY & ADDITIONAL STUDIES:

## 2020-05-09 NOTE — PROGRESS NOTE ADULT - RS GEN PE MLT RESP DETAILS PC
breath sounds equal/good air movement/no rhonchi/no rales
breath sounds equal/good air movement/no rhonchi/no wheezes/rales
good air movement/rales/no rhonchi/no wheezes
no wheezes/airway patent/no rhonchi/rales/breath sounds equal/good air movement
airway patent/no rales/rhonchi/breath sounds equal/no wheezes/good air movement
good air movement/no rhonchi/no wheezes/rales
no rhonchi/no wheezes/rales/breath sounds equal/good air movement
no wheezes/rales/good air movement/no rhonchi/breath sounds equal
no wheezes/rales/no rhonchi/airway patent/good air movement/breath sounds equal
rales/breath sounds equal/no rhonchi/no wheezes/good air movement
airway patent/good air movement/breath sounds equal/no wheezes/rales/rhonchi
breath sounds equal/no rhonchi/no wheezes/good air movement/rales
no rhonchi/clear to auscultation bilaterally/good air movement/breath sounds equal/no rales/no wheezes

## 2020-05-09 NOTE — PROGRESS NOTE ADULT - SUBJECTIVE AND OBJECTIVE BOX
HISTORY  55y Female with COVID-19(+) pneumonia requiring intubation.    24 HOUR EVENTS: continues to remain acidotic; prognosis poor.    SUBJECTIVE/ROS: Due to intubation with sedation, subjective information was not able to be obtained from the patient. History was obtained, to the extent possible, from review of the chart and collateral sources of information.      NEURO  Exam: sedated, no acute distress  Meds: acetaminophen    Suspension .. 650 milliGRAM(s) Oral every 6 hours PRN Temp greater or equal to 38C (100.4F)  dexMEDEtomidine Infusion 0.2 MICROgram(s)/kG/Hr IV Continuous <Continuous>  fentaNYL   Infusion 0.5 MICROgram(s)/kG/Hr IV Continuous <Continuous>  rocuronium Infusion 8 MICROgram(s)/kG/Min IV Continuous <Continuous>    Adequacy of sedation and pain control has been assessed and adjusted.      RESPIRATORY  RR: 35 (28 - 35)  SpO2: 88% (88% - 99%)  Exam: without dissynchrony  Mechanical Ventilation: Mode: AC/ CMV (Assist Control/ Continuous Mandatory Ventilation), RR (machine): 35, TV (machine): 380, FiO2: 100, PEEP: 12  Predicted Body Weight:  Tidal Volume/PBW ratio:  PaO2 to FiO2 ratio:  Extubation readiness assessed.  ABG - ( 08 May 2020 03:53 )  pH: 7.14  /  pCO2: 78    /  pO2: 68    / HCO3: 26    / Base Excess: -3.7  /  SaO2: 92      Lactate: x                CARDIOVASCULAR  HR: 127 (65 - 127)  ABP: 89/42 (80/48 - 154/81)  ABP(mean): 57 (57 - 116)  Cardiac Rhythm: sinus  Most recent QTc:   Meds: midodrine  norepinephrine Infusion (4.04 mL/Hr)        GI/NUTRITION  Diet: Diet, NPO with Tube Feed:   Tube Feeding Modality: Nasogastric  Nepro with Carb Steady  Total Volume for 24 Hours (mL): 720  Continuous  Starting Tube Feed Rate mL per Hour: 10  Increase Tube Feed Rate by (mL): 10     Every hour  Until Goal Tube Feed Rate (mL per Hour): 30  Tube Feed Duration (in Hours): 24  Tube Feed Start Time: 09:30  No Carb Prosource (1pkg = 15gms Protein)     Qty per Day:  2 (05-06-20 @ 10:02)    Most recent bowel movement:  Meds/stress ulcer prophylaxis: calcium acetate 667 milliGRAM(s) Oral three times a day with meals  pantoprazole  Injectable 40 milliGRAM(s) IV Push daily  polyethylene glycol 3350 17 Gram(s) Oral daily  senna 2 Tablet(s) Oral at bedtime        GENITOURINARY  I&O's Detail    05-07 @ 07:01  -  05-08 @ 07:00  --------------------------------------------------------  IN:    dexmedetomidine Infusion: 118.8 mL    Enteral Tube Flush: 260 mL    fentaNYL  Infusion: 189.2 mL    norepinephrine Infusion: 84.8 mL    rocuronium Infusion: 54.8 mL    Sodium Chloride 0.9% IV Bolus: 1000 mL  Total IN: 1707.7 mL    OUT:    Other: 2500 mL  Total OUT: 2500 mL    Total NET: -792.3 mL          05-08    133<L>  |  95<L>  |  28<H>  ----------------------------<  69<L>  3.7   |  25  |  3.44<H>    Ca    9.6      08 May 2020 06:42  Phos  6.0     05-08  Mg     2.3     05-08    TPro  8.2  /  Alb  1.8<L>  /  TBili  3.3<H>  /  DBili  x   /  AST  48<H>  /  ALT  49  /  AlkPhos  278<H>  05-08    [x] Renner catheter, indication: urine output monitoring in critically ill patient.  Meds: calcium acetate 667 milliGRAM(s) Oral three times a day with meals          HEMATOLOGIC  Meds/VTE prophylaxis:                         8.3    23.74 )-----------( 183      ( 08 May 2020 06:42 )             28.2     PT/INR - ( 08 May 2020 06:42 )   PT: 16.7 sec;   INR: 1.46 ratio           15650-12-91 @ 09:05        INFECTIOUS DISEASES  T(C): 37.1, Max: 37.1 (05-08-20 @ 05:25)  WBC Count: 23.74 (05-08-20 @ 06:42)  WBC Count: 33.13 (05-07-20 @ 06:27)    Recent Cultures:    Meds: meropenem  IVPB 500 milliGRAM(s) IV Intermittent every 12 hours  meropenem  IVPB        Procalcitonin, Serum: 2.74 ng/mL (05-06-20 @ 09:14)        ENDOCRINE  Fingersticks: 57, 85, 105, 97, 51  Meds:     ACCESS DEVICES:  CENTRAL LINE: [x ] YES LOCATION: [facility]  DATE INSERTED: 4/30  REMOVE: NO     RENNER: [ x] YES    REMOVE: NO     A-LINE: [ x] YES LOCATION: R axillary  DATE INSERTED: 4/26  REMOVE: NO  EXPLAIN:  [ ] [facility]             [x ] R [ ] L [ ] IJ  [ ] Fem     Placed:4/24  [x] Urinary Catheter, Date Placed:   Necessity of urinary, arterial, and venous catheters discussed.      CODE STATUS: Full code    IMAGING: HISTORY  55y Female with COVID-19(+) pneumonia requiring intubation.    24 HOUR EVENTS: continues to remain acidotic; prognosis poor.    SUBJECTIVE/ROS: Due to intubation with sedation, subjective information was not able to be obtained from the patient. History was obtained, to the extent possible, from review of the chart and collateral sources of information.      NEURO  Exam: sedated, no acute distress  Meds: acetaminophen    Suspension .. 650 milliGRAM(s) Oral every 6 hours PRN Temp greater or equal to 38C (100.4F)  dexMEDEtomidine Infusion 0.2 MICROgram(s)/kG/Hr IV Continuous <Continuous>  fentaNYL   Infusion 0.5 MICROgram(s)/kG/Hr IV Continuous <Continuous>  rocuronium Infusion 8 MICROgram(s)/kG/Min IV Continuous <Continuous>    Adequacy of sedation and pain control has been assessed and adjusted.      RESPIRATORY  RR: 35 (28 - 35)  SpO2: 88% (88% - 99%)  Exam: without dissynchrony  Mechanical Ventilation: Mode: AC/ CMV (Assist Control/ Continuous Mandatory Ventilation), RR (machine): 35, TV (machine): 380, FiO2: 100, PEEP: 12  Predicted Body Weight:  Tidal Volume/PBW ratio:  PaO2 to FiO2 ratio:  Extubation readiness assessed.  ABG - ( 08 May 2020 03:53 )  pH: 7.14  /  pCO2: 78    /  pO2: 68    / HCO3: 26    / Base Excess: -3.7  /  SaO2: 92      Lactate: x                CARDIOVASCULAR  HR: 127 (65 - 127)  ABP: 89/42 (80/48 - 154/81)  ABP(mean): 57 (57 - 116)  Cardiac Rhythm: sinus  Most recent QTc:   Meds: midodrine  norepinephrine Infusion (4.04 mL/Hr)        GI/NUTRITION  Abdomen is distended  Diet: Diet, NPO with Tube Feed:   Tube Feeding Modality: Nasogastric  Nepro with Carb Steady  Total Volume for 24 Hours (mL): 720  Continuous  Starting Tube Feed Rate mL per Hour: 10  Increase Tube Feed Rate by (mL): 10     Every hour  Until Goal Tube Feed Rate (mL per Hour): 30  Tube Feed Duration (in Hours): 24  Tube Feed Start Time: 09:30  No Carb Prosource (1pkg = 15gms Protein)     Qty per Day:  2 (05-06-20 @ 10:02)    Most recent bowel movement:  Meds/stress ulcer prophylaxis: calcium acetate 667 milliGRAM(s) Oral three times a day with meals  pantoprazole  Injectable 40 milliGRAM(s) IV Push daily  polyethylene glycol 3350 17 Gram(s) Oral daily  senna 2 Tablet(s) Oral at bedtime        GENITOURINARY  I&O's Detail    05-07 @ 07:01  -  05-08 @ 07:00  --------------------------------------------------------  IN:    dexmedetomidine Infusion: 118.8 mL    Enteral Tube Flush: 260 mL    fentaNYL  Infusion: 189.2 mL    norepinephrine Infusion: 84.8 mL    rocuronium Infusion: 54.8 mL    Sodium Chloride 0.9% IV Bolus: 1000 mL  Total IN: 1707.7 mL    OUT:    Other: 2500 mL  Total OUT: 2500 mL    Total NET: -792.3 mL          05-08    133<L>  |  95<L>  |  28<H>  ----------------------------<  69<L>  3.7   |  25  |  3.44<H>    Ca    9.6      08 May 2020 06:42  Phos  6.0     05-08  Mg     2.3     05-08    TPro  8.2  /  Alb  1.8<L>  /  TBili  3.3<H>  /  DBili  x   /  AST  48<H>  /  ALT  49  /  AlkPhos  278<H>  05-08    [x] Renner catheter, indication: urine output monitoring in critically ill patient.  Meds: calcium acetate 667 milliGRAM(s) Oral three times a day with meals          HEMATOLOGIC  Meds/VTE prophylaxis:                         8.3    23.74 )-----------( 183      ( 08 May 2020 06:42 )             28.2     PT/INR - ( 08 May 2020 06:42 )   PT: 16.7 sec;   INR: 1.46 ratio           73023-60-98 @ 09:05        INFECTIOUS DISEASES  T(C): 37.1, Max: 37.1 (05-08-20 @ 05:25)  WBC Count: 23.74 (05-08-20 @ 06:42)  WBC Count: 33.13 (05-07-20 @ 06:27)    Recent Cultures:    Meds: meropenem  IVPB 500 milliGRAM(s) IV Intermittent every 12 hours  meropenem  IVPB        Procalcitonin, Serum: 2.74 ng/mL (05-06-20 @ 09:14)        ENDOCRINE  Fingersticks: 57, 85, 105, 97, 51  Meds:     ACCESS DEVICES:  CENTRAL LINE: [x ] YES LOCATION: [facility]  DATE INSERTED: 4/30  REMOVE: NO     RENNER: [ x] YES    REMOVE: NO     A-LINE: [ x] YES LOCATION: R axillary  DATE INSERTED: 4/26  REMOVE: NO  EXPLAIN:  [ ] [facility]             [x ] R [ ] L [ ] IJ  [ ] Fem     Placed:4/24  [x] Urinary Catheter, Date Placed:   Necessity of urinary, arterial, and venous catheters discussed.      CODE STATUS: Full code    IMAGING

## 2020-05-09 NOTE — PROGRESS NOTE ADULT - REASON FOR ADMISSION
Pneumonia

## 2020-05-10 PROCEDURE — 84484 ASSAY OF TROPONIN QUANT: CPT

## 2020-05-10 PROCEDURE — 93005 ELECTROCARDIOGRAM TRACING: CPT

## 2020-05-10 PROCEDURE — 87635 SARS-COV-2 COVID-19 AMP PRB: CPT

## 2020-05-10 PROCEDURE — 80053 COMPREHEN METABOLIC PANEL: CPT

## 2020-05-10 PROCEDURE — 94640 AIRWAY INHALATION TREATMENT: CPT

## 2020-05-10 PROCEDURE — 82803 BLOOD GASES ANY COMBINATION: CPT

## 2020-05-10 PROCEDURE — 71045 X-RAY EXAM CHEST 1 VIEW: CPT

## 2020-05-10 PROCEDURE — 85652 RBC SED RATE AUTOMATED: CPT

## 2020-05-10 PROCEDURE — 80202 ASSAY OF VANCOMYCIN: CPT

## 2020-05-10 PROCEDURE — 82533 TOTAL CORTISOL: CPT

## 2020-05-10 PROCEDURE — 84100 ASSAY OF PHOSPHORUS: CPT

## 2020-05-10 PROCEDURE — 96374 THER/PROPH/DIAG INJ IV PUSH: CPT

## 2020-05-10 PROCEDURE — 85027 COMPLETE CBC AUTOMATED: CPT

## 2020-05-10 PROCEDURE — 87389 HIV-1 AG W/HIV-1&-2 AB AG IA: CPT

## 2020-05-10 PROCEDURE — 83036 HEMOGLOBIN GLYCOSYLATED A1C: CPT

## 2020-05-10 PROCEDURE — 80048 BASIC METABOLIC PNL TOTAL CA: CPT

## 2020-05-10 PROCEDURE — 85610 PROTHROMBIN TIME: CPT

## 2020-05-10 PROCEDURE — 82955 ASSAY OF G6PD ENZYME: CPT

## 2020-05-10 PROCEDURE — 87641 MR-STAPH DNA AMP PROBE: CPT

## 2020-05-10 PROCEDURE — 96375 TX/PRO/DX INJ NEW DRUG ADDON: CPT

## 2020-05-10 PROCEDURE — 83735 ASSAY OF MAGNESIUM: CPT

## 2020-05-10 PROCEDURE — 85730 THROMBOPLASTIN TIME PARTIAL: CPT

## 2020-05-10 PROCEDURE — 94002 VENT MGMT INPAT INIT DAY: CPT

## 2020-05-10 PROCEDURE — 84145 PROCALCITONIN (PCT): CPT

## 2020-05-10 PROCEDURE — 82728 ASSAY OF FERRITIN: CPT

## 2020-05-10 PROCEDURE — 84478 ASSAY OF TRIGLYCERIDES: CPT

## 2020-05-10 PROCEDURE — P9047: CPT

## 2020-05-10 PROCEDURE — 82962 GLUCOSE BLOOD TEST: CPT

## 2020-05-10 PROCEDURE — 83615 LACTATE (LD) (LDH) ENZYME: CPT

## 2020-05-10 PROCEDURE — 99053 MED SERV 10PM-8AM 24 HR FAC: CPT

## 2020-05-10 PROCEDURE — 87640 STAPH A DNA AMP PROBE: CPT

## 2020-05-10 PROCEDURE — 94003 VENT MGMT INPAT SUBQ DAY: CPT

## 2020-05-10 PROCEDURE — 83690 ASSAY OF LIPASE: CPT

## 2020-05-10 PROCEDURE — 99261: CPT

## 2020-05-10 PROCEDURE — 80074 ACUTE HEPATITIS PANEL: CPT

## 2020-05-10 PROCEDURE — 36415 COLL VENOUS BLD VENIPUNCTURE: CPT

## 2020-05-10 PROCEDURE — 81001 URINALYSIS AUTO W/SCOPE: CPT

## 2020-05-10 PROCEDURE — 86140 C-REACTIVE PROTEIN: CPT

## 2020-05-10 PROCEDURE — 99285 EMERGENCY DEPT VISIT HI MDM: CPT | Mod: 25

## 2020-05-10 PROCEDURE — 83605 ASSAY OF LACTIC ACID: CPT

## 2020-05-10 PROCEDURE — 87040 BLOOD CULTURE FOR BACTERIA: CPT

## 2020-05-10 PROCEDURE — 82550 ASSAY OF CK (CPK): CPT

## 2020-05-10 PROCEDURE — 85379 FIBRIN DEGRADATION QUANT: CPT

## 2024-08-29 NOTE — CONSULT NOTE ADULT - ATTENDING COMMENTS
Upper GI Endoscopy: What to Expect at Home  Your Recovery  You may have a sore throat for a day or two after the test.  How can you care for yourself at home?  Activity  Rest as much as you need to after you go home.  You should be able to go back to your usual activities the day after the test.  Diet  Drink plenty of fluids (unless your doctor has told you not to).  Follow-up care is a key part of your treatment and safety. Be sure to make and go to all appointments, and call your doctor if you are having problems.  When should you call for help?  Call 911 anytime you think you may need emergency care. For example, call if:  You passed out (lost consciousness).  You cough up blood.  You vomit blood or what looks like coffee grounds.  You pass maroon or very bloody stools.  Call your doctor now or seek immediate medical care if:  You have trouble swallowing.  You have belly pain.  Your stools are black and tarlike or have streaks of blood.  You are sick to your stomach or cannot keep fluids down.  Watch closely for changes in your health, and be sure to contact your doctor if:  Your throat still hurts after a day or two.  You do not get better as expected.   Where can you learn more?   Go to [email or web address] and sign in to your [employer] account. Enter J454 in the Search Health Information box to learn more about “Upper GI Endoscopy: What to Expect at Home.”    .     © 1315-9251 [employer]. Care instructions adapted under license by [employer]. This care instruction is for use with your licensed healthcare professional. If you have questions about a medical condition or this instruction, always ask your healthcare professional. [employer] disclaims any warranty or liability for your use of this information.  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 Patient was admitted on medicine floor for COVID pna. ICU consulted for hypoxia and desaturation on NRB.     Assessment:  - Acute Hypoxic Respiratory Failure  - B/L  PNA   -COVID 19 infection  -HTN    Plan:  -transfer to icu   -O2 supp as needed  -low threshold for intubation  -prone therapy  -isolation.. contact and airborne  -start anakinra and solumedrol  -plaquenil and vitamins  -f/u cultures  -dvt/gi prophy